# Patient Record
Sex: MALE | Race: WHITE | NOT HISPANIC OR LATINO
[De-identification: names, ages, dates, MRNs, and addresses within clinical notes are randomized per-mention and may not be internally consistent; named-entity substitution may affect disease eponyms.]

---

## 2020-01-13 ENCOUNTER — APPOINTMENT (OUTPATIENT)
Dept: SURGERY | Facility: HOSPITAL | Age: 85
End: 2020-01-13

## 2020-01-13 ENCOUNTER — OUTPATIENT (OUTPATIENT)
Dept: OUTPATIENT SERVICES | Facility: HOSPITAL | Age: 85
LOS: 1 days | Discharge: HOME | End: 2020-01-13
Payer: COMMERCIAL

## 2020-01-13 ENCOUNTER — APPOINTMENT (OUTPATIENT)
Dept: WOUND CARE | Facility: HOSPITAL | Age: 85
End: 2020-01-13
Payer: MEDICARE

## 2020-01-13 VITALS
HEART RATE: 84 BPM | BODY MASS INDEX: 23.75 KG/M2 | SYSTOLIC BLOOD PRESSURE: 147 MMHG | TEMPERATURE: 97.1 F | RESPIRATION RATE: 20 BRPM | HEIGHT: 76 IN | WEIGHT: 195 LBS | DIASTOLIC BLOOD PRESSURE: 72 MMHG

## 2020-01-13 DIAGNOSIS — L97.101 NON-PRESSURE CHRONIC ULCER OF UNSPECIFIED THIGH LIMITED TO BREAKDOWN OF SKIN: ICD-10-CM

## 2020-01-13 DIAGNOSIS — Z78.9 OTHER SPECIFIED HEALTH STATUS: ICD-10-CM

## 2020-01-13 PROCEDURE — G0463: CPT

## 2020-01-13 PROCEDURE — 99204 OFFICE O/P NEW MOD 45 MIN: CPT

## 2020-01-13 NOTE — PLAN
[FreeTextEntry1] : Medihoney dressings with Ace wrap and leg elevation.\par Non-invasive vascular testing\par Return one week.

## 2020-01-13 NOTE — REVIEW OF SYSTEMS
[Joint Stiffness] : joint stiffness [Joint Pain] : joint pain [Skin Wound] : skin wound [Easy Bruising] : a tendency for easy bruising [Easy Bleeding] : a tendency for easy bleeding [Negative] : Endocrine [Change In A Mole] : no change in a mole

## 2020-01-13 NOTE — PHYSICAL EXAM
[Normal Breath Sounds] : Normal breath sounds [Normal Heart Sounds] : normal heart sounds [Murmur] : murmur was appreciated  [1+] : left 1+ [Ankle Swelling Bilaterally] : bilaterally  [Varicose Veins Of Lower Extremities] : bilaterally [] : bilaterally [Ankle Swelling On The Left] : moderate [Skin Ulcer] : ulcer [Oriented to Person] : oriented to person [Alert] : alert [Oriented to Place] : oriented to place [Oriented to Time] : oriented to time [Calm] : calm [JVD] : no jugular venous distention  [Purpura] : no purpura  [Abdomen Tenderness] : ~T ~M No abdominal tenderness [Petechiae] : no petechiae [Skin Induration] : no induration [de-identified] : WDWN elderly WM in NAD [de-identified] : Expressionless face [de-identified] : Faint systolic murmur present [de-identified] : Supple [de-identified] : Hyperpigmentation of skin both lower legs, left>right. Ulcer lateral aspect left lower leg through fat layer. Fibrin present at base with some viable soft tissue present. No exposure of bone, tendon, muscle. No undermining and no tunneling. [de-identified] : Tremor of both hands [FreeTextEntry1] : Left Lateral Lower Leg [FreeTextEntry2] : 3.0 [FreeTextEntry3] : 1.9 [de-identified] : Small Serosanguineous [FreeTextEntry4] : 0.3 [de-identified] : Intact/ slightly macerated [de-identified] : % [de-identified] : Medihoney/ Dry Dressing & Kerlix [de-identified] : 1-25% [de-identified] : Cleansed with Normal saline\par  [TWNoteComboBox6] : Venous [de-identified] : CIRCULATION\par Dorsalis Pedis: R palpable  L palpable\par Posterior Tibialis: R Doppler L Doppler\par Extremity Color: Pigmented\par Extremity Temperature: Warm\par Capillary Refill: > 3 seconds bilaterally\par CINTHIA: Vascular studies ordered by Dr Jose De Jesus myles sheet submitted\par \par \par  [de-identified] : None [de-identified] : None [de-identified] : Ace wraps [de-identified] : Yes

## 2020-01-13 NOTE — HISTORY OF PRESENT ILLNESS
[FreeTextEntry1] : The wound is located on Left Lower Leg & pt states he has had wound x 1 year- pt states he went to [facility] x about 7-9 months & then pt was discharged from [facility] & states he underwent Vein Procedure (pt states "tying off of the vein") while under their care- wound has gotten larger so he went to PCP (Dr Beckett) who recommended [facility]

## 2020-01-13 NOTE — ASSESSMENT
[FreeTextEntry2] : Alteration in skin integrity- promote optimal skin integrity\par  [FreeTextEntry4] : Dr Syed/ Photo taken\par Vascular studies ordered by Dr Syed- shar sheet submitted\par F/U to [facility] in 1 week\par

## 2020-01-13 NOTE — VITALS
[de-identified] : Pt denies c/o any pains or discomforts at present [Pain related to present condition?] : The patient's  pain is not related to present condition.

## 2020-01-14 DIAGNOSIS — I83.228 VARICOSE VEINS OF LEFT LOWER EXTREMITY WITH BOTH ULCER OF OTHER PART OF LOWER EXTREMITY AND INFLAMMATION: ICD-10-CM

## 2020-01-14 DIAGNOSIS — I49.3 VENTRICULAR PREMATURE DEPOLARIZATION: ICD-10-CM

## 2020-01-14 DIAGNOSIS — I34.0 NONRHEUMATIC MITRAL (VALVE) INSUFFICIENCY: ICD-10-CM

## 2020-01-14 DIAGNOSIS — M19.90 UNSPECIFIED OSTEOARTHRITIS, UNSPECIFIED SITE: ICD-10-CM

## 2020-01-14 DIAGNOSIS — Z79.899 OTHER LONG TERM (CURRENT) DRUG THERAPY: ICD-10-CM

## 2020-01-14 DIAGNOSIS — Z87.891 PERSONAL HISTORY OF NICOTINE DEPENDENCE: ICD-10-CM

## 2020-01-14 DIAGNOSIS — R01.1 CARDIAC MURMUR, UNSPECIFIED: ICD-10-CM

## 2020-01-14 DIAGNOSIS — G20 PARKINSON'S DISEASE: ICD-10-CM

## 2020-01-14 DIAGNOSIS — Z91.018 ALLERGY TO OTHER FOODS: ICD-10-CM

## 2020-01-14 DIAGNOSIS — I83.893 VARICOSE VEINS OF BILATERAL LOWER EXTREMITIES WITH OTHER COMPLICATIONS: ICD-10-CM

## 2020-01-14 DIAGNOSIS — L97.822 NON-PRESSURE CHRONIC ULCER OF OTHER PART OF LEFT LOWER LEG WITH FAT LAYER EXPOSED: ICD-10-CM

## 2020-01-21 ENCOUNTER — OUTPATIENT (OUTPATIENT)
Dept: OUTPATIENT SERVICES | Facility: HOSPITAL | Age: 85
LOS: 1 days | Discharge: HOME | End: 2020-01-21
Payer: COMMERCIAL

## 2020-01-21 ENCOUNTER — APPOINTMENT (OUTPATIENT)
Dept: WOUND CARE | Facility: HOSPITAL | Age: 85
End: 2020-01-21
Payer: MEDICARE

## 2020-01-21 VITALS
SYSTOLIC BLOOD PRESSURE: 132 MMHG | HEIGHT: 76 IN | HEART RATE: 92 BPM | BODY MASS INDEX: 23.75 KG/M2 | RESPIRATION RATE: 20 BRPM | DIASTOLIC BLOOD PRESSURE: 74 MMHG | OXYGEN SATURATION: 100 % | TEMPERATURE: 97.2 F | WEIGHT: 195 LBS

## 2020-01-21 DIAGNOSIS — G20 PARKINSON'S DISEASE: ICD-10-CM

## 2020-01-21 DIAGNOSIS — L97.101 NON-PRESSURE CHRONIC ULCER OF UNSPECIFIED THIGH LIMITED TO BREAKDOWN OF SKIN: ICD-10-CM

## 2020-01-21 DIAGNOSIS — I83.228 VARICOSE VEINS OF LEFT LOWER EXTREMITY WITH BOTH ULCER OF OTHER PART OF LOWER EXTREMITY AND INFLAMMATION: ICD-10-CM

## 2020-01-21 DIAGNOSIS — M19.90 UNSPECIFIED OSTEOARTHRITIS, UNSPECIFIED SITE: ICD-10-CM

## 2020-01-21 DIAGNOSIS — Z91.018 ALLERGY TO OTHER FOODS: ICD-10-CM

## 2020-01-21 DIAGNOSIS — I34.0 NONRHEUMATIC MITRAL (VALVE) INSUFFICIENCY: ICD-10-CM

## 2020-01-21 DIAGNOSIS — I49.3 VENTRICULAR PREMATURE DEPOLARIZATION: ICD-10-CM

## 2020-01-21 DIAGNOSIS — R01.1 CARDIAC MURMUR, UNSPECIFIED: ICD-10-CM

## 2020-01-21 DIAGNOSIS — L97.822 NON-PRESSURE CHRONIC ULCER OF OTHER PART OF LEFT LOWER LEG WITH FAT LAYER EXPOSED: ICD-10-CM

## 2020-01-21 DIAGNOSIS — I83.893 VARICOSE VEINS OF BILATERAL LOWER EXTREMITIES WITH OTHER COMPLICATIONS: ICD-10-CM

## 2020-01-21 DIAGNOSIS — Z87.891 PERSONAL HISTORY OF NICOTINE DEPENDENCE: ICD-10-CM

## 2020-01-21 DIAGNOSIS — Z79.899 OTHER LONG TERM (CURRENT) DRUG THERAPY: ICD-10-CM

## 2020-01-21 PROCEDURE — 99213 OFFICE O/P EST LOW 20 MIN: CPT

## 2020-01-21 PROCEDURE — G0463: CPT

## 2020-01-21 NOTE — ASSESSMENT
[Written] : Written [Verbal] : Verbal [Patient] : Patient [Good - alert, interested, motivated] : Good - alert, interested, motivated [Skin Care] : skin care [Dressing changes] : dressing changes [Signs and symptoms of infection] : sign and symptoms of infection [Venous Disease] : venous disease [Arterial Disease] : arterial disease [Nutrition] : nutrition [How and When to Call] : how and when to call [Off-loading] : off-loading [Compression Therapy] : compression therapy [Patient responsibility to plan of care] : patient responsibility to plan of care [Stable] : stable [Home] : Home [Ambulatory] : Ambulatory [] : No [FreeTextEntry2] : Infection prevention\par Restoration of skin integrity \par Compression therapy/ elevation  [FreeTextEntry4] : No signs/symptoms of infection. \par Pt has not yet scheduled vascular studies \par Follow up to [facility] in one week

## 2020-01-21 NOTE — REVIEW OF SYSTEMS
[Joint Pain] : joint pain [Joint Stiffness] : joint stiffness [Skin Wound] : skin wound [Easy Bleeding] : a tendency for easy bleeding [Easy Bruising] : a tendency for easy bruising [Negative] : Endocrine [Change In A Mole] : no change in a mole

## 2020-01-21 NOTE — HISTORY OF PRESENT ILLNESS
[FreeTextEntry1] : The patient is an 84 year old male who presents for follow-up of a venous stasis ulcer of his left lower leg. He is using Medihoney, and the ulcer has improved. He is awaiting vascular testing.

## 2020-01-21 NOTE — PHYSICAL EXAM
[4 x 4] : 4 x 4  [Murmur] : murmur was appreciated  [1+] : left 1+ [Ankle Swelling Bilaterally] : bilaterally  [Varicose Veins Of Lower Extremities] : bilaterally [] : bilaterally [Ankle Swelling On The Left] : moderate [Skin Ulcer] : ulcer [Alert] : alert [Oriented to Person] : oriented to person [Oriented to Place] : oriented to place [Oriented to Time] : oriented to time [Calm] : calm [JVD] : no jugular venous distention  [Abdomen Tenderness] : ~T ~M No abdominal tenderness [Purpura] : no purpura  [Petechiae] : no petechiae [Skin Induration] : no induration [de-identified] : WDWN elderly WM in NAD [de-identified] : Expressionless face [de-identified] : Supple [de-identified] : Faint systolic murmur present [de-identified] : Ulcer left lower leg smaller with less slough at base and more viable soft tissue present. No sign of infection. [de-identified] : Tremor of both hands [FreeTextEntry1] : Lateral lower leg  [FreeTextEntry2] : 2.5 [FreeTextEntry3] : 1.5 [FreeTextEntry4] : 0.1 [de-identified] : Serosanguineous  [de-identified] : 1-5% [de-identified] : Circulatory and neuromuscular function WNL post ACE application. Patient verbalizes they feel 'comfortable' post ACE application to the lower extremities.  [de-identified] : NSC, Medihoney,DD  [TWNoteComboBox1] : Left [TWNoteComboBox4] : Small [TWNoteComboBox5] : No [de-identified] : No [de-identified] : Normal [de-identified] : None [de-identified] : None [de-identified] : >75% [de-identified] : Ace wraps [de-identified] : 3x Weekly [de-identified] : Yes [de-identified] : Secondary Dressing

## 2020-01-28 ENCOUNTER — OUTPATIENT (OUTPATIENT)
Dept: OUTPATIENT SERVICES | Facility: HOSPITAL | Age: 85
LOS: 1 days | End: 2020-01-28
Payer: COMMERCIAL

## 2020-01-28 DIAGNOSIS — I83.228 VARICOSE VEINS OF LEFT LOWER EXTREMITY WITH BOTH ULCER OF OTHER PART OF LOWER EXTREMITY AND INFLAMMATION: ICD-10-CM

## 2020-01-28 PROCEDURE — 93923 UPR/LXTR ART STDY 3+ LVLS: CPT

## 2020-01-28 PROCEDURE — 93922 UPR/L XTREMITY ART 2 LEVELS: CPT | Mod: 26

## 2020-01-29 ENCOUNTER — APPOINTMENT (OUTPATIENT)
Dept: WOUND CARE | Facility: HOSPITAL | Age: 85
End: 2020-01-29
Payer: MEDICARE

## 2020-01-29 ENCOUNTER — OUTPATIENT (OUTPATIENT)
Dept: OUTPATIENT SERVICES | Facility: HOSPITAL | Age: 85
LOS: 1 days | Discharge: HOME | End: 2020-01-29
Payer: COMMERCIAL

## 2020-01-29 VITALS
DIASTOLIC BLOOD PRESSURE: 73 MMHG | OXYGEN SATURATION: 98 % | RESPIRATION RATE: 20 BRPM | WEIGHT: 195 LBS | TEMPERATURE: 97.6 F | HEIGHT: 76 IN | SYSTOLIC BLOOD PRESSURE: 134 MMHG | BODY MASS INDEX: 23.75 KG/M2 | HEART RATE: 72 BPM

## 2020-01-29 DIAGNOSIS — L97.101 NON-PRESSURE CHRONIC ULCER OF UNSPECIFIED THIGH LIMITED TO BREAKDOWN OF SKIN: ICD-10-CM

## 2020-01-29 PROCEDURE — G0463: CPT

## 2020-01-29 PROCEDURE — 99213 OFFICE O/P EST LOW 20 MIN: CPT

## 2020-01-29 NOTE — PHYSICAL EXAM
[Murmur] : murmur was appreciated  [1+] : left 1+ [Ankle Swelling Bilaterally] : bilaterally  [Varicose Veins Of Lower Extremities] : bilaterally [] : bilaterally [Ankle Swelling On The Left] : moderate [Skin Ulcer] : ulcer [Alert] : alert [Oriented to Person] : oriented to person [Oriented to Place] : oriented to place [Oriented to Time] : oriented to time [Calm] : calm [JVD] : no jugular venous distention  [Abdomen Tenderness] : ~T ~M No abdominal tenderness [Purpura] : no purpura  [Skin Induration] : no induration [Petechiae] : no petechiae [de-identified] : WDWN elderly WM in NAD [de-identified] : Expressionless face [de-identified] : Supple [de-identified] : Ulcer now very clean with no slough at base. Tissue at base is viable and pink. No sign of infection. [de-identified] : Faint systolic murmur present [de-identified] : Tremor of both hands

## 2020-01-29 NOTE — PHYSICAL EXAM
[Murmur] : murmur was appreciated  [1+] : left 1+ [Ankle Swelling Bilaterally] : bilaterally  [Varicose Veins Of Lower Extremities] : bilaterally [] : bilaterally [Ankle Swelling On The Left] : moderate [Skin Ulcer] : ulcer [Oriented to Person] : oriented to person [Alert] : alert [Oriented to Place] : oriented to place [Oriented to Time] : oriented to time [Calm] : calm [JVD] : no jugular venous distention  [Abdomen Tenderness] : ~T ~M No abdominal tenderness [Purpura] : no purpura  [Skin Induration] : no induration [Petechiae] : no petechiae [de-identified] : WDWN elderly WM in NAD [de-identified] : Expressionless face [de-identified] : Supple [de-identified] : Faint systolic murmur present [de-identified] : Ulcer now very clean with no slough at base. Tissue at base is viable and pink. No sign of infection. [de-identified] : Tremor of both hands

## 2020-01-31 DIAGNOSIS — R01.1 CARDIAC MURMUR, UNSPECIFIED: ICD-10-CM

## 2020-01-31 DIAGNOSIS — M19.90 UNSPECIFIED OSTEOARTHRITIS, UNSPECIFIED SITE: ICD-10-CM

## 2020-01-31 DIAGNOSIS — G20 PARKINSON'S DISEASE: ICD-10-CM

## 2020-01-31 DIAGNOSIS — I49.3 VENTRICULAR PREMATURE DEPOLARIZATION: ICD-10-CM

## 2020-01-31 DIAGNOSIS — Z79.899 OTHER LONG TERM (CURRENT) DRUG THERAPY: ICD-10-CM

## 2020-01-31 DIAGNOSIS — Z91.018 ALLERGY TO OTHER FOODS: ICD-10-CM

## 2020-01-31 DIAGNOSIS — I34.0 NONRHEUMATIC MITRAL (VALVE) INSUFFICIENCY: ICD-10-CM

## 2020-01-31 DIAGNOSIS — I83.893 VARICOSE VEINS OF BILATERAL LOWER EXTREMITIES WITH OTHER COMPLICATIONS: ICD-10-CM

## 2020-01-31 DIAGNOSIS — I83.228 VARICOSE VEINS OF LEFT LOWER EXTREMITY WITH BOTH ULCER OF OTHER PART OF LOWER EXTREMITY AND INFLAMMATION: ICD-10-CM

## 2020-01-31 DIAGNOSIS — Z87.891 PERSONAL HISTORY OF NICOTINE DEPENDENCE: ICD-10-CM

## 2020-01-31 DIAGNOSIS — L97.822 NON-PRESSURE CHRONIC ULCER OF OTHER PART OF LEFT LOWER LEG WITH FAT LAYER EXPOSED: ICD-10-CM

## 2020-02-12 ENCOUNTER — OUTPATIENT (OUTPATIENT)
Dept: OUTPATIENT SERVICES | Facility: HOSPITAL | Age: 85
LOS: 1 days | Discharge: HOME | End: 2020-02-12
Payer: COMMERCIAL

## 2020-02-12 ENCOUNTER — APPOINTMENT (OUTPATIENT)
Dept: WOUND CARE | Facility: HOSPITAL | Age: 85
End: 2020-02-12
Payer: MEDICARE

## 2020-02-12 VITALS
HEART RATE: 82 BPM | HEIGHT: 76 IN | WEIGHT: 195 LBS | BODY MASS INDEX: 23.75 KG/M2 | TEMPERATURE: 97.1 F | RESPIRATION RATE: 18 BRPM | SYSTOLIC BLOOD PRESSURE: 117 MMHG | DIASTOLIC BLOOD PRESSURE: 64 MMHG | OXYGEN SATURATION: 99 %

## 2020-02-12 DIAGNOSIS — L97.101 NON-PRESSURE CHRONIC ULCER OF UNSPECIFIED THIGH LIMITED TO BREAKDOWN OF SKIN: ICD-10-CM

## 2020-02-12 PROCEDURE — 99213 OFFICE O/P EST LOW 20 MIN: CPT

## 2020-02-12 PROCEDURE — G0463: CPT

## 2020-02-12 NOTE — HISTORY OF PRESENT ILLNESS
[FreeTextEntry1] : The patient is an 84 year old male who presents for follow-up of a venous stasis ulcer of the left lower leg. It is unchanged in size

## 2020-02-12 NOTE — PHYSICAL EXAM
[4 x 4] : 4 x 4  [Murmur] : murmur was appreciated  [1+] : right 1+ [Ankle Swelling Bilaterally] : bilaterally  [Varicose Veins Of Lower Extremities] : bilaterally [] : bilaterally [Ankle Swelling On The Left] : moderate [Skin Ulcer] : ulcer [Alert] : alert [Oriented to Person] : oriented to person [Oriented to Place] : oriented to place [Oriented to Time] : oriented to time [Calm] : calm [Abdomen Tenderness] : ~T ~M No abdominal tenderness [JVD] : no jugular venous distention  [Purpura] : no purpura  [Petechiae] : no petechiae [Skin Induration] : no induration [de-identified] : WDWN elderly WM in NAD [de-identified] : Faint systolic murmur present [de-identified] : Supple [de-identified] : Expressionless face [de-identified] : Tremor of both hands [de-identified] : Ulcer left lower leg contains less granulation tissue and more fibrin at base. No new skin at margins. No sign of infection. [FreeTextEntry2] : 3.0 [FreeTextEntry1] : Lower Lateral Leg  [FreeTextEntry4] : 0.2 [de-identified] : 15% [FreeTextEntry3] : 1.7 [de-identified] : Medihoney  [de-identified] : Cleansed with Normal saline\par  [TWNoteComboBox1] : Left [TWNoteComboBox4] : Small [TWNoteComboBox6] : Venous [TWNoteComboBox5] : No [de-identified] : No [de-identified] : Normal [de-identified] : None [de-identified] : >75% [de-identified] : None [de-identified] : Yes [de-identified] : Ace wraps [de-identified] : 3x Weekly [de-identified] : Primary Dressing

## 2020-02-12 NOTE — ASSESSMENT
[Verbal] : Verbal [Patient] : Patient [Fair - mild discomfort, physical impairment, low acceptance] : Fair - mild discomfort, physical impairment, low acceptance [Verbalizes knowledge/Understanding] : Verbalizes knowledge/understanding [Dressing changes] : dressing changes [Signs and symptoms of infection] : sign and symptoms of infection [How and When to Call] : how and when to call [Compression Therapy] : compression therapy [Patient responsibility to plan of care] : patient responsibility to plan of care [Stable] : stable [Home] : Home [Ambulatory] : Ambulatory [Not Applicable - Long Term Care/Home Health Agency] : Long Term Care/Home Health Agency: Not Applicable [] : No [FreeTextEntry2] : Infection Prevention \par Enzymatic debridement \par Edema Control\par F/U 1 week [FreeTextEntry4] : F/U 1 week

## 2020-02-14 DIAGNOSIS — I83.228 VARICOSE VEINS OF LEFT LOWER EXTREMITY WITH BOTH ULCER OF OTHER PART OF LOWER EXTREMITY AND INFLAMMATION: ICD-10-CM

## 2020-02-14 DIAGNOSIS — I49.3 VENTRICULAR PREMATURE DEPOLARIZATION: ICD-10-CM

## 2020-02-14 DIAGNOSIS — G20 PARKINSON'S DISEASE: ICD-10-CM

## 2020-02-14 DIAGNOSIS — Z91.018 ALLERGY TO OTHER FOODS: ICD-10-CM

## 2020-02-14 DIAGNOSIS — Z79.899 OTHER LONG TERM (CURRENT) DRUG THERAPY: ICD-10-CM

## 2020-02-14 DIAGNOSIS — I34.0 NONRHEUMATIC MITRAL (VALVE) INSUFFICIENCY: ICD-10-CM

## 2020-02-14 DIAGNOSIS — R01.1 CARDIAC MURMUR, UNSPECIFIED: ICD-10-CM

## 2020-02-14 DIAGNOSIS — I83.893 VARICOSE VEINS OF BILATERAL LOWER EXTREMITIES WITH OTHER COMPLICATIONS: ICD-10-CM

## 2020-02-14 DIAGNOSIS — L97.822 NON-PRESSURE CHRONIC ULCER OF OTHER PART OF LEFT LOWER LEG WITH FAT LAYER EXPOSED: ICD-10-CM

## 2020-02-14 DIAGNOSIS — Z87.891 PERSONAL HISTORY OF NICOTINE DEPENDENCE: ICD-10-CM

## 2020-02-14 DIAGNOSIS — M19.90 UNSPECIFIED OSTEOARTHRITIS, UNSPECIFIED SITE: ICD-10-CM

## 2020-02-19 ENCOUNTER — OUTPATIENT (OUTPATIENT)
Dept: OUTPATIENT SERVICES | Facility: HOSPITAL | Age: 85
LOS: 1 days | Discharge: HOME | End: 2020-02-19
Payer: COMMERCIAL

## 2020-02-19 ENCOUNTER — APPOINTMENT (OUTPATIENT)
Dept: SURGERY | Facility: HOSPITAL | Age: 85
End: 2020-02-19

## 2020-02-19 ENCOUNTER — APPOINTMENT (OUTPATIENT)
Dept: WOUND CARE | Facility: HOSPITAL | Age: 85
End: 2020-02-19
Payer: MEDICARE

## 2020-02-19 VITALS
OXYGEN SATURATION: 99 % | DIASTOLIC BLOOD PRESSURE: 71 MMHG | RESPIRATION RATE: 20 BRPM | TEMPERATURE: 98.1 F | HEART RATE: 65 BPM | SYSTOLIC BLOOD PRESSURE: 136 MMHG

## 2020-02-19 DIAGNOSIS — I83.228 VARICOSE VEINS OF LEFT LOWER EXTREMITY WITH BOTH ULCER OF OTHER PART OF LOWER EXTREMITY AND INFLAMMATION: ICD-10-CM

## 2020-02-19 PROCEDURE — 99213 OFFICE O/P EST LOW 20 MIN: CPT

## 2020-02-19 PROCEDURE — G0463: CPT

## 2020-02-19 NOTE — HISTORY OF PRESENT ILLNESS
[FreeTextEntry1] : The patient is an 84 year old male who presents for follow-up of a venous stasis ulcer of his left lower leg. He is using Medihoney, and the ulcer is improved.

## 2020-02-19 NOTE — REVIEW OF SYSTEMS
[Joint Pain] : joint pain [Skin Wound] : skin wound [Joint Stiffness] : joint stiffness [Easy Bleeding] : a tendency for easy bleeding [Easy Bruising] : a tendency for easy bruising [Negative] : Endocrine [Change In A Mole] : no change in a mole

## 2020-02-19 NOTE — PHYSICAL EXAM
[Murmur] : murmur was appreciated  [Ankle Swelling Bilaterally] : bilaterally  [1+] : right 1+ [Varicose Veins Of Lower Extremities] : bilaterally [Ankle Swelling On The Left] : moderate [] : present [Alert] : alert [Skin Ulcer] : ulcer [Oriented to Time] : oriented to time [Oriented to Place] : oriented to place [Oriented to Person] : oriented to person [Calm] : calm [4 x 4] : 4 x 4  [JVD] : no jugular venous distention  [Abdomen Tenderness] : ~T ~M No abdominal tenderness [Purpura] : no purpura  [Petechiae] : no petechiae [de-identified] : WDWN elderly WM in NAD [de-identified] : Expressionless face [Skin Induration] : no induration [de-identified] : Supple [de-identified] : Faint systolic murmur present [de-identified] : Ulcer left lower leg  with less fibrin and more viable soft tissue present. Periwound clean. [de-identified] : Tremor of both hands [FreeTextEntry1] : Left lateral lower leg  [FreeTextEntry2] : 2.7 [de-identified] : No neurovascular deficits noted.\par  [FreeTextEntry4] : 0.2 [FreeTextEntry3] : 1.3 [de-identified] : Serous/sanguinous [de-identified] : 5% [de-identified] : Medihoney [de-identified] : Expressed comfort post ACE application. [de-identified] : Cleansed with NS\par Kerlix  [de-identified] : Normal [de-identified] : None [TWNoteComboBox4] : Small [de-identified] : >75% [de-identified] : None [de-identified] : Yes [de-identified] : Ace wraps [de-identified] : Primary Dressing [de-identified] : Every other day

## 2020-02-19 NOTE — ASSESSMENT
[Verbal] : Verbal [Good - alert, interested, motivated] : Good - alert, interested, motivated [Patient] : Patient [Verbalizes knowledge/Understanding] : Verbalizes knowledge/understanding [Dressing changes] : dressing changes [Signs and symptoms of infection] : sign and symptoms of infection [Skin Care] : skin care [How and When to Call] : how and when to call [Venous Disease] : venous disease [Patient responsibility to plan of care] : patient responsibility to plan of care [Compression Therapy] : compression therapy [Stable] : stable [Home] : Home [Ambulatory] : Ambulatory [Not Applicable - Long Term Care/Home Health Agency] : Long Term Care/Home Health Agency: Not Applicable [] : No [FreeTextEntry4] : Follow up in 1 week  [FreeTextEntry2] : Autolytic debridement \par Localized wound care \par Infection prevention \par Compression therapy

## 2020-02-21 DIAGNOSIS — I83.893 VARICOSE VEINS OF BILATERAL LOWER EXTREMITIES WITH OTHER COMPLICATIONS: ICD-10-CM

## 2020-02-21 DIAGNOSIS — I49.3 VENTRICULAR PREMATURE DEPOLARIZATION: ICD-10-CM

## 2020-02-21 DIAGNOSIS — L97.822 NON-PRESSURE CHRONIC ULCER OF OTHER PART OF LEFT LOWER LEG WITH FAT LAYER EXPOSED: ICD-10-CM

## 2020-02-21 DIAGNOSIS — I34.0 NONRHEUMATIC MITRAL (VALVE) INSUFFICIENCY: ICD-10-CM

## 2020-02-21 DIAGNOSIS — Z91.018 ALLERGY TO OTHER FOODS: ICD-10-CM

## 2020-02-21 DIAGNOSIS — Z79.899 OTHER LONG TERM (CURRENT) DRUG THERAPY: ICD-10-CM

## 2020-02-21 DIAGNOSIS — G20 PARKINSON'S DISEASE: ICD-10-CM

## 2020-02-21 DIAGNOSIS — M19.90 UNSPECIFIED OSTEOARTHRITIS, UNSPECIFIED SITE: ICD-10-CM

## 2020-02-21 DIAGNOSIS — I83.228 VARICOSE VEINS OF LEFT LOWER EXTREMITY WITH BOTH ULCER OF OTHER PART OF LOWER EXTREMITY AND INFLAMMATION: ICD-10-CM

## 2020-02-21 DIAGNOSIS — Z87.891 PERSONAL HISTORY OF NICOTINE DEPENDENCE: ICD-10-CM

## 2020-02-21 DIAGNOSIS — R01.1 CARDIAC MURMUR, UNSPECIFIED: ICD-10-CM

## 2020-02-26 ENCOUNTER — APPOINTMENT (OUTPATIENT)
Dept: WOUND CARE | Facility: HOSPITAL | Age: 85
End: 2020-02-26
Payer: MEDICARE

## 2020-02-26 ENCOUNTER — OUTPATIENT (OUTPATIENT)
Dept: OUTPATIENT SERVICES | Facility: HOSPITAL | Age: 85
LOS: 1 days | Discharge: HOME | End: 2020-02-26
Payer: COMMERCIAL

## 2020-02-26 VITALS
OXYGEN SATURATION: 97 % | SYSTOLIC BLOOD PRESSURE: 139 MMHG | RESPIRATION RATE: 20 BRPM | HEIGHT: 76 IN | TEMPERATURE: 97.3 F | BODY MASS INDEX: 23.75 KG/M2 | HEART RATE: 58 BPM | DIASTOLIC BLOOD PRESSURE: 75 MMHG | WEIGHT: 195 LBS

## 2020-02-26 DIAGNOSIS — I83.228 VARICOSE VEINS OF LEFT LOWER EXTREMITY WITH BOTH ULCER OF OTHER PART OF LOWER EXTREMITY AND INFLAMMATION: ICD-10-CM

## 2020-02-26 PROCEDURE — G0463: CPT

## 2020-02-26 PROCEDURE — 99213 OFFICE O/P EST LOW 20 MIN: CPT

## 2020-02-26 NOTE — ASSESSMENT
[Patient] : Patient [Verbal] : Verbal [Good - alert, interested, motivated] : Good - alert, interested, motivated [Dressing changes] : dressing changes [Verbalizes knowledge/Understanding] : Verbalizes knowledge/understanding [Skin Care] : skin care [Signs and symptoms of infection] : sign and symptoms of infection [Venous Disease] : venous disease [Compression Therapy] : compression therapy [How and When to Call] : how and when to call [Patient responsibility to plan of care] : patient responsibility to plan of care [] : Yes [Stable] : stable [Ambulatory] : Ambulatory [Home] : Home [Not Applicable - Long Term Care/Home Health Agency] : Long Term Care/Home Health Agency: Not Applicable [FreeTextEntry4] : No signs/symptoms of infection. \par Follow up in 1 week  [FreeTextEntry2] : Infection prevention \par Compression therapy / elevation\par Achieving pain tolerance levels within the Pts limits of 0/10.\par Focus on maintaining pain level within acceptable limits. \par

## 2020-02-26 NOTE — HISTORY OF PRESENT ILLNESS
[FreeTextEntry1] : The patient is an 84 year old male who presents for follow-up of a venous stasis ulcer of his left lower leg. It is improved with Medihoney.

## 2020-02-26 NOTE — REVIEW OF SYSTEMS
[Skin Wound] : skin wound [Joint Pain] : joint pain [Joint Stiffness] : joint stiffness [Easy Bleeding] : a tendency for easy bleeding [Easy Bruising] : a tendency for easy bruising [Negative] : Psychiatric [Change In A Mole] : no change in a mole

## 2020-02-26 NOTE — PHYSICAL EXAM
[Abdominal Pad] : Abdominal Pad [4 x 4] : 4 x 4  [Murmur] : murmur was appreciated  [1+] : left 1+ [Ankle Swelling Bilaterally] : bilaterally  [Varicose Veins Of Lower Extremities] : bilaterally [] : present [Ankle Swelling On The Left] : moderate [Skin Ulcer] : ulcer [Alert] : alert [Oriented to Person] : oriented to person [Oriented to Time] : oriented to time [Calm] : calm [Oriented to Place] : oriented to place [JVD] : no jugular venous distention  [Purpura] : no purpura  [Petechiae] : no petechiae [Abdomen Tenderness] : ~T ~M No abdominal tenderness [Skin Induration] : no induration [de-identified] : WDWN elderly WM in NAD [de-identified] : Expressionless face [de-identified] : Supple [de-identified] : Tremor of both hands [de-identified] : Ulcer left lower leg smaller with less slough at base. New skin at inferior margin. No sign of infection. [de-identified] : Faint systolic murmur present [de-identified] : \par  [FreeTextEntry1] : lateral lower leg  [FreeTextEntry2] : 2.5 [FreeTextEntry3] : 1.3 [de-identified] : Serous/sanguinous [FreeTextEntry4] : 0.2 [de-identified] : 5% [de-identified] : NSC,Medihoney,DD  [de-identified] : Circulatory and neuromuscular function WNL post ACE application. Patient verbalizes they feel 'comfortable' post ACE application to the lower extremities.  [de-identified] : None [de-identified] : Normal [TWNoteComboBox4] : Small [TWNoteComboBox1] : Left [de-identified] : Yes [de-identified] : >75% [de-identified] : None [de-identified] : Secondary Dressing [de-identified] : Ace wraps [de-identified] : 3x Weekly

## 2020-02-27 DIAGNOSIS — R01.1 CARDIAC MURMUR, UNSPECIFIED: ICD-10-CM

## 2020-02-27 DIAGNOSIS — Z91.018 ALLERGY TO OTHER FOODS: ICD-10-CM

## 2020-02-27 DIAGNOSIS — I34.0 NONRHEUMATIC MITRAL (VALVE) INSUFFICIENCY: ICD-10-CM

## 2020-02-27 DIAGNOSIS — M19.90 UNSPECIFIED OSTEOARTHRITIS, UNSPECIFIED SITE: ICD-10-CM

## 2020-02-27 DIAGNOSIS — L97.822 NON-PRESSURE CHRONIC ULCER OF OTHER PART OF LEFT LOWER LEG WITH FAT LAYER EXPOSED: ICD-10-CM

## 2020-02-27 DIAGNOSIS — Z87.891 PERSONAL HISTORY OF NICOTINE DEPENDENCE: ICD-10-CM

## 2020-02-27 DIAGNOSIS — I83.228 VARICOSE VEINS OF LEFT LOWER EXTREMITY WITH BOTH ULCER OF OTHER PART OF LOWER EXTREMITY AND INFLAMMATION: ICD-10-CM

## 2020-02-27 DIAGNOSIS — I49.3 VENTRICULAR PREMATURE DEPOLARIZATION: ICD-10-CM

## 2020-02-27 DIAGNOSIS — Z79.899 OTHER LONG TERM (CURRENT) DRUG THERAPY: ICD-10-CM

## 2020-02-27 DIAGNOSIS — G20 PARKINSON'S DISEASE: ICD-10-CM

## 2020-02-27 DIAGNOSIS — I83.893 VARICOSE VEINS OF BILATERAL LOWER EXTREMITIES WITH OTHER COMPLICATIONS: ICD-10-CM

## 2020-03-04 ENCOUNTER — APPOINTMENT (OUTPATIENT)
Dept: WOUND CARE | Facility: HOSPITAL | Age: 85
End: 2020-03-04
Payer: MEDICARE

## 2020-03-04 ENCOUNTER — OUTPATIENT (OUTPATIENT)
Dept: OUTPATIENT SERVICES | Facility: HOSPITAL | Age: 85
LOS: 1 days | Discharge: HOME | End: 2020-03-04
Payer: COMMERCIAL

## 2020-03-04 VITALS
BODY MASS INDEX: 23.75 KG/M2 | HEIGHT: 76 IN | OXYGEN SATURATION: 98 % | RESPIRATION RATE: 20 BRPM | TEMPERATURE: 98.4 F | SYSTOLIC BLOOD PRESSURE: 117 MMHG | HEART RATE: 69 BPM | WEIGHT: 195 LBS | DIASTOLIC BLOOD PRESSURE: 71 MMHG

## 2020-03-04 DIAGNOSIS — I83.228 VARICOSE VEINS OF LEFT LOWER EXTREMITY WITH BOTH ULCER OF OTHER PART OF LOWER EXTREMITY AND INFLAMMATION: ICD-10-CM

## 2020-03-04 PROCEDURE — 73610 X-RAY EXAM OF ANKLE: CPT | Mod: 26,LT

## 2020-03-04 PROCEDURE — G0463: CPT

## 2020-03-04 PROCEDURE — 73610 X-RAY EXAM OF ANKLE: CPT

## 2020-03-04 PROCEDURE — 73590 X-RAY EXAM OF LOWER LEG: CPT | Mod: 26,LT

## 2020-03-04 PROCEDURE — 73590 X-RAY EXAM OF LOWER LEG: CPT

## 2020-03-04 PROCEDURE — 99214 OFFICE O/P EST MOD 30 MIN: CPT

## 2020-03-06 NOTE — ASSESSMENT
[Verbal] : Verbal [Patient] : Patient [Verbalizes knowledge/Understanding] : Verbalizes knowledge/understanding [Good - alert, interested, motivated] : Good - alert, interested, motivated [Dressing changes] : dressing changes [Skin Care] : skin care [Signs and symptoms of infection] : sign and symptoms of infection [Venous Disease] : venous disease [How and When to Call] : how and when to call [Compression Therapy] : compression therapy [Patient responsibility to plan of care] : patient responsibility to plan of care [] : Yes [Stable] : stable [Ambulatory] : Ambulatory [Home] : Home [Not Applicable - Long Term Care/Home Health Agency] : Long Term Care/Home Health Agency: Not Applicable [FreeTextEntry2] : Infection prevention \par Compression therapy / elevation\par Achieving pain tolerance levels within the Pts limits of 0/10.\par Focus on maintaining pain level within acceptable limits. \par ON 03/06/20 [facility] MEDICAL DIRECTOR REQUEST PT OBTAIN MRI OF LEFT LEG AND ADVISED AUTHORIZATION WAS SUBMITTED\par  [FreeTextEntry4] : No signs/symptoms of infection. \par Follow up in 1 week

## 2020-03-06 NOTE — PLAN
[FreeTextEntry1] : Patient referred to DR. Christian for vascular surgical evaluation.\par Start Mupirocin dressings with Ace wrap. Hold Medihoney.\par Xray distal tibia/fibula and ankle\par Return one week.

## 2020-03-06 NOTE — HISTORY OF PRESENT ILLNESS
[FreeTextEntry1] : The patient is an 84 year old male who presents for follow-up of a venous stasis ulcer of the lower left leg. The ulcer tunnels below muscle as of today.

## 2020-03-06 NOTE — PHYSICAL EXAM
[4 x 4] : 4 x 4  [Abdominal Pad] : Abdominal Pad [Murmur] : murmur was appreciated  [1+] : left 1+ [Ankle Swelling Bilaterally] : bilaterally  [Varicose Veins Of Lower Extremities] : bilaterally [] : bilaterally [Ankle Swelling On The Left] : moderate [Skin Ulcer] : ulcer [Alert] : alert [Oriented to Person] : oriented to person [Oriented to Place] : oriented to place [Oriented to Time] : oriented to time [Calm] : calm [JVD] : no jugular venous distention  [Abdomen Tenderness] : ~T ~M No abdominal tenderness [Purpura] : no purpura  [Petechiae] : no petechiae [Skin Induration] : no induration [de-identified] : WDWN elderly WM in NAD [de-identified] : Expressionless face [de-identified] : Supple [de-identified] : Faint systolic murmur present [de-identified] : Ulcer left lower leg tunnels below necrotic muscle layer. Bone is palpable but not exposed at base. Some bleeding present from skin edges. No odor, no pus, no cellulitis. [de-identified] : Tremor of both hands [de-identified] : \par  [FreeTextEntry1] : lateral lower leg  [FreeTextEntry2] : 2.5 [FreeTextEntry3] : 1.3 [FreeTextEntry4] : 0.2- Probes to bone  [de-identified] : Serous/sanguinous [de-identified] : 1-5% [de-identified] : Circulatory and neuromuscular function WNL post ACE application. Patient verbalizes they feel 'comfortable' post ACE application to the lower extremities.  [de-identified] : PEDRO,bactroban,DD  [TWNoteComboBox1] : Left [TWNoteComboBox4] : Small [de-identified] : Normal [de-identified] : None [de-identified] : None [de-identified] : >75% [de-identified] : Yes [de-identified] : Ace wraps [de-identified] : 2x Daily [de-identified] : Secondary Dressing

## 2020-03-08 DIAGNOSIS — L97.825 NON-PRESSURE CHRONIC ULCER OF OTHER PART OF LEFT LOWER LEG WITH MUSCLE INVOLVEMENT WITHOUT EVIDENCE OF NECROSIS: ICD-10-CM

## 2020-03-08 DIAGNOSIS — I83.893 VARICOSE VEINS OF BILATERAL LOWER EXTREMITIES WITH OTHER COMPLICATIONS: ICD-10-CM

## 2020-03-08 DIAGNOSIS — Z79.899 OTHER LONG TERM (CURRENT) DRUG THERAPY: ICD-10-CM

## 2020-03-08 DIAGNOSIS — I34.0 NONRHEUMATIC MITRAL (VALVE) INSUFFICIENCY: ICD-10-CM

## 2020-03-08 DIAGNOSIS — I49.3 VENTRICULAR PREMATURE DEPOLARIZATION: ICD-10-CM

## 2020-03-08 DIAGNOSIS — M19.90 UNSPECIFIED OSTEOARTHRITIS, UNSPECIFIED SITE: ICD-10-CM

## 2020-03-08 DIAGNOSIS — I83.228 VARICOSE VEINS OF LEFT LOWER EXTREMITY WITH BOTH ULCER OF OTHER PART OF LOWER EXTREMITY AND INFLAMMATION: ICD-10-CM

## 2020-03-08 DIAGNOSIS — R01.1 CARDIAC MURMUR, UNSPECIFIED: ICD-10-CM

## 2020-03-08 DIAGNOSIS — Z87.891 PERSONAL HISTORY OF NICOTINE DEPENDENCE: ICD-10-CM

## 2020-03-08 DIAGNOSIS — G20 PARKINSON'S DISEASE: ICD-10-CM

## 2020-03-08 DIAGNOSIS — Z91.018 ALLERGY TO OTHER FOODS: ICD-10-CM

## 2020-03-11 ENCOUNTER — OUTPATIENT (OUTPATIENT)
Dept: OUTPATIENT SERVICES | Facility: HOSPITAL | Age: 85
LOS: 1 days | Discharge: HOME | End: 2020-03-11
Payer: COMMERCIAL

## 2020-03-11 ENCOUNTER — APPOINTMENT (OUTPATIENT)
Dept: WOUND CARE | Facility: HOSPITAL | Age: 85
End: 2020-03-11
Payer: MEDICARE

## 2020-03-11 VITALS
OXYGEN SATURATION: 97 % | SYSTOLIC BLOOD PRESSURE: 119 MMHG | DIASTOLIC BLOOD PRESSURE: 73 MMHG | WEIGHT: 195 LBS | RESPIRATION RATE: 20 BRPM | BODY MASS INDEX: 23.75 KG/M2 | HEIGHT: 76 IN | HEART RATE: 88 BPM | TEMPERATURE: 98.8 F

## 2020-03-11 DIAGNOSIS — R01.1 CARDIAC MURMUR, UNSPECIFIED: ICD-10-CM

## 2020-03-11 DIAGNOSIS — I83.228 VARICOSE VEINS OF LEFT LOWER EXTREMITY WITH BOTH ULCER OF OTHER PART OF LOWER EXTREMITY AND INFLAMMATION: ICD-10-CM

## 2020-03-11 DIAGNOSIS — Z87.891 PERSONAL HISTORY OF NICOTINE DEPENDENCE: ICD-10-CM

## 2020-03-11 DIAGNOSIS — Z79.899 OTHER LONG TERM (CURRENT) DRUG THERAPY: ICD-10-CM

## 2020-03-11 DIAGNOSIS — I83.893 VARICOSE VEINS OF BILATERAL LOWER EXTREMITIES WITH OTHER COMPLICATIONS: ICD-10-CM

## 2020-03-11 DIAGNOSIS — I34.0 NONRHEUMATIC MITRAL (VALVE) INSUFFICIENCY: ICD-10-CM

## 2020-03-11 DIAGNOSIS — L97.825 NON-PRESSURE CHRONIC ULCER OF OTHER PART OF LEFT LOWER LEG WITH MUSCLE INVOLVEMENT WITHOUT EVIDENCE OF NECROSIS: ICD-10-CM

## 2020-03-11 DIAGNOSIS — G20 PARKINSON'S DISEASE: ICD-10-CM

## 2020-03-11 DIAGNOSIS — M19.90 UNSPECIFIED OSTEOARTHRITIS, UNSPECIFIED SITE: ICD-10-CM

## 2020-03-11 DIAGNOSIS — Z91.018 ALLERGY TO OTHER FOODS: ICD-10-CM

## 2020-03-11 DIAGNOSIS — I49.3 VENTRICULAR PREMATURE DEPOLARIZATION: ICD-10-CM

## 2020-03-11 LAB
BUN SERPL-MCNC: 17 MG/DL — SIGNIFICANT CHANGE UP (ref 7–23)
CREAT SERPL-MCNC: 0.99 MG/DL — SIGNIFICANT CHANGE UP (ref 0.5–1.3)

## 2020-03-11 PROCEDURE — 99213 OFFICE O/P EST LOW 20 MIN: CPT

## 2020-03-11 PROCEDURE — G0463: CPT

## 2020-03-11 PROCEDURE — 84520 ASSAY OF UREA NITROGEN: CPT

## 2020-03-11 PROCEDURE — 82565 ASSAY OF CREATININE: CPT

## 2020-03-11 NOTE — PHYSICAL EXAM
[4 x 4] : 4 x 4  [Murmur] : murmur was appreciated  [1+] : left 1+ [Ankle Swelling Bilaterally] : bilaterally  [Varicose Veins Of Lower Extremities] : bilaterally [] : bilaterally [Ankle Swelling On The Left] : moderate [Skin Ulcer] : ulcer [Alert] : alert [Oriented to Person] : oriented to person [Oriented to Place] : oriented to place [Oriented to Time] : oriented to time [Calm] : calm [JVD] : no jugular venous distention  [Abdomen Tenderness] : ~T ~M No abdominal tenderness [Purpura] : no purpura  [Petechiae] : no petechiae [Skin Induration] : no induration [de-identified] : WDWN elderly WM in NAD [de-identified] : Expressionless face [de-identified] : Supple [de-identified] : Faint systolic murmur present [de-identified] : Ulcer left lower leg  with areas of viable soft tissue now present at base. No odor and no pus present. Bone is not exposed. No cellulitis present. [de-identified] : Tremor of both hands [FreeTextEntry1] : Lateral Lower Leg  [FreeTextEntry2] : 3.0 [FreeTextEntry3] : 1.9 [FreeTextEntry4] : 0.2 [de-identified] : 5% [de-identified] : Mupirocin  [de-identified] : Cleansed with Normal saline\par  [TWNoteComboBox1] : Left [TWNoteComboBox4] : Small [TWNoteComboBox5] : No [TWNoteComboBox6] : Other [de-identified] : No [de-identified] : Normal [de-identified] : None [de-identified] : None [de-identified] : >75% [de-identified] : Yes [de-identified] : Ace wraps [de-identified] : 2x Weekly [de-identified] : Primary Dressing

## 2020-03-11 NOTE — PLAN
[FreeTextEntry1] : As per medical director, patient is to have an MRI of the left lower leg\par Continue Mupirocin with Ace wrap\par Return one week.

## 2020-03-11 NOTE — ASSESSMENT
[Verbal] : Verbal [Patient] : Patient [Fair - mild discomfort, physical impairment, low acceptance] : Fair - mild discomfort, physical impairment, low acceptance [Verbalizes knowledge/Understanding] : Verbalizes knowledge/understanding [Dressing changes] : dressing changes [Signs and symptoms of infection] : sign and symptoms of infection [How and When to Call] : how and when to call [Labs and Tests] : labs and tests [Compression Therapy] : compression therapy [Patient responsibility to plan of care] : patient responsibility to plan of care [Stable] : stable [Home] : Home [Ambulatory] : Ambulatory [Not Applicable - Long Term Care/Home Health Agency] : Long Term Care/Home Health Agency: Not Applicable [] : No [FreeTextEntry2] : Infection Prevention\par Edema Control\par MRI of left lower leg \par Maintain acceptable pain limit of 0/10\par F/U 1 week  [FreeTextEntry4] : Patient provided with a prescription to complete an MRI of the left leg.Authorization for MRI of left leg submitted today. \par F/U 1 week

## 2020-03-11 NOTE — HISTORY OF PRESENT ILLNESS
[FreeTextEntry1] : The patient is an 84 year old male who presents for follow-up of an ulcer of his left lower leg. The ulcer was found to involve the muscle layer, and an xray of the area showed no sign of bone involvement. He has been using Mupirocin dressings, and the ulcer is improved.

## 2020-03-18 ENCOUNTER — OUTPATIENT (OUTPATIENT)
Dept: OUTPATIENT SERVICES | Facility: HOSPITAL | Age: 85
LOS: 1 days | Discharge: HOME | End: 2020-03-18
Payer: COMMERCIAL

## 2020-03-18 ENCOUNTER — APPOINTMENT (OUTPATIENT)
Dept: WOUND CARE | Facility: HOSPITAL | Age: 85
End: 2020-03-18
Payer: MEDICARE

## 2020-03-18 VITALS
TEMPERATURE: 97.6 F | HEART RATE: 99 BPM | BODY MASS INDEX: 23.75 KG/M2 | OXYGEN SATURATION: 95 % | RESPIRATION RATE: 20 BRPM | DIASTOLIC BLOOD PRESSURE: 77 MMHG | SYSTOLIC BLOOD PRESSURE: 129 MMHG | WEIGHT: 195 LBS | HEIGHT: 76 IN

## 2020-03-18 DIAGNOSIS — I83.228 VARICOSE VEINS OF LEFT LOWER EXTREMITY WITH BOTH ULCER OF OTHER PART OF LOWER EXTREMITY AND INFLAMMATION: ICD-10-CM

## 2020-03-18 PROCEDURE — 99213 OFFICE O/P EST LOW 20 MIN: CPT

## 2020-03-18 PROCEDURE — G0463: CPT

## 2020-03-18 NOTE — HISTORY OF PRESENT ILLNESS
[FreeTextEntry1] : The patient is an 84 year old male who presents for follow-up of an ulcer of his left lower leg. It is somewhat improved with the use of Mupirocin. An MRI showed no sign of osteomyelitis as per the interpreting radiologist. Patient's appointment with vascular surgeon has been delayed due to Coronavirus  problems.

## 2020-03-18 NOTE — ASSESSMENT
[Verbal] : Verbal [Written] : Written [Demo] : Demo [Good - alert, interested, motivated] : Good - alert, interested, motivated [Needs reinforcement] : needs reinforcement [Dressing changes] : dressing changes [Skin Care] : skin care [Signs and symptoms of infection] : sign and symptoms of infection [Venous Disease] : venous disease [How and When to Call] : how and when to call [Labs and Tests] : labs and tests [Pain Management] : pain management [Compression Therapy] : compression therapy [Patient responsibility to plan of care] : patient responsibility to plan of care [Stable] : stable [Home] : Home [Ambulatory] : Ambulatory [Not Applicable - Long Term Care/Home Health Agency] : Long Term Care/Home Health Agency: Not Applicable [] : No [FreeTextEntry2] : Infection prevention \par Localized wound care \par Compression therapy  [FreeTextEntry3] : Wound the same in status  [FreeTextEntry4] : MRI results reviewed by MD with patient. \par Patient stated he has an appointment with Dr. Christian some time in May 2020. \par Follow up in 1 week

## 2020-03-18 NOTE — PLAN
[FreeTextEntry1] : Continue Mupirocin with Ace wraps\par Ace wrap to right lower leg as well.\par Return one week.

## 2020-03-18 NOTE — PHYSICAL EXAM
[Murmur] : murmur was appreciated  [1+] : left 1+ [Ankle Swelling Bilaterally] : bilaterally  [Varicose Veins Of Lower Extremities] : bilaterally [] : bilaterally [Ankle Swelling On The Left] : moderate [Skin Ulcer] : ulcer [Alert] : alert [Oriented to Person] : oriented to person [Oriented to Place] : oriented to place [Oriented to Time] : oriented to time [Calm] : calm [4 x 4] : 4 x 4  [JVD] : no jugular venous distention  [Abdomen Tenderness] : ~T ~M No abdominal tenderness [Purpura] : no purpura  [Petechiae] : no petechiae [Skin Induration] : no induration [de-identified] : WDWN elderly WM in NAD [de-identified] : Expressionless face [de-identified] : Supple [de-identified] : Faint systolic murmur present [de-identified] : Left lower leg ulcer open with more viable soft tissue exposed at base. No odor, no pus, no cellulitis [de-identified] : Tremor of both hands [de-identified] : + 1 edema lower left leg  [FreeTextEntry1] : Left lateral lower leg  [FreeTextEntry2] : 3 [FreeTextEntry3] : 2 [FreeTextEntry4] : 0.2 [de-identified] : Serous/sanguinous [de-identified] : 15% [de-identified] : Expressed comfort post ACE application. [de-identified] : Bactroban  [de-identified] : Cleansed with NS\par Kerlix  [TWNoteComboBox4] : Small [de-identified] : Normal [de-identified] : None [de-identified] : None [de-identified] : >75% [de-identified] : Yes [de-identified] : Ace wraps [de-identified] : 2x Daily [de-identified] : Primary Dressing

## 2020-03-21 DIAGNOSIS — I34.0 NONRHEUMATIC MITRAL (VALVE) INSUFFICIENCY: ICD-10-CM

## 2020-03-21 DIAGNOSIS — Z87.891 PERSONAL HISTORY OF NICOTINE DEPENDENCE: ICD-10-CM

## 2020-03-21 DIAGNOSIS — Z79.899 OTHER LONG TERM (CURRENT) DRUG THERAPY: ICD-10-CM

## 2020-03-21 DIAGNOSIS — Z91.018 ALLERGY TO OTHER FOODS: ICD-10-CM

## 2020-03-21 DIAGNOSIS — M19.90 UNSPECIFIED OSTEOARTHRITIS, UNSPECIFIED SITE: ICD-10-CM

## 2020-03-21 DIAGNOSIS — L97.825 NON-PRESSURE CHRONIC ULCER OF OTHER PART OF LEFT LOWER LEG WITH MUSCLE INVOLVEMENT WITHOUT EVIDENCE OF NECROSIS: ICD-10-CM

## 2020-03-21 DIAGNOSIS — G20 PARKINSON'S DISEASE: ICD-10-CM

## 2020-03-21 DIAGNOSIS — I83.893 VARICOSE VEINS OF BILATERAL LOWER EXTREMITIES WITH OTHER COMPLICATIONS: ICD-10-CM

## 2020-03-21 DIAGNOSIS — I83.228 VARICOSE VEINS OF LEFT LOWER EXTREMITY WITH BOTH ULCER OF OTHER PART OF LOWER EXTREMITY AND INFLAMMATION: ICD-10-CM

## 2020-03-21 DIAGNOSIS — I49.3 VENTRICULAR PREMATURE DEPOLARIZATION: ICD-10-CM

## 2020-03-21 DIAGNOSIS — R01.1 CARDIAC MURMUR, UNSPECIFIED: ICD-10-CM

## 2020-03-25 ENCOUNTER — APPOINTMENT (OUTPATIENT)
Dept: OBGYN | Facility: HOSPITAL | Age: 85
End: 2020-03-25
Payer: MEDICARE

## 2020-03-25 ENCOUNTER — OUTPATIENT (OUTPATIENT)
Dept: OUTPATIENT SERVICES | Facility: HOSPITAL | Age: 85
LOS: 1 days | Discharge: HOME | End: 2020-03-25
Payer: COMMERCIAL

## 2020-03-25 ENCOUNTER — APPOINTMENT (OUTPATIENT)
Dept: WOUND CARE | Facility: HOSPITAL | Age: 85
End: 2020-03-25

## 2020-03-25 VITALS
HEIGHT: 76 IN | TEMPERATURE: 98.6 F | BODY MASS INDEX: 23.75 KG/M2 | RESPIRATION RATE: 20 BRPM | SYSTOLIC BLOOD PRESSURE: 118 MMHG | WEIGHT: 195 LBS | DIASTOLIC BLOOD PRESSURE: 66 MMHG | HEART RATE: 85 BPM | OXYGEN SATURATION: 98 %

## 2020-03-25 DIAGNOSIS — I83.228 VARICOSE VEINS OF LEFT LOWER EXTREMITY WITH BOTH ULCER OF OTHER PART OF LOWER EXTREMITY AND INFLAMMATION: ICD-10-CM

## 2020-03-25 PROCEDURE — G0463: CPT

## 2020-03-25 PROCEDURE — 99213 OFFICE O/P EST LOW 20 MIN: CPT

## 2020-03-25 NOTE — PHYSICAL EXAM
Patient: Ekta Brito Date: 2018   : 1951    67 year old female      HEPATOLOGY FOLLOW UP:    Chief Complaint   Patient presents with   • Hepatitis B     cirrhosis     ong  present for entire visit.    HPI: Ekta Brito is a 67 year old ong female with a history of HBV cirrhosis who presents to clinic today for follow up.     Patient was diagnosed with hepatitis B in  during that time she did not want treatment. She was admitted to the hospital on 2017 noted to have elevated LFTs and was started on entecavir 0.5 mg p.o. daily. More recently she underwent a fibroscan which noted cirrhosis Kpa 18. Her cirrhosis is complicated by volume overload and ascites not requiring paracentesis. No history of hepatic encephalopathy, GI bleed or jaundice. Past medical history is also significant for hypertension and type 2 diabetes, complicated by retinopathy and nephropathy (CKD III).    Patient was recently seen in the ED with complaints of abdominal bloating, high blood pressure and dizziness. Patient also noted bilateral blurred vision. She also reported lower extremity edema. Her hypertension was treated and her medications were adjusted. CT of abdomen and pelvis noted were some mild to moderate mesenteric edema and abdominal and pelvic ascites no other acute findings. Paracentesis was not done at the time.    The patient presents to clinic today accompanied by her daughter for post ED follow-up. Patient reports the abdominal bloating has improved. She continues to have lower extremity edema. She admits she is following 2 g low-sodium diet. She reports bruising easily and has multiple scattered bruises throughout her upper extremities and lower extremities. She denies any abdominal pain. No fever, chills, dyspnea, dysuria, nausea, vomiting or diarrhea. She reports she is still taking entecavir 1 tablet a day. However, at her last clinic visit this was increased to 1 mg. No GI bleeding. Patient  also denies F/C, jaundice, NV, abdominal pain, SOB, cough, CP, or urinary complaints.     PAST MEDICAL HISTORY:    Hypertension                                                  Diabetes (CMS/HCC)                                            Hypercholesteremia                              12/26/2015    Elevated liver enzymes                          12/26/2015    Proliferative diabetic retinopathy              10/21/2016    Vitreous hemorrhage, left eye (CMS/HCC)         09/20/2016    Glaucoma suspect of both eyes                   09/02/2016    Wears eyeglasses                                              Full dentures                                                 HPV in female                                   04/2017       Gastroesophageal reflux disease                               Liver disease                                                   Comment: Hep B    Anemia                                                        Social History:  Social History   Substance Use Topics   • Smoking status: Never Smoker   • Smokeless tobacco: Never Used   • Alcohol use No     MEDICATIONS:  Current Outpatient Prescriptions   Medication   • metoPROLOL tartrate (LOPRESSOR) 50 MG tablet   • Insulin Pen Needle (PEN NEEDLES) 32G X 5 MM Misc   • insulin aspart (NOVOLOG FLEXPEN) 100 UNIT/ML pen-injector   • insulin lispro (HUMALOG KWIKPEN) 100 UNIT/ML pen-injector   • insulin glargine (BASAGLAR KWIKPEN) 100 UNIT/ML pen-injector   • losartan (COZAAR) 100 MG tablet   • pantoprazole (PROTONIX) 40 MG tablet   • ferrous sulfate 325 (65 FE) MG tablet   • furosemide (LASIX) 20 MG tablet   • entecavir (BARACLUDE) 0.5 MG tablet   • amLODIPine (NORVASC) 5 MG tablet   • aspirin 81 MG tablet     No current facility-administered medications for this visit.      ALLERGIES:  Allergies as of 07/20/2018   • (No Known Allergies)       REVIEW OF SYSTEMS:    Negative except for HPI    PHYSICAL EXAMINATION:  Visit Vitals  /76 (BP Location: [facility]  Patient Position: Sitting, Cuff Size: Regular)   Pulse 68   Temp 97.6 °F (36.4 °C) (Oral)   Ht 4' 9\" (1.448 m)   Wt 49 kg   SpO2 99%   BMI 23.39 kg/m²     GENERAL: This is a 67 year old ong female in no apparent distress. Well-developed, well-nourished. A&O x 3.   HEENT: Normocephalic, atraumatic. Sclerae anicteric. PERRLA.   NECK: Supple with no cervical or supraclavicular lymphadenopathy. No thyroid enlargement.   SKIN: Warm and dry without rashes or jaundice.  HEART: S1, S2 normal with no murmurs  LUNGS: Good respiratory effort. CTAB without wheezes or rales.   ABDOMEN: Soft, nondistended, nontender, obese. No hepatosplenomegaly. No masses. Bowel sounds normal. Small amount of ascites.   MUSCULOSKELETAL: + 2 LE edema, cyanosis or clubbing.   NEUROLOGIC: Alert, awake and oriented x 3 without asterixis or focal neurological deficit.  PSYCHIATRIC: Mood and affect is appropriate.    Labs: Reviewed In EPIC  MELD-Na score: 13 at 7/20/2018 10:58 AM  MELD score: 13 at 7/20/2018 10:58 AM  Calculated from:  Serum Creatinine: 1.77 mg/dL at 7/20/2018 10:58 AM  Serum Sodium: 143 mmol/L (Rounded to 137) at 7/20/2018 10:58 AM  Total Bilirubin: 0.4 mg/dL (Rounded to 1) at 7/20/2018 10:58 AM  INR(ratio): 1.1 at 7/20/2018 10:58 AM  Age: 67 years    Radiology Findings:  5/21/18 US Liver W Doppler  IMPRESSION:    1.  Mild coarsening and surface nodularity of the liver on grayscale  imaging, compatible with cirrhosis.  2.  Based on Elastography- Liver Fibrosis Staging: Moderate-Severe Metavir  Score: F3  3.  Hepatic vasculature is patent with appropriate flow direction and  velocities.  4.  Unchanged 1.5 cm hepatic cyst.    7/13/18 CT Abdomen wo contrast  IMPRESSION:   1.  Anemia.  2.  Worsened mild to moderate mesenteric edema and abdominal and pelvic  ascites.  3.  Cirrhotic configuration of the liver.  4.  Distal colonic diverticulosis.  5.  Other findings as discussed.      ASSESSMENT AND PLAN:  This is a 67 year old ong  female with a history of HBV cirrhosis who presents for follow up.     1. HBV Cirrhosis. 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   -- Diagnosed via fibroscan 3/2018. Decompensated with h/o ascites and peripheral edema  -- Defer liver transplant evaluation due to low meld    2. Chronic hepatitis B   --Increase entecavir to 1 mg po daily ( changed at last clinic visit but pt wasn't taking 1 mg)   --HBV DNA 18 (2/26/18)   --Will check Be antigen and antibody with next labs   --LFTs improved.     3. Portal hypertension/Ascites. No history of paracentesis  --Small amount of ascites on exam, + 2 LE edema   --On lasix 20 mg po daily. Labs note ERICA Scr 1.7. Hold Lasix at this time and recheck next week.   --LVP PRN   -- Reinforced 2 gram sodium diet.     4. Portal hypertension/Varices.   -- Last EGD 10/2017 no varices.  -- Repeat EGD 10/2019.     5. Hepatic encephalopathy. Grade 0.   -- Stable, not acutely encephalopathic. No asterixis.     6. HCC screening.   -- US Liver 5/21 revealed no concerning liver lesions. 1.5 cm stable hepatic cyst  -- AFP 21 (8/2017). Pending today  -- Repeat imaging and AFP every 6 months.   -- Due for screening liver US in November. Unable to do 4 phase CT due to CKD III    7. Nutrition:   -- Encouraged to maintain adequate nutrition and increase protein intake.     8. CKD III with proteinuria. SCr 1.7. Hold Lasix. Repeat BMP next week.     9. DM type II. Following with PCP    Follow up appointment: as scheduled    HEBER Calero  [facility]  Phone # [phone]  Pager# [phone]     [4 x 4] : 4 x 4  [JVD] : no jugular venous distention  [Normal Thyroid] : the thyroid was normal [Normal Breath Sounds] : Normal breath sounds [Normal Heart Sounds] : normal heart sounds [Normal Rate and Rhythm] : normal rate and rhythm [] : of the left leg [Ankle Swelling On The Left] : moderate [Abdomen Masses] : No abdominal massess [Abdomen Tenderness] : ~T ~M No abdominal tenderness [Tender] : nontender [Enlarged] : not enlarged [Alert] : alert [Oriented to Person] : oriented to person [Oriented to Place] : oriented to place [Oriented to Time] : oriented to time [Calm] : calm [de-identified] : adult WM, NAD, WD,WN, alert, Ox 3. [FreeTextEntry1] : Lateral lower leg  [FreeTextEntry2] : 2.8 [FreeTextEntry3] : 1.8 [FreeTextEntry4] : 0.2 [de-identified] : Serous/sanguinous [de-identified] : 15% [de-identified] : Circulatory and neuromuscular function WNL post ACE application. Patient verbalizes they feel 'comfortable' post ACE application to the lower extremities.  [de-identified] : PEDRO,Bactroban,DD  [TWNoteComboBox1] : Left [TWNoteComboBox4] : Small [de-identified] : Normal [de-identified] : None [de-identified] : None [de-identified] : >75% [de-identified] : Yes [de-identified] : Ace wraps [de-identified] : 2x Daily [de-identified] : Primary Dressing

## 2020-03-25 NOTE — ASSESSMENT
[Verbal] : Verbal [Patient] : Patient [Good - alert, interested, motivated] : Good - alert, interested, motivated [Verbalizes knowledge/Understanding] : Verbalizes knowledge/understanding [Dressing changes] : dressing changes [Skin Care] : skin care [Signs and symptoms of infection] : sign and symptoms of infection [Venous Disease] : venous disease [How and When to Call] : how and when to call [Compression Therapy] : compression therapy [Patient responsibility to plan of care] : patient responsibility to plan of care [Stable] : stable [Home] : Home [Ambulatory] : Ambulatory [Not Applicable - Long Term Care/Home Health Agency] : Long Term Care/Home Health Agency: Not Applicable [Pressure relief] : pressure relief [Nutrition] : nutrition [Arterial Disease] : arterial disease [] : No [FreeTextEntry2] : Infection prevention \par Restoration of skin integrity \par Compression therapy / elevation\par Achieving pain tolerance levels within the Pts limits of 0/10.\par Develop Realistic expectations and measurable goals in nursing POC to reduce, eliminate or develop acceptable pain limit tolerance.\par Utilization of offloading, taking deep breaths and guided imagery to reduce discomfort PRN.  [FreeTextEntry4] : No signs/symptoms of infection. \par Follow up in 1 week

## 2020-03-25 NOTE — HISTORY OF PRESENT ILLNESS
[FreeTextEntry1] : 83 yo WM, here for f/u of a chronic lateral LLE VSU. Gradual improvement seen. Doing well.

## 2020-03-25 NOTE — REVIEW OF SYSTEMS
[Joint Pain] : joint pain [Joint Stiffness] : joint stiffness [Skin Wound] : skin wound [Change In A Mole] : no change in a mole [Easy Bleeding] : a tendency for easy bleeding [Easy Bruising] : a tendency for easy bruising [Negative] : Endocrine

## 2020-03-29 DIAGNOSIS — I34.0 NONRHEUMATIC MITRAL (VALVE) INSUFFICIENCY: ICD-10-CM

## 2020-03-29 DIAGNOSIS — I49.3 VENTRICULAR PREMATURE DEPOLARIZATION: ICD-10-CM

## 2020-03-29 DIAGNOSIS — G20 PARKINSON'S DISEASE: ICD-10-CM

## 2020-03-29 DIAGNOSIS — L97.822 NON-PRESSURE CHRONIC ULCER OF OTHER PART OF LEFT LOWER LEG WITH FAT LAYER EXPOSED: ICD-10-CM

## 2020-03-29 DIAGNOSIS — M19.90 UNSPECIFIED OSTEOARTHRITIS, UNSPECIFIED SITE: ICD-10-CM

## 2020-03-29 DIAGNOSIS — R01.1 CARDIAC MURMUR, UNSPECIFIED: ICD-10-CM

## 2020-03-29 DIAGNOSIS — Z79.899 OTHER LONG TERM (CURRENT) DRUG THERAPY: ICD-10-CM

## 2020-03-29 DIAGNOSIS — I83.893 VARICOSE VEINS OF BILATERAL LOWER EXTREMITIES WITH OTHER COMPLICATIONS: ICD-10-CM

## 2020-03-29 DIAGNOSIS — Z91.018 ALLERGY TO OTHER FOODS: ICD-10-CM

## 2020-03-29 DIAGNOSIS — I83.228 VARICOSE VEINS OF LEFT LOWER EXTREMITY WITH BOTH ULCER OF OTHER PART OF LOWER EXTREMITY AND INFLAMMATION: ICD-10-CM

## 2020-03-29 DIAGNOSIS — Z87.891 PERSONAL HISTORY OF NICOTINE DEPENDENCE: ICD-10-CM

## 2020-03-31 ENCOUNTER — APPOINTMENT (OUTPATIENT)
Dept: VASCULAR SURGERY | Facility: CLINIC | Age: 85
End: 2020-03-31
Payer: MEDICARE

## 2020-03-31 VITALS
DIASTOLIC BLOOD PRESSURE: 84 MMHG | BODY MASS INDEX: 23.75 KG/M2 | WEIGHT: 195 LBS | TEMPERATURE: 98.1 F | HEART RATE: 101 BPM | SYSTOLIC BLOOD PRESSURE: 154 MMHG | HEIGHT: 76 IN

## 2020-03-31 PROCEDURE — 99203 OFFICE O/P NEW LOW 30 MIN: CPT

## 2020-03-31 PROCEDURE — 93970 EXTREMITY STUDY: CPT

## 2020-03-31 NOTE — HISTORY OF PRESENT ILLNESS
[FreeTextEntry1] : 84-year-old gentleman presents to the office with a left lateral malleolus wound.  Patient is unsure how long the wound has been present.  Patient has a history of left small saphenous vein ligation.\par Has been undergoing compression dressings.  He is here for evaluation.

## 2020-03-31 NOTE — ASSESSMENT
[FreeTextEntry1] : Patient with previous arterial Dopplers which were within normal limits.  In the office today patient underwent a venous duplex study which shows ligation of the left small saphenous vein.  In addition there was reflux in the femoral vein and popliteal vein.  There was also reflux through the greater saphenous vein.  At this time would continue with compression and patient may eventually be a candidate for ablation upon resolution of the coronavirus epidemic.  He will follow-up with me in 6 to 8 weeks.

## 2020-03-31 NOTE — PHYSICAL EXAM
[JVD] : no jugular venous distention  [Normal Breath Sounds] : Normal breath sounds [Normal Rate and Rhythm] : normal rate and rhythm [2+] : left 2+ [Ankle Swelling (On Exam)] : present [Varicose Veins Of Lower Extremities] : not present [] : bilaterally [Ankle Swelling Bilaterally] : severe [Abdomen Tenderness] : ~T ~M No abdominal tenderness [No Rash or Lesion] : No rash or lesion [Skin Ulcer] : ulcer [Skin Induration] : induration [Alert] : alert [Calm] : calm [de-identified] : Appears well

## 2020-04-08 ENCOUNTER — APPOINTMENT (OUTPATIENT)
Dept: WOUND CARE | Facility: HOSPITAL | Age: 85
End: 2020-04-08
Payer: MEDICARE

## 2020-04-08 ENCOUNTER — OUTPATIENT (OUTPATIENT)
Dept: OUTPATIENT SERVICES | Facility: HOSPITAL | Age: 85
LOS: 1 days | Discharge: HOME | End: 2020-04-08
Payer: COMMERCIAL

## 2020-04-08 VITALS
WEIGHT: 195 LBS | TEMPERATURE: 97.1 F | RESPIRATION RATE: 20 BRPM | HEIGHT: 76 IN | SYSTOLIC BLOOD PRESSURE: 156 MMHG | OXYGEN SATURATION: 97 % | HEART RATE: 76 BPM | BODY MASS INDEX: 23.75 KG/M2 | DIASTOLIC BLOOD PRESSURE: 82 MMHG

## 2020-04-08 DIAGNOSIS — I83.228 VARICOSE VEINS OF LEFT LOWER EXTREMITY WITH BOTH ULCER OF OTHER PART OF LOWER EXTREMITY AND INFLAMMATION: ICD-10-CM

## 2020-04-08 PROCEDURE — 99213 OFFICE O/P EST LOW 20 MIN: CPT

## 2020-04-08 PROCEDURE — G0463: CPT

## 2020-04-08 NOTE — PLAN
[FreeTextEntry1] : Patient examined and evaluated at this time.\par Continue local wound care and offloading.\par Vascular procedure to be scheduled as per dr castellanos

## 2020-04-08 NOTE — REVIEW OF SYSTEMS
[Fever] : no fever [Eye Pain] : no eye pain [Earache] : no earache [Chest Pain] : no chest pain [Shortness Of Breath] : no shortness of breath [Abdominal Pain] : no abdominal pain [Skin Wound] : skin wound [FreeTextEntry9] : phill [de-identified] : left lateral lower leg venous stasis ulceration down to skin and subcutaneous tissue with stasis dermatitis

## 2020-04-08 NOTE — ASSESSMENT
[Verbal] : Verbal [Written] : Written [Demo] : Demo [Patient] : Patient [Verbalizes knowledge/Understanding] : Verbalizes knowledge/understanding [Dressing changes] : dressing changes [Skin Care] : skin care [Signs and symptoms of infection] : sign and symptoms of infection [Venous Disease] : venous disease [Nutrition] : nutrition [How and When to Call] : how and when to call [Pain Management] : pain management [Compression Therapy] : compression therapy [Patient responsibility to plan of care] : patient responsibility to plan of care [Stable] : stable [Home] : Home [Ambulatory] : Ambulatory [Not Applicable - Long Term Care/Home Health Agency] : Long Term Care/Home Health Agency: Not Applicable [] : No [FreeTextEntry2] : Infection prevention \par Localized wound care \par Acceptable pain tolerance levels deemed to be 0/10.\par Goal of remaining pain free regarding wounds.\par  [FreeTextEntry3] : Wound remains the same in status  [FreeTextEntry4] : Patient saw Dr. Christian who wants to do surgical intervention after Covid 19 pandemic. \par Follow up in 2 to 3 weeks

## 2020-04-08 NOTE — PHYSICAL EXAM
[4 x 4] : 4 x 4  [JVD] : no jugular venous distention  [2+] : left 2+ [Ankle Swelling (On Exam)] : present [Varicose Veins Of Lower Extremities] : present [Varicose Veins Of The Left Leg] : of the left leg [] : of the left leg [Ankle Swelling On The Left] : moderate [Skin Ulcer] : ulcer [de-identified] : calm [de-identified] : 4/5 strength in all quadrants [de-identified] : left lateral lower leg venous stasis ulceration down to skin and subcutaneous tissue with stasis dermatitis [FreeTextEntry1] : Left lateral lower leg [FreeTextEntry2] : 3 [FreeTextEntry3] : 1.8 [FreeTextEntry4] : 0.2 [de-identified] : Serous/sanguinous [de-identified] : 15% [de-identified] : Expressed comfort post ACE application. [de-identified] : Hydrofera blue  [de-identified] : Cleansed with NS\par Kerlix  [TWNoteComboBox4] : Small [de-identified] : Normal [de-identified] : None [de-identified] : None [de-identified] : >75% [de-identified] : Yes [de-identified] : Ace wraps [de-identified] : 3x Weekly [de-identified] : Primary Dressing

## 2020-04-08 NOTE — HISTORY OF PRESENT ILLNESS
[FreeTextEntry1] : pt seen for left lateral lower leg venous stasis ulceration down to skin and subcutaneous tissue with stasis dermatitis

## 2020-04-11 DIAGNOSIS — I83.228 VARICOSE VEINS OF LEFT LOWER EXTREMITY WITH BOTH ULCER OF OTHER PART OF LOWER EXTREMITY AND INFLAMMATION: ICD-10-CM

## 2020-04-11 DIAGNOSIS — I83.893 VARICOSE VEINS OF BILATERAL LOWER EXTREMITIES WITH OTHER COMPLICATIONS: ICD-10-CM

## 2020-04-11 DIAGNOSIS — I34.0 NONRHEUMATIC MITRAL (VALVE) INSUFFICIENCY: ICD-10-CM

## 2020-04-11 DIAGNOSIS — I49.3 VENTRICULAR PREMATURE DEPOLARIZATION: ICD-10-CM

## 2020-04-11 DIAGNOSIS — R01.1 CARDIAC MURMUR, UNSPECIFIED: ICD-10-CM

## 2020-04-11 DIAGNOSIS — Z91.018 ALLERGY TO OTHER FOODS: ICD-10-CM

## 2020-04-11 DIAGNOSIS — Z87.891 PERSONAL HISTORY OF NICOTINE DEPENDENCE: ICD-10-CM

## 2020-04-11 DIAGNOSIS — M19.90 UNSPECIFIED OSTEOARTHRITIS, UNSPECIFIED SITE: ICD-10-CM

## 2020-04-11 DIAGNOSIS — G20 PARKINSON'S DISEASE: ICD-10-CM

## 2020-04-11 DIAGNOSIS — Z79.899 OTHER LONG TERM (CURRENT) DRUG THERAPY: ICD-10-CM

## 2020-04-11 DIAGNOSIS — L97.822 NON-PRESSURE CHRONIC ULCER OF OTHER PART OF LEFT LOWER LEG WITH FAT LAYER EXPOSED: ICD-10-CM

## 2020-04-22 ENCOUNTER — APPOINTMENT (OUTPATIENT)
Dept: OBGYN | Facility: HOSPITAL | Age: 85
End: 2020-04-22
Payer: MEDICARE

## 2020-04-22 ENCOUNTER — APPOINTMENT (OUTPATIENT)
Dept: WOUND CARE | Facility: HOSPITAL | Age: 85
End: 2020-04-22

## 2020-04-22 ENCOUNTER — OUTPATIENT (OUTPATIENT)
Dept: OUTPATIENT SERVICES | Facility: HOSPITAL | Age: 85
LOS: 1 days | Discharge: HOME | End: 2020-04-22
Payer: COMMERCIAL

## 2020-04-22 VITALS
OXYGEN SATURATION: 97 % | DIASTOLIC BLOOD PRESSURE: 80 MMHG | HEIGHT: 76 IN | HEART RATE: 64 BPM | BODY MASS INDEX: 23.75 KG/M2 | TEMPERATURE: 97.5 F | RESPIRATION RATE: 16 BRPM | WEIGHT: 195 LBS | SYSTOLIC BLOOD PRESSURE: 159 MMHG

## 2020-04-22 DIAGNOSIS — I83.228 VARICOSE VEINS OF LEFT LOWER EXTREMITY WITH BOTH ULCER OF OTHER PART OF LOWER EXTREMITY AND INFLAMMATION: ICD-10-CM

## 2020-04-22 PROCEDURE — 99213 OFFICE O/P EST LOW 20 MIN: CPT

## 2020-04-22 PROCEDURE — G0463: CPT

## 2020-04-22 NOTE — ASSESSMENT
[Verbal] : Verbal [Patient] : Patient [Good - alert, interested, motivated] : Good - alert, interested, motivated [Verbalizes knowledge/Understanding] : Verbalizes knowledge/understanding [Dressing changes] : dressing changes [Skin Care] : skin care [Signs and symptoms of infection] : sign and symptoms of infection [Venous Disease] : venous disease [How and When to Call] : how and when to call [Compression Therapy] : compression therapy [Patient responsibility to plan of care] : patient responsibility to plan of care [Stable] : stable [Ambulatory] : Ambulatory [Home] : Home [Not Applicable - Long Term Care/Home Health Agency] : Long Term Care/Home Health Agency: Not Applicable [] : Yes [FreeTextEntry4] : Dr. Berkowitz\par Patient to f/u with Dr. Christian for surgical intervention after COVID-19 pandemic.\par f/u 2 weeks [FreeTextEntry2] : Promote optimal skin integrity, infection prevention, edema control

## 2020-04-22 NOTE — PHYSICAL EXAM
[4 x 4] : 4 x 4  [Normal Thyroid] : the thyroid was normal [Normal Breath Sounds] : Normal breath sounds [Normal Heart Sounds] : normal heart sounds [Normal Rate and Rhythm] : normal rate and rhythm [Ankle Swelling (On Exam)] : present [] : of the left leg [Ankle Swelling On The Left] : moderate [Alert] : alert [Oriented to Person] : oriented to person [Oriented to Place] : oriented to place [Oriented to Time] : oriented to time [Calm] : calm [Abdomen Masses] : No abdominal massess [JVD] : no jugular venous distention  [Abdomen Tenderness] : ~T ~M No abdominal tenderness [Tender] : nontender [Enlarged] : not enlarged [de-identified] : elderly WM, NAD, WD, WN, alert, Ox 3. [FreeTextEntry1] : Left lateral lower leg - small island of epithelium within wound [FreeTextEntry3] : 1.5 [FreeTextEntry2] : 2.9 [FreeTextEntry4] : 0.2 [de-identified] : serosanguineous [de-identified] : Intact [de-identified] : >75% [de-identified] : Hydrofera blue [de-identified] : 15% [de-identified] : NSC [TWNoteComboBox4] : Small [de-identified] : None [de-identified] : Yes [de-identified] : Ace wraps [de-identified] : 3x Weekly

## 2020-04-22 NOTE — REVIEW OF SYSTEMS
[Skin Wound] : skin wound [Fever] : no fever [Eye Pain] : no eye pain [Earache] : no earache [Chest Pain] : no chest pain [Shortness Of Breath] : no shortness of breath [Abdominal Pain] : no abdominal pain [FreeTextEntry9] : phill [de-identified] : left lateral lower leg venous stasis ulceration down to skin and subcutaneous tissue with stasis dermatitis

## 2020-04-22 NOTE — HISTORY OF PRESENT ILLNESS
[FreeTextEntry1] : 85 yo WM, here for f/u of chronic LLE VSU. Using hydrofera blue. Small area of new epithel island seen at 9:00.

## 2020-04-24 DIAGNOSIS — I83.228 VARICOSE VEINS OF LEFT LOWER EXTREMITY WITH BOTH ULCER OF OTHER PART OF LOWER EXTREMITY AND INFLAMMATION: ICD-10-CM

## 2020-04-24 DIAGNOSIS — I49.3 VENTRICULAR PREMATURE DEPOLARIZATION: ICD-10-CM

## 2020-04-24 DIAGNOSIS — Z91.018 ALLERGY TO OTHER FOODS: ICD-10-CM

## 2020-04-24 DIAGNOSIS — Z87.891 PERSONAL HISTORY OF NICOTINE DEPENDENCE: ICD-10-CM

## 2020-04-24 DIAGNOSIS — I34.0 NONRHEUMATIC MITRAL (VALVE) INSUFFICIENCY: ICD-10-CM

## 2020-04-24 DIAGNOSIS — I83.893 VARICOSE VEINS OF BILATERAL LOWER EXTREMITIES WITH OTHER COMPLICATIONS: ICD-10-CM

## 2020-04-24 DIAGNOSIS — Z79.899 OTHER LONG TERM (CURRENT) DRUG THERAPY: ICD-10-CM

## 2020-04-24 DIAGNOSIS — L97.822 NON-PRESSURE CHRONIC ULCER OF OTHER PART OF LEFT LOWER LEG WITH FAT LAYER EXPOSED: ICD-10-CM

## 2020-04-24 DIAGNOSIS — M19.90 UNSPECIFIED OSTEOARTHRITIS, UNSPECIFIED SITE: ICD-10-CM

## 2020-04-24 DIAGNOSIS — R01.1 CARDIAC MURMUR, UNSPECIFIED: ICD-10-CM

## 2020-04-24 DIAGNOSIS — G20 PARKINSON'S DISEASE: ICD-10-CM

## 2020-05-06 ENCOUNTER — OUTPATIENT (OUTPATIENT)
Dept: OUTPATIENT SERVICES | Facility: HOSPITAL | Age: 85
LOS: 1 days | Discharge: HOME | End: 2020-05-06
Payer: COMMERCIAL

## 2020-05-06 ENCOUNTER — APPOINTMENT (OUTPATIENT)
Dept: INTERNAL MEDICINE | Facility: CLINIC | Age: 85
End: 2020-05-06
Payer: MEDICARE

## 2020-05-06 ENCOUNTER — APPOINTMENT (OUTPATIENT)
Dept: OBGYN | Facility: HOSPITAL | Age: 85
End: 2020-05-06
Payer: MEDICARE

## 2020-05-06 VITALS
RESPIRATION RATE: 14 BRPM | DIASTOLIC BLOOD PRESSURE: 75 MMHG | WEIGHT: 196 LBS | HEART RATE: 67 BPM | SYSTOLIC BLOOD PRESSURE: 136 MMHG | TEMPERATURE: 99.2 F | OXYGEN SATURATION: 98 % | HEIGHT: 76 IN | BODY MASS INDEX: 23.87 KG/M2

## 2020-05-06 VITALS
HEART RATE: 87 BPM | RESPIRATION RATE: 18 BRPM | DIASTOLIC BLOOD PRESSURE: 71 MMHG | TEMPERATURE: 97.1 F | WEIGHT: 195 LBS | BODY MASS INDEX: 23.75 KG/M2 | OXYGEN SATURATION: 98 % | SYSTOLIC BLOOD PRESSURE: 128 MMHG | HEIGHT: 76 IN

## 2020-05-06 DIAGNOSIS — I83.228 VARICOSE VEINS OF LEFT LOWER EXTREMITY WITH BOTH ULCER OF OTHER PART OF LOWER EXTREMITY AND INFLAMMATION: ICD-10-CM

## 2020-05-06 DIAGNOSIS — Z80.0 FAMILY HISTORY OF MALIGNANT NEOPLASM OF DIGESTIVE ORGANS: ICD-10-CM

## 2020-05-06 PROCEDURE — 99203 OFFICE O/P NEW LOW 30 MIN: CPT

## 2020-05-06 PROCEDURE — 99213 OFFICE O/P EST LOW 20 MIN: CPT

## 2020-05-06 PROCEDURE — G0463: CPT

## 2020-05-06 NOTE — HISTORY OF PRESENT ILLNESS
[FreeTextEntry1] : 85 yo WM, here for f/u of a chronic left lateral ankle VSU. Using hydrofera blue. Recent ABIs were 1.4 bilaterally.

## 2020-05-06 NOTE — REVIEW OF SYSTEMS
[Skin Wound] : skin wound [Fever] : no fever [Eye Pain] : no eye pain [Earache] : no earache [Chest Pain] : no chest pain [Shortness Of Breath] : no shortness of breath [Abdominal Pain] : no abdominal pain [FreeTextEntry9] : phill [de-identified] : left lateral lower leg venous stasis ulceration down to skin and subcutaneous tissue with stasis dermatitis

## 2020-05-06 NOTE — ASSESSMENT
[Verbal] : Verbal [Patient] : Patient [Good - alert, interested, motivated] : Good - alert, interested, motivated [Verbalizes knowledge/Understanding] : Verbalizes knowledge/understanding [Dressing changes] : dressing changes [Skin Care] : skin care [Signs and symptoms of infection] : sign and symptoms of infection [How and When to Call] : how and when to call [Compression Therapy] : compression therapy [Patient responsibility to plan of care] : patient responsibility to plan of care [] : Yes [Stable] : stable [Home] : Home [Ambulatory] : Ambulatory [Not Applicable - Long Term Care/Home Health Agency] : Long Term Care/Home Health Agency: Not Applicable [FreeTextEntry2] : Restore Skin Integrity\par Infection Control\par Localized wound care\par Compression Therapy\par Develop Realistic expectations and measurable treatments nursing POC to reduce, eliminate or develop acceptable pain limit tolerance [FreeTextEntry4] : Photos Taken\par F/U to [facility] in 1 weeks\par

## 2020-05-06 NOTE — PHYSICAL EXAM
[4 x 4] : 4 x 4  [Normal Thyroid] : the thyroid was normal [Normal Breath Sounds] : Normal breath sounds [Normal Heart Sounds] : normal heart sounds [Normal Rate and Rhythm] : normal rate and rhythm [Ankle Swelling (On Exam)] : present [] : of the left leg [Ankle Swelling On The Left] : moderate [Alert] : alert [Oriented to Person] : oriented to person [Oriented to Place] : oriented to place [Oriented to Time] : oriented to time [Calm] : calm [JVD] : no jugular venous distention  [Abdomen Masses] : No abdominal massess [Abdomen Tenderness] : ~T ~M No abdominal tenderness [Tender] : nontender [Enlarged] : not enlarged [de-identified] : adult, elderly WM, NAD, WD, WN, alert, Ox3. [FreeTextEntry1] : Left Lateral Lower Leg [FreeTextEntry2] : 2.8 [FreeTextEntry3] : 1.1 [FreeTextEntry4] : 0.2 [de-identified] : Serosanguineous [de-identified] : Intact [de-identified] : 25% [de-identified] : Hydrofera Blue [de-identified] : Cleansed with Normal Saline\par  [TWNoteComboBox4] : Small [TWNoteComboBox5] : No [de-identified] : No [de-identified] : None [de-identified] : None [de-identified] : >75% [de-identified] : Yes [de-identified] : Ace wraps [de-identified] : 3x Weekly

## 2020-05-07 PROBLEM — Z80.0 FAMILY HISTORY OF PANCREATIC CANCER: Status: ACTIVE | Noted: 2020-05-07

## 2020-05-07 NOTE — HISTORY OF PRESENT ILLNESS
[FreeTextEntry8] : The patient is an 84 year old male PMH as below who presents for change in primary care physician.  He is taking both medications that he is prescribed daily and denies adverse reactions or side effects.  He has left lower extremity vascular disease that caused a wound on his left thigh.  He goes to the wound care clinic at [facility].  He uses HCTZ to help decrease the lower extremity edema.  His left lower extremity is wrapped with lymphedema pressure bandage.  He is taking sinemet for parkinson's disease.

## 2020-05-07 NOTE — REVIEW OF SYSTEMS
[Joint Pain] : joint pain [Joint Stiffness] : joint stiffness [Unsteady Walk] : ataxia [Negative] : Heme/Lymph [FreeTextEntry9] : bilateral knees [de-identified] : left lower extremity wound [de-identified] : parkinson's disease

## 2020-05-07 NOTE — PLAN
[FreeTextEntry1] : Neurology\par Parkinson's Disease-continue with sinemet, follow up with neurologist\par \par Vascular\par left lower extremity/ulcer-follow up with [facility], continue with HCTZ\par \par Orthopedics\par bilateral knee DJD-stable\par \par follow up for annual physical exam and fasting blood work up

## 2020-05-07 NOTE — PHYSICAL EXAM
[Normal Sclera/Conjunctiva] : normal sclera/conjunctiva [PERRL] : pupils equal round and reactive to light [EOMI] : extraocular movements intact [No Carotid Bruits] : no carotid bruits [No Palpable Aorta] : no palpable aorta [Normal] : no joint swelling and grossly normal strength and tone [de-identified] : 1-2 + bilateral pitting edema lower extremity [de-identified] : left lower extremity in wraps-did not want it to be removed [de-identified] : pill rolling tremor b/l hands, shuffling gait

## 2020-05-09 DIAGNOSIS — Z87.891 PERSONAL HISTORY OF NICOTINE DEPENDENCE: ICD-10-CM

## 2020-05-09 DIAGNOSIS — Z91.018 ALLERGY TO OTHER FOODS: ICD-10-CM

## 2020-05-09 DIAGNOSIS — I49.3 VENTRICULAR PREMATURE DEPOLARIZATION: ICD-10-CM

## 2020-05-09 DIAGNOSIS — I83.228 VARICOSE VEINS OF LEFT LOWER EXTREMITY WITH BOTH ULCER OF OTHER PART OF LOWER EXTREMITY AND INFLAMMATION: ICD-10-CM

## 2020-05-09 DIAGNOSIS — G20 PARKINSON'S DISEASE: ICD-10-CM

## 2020-05-09 DIAGNOSIS — I83.893 VARICOSE VEINS OF BILATERAL LOWER EXTREMITIES WITH OTHER COMPLICATIONS: ICD-10-CM

## 2020-05-09 DIAGNOSIS — R01.1 CARDIAC MURMUR, UNSPECIFIED: ICD-10-CM

## 2020-05-09 DIAGNOSIS — L97.822 NON-PRESSURE CHRONIC ULCER OF OTHER PART OF LEFT LOWER LEG WITH FAT LAYER EXPOSED: ICD-10-CM

## 2020-05-09 DIAGNOSIS — Z79.899 OTHER LONG TERM (CURRENT) DRUG THERAPY: ICD-10-CM

## 2020-05-09 DIAGNOSIS — M19.90 UNSPECIFIED OSTEOARTHRITIS, UNSPECIFIED SITE: ICD-10-CM

## 2020-05-09 DIAGNOSIS — I34.0 NONRHEUMATIC MITRAL (VALVE) INSUFFICIENCY: ICD-10-CM

## 2020-05-13 ENCOUNTER — APPOINTMENT (OUTPATIENT)
Dept: OBGYN | Facility: HOSPITAL | Age: 85
End: 2020-05-13
Payer: MEDICARE

## 2020-05-13 ENCOUNTER — OUTPATIENT (OUTPATIENT)
Dept: OUTPATIENT SERVICES | Facility: HOSPITAL | Age: 85
LOS: 1 days | Discharge: HOME | End: 2020-05-13
Payer: COMMERCIAL

## 2020-05-13 VITALS
HEIGHT: 76 IN | WEIGHT: 196 LBS | RESPIRATION RATE: 16 BRPM | HEART RATE: 79 BPM | OXYGEN SATURATION: 97 % | BODY MASS INDEX: 23.87 KG/M2 | DIASTOLIC BLOOD PRESSURE: 69 MMHG | TEMPERATURE: 98 F | SYSTOLIC BLOOD PRESSURE: 120 MMHG

## 2020-05-13 VITALS
RESPIRATION RATE: 20 BRPM | SYSTOLIC BLOOD PRESSURE: 120 MMHG | BODY MASS INDEX: 23.87 KG/M2 | HEIGHT: 76 IN | TEMPERATURE: 98 F | WEIGHT: 196 LBS | DIASTOLIC BLOOD PRESSURE: 69 MMHG | HEART RATE: 79 BPM

## 2020-05-13 DIAGNOSIS — I83.228 VARICOSE VEINS OF LEFT LOWER EXTREMITY WITH BOTH ULCER OF OTHER PART OF LOWER EXTREMITY AND INFLAMMATION: ICD-10-CM

## 2020-05-13 PROCEDURE — 99213 OFFICE O/P EST LOW 20 MIN: CPT

## 2020-05-13 PROCEDURE — 29581 APPL MULTLAYER CMPRN SYS LEG: CPT | Mod: LT

## 2020-05-20 ENCOUNTER — OUTPATIENT (OUTPATIENT)
Dept: OUTPATIENT SERVICES | Facility: HOSPITAL | Age: 85
LOS: 1 days | Discharge: HOME | End: 2020-05-20
Payer: COMMERCIAL

## 2020-05-20 ENCOUNTER — APPOINTMENT (OUTPATIENT)
Dept: OBGYN | Facility: HOSPITAL | Age: 85
End: 2020-05-20
Payer: MEDICARE

## 2020-05-20 VITALS
HEIGHT: 76 IN | BODY MASS INDEX: 23.87 KG/M2 | WEIGHT: 196 LBS | DIASTOLIC BLOOD PRESSURE: 81 MMHG | TEMPERATURE: 98.8 F | HEART RATE: 80 BPM | RESPIRATION RATE: 20 BRPM | OXYGEN SATURATION: 97 % | SYSTOLIC BLOOD PRESSURE: 137 MMHG

## 2020-05-20 DIAGNOSIS — I83.228 VARICOSE VEINS OF LEFT LOWER EXTREMITY WITH BOTH ULCER OF OTHER PART OF LOWER EXTREMITY AND INFLAMMATION: ICD-10-CM

## 2020-05-20 PROCEDURE — 99213 OFFICE O/P EST LOW 20 MIN: CPT

## 2020-05-20 PROCEDURE — 97602 WOUND(S) CARE NON-SELECTIVE: CPT

## 2020-05-20 RX ORDER — COLLAGENASE SANTYL 250 [ARB'U]/G
250 OINTMENT TOPICAL DAILY
Qty: 1 | Refills: 1 | Status: COMPLETED | COMMUNITY
Start: 2020-05-20 | End: 2020-07-19

## 2020-05-20 NOTE — REVIEW OF SYSTEMS
[Skin Wound] : skin wound [Fever] : no fever [Earache] : no earache [Eye Pain] : no eye pain [Shortness Of Breath] : no shortness of breath [Chest Pain] : no chest pain [Abdominal Pain] : no abdominal pain [FreeTextEntry9] : phill [de-identified] : left lateral lower leg venous stasis ulceration down to skin and subcutaneous tissue with stasis dermatitis

## 2020-05-20 NOTE — HISTORY OF PRESENT ILLNESS
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 Recent ABIs were 1.4. Pt seen by Dr. Henao recently who may be consideration vein ablation.

## 2020-05-20 NOTE — PHYSICAL EXAM
[Normal Thyroid] : the thyroid was normal [Normal Breath Sounds] : Normal breath sounds [Normal Heart Sounds] : normal heart sounds [Normal Rate and Rhythm] : normal rate and rhythm [Ankle Swelling (On Exam)] : present [Varicose Veins Of Lower Extremities] : bilaterally [] : of the left leg [Ankle Swelling On The Left] : moderate [Alert] : alert [Oriented to Person] : oriented to person [Oriented to Place] : oriented to place [Oriented to Time] : oriented to time [Calm] : calm [JVD] : no jugular venous distention  [Abdomen Masses] : No abdominal massess [Abdomen Tenderness] : ~T ~M No abdominal tenderness [Tender] : nontender [Enlarged] : not enlarged [de-identified] : elderly WM, NAD, WD, WN, alert, Ox3. [FreeTextEntry1] : Left Lateral Lower Leg [FreeTextEntry2] : 2.8 [FreeTextEntry3] : 1.4 [FreeTextEntry4] : 0.2 [de-identified] : Small Serosanguineous [de-identified] : Intact [de-identified] : % [de-identified] : 1-25% [de-identified] : Coban [de-identified] : Silver Alginate/ Dry Dressing & Kerlix [de-identified] : Cleansed with Normal saline\par  [de-identified] : None [de-identified] : None [de-identified] : Yes

## 2020-05-20 NOTE — ASSESSMENT
[Verbal] : Verbal [Demo] : Demo [Patient] : Patient [Good - alert, interested, motivated] : Good - alert, interested, motivated [Verbalizes knowledge/Understanding] : Verbalizes knowledge/understanding [Dressing changes] : dressing changes [Skin Care] : skin care [Pressure relief] : pressure relief [Signs and symptoms of infection] : sign and symptoms of infection [Venous Disease] : venous disease [How and When to Call] : how and when to call [Compression Therapy] : compression therapy [Off-loading] : off-loading [Patient responsibility to plan of care] : patient responsibility to plan of care [Stable] : stable [Home] : Home [Ambulatory] : Ambulatory [] : No [FreeTextEntry2] : Alteration in skin integrity- promote optimal skin integrity\par  [FreeTextEntry3] : unchanged [FreeTextEntry4] : Dr Berkowitz/ Photos taken\par Pt was seen by Dr Christian on 03/31/20 & is to schedule follow up (6-8 weeks)  & will be considered for possible ablation in future  by Dr Christian (see MD note)- Dr Berkowitz aware\par Apligraf ordered by Dr Berkowitz for application next visit- preauth sheet submitted\par F/U to [facility] in 1 week\par

## 2020-05-21 DIAGNOSIS — I83.228 VARICOSE VEINS OF LEFT LOWER EXTREMITY WITH BOTH ULCER OF OTHER PART OF LOWER EXTREMITY AND INFLAMMATION: ICD-10-CM

## 2020-05-21 DIAGNOSIS — I49.3 VENTRICULAR PREMATURE DEPOLARIZATION: ICD-10-CM

## 2020-05-21 DIAGNOSIS — Z79.899 OTHER LONG TERM (CURRENT) DRUG THERAPY: ICD-10-CM

## 2020-05-21 DIAGNOSIS — G20 PARKINSON'S DISEASE: ICD-10-CM

## 2020-05-21 DIAGNOSIS — Z91.018 ALLERGY TO OTHER FOODS: ICD-10-CM

## 2020-05-21 DIAGNOSIS — L97.822 NON-PRESSURE CHRONIC ULCER OF OTHER PART OF LEFT LOWER LEG WITH FAT LAYER EXPOSED: ICD-10-CM

## 2020-05-21 DIAGNOSIS — Z87.891 PERSONAL HISTORY OF NICOTINE DEPENDENCE: ICD-10-CM

## 2020-05-21 DIAGNOSIS — I83.893 VARICOSE VEINS OF BILATERAL LOWER EXTREMITIES WITH OTHER COMPLICATIONS: ICD-10-CM

## 2020-05-21 DIAGNOSIS — R01.1 CARDIAC MURMUR, UNSPECIFIED: ICD-10-CM

## 2020-05-21 DIAGNOSIS — I34.0 NONRHEUMATIC MITRAL (VALVE) INSUFFICIENCY: ICD-10-CM

## 2020-05-21 DIAGNOSIS — M19.90 UNSPECIFIED OSTEOARTHRITIS, UNSPECIFIED SITE: ICD-10-CM

## 2020-05-22 NOTE — PHYSICAL EXAM
[4 x 4] : 4 x 4  [Normal Thyroid] : the thyroid was normal [Normal Heart Sounds] : normal heart sounds [Normal Breath Sounds] : Normal breath sounds [Normal Rate and Rhythm] : normal rate and rhythm [] : present [Ankle Swelling On The Left] : moderate [Alert] : alert [Oriented to Person] : oriented to person [Oriented to Place] : oriented to place [Oriented to Time] : oriented to time [Calm] : calm [JVD] : no jugular venous distention  [Abdomen Tenderness] : ~T ~M No abdominal tenderness [Abdomen Masses] : No abdominal massess [Tender] : nontender [Enlarged] : not enlarged [de-identified] : elderly WM, NAD, WD, WN, alert, Ox3. [FreeTextEntry1] : Left Lateral Lower Leg [FreeTextEntry2] : 3.0 [FreeTextEntry3] : 1.6 [FreeTextEntry4] : 0.3 [de-identified] : Small Serosanguineous [de-identified] : Intact [de-identified] : 75% [de-identified] : 25% [de-identified] : None [de-identified] : Cleansed with Normal saline\par Kerlix  [de-identified] : Collagenase & Calcium Alginate  [de-identified] : >75% [de-identified] : None [de-identified] : Yes [de-identified] : Daily [de-identified] : Ace wraps

## 2020-05-22 NOTE — HISTORY OF PRESENT ILLNESS
[FreeTextEntry1] : 83 yo WM, here for f/u of a chronic left lateral VSU. Seen by Dr. Henao who may be planning vein ablation in near future. Used coban this past week. Was to have an apligraf placed, but a fair amt of yellow slough seen. Will use collagenase for a while first.

## 2020-05-22 NOTE — HISTORY OF PRESENT ILLNESS
[FreeTextEntry1] : 85 yo WM, here for f/u of a chronic left lateral VSU. Seen by Dr. Henao who may be planning vein ablation in near future. Used coban this past week. Was to have an apligraf placed, but a fair amt of yellow slough seen. Will use collagenase for a while first.

## 2020-05-22 NOTE — PLAN
[FreeTextEntry1] : collagenase, alginate, DD, ace\par  leg elevation\par hold on apligraf and coban for now.\par f/u 1 wk.

## 2020-05-22 NOTE — ASSESSMENT
[Verbal] : Verbal [Demo] : Demo [Patient] : Patient [Good - alert, interested, motivated] : Good - alert, interested, motivated [Verbalizes knowledge/Understanding] : Verbalizes knowledge/understanding [Dressing changes] : dressing changes [Skin Care] : skin care [Pressure relief] : pressure relief [Signs and symptoms of infection] : sign and symptoms of infection [Venous Disease] : venous disease [How and When to Call] : how and when to call [Compression Therapy] : compression therapy [Patient responsibility to plan of care] : patient responsibility to plan of care [Stable] : stable [Home] : Home [Ambulatory] : Ambulatory [FreeTextEntry2] : Restore Optimal Skin Integrity \par Enzymatic Debridement \par Compression Therapy \par  [] : No [FreeTextEntry3] : Wounds Larger [FreeTextEntry4] : Pt was Authorized for Apligraf Application. Procedure was held due to hardened slough in wound at today's visit to [facility]. \par MD escribed Collagenase to Pt's pharmacy.\par F/U to [facility] in 1 week\par 1 unit apligraf remains available in tissue holding for pts next  appt. Exp 05/27/20

## 2020-05-22 NOTE — REVIEW OF SYSTEMS
[Skin Wound] : skin wound [Fever] : no fever [Eye Pain] : no eye pain [Chest Pain] : no chest pain [Earache] : no earache [Shortness Of Breath] : no shortness of breath [Abdominal Pain] : no abdominal pain [FreeTextEntry9] : phill [de-identified] : left lateral lower leg venous stasis ulceration down to skin and subcutaneous tissue with stasis dermatitis

## 2020-05-22 NOTE — ASSESSMENT
[Verbal] : Verbal [Demo] : Demo [Patient] : Patient [Good - alert, interested, motivated] : Good - alert, interested, motivated [Verbalizes knowledge/Understanding] : Verbalizes knowledge/understanding [Dressing changes] : dressing changes [Skin Care] : skin care [Pressure relief] : pressure relief [Signs and symptoms of infection] : sign and symptoms of infection [Venous Disease] : venous disease [How and When to Call] : how and when to call [Patient responsibility to plan of care] : patient responsibility to plan of care [Compression Therapy] : compression therapy [Stable] : stable [Home] : Home [Ambulatory] : Ambulatory [] : No [FreeTextEntry2] : Restore Optimal Skin Integrity \par Enzymatic Debridement \par Compression Therapy \par  [FreeTextEntry4] : Pt was Authorized for Apligraf Application. Procedure was held due to hardened slough in wound at today's visit to [facility]. \par MD escribed Collagenase to Pt's pharmacy.\par F/U to [facility] in 1 week\par 1 unit apligraf remains available in tissue holding for pts next  appt. Exp 05/27/20 [FreeTextEntry3] : Wounds Larger

## 2020-05-22 NOTE — PHYSICAL EXAM
[4 x 4] : 4 x 4  [Normal Thyroid] : the thyroid was normal [Normal Heart Sounds] : normal heart sounds [Normal Breath Sounds] : Normal breath sounds [Normal Rate and Rhythm] : normal rate and rhythm [] : of the left leg [Ankle Swelling On The Left] : moderate [Alert] : alert [Oriented to Person] : oriented to person [Oriented to Place] : oriented to place [Oriented to Time] : oriented to time [Calm] : calm [JVD] : no jugular venous distention  [Abdomen Masses] : No abdominal massess [Abdomen Tenderness] : ~T ~M No abdominal tenderness [Tender] : nontender [Enlarged] : not enlarged [de-identified] : elderly WM, NAD, WD, WN, alert, Ox3. [FreeTextEntry1] : Left Lateral Lower Leg [FreeTextEntry2] : 3.0 [FreeTextEntry3] : 1.6 [de-identified] : Small Serosanguineous [FreeTextEntry4] : 0.3 [de-identified] : Intact [de-identified] : 75% [de-identified] : 25% [de-identified] : Collagenase & Calcium Alginate  [de-identified] : Cleansed with Normal saline\par Kerlix  [de-identified] : None [de-identified] : None [de-identified] : >75% [de-identified] : Yes [de-identified] : Ace wraps [de-identified] : Daily

## 2020-05-22 NOTE — REVIEW OF SYSTEMS
[Skin Wound] : skin wound [Fever] : no fever [Eye Pain] : no eye pain [Earache] : no earache [Chest Pain] : no chest pain [Shortness Of Breath] : no shortness of breath [Abdominal Pain] : no abdominal pain [FreeTextEntry9] : phill [de-identified] : left lateral lower leg venous stasis ulceration down to skin and subcutaneous tissue with stasis dermatitis

## 2020-05-22 NOTE — ASSESSMENT
[Verbal] : Verbal [Demo] : Demo [Patient] : Patient [Good - alert, interested, motivated] : Good - alert, interested, motivated [Verbalizes knowledge/Understanding] : Verbalizes knowledge/understanding [Dressing changes] : dressing changes [Skin Care] : skin care [Pressure relief] : pressure relief [Signs and symptoms of infection] : sign and symptoms of infection [Venous Disease] : venous disease [How and When to Call] : how and when to call [Compression Therapy] : compression therapy [Patient responsibility to plan of care] : patient responsibility to plan of care [Stable] : stable [Home] : Home [Ambulatory] : Ambulatory [] : No [FreeTextEntry2] : Restore Optimal Skin Integrity \par Enzymatic Debridement \par Compression Therapy \par  [FreeTextEntry3] : Wounds Larger [FreeTextEntry4] : Pt was Authorized for Apligraf Application. Procedure was held due to hardened slough in wound at today's visit to [facility]. \par MD escribed Collagenase to Pt's pharmacy.\par F/U to [facility] in 1 week\par 1 unit apligraf remains available in tissue holding for pts next  appt. Exp 05/27/20

## 2020-05-22 NOTE — PHYSICAL EXAM
[4 x 4] : 4 x 4  [Normal Thyroid] : the thyroid was normal [Normal Heart Sounds] : normal heart sounds [Normal Breath Sounds] : Normal breath sounds [Normal Rate and Rhythm] : normal rate and rhythm [] : present [Ankle Swelling On The Left] : moderate [Alert] : alert [Oriented to Person] : oriented to person [Oriented to Place] : oriented to place [Oriented to Time] : oriented to time [Calm] : calm [JVD] : no jugular venous distention  [Abdomen Tenderness] : ~T ~M No abdominal tenderness [Abdomen Masses] : No abdominal massess [Tender] : nontender [Enlarged] : not enlarged [de-identified] : elderly WM, NAD, WD, WN, alert, Ox3. [FreeTextEntry1] : Left Lateral Lower Leg [FreeTextEntry2] : 3.0 [FreeTextEntry3] : 1.6 [FreeTextEntry4] : 0.3 [de-identified] : Small Serosanguineous [de-identified] : 25% [de-identified] : 75% [de-identified] : Intact [de-identified] : Collagenase & Calcium Alginate  [de-identified] : Cleansed with Normal saline\par Kerlix  [de-identified] : None [de-identified] : None [de-identified] : >75% [de-identified] : Yes [de-identified] : Ace wraps [de-identified] : Daily

## 2020-05-22 NOTE — REVIEW OF SYSTEMS
[Skin Wound] : skin wound [Fever] : no fever [Eye Pain] : no eye pain [Chest Pain] : no chest pain [Earache] : no earache [Shortness Of Breath] : no shortness of breath [Abdominal Pain] : no abdominal pain [FreeTextEntry9] : phill [de-identified] : left lateral lower leg venous stasis ulceration down to skin and subcutaneous tissue with stasis dermatitis

## 2020-05-26 ENCOUNTER — APPOINTMENT (OUTPATIENT)
Dept: VASCULAR SURGERY | Facility: CLINIC | Age: 85
End: 2020-05-26
Payer: MEDICARE

## 2020-05-26 PROCEDURE — 99213 OFFICE O/P EST LOW 20 MIN: CPT

## 2020-05-26 NOTE — PHYSICAL EXAM
[JVD] : no jugular venous distention  [Normal Rate and Rhythm] : normal rate and rhythm [Normal Breath Sounds] : Normal breath sounds [2+] : left 2+ [Ankle Swelling (On Exam)] : present [] : bilaterally [Varicose Veins Of Lower Extremities] : not present [Ankle Swelling Bilaterally] : severe [Abdomen Tenderness] : ~T ~M No abdominal tenderness [Skin Ulcer] : ulcer [No Rash or Lesion] : No rash or lesion [Alert] : alert [Skin Induration] : induration [Calm] : calm [de-identified] : Appears well

## 2020-05-26 NOTE — ASSESSMENT
[FreeTextEntry1] : Patient with previous arterial Dopplers which were within normal limits.  In the office today patient underwent a venous duplex study which shows ligation of the left small saphenous vein.  In addition there was reflux in the femoral vein and popliteal vein.  There was also reflux through the greater saphenous vein.  At this time there has been minimal improvement in the left lateral wound.  Would recommend ablation of the left greater saphenous vein followed with ongoing compression.

## 2020-05-27 ENCOUNTER — APPOINTMENT (OUTPATIENT)
Dept: OBGYN | Facility: HOSPITAL | Age: 85
End: 2020-05-27
Payer: MEDICARE

## 2020-05-27 ENCOUNTER — OUTPATIENT (OUTPATIENT)
Dept: OUTPATIENT SERVICES | Facility: HOSPITAL | Age: 85
LOS: 1 days | Discharge: HOME | End: 2020-05-27
Payer: COMMERCIAL

## 2020-05-27 VITALS
OXYGEN SATURATION: 100 % | BODY MASS INDEX: 23.87 KG/M2 | SYSTOLIC BLOOD PRESSURE: 124 MMHG | DIASTOLIC BLOOD PRESSURE: 70 MMHG | HEART RATE: 72 BPM | TEMPERATURE: 97 F | WEIGHT: 196 LBS | RESPIRATION RATE: 20 BRPM | HEIGHT: 76 IN

## 2020-05-27 DIAGNOSIS — Z87.891 PERSONAL HISTORY OF NICOTINE DEPENDENCE: ICD-10-CM

## 2020-05-27 DIAGNOSIS — L97.822 NON-PRESSURE CHRONIC ULCER OF OTHER PART OF LEFT LOWER LEG WITH FAT LAYER EXPOSED: ICD-10-CM

## 2020-05-27 DIAGNOSIS — G20 PARKINSON'S DISEASE: ICD-10-CM

## 2020-05-27 DIAGNOSIS — I83.228 VARICOSE VEINS OF LEFT LOWER EXTREMITY WITH BOTH ULCER OF OTHER PART OF LOWER EXTREMITY AND INFLAMMATION: ICD-10-CM

## 2020-05-27 DIAGNOSIS — Z79.899 OTHER LONG TERM (CURRENT) DRUG THERAPY: ICD-10-CM

## 2020-05-27 DIAGNOSIS — Z91.018 ALLERGY TO OTHER FOODS: ICD-10-CM

## 2020-05-27 DIAGNOSIS — I49.3 VENTRICULAR PREMATURE DEPOLARIZATION: ICD-10-CM

## 2020-05-27 DIAGNOSIS — R01.1 CARDIAC MURMUR, UNSPECIFIED: ICD-10-CM

## 2020-05-27 DIAGNOSIS — M19.90 UNSPECIFIED OSTEOARTHRITIS, UNSPECIFIED SITE: ICD-10-CM

## 2020-05-27 DIAGNOSIS — I83.893 VARICOSE VEINS OF BILATERAL LOWER EXTREMITIES WITH OTHER COMPLICATIONS: ICD-10-CM

## 2020-05-27 PROCEDURE — 15275 SKIN SUB GRAFT FACE/NK/HF/G: CPT

## 2020-05-27 PROCEDURE — 15271 SKIN SUB GRAFT TRNK/ARM/LEG: CPT

## 2020-05-29 NOTE — PHYSICAL EXAM
[4 x 4] : 4 x 4  [Normal Thyroid] : the thyroid was normal [Normal Heart Sounds] : normal heart sounds [Normal Breath Sounds] : Normal breath sounds [Normal Rate and Rhythm] : normal rate and rhythm [Ankle Swelling (On Exam)] : present [] : present [Ankle Swelling On The Left] : moderate [Alert] : alert [Oriented to Place] : oriented to place [Oriented to Person] : oriented to person [Calm] : calm [Oriented to Time] : oriented to time [JVD] : no jugular venous distention  [Abdomen Tenderness] : ~T ~M No abdominal tenderness [Abdomen Masses] : No abdominal massess [Enlarged] : not enlarged [Tender] : nontender [de-identified] : elderly WM, NAD, WD, WN, alert, Ox 3. [FreeTextEntry1] : Lateral Lower Leg [FreeTextEntry2] : 2.8 [FreeTextEntry3] : 1.1 [FreeTextEntry4] : 0.3 [de-identified] : Intact [de-identified] : Small Serosanguineous [de-identified] : 75% [de-identified] : 25% [de-identified] : Apligraf, Dermabond, Wound veill & Calcium Alginate  [TWNoteComboBox1] : Left [de-identified] : COBAN Circulatory and neuromuscular function WNL post COBAN application. Patient verbalizes they feel 'comfortable' post COBAN application to the lower extremities.  [de-identified] : None [de-identified] : None [TWNoteComboBox7] : Jorge [de-identified] : >75% [de-identified] : Yes [de-identified] : Application of skin substitute [de-identified] : Other [de-identified] : Secondary Dressing [de-identified] : Weekly

## 2020-05-29 NOTE — ASSESSMENT
[Verbal] : Verbal [Demo] : Demo [Good - alert, interested, motivated] : Good - alert, interested, motivated [Patient] : Patient [Verbalizes knowledge/Understanding] : Verbalizes knowledge/understanding [Dressing changes] : dressing changes [Skin Care] : skin care [Signs and symptoms of infection] : sign and symptoms of infection [Pressure relief] : pressure relief [Venous Disease] : venous disease [How and When to Call] : how and when to call [Compression Therapy] : compression therapy [Patient responsibility to plan of care] : patient responsibility to plan of care [Home] : Home [Stable] : stable [Ambulatory] : Ambulatory [] : No [FreeTextEntry2] : Infection prevention\par Restore Optimal Skin Integrity \par Enzymatic Debridement \par Compression Therapy \par Achieving pain tolerance levels within the Pts limits of 0/10.\par Develop Realistic expectations and measurable goals in nursing POC to reduce, eliminate or develop acceptable pain limit tolerance.\par Pt educated on the utilization of offloading, taking deep breaths and guided imagery to reduce discomfort PRN. \par  [FreeTextEntry4] : 1 unit of Apligraf (44 sq cm), Lot #[id number] EXP 05/27/2020 [id number] , pH:  7.3 - 7.5, reconstituted with Normal saline 0.9% as per manufacturers guideline , Lot # [id number], Exp 08/01/20., applied to left lateral lower leg .  30 % implanted.   70% discarded.\par Auth submitted for apligraf #2 next visit \par pt saw vascular surgeon yesterday and is pending scheduling for vascular intervention\par Follow up to [facility] in one week \par 1 unit apligraf ordered on 05/29/20 for pts next  appt

## 2020-05-29 NOTE — REVIEW OF SYSTEMS
[Skin Wound] : skin wound [Fever] : no fever [Earache] : no earache [Eye Pain] : no eye pain [Chest Pain] : no chest pain [Abdominal Pain] : no abdominal pain [Shortness Of Breath] : no shortness of breath [FreeTextEntry9] : phill [de-identified] : left lateral lower leg venous stasis ulceration down to skin and subcutaneous tissue with stasis dermatitis

## 2020-05-29 NOTE — PROCEDURE
[Scalpel] : scalpel [Saline] : saline [Skin] : skin [Other: ___] : [unfilled] [Subcutaneous Tissue] : subcutaneous tissue [Hydrated with saline] : hydrated with saline [Fenestrated] : fenestrated [Apligraf] : apligraf [____ % was used] : and [unfilled] % was used [____ % was discarded] : and [unfilled] % was discarded [FreeTextEntry9] : 0782 [] : No [de-identified] : shallow LLE VSU [de-identified] : Saulo Berkowitz MD [de-identified] : none [de-identified] : none [FreeTextEntry6] : Chronic VSU of the LLE [FreeTextEntry7] : same [de-identified] : none [de-identified] : none [de-identified] : tayo

## 2020-06-03 ENCOUNTER — APPOINTMENT (OUTPATIENT)
Dept: OBGYN | Facility: HOSPITAL | Age: 85
End: 2020-06-03
Payer: MEDICARE

## 2020-06-03 ENCOUNTER — OUTPATIENT (OUTPATIENT)
Dept: OUTPATIENT SERVICES | Facility: HOSPITAL | Age: 85
LOS: 1 days | Discharge: HOME | End: 2020-06-03
Payer: COMMERCIAL

## 2020-06-03 VITALS
HEART RATE: 72 BPM | TEMPERATURE: 98.3 F | SYSTOLIC BLOOD PRESSURE: 134 MMHG | HEIGHT: 76 IN | RESPIRATION RATE: 20 BRPM | WEIGHT: 195 LBS | BODY MASS INDEX: 23.75 KG/M2 | DIASTOLIC BLOOD PRESSURE: 76 MMHG | OXYGEN SATURATION: 99 %

## 2020-06-03 VITALS
BODY MASS INDEX: 23.87 KG/M2 | HEART RATE: 72 BPM | TEMPERATURE: 98.3 F | WEIGHT: 195.99 LBS | HEIGHT: 76 IN | DIASTOLIC BLOOD PRESSURE: 76 MMHG | SYSTOLIC BLOOD PRESSURE: 134 MMHG | OXYGEN SATURATION: 99 % | RESPIRATION RATE: 20 BRPM

## 2020-06-03 DIAGNOSIS — I49.3 VENTRICULAR PREMATURE DEPOLARIZATION: ICD-10-CM

## 2020-06-03 DIAGNOSIS — Z79.899 OTHER LONG TERM (CURRENT) DRUG THERAPY: ICD-10-CM

## 2020-06-03 DIAGNOSIS — Z91.018 ALLERGY TO OTHER FOODS: ICD-10-CM

## 2020-06-03 DIAGNOSIS — I83.228 VARICOSE VEINS OF LEFT LOWER EXTREMITY WITH BOTH ULCER OF OTHER PART OF LOWER EXTREMITY AND INFLAMMATION: ICD-10-CM

## 2020-06-03 DIAGNOSIS — I83.893 VARICOSE VEINS OF BILATERAL LOWER EXTREMITIES WITH OTHER COMPLICATIONS: ICD-10-CM

## 2020-06-03 DIAGNOSIS — R01.1 CARDIAC MURMUR, UNSPECIFIED: ICD-10-CM

## 2020-06-03 DIAGNOSIS — M19.90 UNSPECIFIED OSTEOARTHRITIS, UNSPECIFIED SITE: ICD-10-CM

## 2020-06-03 DIAGNOSIS — Z87.891 PERSONAL HISTORY OF NICOTINE DEPENDENCE: ICD-10-CM

## 2020-06-03 DIAGNOSIS — G20 PARKINSON'S DISEASE: ICD-10-CM

## 2020-06-03 DIAGNOSIS — I34.0 NONRHEUMATIC MITRAL (VALVE) INSUFFICIENCY: ICD-10-CM

## 2020-06-03 DIAGNOSIS — L97.822 NON-PRESSURE CHRONIC ULCER OF OTHER PART OF LEFT LOWER LEG WITH FAT LAYER EXPOSED: ICD-10-CM

## 2020-06-03 PROCEDURE — 15271 SKIN SUB GRAFT TRNK/ARM/LEG: CPT

## 2020-06-05 NOTE — REASON FOR VISIT
[Other: _____] : [unfilled] [Apligraf] : apligraf [FreeTextEntry4] : chronic VSU of the LLE [FreeTextEntry3] : chronic VSU of the LLE [FreeTextEntry6] : SARI VSU [FreeTextEntry7] : SARI HURLEYU [FreeTextEntry9] : see RN note [FreeTextEntry8] : see RN note

## 2020-06-05 NOTE — ASSESSMENT
[Verbal] : Verbal [Patient] : Patient [Good - alert, interested, motivated] : Good - alert, interested, motivated [Verbalizes knowledge/Understanding] : Verbalizes knowledge/understanding [Skin Care] : skin care [Dressing changes] : dressing changes [Signs and symptoms of infection] : sign and symptoms of infection [How and When to Call] : how and when to call [Compression Therapy] : compression therapy [Patient responsibility to plan of care] : patient responsibility to plan of care [] : Yes [Stable] : stable [Home] : Home [Ambulatory] : Ambulatory [Not Applicable - Long Term Care/Home Health Agency] : Long Term Care/Home Health Agency: Not Applicable [FreeTextEntry2] : Restore Skin Integrity\par Infection Control\par Localized wound care\par Maintain acceptable pain levels at satisfactory relief.\par Demonstrates use of both nonpharmacological and pharmacological pain relief strategies [FreeTextEntry4] : Photos Taken\par F/U to [facility] in 1 week\par MD feels pt would benefit from Apligraf, Auth Submitted for Apligraf #3 \par 1 unit apligraf ordered on 06/05/20 for pts next assessment

## 2020-06-05 NOTE — PHYSICAL EXAM
[4 x 4] : 4 x 4  [Abdominal Pad] : Abdominal Pad [Normal Thyroid] : the thyroid was normal [Normal Heart Sounds] : normal heart sounds [Normal Rate and Rhythm] : normal rate and rhythm [Normal Breath Sounds] : Normal breath sounds [Ankle Swelling Bilaterally] : bilaterally  [] : present [Ankle Swelling On The Left] : moderate [Alert] : alert [Oriented to Person] : oriented to person [Oriented to Place] : oriented to place [Oriented to Time] : oriented to time [Calm] : calm [JVD] : no jugular venous distention  [Ankle Swelling (On Exam)] : not present [Abdomen Masses] : No abdominal massess [Abdomen Tenderness] : ~T ~M No abdominal tenderness [Tender] : nontender [Enlarged] : not enlarged [de-identified] : elderly adult WM, NAD, WD, WN, alert, Ox3. [de-identified] : Small amount of Blood Loss, No Pain During or Post Procedure, Pt tolerated Well.\par  [FreeTextEntry1] : Left Lateral Lower Leg [FreeTextEntry2] : 2.9 [FreeTextEntry3] : 1.2 [FreeTextEntry4] : 0.3 [de-identified] : Serosanguineous [de-identified] : Intact [de-identified] : 25% [de-identified] : Post Debridement Measurements 3.0 x 1.3 x 0.4 [de-identified] : Coban Compression [de-identified] : Apligraf, Wound Veil, Calcium Alginate [TWNoteComboBox4] : Small [de-identified] : Cleansed with Normal Saline\par \par 1 Unit of Apligraf 44 (SqCm) \par Lot #:  [id number]\par Item #: [id number]\par  Exp: 6/09/2020\par pH: 6.8-7.3\par Reconstituted with NS, \par Lot #:  [id number]\par Exp:  09/01/20200\par Applied To Left Lateral Lower Leg\par 25% implanted 75% Discarded\par \par  [TWNoteComboBox5] : No [de-identified] : No [de-identified] : None [de-identified] : None [de-identified] : >75% [TWNoteComboBox7] : Jorge [de-identified] : Yes [de-identified] : Debridement performed of all devitalized tissue to bleeding viable tissue [de-identified] : Multilayer other compression wrap [de-identified] : Weekly

## 2020-06-05 NOTE — PROCEDURE
[Saline] : saline [Scalpel] : scalpel [Sharp scissors] : sharp scissors [Subcutaneous Tissue] : subcutaneous tissue [Other: ___] : [unfilled] [Fenestrated] : fenestrated [Hydrated with saline] : hydrated with saline [Apligraf] : apligraf [____ % was used] : and [unfilled] % was used [____ % was discarded] : and [unfilled] % was discarded [FreeTextEntry1] : wound veil, alginate, SUNNY, abd pad, coban [] : No [FreeTextEntry9] : 9:55am [de-identified] : SARI HURLEYU [de-identified] : Saulo Berkowitz MD [de-identified] : none [de-identified] : none [FreeTextEntry6] : chronic VSU of LLE [FreeTextEntry7] : same  [de-identified] : none [de-identified] : none [de-identified] : tayo

## 2020-06-05 NOTE — REVIEW OF SYSTEMS
[Skin Wound] : skin wound [Fever] : no fever [Eye Pain] : no eye pain [Earache] : no earache [Chest Pain] : no chest pain [Shortness Of Breath] : no shortness of breath [Abdominal Pain] : no abdominal pain [FreeTextEntry9] : phill [de-identified] : left lateral lower leg venous stasis ulceration down to skin and subcutaneous tissue with stasis dermatitis

## 2020-06-10 ENCOUNTER — APPOINTMENT (OUTPATIENT)
Dept: PLASTIC SURGERY | Facility: HOSPITAL | Age: 85
End: 2020-06-10
Payer: MEDICARE

## 2020-06-10 ENCOUNTER — APPOINTMENT (OUTPATIENT)
Dept: OBGYN | Facility: HOSPITAL | Age: 85
End: 2020-06-10

## 2020-06-10 ENCOUNTER — OUTPATIENT (OUTPATIENT)
Dept: OUTPATIENT SERVICES | Facility: HOSPITAL | Age: 85
LOS: 1 days | Discharge: HOME | End: 2020-06-10
Payer: COMMERCIAL

## 2020-06-10 VITALS
DIASTOLIC BLOOD PRESSURE: 73 MMHG | OXYGEN SATURATION: 96 % | SYSTOLIC BLOOD PRESSURE: 119 MMHG | WEIGHT: 195 LBS | TEMPERATURE: 99.3 F | HEIGHT: 76 IN | BODY MASS INDEX: 23.75 KG/M2 | HEART RATE: 72 BPM | RESPIRATION RATE: 20 BRPM

## 2020-06-10 DIAGNOSIS — Z91.018 ALLERGY TO OTHER FOODS: ICD-10-CM

## 2020-06-10 DIAGNOSIS — I83.228 VARICOSE VEINS OF LEFT LOWER EXTREMITY WITH BOTH ULCER OF OTHER PART OF LOWER EXTREMITY AND INFLAMMATION: ICD-10-CM

## 2020-06-10 DIAGNOSIS — G20 PARKINSON'S DISEASE: ICD-10-CM

## 2020-06-10 DIAGNOSIS — I83.893 VARICOSE VEINS OF BILATERAL LOWER EXTREMITIES WITH OTHER COMPLICATIONS: ICD-10-CM

## 2020-06-10 DIAGNOSIS — I49.3 VENTRICULAR PREMATURE DEPOLARIZATION: ICD-10-CM

## 2020-06-10 DIAGNOSIS — Z79.899 OTHER LONG TERM (CURRENT) DRUG THERAPY: ICD-10-CM

## 2020-06-10 DIAGNOSIS — I34.0 NONRHEUMATIC MITRAL (VALVE) INSUFFICIENCY: ICD-10-CM

## 2020-06-10 DIAGNOSIS — L97.822 NON-PRESSURE CHRONIC ULCER OF OTHER PART OF LEFT LOWER LEG WITH FAT LAYER EXPOSED: ICD-10-CM

## 2020-06-10 DIAGNOSIS — R01.1 CARDIAC MURMUR, UNSPECIFIED: ICD-10-CM

## 2020-06-10 DIAGNOSIS — M19.90 UNSPECIFIED OSTEOARTHRITIS, UNSPECIFIED SITE: ICD-10-CM

## 2020-06-10 DIAGNOSIS — Z87.891 PERSONAL HISTORY OF NICOTINE DEPENDENCE: ICD-10-CM

## 2020-06-10 PROCEDURE — 15271 SKIN SUB GRAFT TRNK/ARM/LEG: CPT

## 2020-06-12 NOTE — PROCEDURE
[Saline] : saline [Scalpel] : scalpel [Sharp scissors] : sharp scissors [Subcutaneous Tissue] : subcutaneous tissue [Other: ___] : [unfilled] [Fenestrated] : fenestrated [Hydrated with saline] : hydrated with saline [Apligraf] : apligraf [____ % was used] : and [unfilled] % was used [____ % was discarded] : and [unfilled] % was discarded [FreeTextEntry1] : wound veil, alginate, SUNNY, abd pad, coban [] : No [FreeTextEntry9] : 1007 [de-identified] : VSU left lateral lower leg [de-identified] : Chacorta [place]ner [de-identified] : none [FreeTextEntry6] : Venous stasis ulcer left lower leg [FreeTextEntry7] : same [de-identified] : topical lidocaine [de-identified] : 1cc [de-identified] : yes not sent

## 2020-06-12 NOTE — REVIEW OF SYSTEMS
[Skin Wound] : skin wound [Fever] : no fever [Eye Pain] : no eye pain [Earache] : no earache [Chest Pain] : no chest pain [Shortness Of Breath] : no shortness of breath [Abdominal Pain] : no abdominal pain [FreeTextEntry9] : phill [de-identified] : left lateral lower leg venous stasis ulceration down to skin and subcutaneous tissue with stasis dermatitis

## 2020-06-12 NOTE — PHYSICAL EXAM
[4 x 4] : 4 x 4  [Normal Thyroid] : the thyroid was normal [Normal Breath Sounds] : Normal breath sounds [Normal Heart Sounds] : normal heart sounds [Normal Rate and Rhythm] : normal rate and rhythm [Ankle Swelling Bilaterally] : bilaterally  [] : of the left leg [Ankle Swelling On The Left] : moderate [Alert] : alert [Oriented to Person] : oriented to person [Oriented to Place] : oriented to place [Oriented to Time] : oriented to time [Calm] : calm [JVD] : no jugular venous distention  [Ankle Swelling (On Exam)] : not present [Abdomen Masses] : No abdominal massess [Abdomen Tenderness] : ~T ~M No abdominal tenderness [Tender] : nontender [Enlarged] : not enlarged [de-identified] : elderly adult WM, NAD, WD, WN, alert, Ox3. [FreeTextEntry1] : Lateral Lower Leg [FreeTextEntry2] : 2.4 [FreeTextEntry4] : 0.3 [FreeTextEntry3] : 1.5 [de-identified] : Small Serosanguineous [de-identified] : Intact [de-identified] : 75% [de-identified] : 1-15% [FreeTextEntry5] : Post debridment measuremtns 2.5X1.6X0.4 [de-identified] : COBAN Circulatory and neuromuscular function WNL post COBAN application. Patient verbalizes they feel 'comfortable' post COBAN application to the lower extremities.  [de-identified] : Apligraf, Dermabond, Wound veill & Calcium Alginate  [TWNoteComboBox1] : Left [de-identified] : None [de-identified] : None [de-identified] : >75% [de-identified] : Yes [de-identified] : 2.5% Lidocaine Topical [TWNoteComboBox7] : Jorge [de-identified] : Application of skin substitute [de-identified] : Other [de-identified] : Weekly [de-identified] : Secondary Dressing

## 2020-06-12 NOTE — ASSESSMENT
[Verbal] : Verbal [Demo] : Demo [Patient] : Patient [Good - alert, interested, motivated] : Good - alert, interested, motivated [Verbalizes knowledge/Understanding] : Verbalizes knowledge/understanding [Dressing changes] : dressing changes [Skin Care] : skin care [Pressure relief] : pressure relief [Signs and symptoms of infection] : sign and symptoms of infection [Venous Disease] : venous disease [How and When to Call] : how and when to call [Compression Therapy] : compression therapy [Patient responsibility to plan of care] : patient responsibility to plan of care [Stable] : stable [Home] : Home [Ambulatory] : Ambulatory [Not Applicable - Long Term Care/Home Health Agency] : Long Term Care/Home Health Agency: Not Applicable [] : No [FreeTextEntry2] : Infection prevention\par Restore Optimal Skin Integrity \par Enzymatic Debridement \par Compression Therapy \par Achieving pain tolerance levels within the Pts limits of 0/10.\par Develop Realistic expectations and measurable goals in nursing POC to reduce, eliminate or develop acceptable pain limit tolerance.\par Pt educated on the utilization of offloading, taking deep breaths and guided imagery to reduce discomfort PRN. \par  [FreeTextEntry4] : 1 unit of Apligraf (44 sq cm), Lot #[id number] EXP 06/18/2020 [id number] , pH:  7.3 - 7.5, reconstituted with Normal saline 0.9% as per manufacturers guideline , Lot # [id number], Exp 09/01/22., applied to left lateral lower leg .  30 % implanted.   70% discarded.\par Auth submitted for apligraf #4 next visit \par pt saw vascular surgeon yesterday and is still pending scheduling for vascular intervention\par Follow up to [facility] in one week \par \par 1 UNIT APLIGRAF ORDERED ON 06/12/20 FOR PTS NEXT APPT

## 2020-06-17 ENCOUNTER — NON-APPOINTMENT (OUTPATIENT)
Age: 85
End: 2020-06-17

## 2020-06-17 ENCOUNTER — APPOINTMENT (OUTPATIENT)
Dept: PLASTIC SURGERY | Facility: HOSPITAL | Age: 85
End: 2020-06-17
Payer: MEDICARE

## 2020-06-17 ENCOUNTER — OUTPATIENT (OUTPATIENT)
Dept: OUTPATIENT SERVICES | Facility: HOSPITAL | Age: 85
LOS: 1 days | Discharge: HOME | End: 2020-06-17
Payer: COMMERCIAL

## 2020-06-17 VITALS
WEIGHT: 195 LBS | RESPIRATION RATE: 20 BRPM | DIASTOLIC BLOOD PRESSURE: 64 MMHG | TEMPERATURE: 98.9 F | OXYGEN SATURATION: 20 % | BODY MASS INDEX: 23.75 KG/M2 | SYSTOLIC BLOOD PRESSURE: 116 MMHG | HEIGHT: 76 IN | HEART RATE: 87 BPM

## 2020-06-17 DIAGNOSIS — I83.228 VARICOSE VEINS OF LEFT LOWER EXTREMITY WITH BOTH ULCER OF OTHER PART OF LOWER EXTREMITY AND INFLAMMATION: ICD-10-CM

## 2020-06-17 DIAGNOSIS — M19.90 UNSPECIFIED OSTEOARTHRITIS, UNSPECIFIED SITE: ICD-10-CM

## 2020-06-17 DIAGNOSIS — I34.0 NONRHEUMATIC MITRAL (VALVE) INSUFFICIENCY: ICD-10-CM

## 2020-06-17 DIAGNOSIS — L97.822 NON-PRESSURE CHRONIC ULCER OF OTHER PART OF LEFT LOWER LEG WITH FAT LAYER EXPOSED: ICD-10-CM

## 2020-06-17 DIAGNOSIS — I49.3 VENTRICULAR PREMATURE DEPOLARIZATION: ICD-10-CM

## 2020-06-17 DIAGNOSIS — I83.893 VARICOSE VEINS OF BILATERAL LOWER EXTREMITIES WITH OTHER COMPLICATIONS: ICD-10-CM

## 2020-06-17 DIAGNOSIS — R01.1 CARDIAC MURMUR, UNSPECIFIED: ICD-10-CM

## 2020-06-17 DIAGNOSIS — Z87.891 PERSONAL HISTORY OF NICOTINE DEPENDENCE: ICD-10-CM

## 2020-06-17 DIAGNOSIS — Z79.899 OTHER LONG TERM (CURRENT) DRUG THERAPY: ICD-10-CM

## 2020-06-17 DIAGNOSIS — G20 PARKINSON'S DISEASE: ICD-10-CM

## 2020-06-17 DIAGNOSIS — Z91.018 ALLERGY TO OTHER FOODS: ICD-10-CM

## 2020-06-17 PROCEDURE — 15271 SKIN SUB GRAFT TRNK/ARM/LEG: CPT

## 2020-06-22 NOTE — PHYSICAL EXAM
[Normal Thyroid] : the thyroid was normal [Normal Breath Sounds] : Normal breath sounds [Normal Heart Sounds] : normal heart sounds [Normal Rate and Rhythm] : normal rate and rhythm [Ankle Swelling Bilaterally] : bilaterally  [Ankle Swelling On The Left] : moderate [] : of the left leg [Alert] : alert [Oriented to Person] : oriented to person [Oriented to Place] : oriented to place [Oriented to Time] : oriented to time [Calm] : calm [Please See PDF for Tissue Analytics] : Please See PDF for Tissue Analytics. [JVD] : no jugular venous distention  [Abdomen Masses] : No abdominal massess [Ankle Swelling (On Exam)] : not present [Abdomen Tenderness] : ~T ~M No abdominal tenderness [Tender] : nontender [Enlarged] : not enlarged [de-identified] : elderly adult WM, NAD, WD, WN, alert, Ox3.

## 2020-06-22 NOTE — ASSESSMENT
[Verbal] : Verbal [Written] : Written [Demo] : Demo [Patient] : Patient [Good - alert, interested, motivated] : Good - alert, interested, motivated [Verbalizes knowledge/Understanding] : Verbalizes knowledge/understanding [Dressing changes] : dressing changes [Skin Care] : skin care [Signs and symptoms of infection] : sign and symptoms of infection [Venous Disease] : venous disease [How and When to Call] : how and when to call [Pain Management] : pain management [Patient responsibility to plan of care] : patient responsibility to plan of care [Compression Therapy] : compression therapy [] : Yes [Stable] : stable [Home] : Home [Not Applicable - Long Term Care/Home Health Agency] : Long Term Care/Home Health Agency: Not Applicable [Ambulatory] : Ambulatory [FreeTextEntry2] : Apligraf skin substitute\par Infection prevention\par Localized wound care \par Compression therapy   [FreeTextEntry4] : 1 unit of 44sq/cm  Apligraf applied. (4th). \par ITM: [id number]    Lot: [id number] 02 1A     EXP: 6/18/20       70% used 30% discarded \par Irrigated with 0.9% Lot: 09 [id number] - 4B - 02     EXP:   9/1/22 \par Auth submitted for Apligraf #5\par Follow up in 1 week \par 1 UNIT APLIGRAF ORDERED ON 06/19/20 FOR PTS NEXT WC APPT

## 2020-06-22 NOTE — ASSESSMENT
[Verbal] : Verbal [Written] : Written [Demo] : Demo [Patient] : Patient [Good - alert, interested, motivated] : Good - alert, interested, motivated [Verbalizes knowledge/Understanding] : Verbalizes knowledge/understanding [Dressing changes] : dressing changes [Skin Care] : skin care [Signs and symptoms of infection] : sign and symptoms of infection [Venous Disease] : venous disease [How and When to Call] : how and when to call [Pain Management] : pain management [Patient responsibility to plan of care] : patient responsibility to plan of care [Compression Therapy] : compression therapy [] : Yes [Home] : Home [Stable] : stable [Not Applicable - Long Term Care/Home Health Agency] : Long Term Care/Home Health Agency: Not Applicable [Ambulatory] : Ambulatory [FreeTextEntry2] : Apligraf skin substitute\par Infection prevention\par Localized wound care \par Compression therapy   [FreeTextEntry4] : 1 unit of 44sq/cm  Apligraf applied. (4th). \par ITM: [id number]    Lot: [id number] 02 1A     EXP: 6/18/20       70% used 30% discarded \par Irrigated with 0.9% Lot: 09 - [id number] - 4B - 02     EXP:   9/1/22 \par Auth submitted for Apligraf #5\par Follow up in 1 week \par 1 UNIT APLIGRAF ORDERED ON 06/19/20 FOR PTS NEXT WC APPT

## 2020-06-22 NOTE — PHYSICAL EXAM
[Normal Thyroid] : the thyroid was normal [Normal Breath Sounds] : Normal breath sounds [Normal Heart Sounds] : normal heart sounds [Normal Rate and Rhythm] : normal rate and rhythm [Ankle Swelling Bilaterally] : bilaterally  [] : of the left leg [Ankle Swelling On The Left] : moderate [Alert] : alert [Oriented to Place] : oriented to place [Oriented to Person] : oriented to person [Oriented to Time] : oriented to time [Calm] : calm [Please See PDF for Tissue Analytics] : Please See PDF for Tissue Analytics. [JVD] : no jugular venous distention  [Ankle Swelling (On Exam)] : not present [Abdomen Masses] : No abdominal massess [Abdomen Tenderness] : ~T ~M No abdominal tenderness [Enlarged] : not enlarged [Tender] : nontender [de-identified] : elderly adult WM, NAD, WD, WN, alert, Ox3.

## 2020-06-22 NOTE — REASON FOR VISIT
[Follow-Up: _____] : a [unfilled] follow-up visit [Apligraf] : apligraf [FreeTextEntry5] : Left lateral lower leg wound

## 2020-06-22 NOTE — REVIEW OF SYSTEMS
[Skin Wound] : skin wound [Fever] : no fever [Earache] : no earache [Eye Pain] : no eye pain [Shortness Of Breath] : no shortness of breath [Chest Pain] : no chest pain [Abdominal Pain] : no abdominal pain [FreeTextEntry9] : phill [de-identified] : left lateral lower leg venous stasis ulceration down to skin and subcutaneous tissue with stasis dermatitis

## 2020-06-22 NOTE — PROCEDURE
[Other: ___] : [unfilled] [Sharp curette] : sharp curette [Subcutaneous Tissue] : subcutaneous tissue [Fenestrated] : fenestrated [Hydrated with saline] : hydrated with saline [Apligraf] : apligraf [____ % was discarded] : and [unfilled] % was discarded [____ % was used] : and [unfilled] % was used [FreeTextEntry1] : wound vail,Sunil, DD, Mony and coban [] : No [FreeTextEntry9] : 10:17 [de-identified] : jayme greer [de-identified] : VSU left lateral lower leg [de-identified] : none [de-identified] : none [FreeTextEntry6] : VSU left lower leg [FreeTextEntry7] : same [de-identified] : 1cc [de-identified] : topical lidocaine [de-identified] : yes, not sent

## 2020-06-22 NOTE — REVIEW OF SYSTEMS
[Skin Wound] : skin wound [Earache] : no earache [Eye Pain] : no eye pain [Fever] : no fever [Chest Pain] : no chest pain [Shortness Of Breath] : no shortness of breath [FreeTextEntry9] : phill [Abdominal Pain] : no abdominal pain [de-identified] : left lateral lower leg venous stasis ulceration down to skin and subcutaneous tissue with stasis dermatitis

## 2020-06-22 NOTE — PROCEDURE
[Other: ___] : [unfilled] [Sharp curette] : sharp curette [Subcutaneous Tissue] : subcutaneous tissue [Fenestrated] : fenestrated [Hydrated with saline] : hydrated with saline [Apligraf] : apligraf [____ % was discarded] : and [unfilled] % was discarded [____ % was used] : and [unfilled] % was used [FreeTextEntry1] : wound vail,Sunil, DD, Mony and coban [] : No [FreeTextEntry9] : 10:17 [de-identified] : jayme greer [de-identified] : VSU left lateral lower leg [de-identified] : none [de-identified] : none [FreeTextEntry6] : VSU left lower leg [FreeTextEntry7] : same [de-identified] : 1cc [de-identified] : topical lidocaine [de-identified] : yes, not sent

## 2020-06-24 ENCOUNTER — OUTPATIENT (OUTPATIENT)
Dept: OUTPATIENT SERVICES | Facility: HOSPITAL | Age: 85
LOS: 1 days | Discharge: HOME | End: 2020-06-24
Payer: COMMERCIAL

## 2020-06-24 ENCOUNTER — APPOINTMENT (OUTPATIENT)
Dept: PLASTIC SURGERY | Facility: HOSPITAL | Age: 85
End: 2020-06-24
Payer: MEDICARE

## 2020-06-24 VITALS
BODY MASS INDEX: 23.75 KG/M2 | HEART RATE: 67 BPM | HEIGHT: 76 IN | WEIGHT: 195 LBS | SYSTOLIC BLOOD PRESSURE: 121 MMHG | TEMPERATURE: 98.4 F | OXYGEN SATURATION: 98 % | DIASTOLIC BLOOD PRESSURE: 72 MMHG | RESPIRATION RATE: 20 BRPM

## 2020-06-24 DIAGNOSIS — I83.893 VARICOSE VEINS OF BILATERAL LOWER EXTREMITIES WITH OTHER COMPLICATIONS: ICD-10-CM

## 2020-06-24 DIAGNOSIS — I83.228 VARICOSE VEINS OF LEFT LOWER EXTREMITY WITH BOTH ULCER OF OTHER PART OF LOWER EXTREMITY AND INFLAMMATION: ICD-10-CM

## 2020-06-24 DIAGNOSIS — I49.3 VENTRICULAR PREMATURE DEPOLARIZATION: ICD-10-CM

## 2020-06-24 DIAGNOSIS — G20 PARKINSON'S DISEASE: ICD-10-CM

## 2020-06-24 DIAGNOSIS — L97.822 NON-PRESSURE CHRONIC ULCER OF OTHER PART OF LEFT LOWER LEG WITH FAT LAYER EXPOSED: ICD-10-CM

## 2020-06-24 DIAGNOSIS — R01.1 CARDIAC MURMUR, UNSPECIFIED: ICD-10-CM

## 2020-06-24 DIAGNOSIS — Z87.891 PERSONAL HISTORY OF NICOTINE DEPENDENCE: ICD-10-CM

## 2020-06-24 DIAGNOSIS — Z79.899 OTHER LONG TERM (CURRENT) DRUG THERAPY: ICD-10-CM

## 2020-06-24 DIAGNOSIS — I34.0 NONRHEUMATIC MITRAL (VALVE) INSUFFICIENCY: ICD-10-CM

## 2020-06-24 DIAGNOSIS — Z91.018 ALLERGY TO OTHER FOODS: ICD-10-CM

## 2020-06-24 DIAGNOSIS — M19.90 UNSPECIFIED OSTEOARTHRITIS, UNSPECIFIED SITE: ICD-10-CM

## 2020-06-24 PROCEDURE — 15271 SKIN SUB GRAFT TRNK/ARM/LEG: CPT

## 2020-06-26 ENCOUNTER — NON-APPOINTMENT (OUTPATIENT)
Age: 85
End: 2020-06-26

## 2020-06-26 NOTE — REVIEW OF SYSTEMS
[Skin Wound] : skin wound [Eye Pain] : no eye pain [Fever] : no fever [Chest Pain] : no chest pain [Earache] : no earache [Shortness Of Breath] : no shortness of breath [FreeTextEntry9] : phill [de-identified] : left lateral lower leg venous stasis ulceration down to skin and subcutaneous tissue with stasis dermatitis [Abdominal Pain] : no abdominal pain

## 2020-06-26 NOTE — PHYSICAL EXAM
[Normal Thyroid] : the thyroid was normal [Normal Breath Sounds] : Normal breath sounds [Normal Rate and Rhythm] : normal rate and rhythm [Normal Heart Sounds] : normal heart sounds [Ankle Swelling Bilaterally] : bilaterally  [Ankle Swelling On The Left] : moderate [] : of the left leg [Alert] : alert [Oriented to Place] : oriented to place [Oriented to Person] : oriented to person [Calm] : calm [Oriented to Time] : oriented to time [Please See PDF for Tissue Analytics] : Please See PDF for Tissue Analytics. [JVD] : no jugular venous distention  [Abdomen Masses] : No abdominal massess [Ankle Swelling (On Exam)] : not present [Abdomen Tenderness] : ~T ~M No abdominal tenderness [Tender] : nontender [Enlarged] : not enlarged [de-identified] : elderly adult WM, NAD, WD, WN, alert, Ox3.

## 2020-06-26 NOTE — ASSESSMENT
[Verbal] : Verbal [Written] : Written [Demo] : Demo [Patient] : Patient [Good - alert, interested, motivated] : Good - alert, interested, motivated [Verbalizes knowledge/Understanding] : Verbalizes knowledge/understanding [Dressing changes] : dressing changes [Skin Care] : skin care [Signs and symptoms of infection] : sign and symptoms of infection [How and When to Call] : how and when to call [Compression Therapy] : compression therapy [Pain Management] : pain management [Home Health] : home health [Patient responsibility to plan of care] : patient responsibility to plan of care [] : Yes [Stable] : stable [Not Applicable - Long Term Care/Home Health Agency] : Long Term Care/Home Health Agency: Not Applicable [Home] : Home [Ambulatory] : Ambulatory [FreeTextEntry2] : Infection prevention\par Localized wound care \par Compression therapy \par Goal of remaining pain free regarding wounds.\par Application of skin substitute  [FreeTextEntry4] : 1 unit of 44sq/cm applied.  75% uses    25% discarded\par EXP: 6/25/20    LOT: [id number]   ITM: [id number]\par Irrigated with 0.9% NS LOT: 14 - 601 - 4B - 01    EXP: 2/1/23 \par Follow up in 1 week

## 2020-06-26 NOTE — PROCEDURE
[Other: ___] : [unfilled] [Sharp curette] : sharp curette [Fenestrated] : fenestrated [Subcutaneous Tissue] : subcutaneous tissue [Apligraf] : apligraf [Hydrated with saline] : hydrated with saline [____ % was used] : and [unfilled] % was used [____ % was discarded] : and [unfilled] % was discarded [FreeTextEntry1] : wound veil,CaAlginate, DD, Coban [] : No [FreeTextEntry9] : 1100hr [de-identified] : none [de-identified] : Chacorta Sumner [de-identified] : VSU left lateral ankle [de-identified] : none [de-identified] : topical lidocaine [FreeTextEntry6] : venous stasis ulcer left lateral lower leg [FreeTextEntry7] : same [de-identified] : yes, not sent to pathology [de-identified] : 1cc

## 2020-07-01 ENCOUNTER — OUTPATIENT (OUTPATIENT)
Dept: OUTPATIENT SERVICES | Facility: HOSPITAL | Age: 85
LOS: 1 days | Discharge: HOME | End: 2020-07-01
Payer: COMMERCIAL

## 2020-07-01 ENCOUNTER — APPOINTMENT (OUTPATIENT)
Dept: SURGERY | Facility: HOSPITAL | Age: 85
End: 2020-07-01
Payer: MEDICARE

## 2020-07-01 VITALS
SYSTOLIC BLOOD PRESSURE: 119 MMHG | BODY MASS INDEX: 23.75 KG/M2 | RESPIRATION RATE: 20 BRPM | DIASTOLIC BLOOD PRESSURE: 71 MMHG | TEMPERATURE: 97.5 F | WEIGHT: 195 LBS | HEART RATE: 78 BPM | HEIGHT: 76 IN | OXYGEN SATURATION: 97 %

## 2020-07-01 DIAGNOSIS — I49.3 VENTRICULAR PREMATURE DEPOLARIZATION: ICD-10-CM

## 2020-07-01 DIAGNOSIS — I83.228 VARICOSE VEINS OF LEFT LOWER EXTREMITY WITH BOTH ULCER OF OTHER PART OF LOWER EXTREMITY AND INFLAMMATION: ICD-10-CM

## 2020-07-01 DIAGNOSIS — M19.90 UNSPECIFIED OSTEOARTHRITIS, UNSPECIFIED SITE: ICD-10-CM

## 2020-07-01 DIAGNOSIS — G20 PARKINSON'S DISEASE: ICD-10-CM

## 2020-07-01 DIAGNOSIS — Z91.018 ALLERGY TO OTHER FOODS: ICD-10-CM

## 2020-07-01 DIAGNOSIS — I83.893 VARICOSE VEINS OF BILATERAL LOWER EXTREMITIES WITH OTHER COMPLICATIONS: ICD-10-CM

## 2020-07-01 DIAGNOSIS — R01.1 CARDIAC MURMUR, UNSPECIFIED: ICD-10-CM

## 2020-07-01 DIAGNOSIS — I34.0 NONRHEUMATIC MITRAL (VALVE) INSUFFICIENCY: ICD-10-CM

## 2020-07-01 DIAGNOSIS — L97.822 NON-PRESSURE CHRONIC ULCER OF OTHER PART OF LEFT LOWER LEG WITH FAT LAYER EXPOSED: ICD-10-CM

## 2020-07-01 DIAGNOSIS — Z87.891 PERSONAL HISTORY OF NICOTINE DEPENDENCE: ICD-10-CM

## 2020-07-01 DIAGNOSIS — Z79.899 OTHER LONG TERM (CURRENT) DRUG THERAPY: ICD-10-CM

## 2020-07-01 PROCEDURE — 29581 APPL MULTLAYER CMPRN SYS LEG: CPT | Mod: LT

## 2020-07-01 PROCEDURE — 99213 OFFICE O/P EST LOW 20 MIN: CPT

## 2020-07-01 NOTE — ASSESSMENT
[Verbal] : Verbal [Patient] : Patient [Verbalizes knowledge/Understanding] : Verbalizes knowledge/understanding [Good - alert, interested, motivated] : Good - alert, interested, motivated [Skin Care] : skin care [Dressing changes] : dressing changes [How and When to Call] : how and when to call [Signs and symptoms of infection] : sign and symptoms of infection [Compression Therapy] : compression therapy [Patient responsibility to plan of care] : patient responsibility to plan of care [] : Yes [Stable] : stable [Home] : Home [Ambulatory] : Ambulatory [Not Applicable - Long Term Care/Home Health Agency] : Long Term Care/Home Health Agency: Not Applicable [FreeTextEntry2] : Restore Skin Integrity\par Infection Control\par Localized wound care\par Maintain acceptable pain levels at satisfactory relief.\par Demonstrates use of both nonpharmacological and pharmacological pain relief strategies [FreeTextEntry1] : Left lateral leg stasis ulcer is clean, moderate excoriated, no infection.\par  [FreeTextEntry4] : Photos Taken\par F/U to [facility] in 1 week

## 2020-07-01 NOTE — PHYSICAL EXAM
[4 x 4] : 4 x 4  [Abdominal Pad] : Abdominal Pad [Normal Breath Sounds] : Normal breath sounds [1+] : right 1+ [0] : left 0 [Ankle Swelling (On Exam)] : present [Varicose Veins Of Lower Extremities] : present [] : of the left leg [Ankle Swelling On The Left] : moderate [Alert] : alert [Ankle Swelling Bilaterally] : severe [Oriented to Person] : oriented to person [Calm] : calm [JVD] : no jugular venous distention  [de-identified] : JANISL [de-identified] : WD/WN in no acute distress. [de-identified] : WNL [de-identified] : WNL [de-identified] : S/P 5 Apligraft, ulcer is clean, moderate excoriation, more serous drainage, no infection, periwound skin is intact with no cellulitis. [FreeTextEntry1] : Left Lateral lower Leg [FreeTextEntry3] : 4.8 [FreeTextEntry2] : 7.6 [de-identified] : Serosanguineous [de-identified] : 1-25% [FreeTextEntry4] : 0.3 [de-identified] : Coban Compression [de-identified] : Silver Alginate [TWNoteComboBox4] : Moderate [de-identified] : Cleansed with Normal Saline\par  [de-identified] : No [TWNoteComboBox5] : No [de-identified] : Mild [de-identified] : >75% [de-identified] : None [de-identified] : Yes [de-identified] : Multilayer other compression wrap [de-identified] : Weekly

## 2020-07-01 NOTE — VITALS
[Pain related to present condition?] : The patient's  pain is related to present condition. [Shooting] : shooting [Occasional] : occasional [] : No [de-identified] : No Pain 0/10 at todays visit pt states occasioanlly does get pain in left leg but is tolerable and does not take any OTC at this time. [FreeTextEntry3] : Left Leg

## 2020-07-08 ENCOUNTER — OUTPATIENT (OUTPATIENT)
Dept: OUTPATIENT SERVICES | Facility: HOSPITAL | Age: 85
LOS: 1 days | Discharge: HOME | End: 2020-07-08
Payer: COMMERCIAL

## 2020-07-08 ENCOUNTER — APPOINTMENT (OUTPATIENT)
Dept: SURGERY | Facility: HOSPITAL | Age: 85
End: 2020-07-08
Payer: MEDICARE

## 2020-07-08 VITALS
TEMPERATURE: 97.7 F | WEIGHT: 195 LBS | HEIGHT: 76 IN | RESPIRATION RATE: 20 BRPM | DIASTOLIC BLOOD PRESSURE: 68 MMHG | BODY MASS INDEX: 23.75 KG/M2 | SYSTOLIC BLOOD PRESSURE: 110 MMHG | HEART RATE: 81 BPM | OXYGEN SATURATION: 97 %

## 2020-07-08 DIAGNOSIS — I83.228 VARICOSE VEINS OF LEFT LOWER EXTREMITY WITH BOTH ULCER OF OTHER PART OF LOWER EXTREMITY AND INFLAMMATION: ICD-10-CM

## 2020-07-08 DIAGNOSIS — L97.822 NON-PRESSURE CHRONIC ULCER OF OTHER PART OF LEFT LOWER LEG WITH FAT LAYER EXPOSED: ICD-10-CM

## 2020-07-08 DIAGNOSIS — I34.0 NONRHEUMATIC MITRAL (VALVE) INSUFFICIENCY: ICD-10-CM

## 2020-07-08 DIAGNOSIS — M19.90 UNSPECIFIED OSTEOARTHRITIS, UNSPECIFIED SITE: ICD-10-CM

## 2020-07-08 DIAGNOSIS — R01.1 CARDIAC MURMUR, UNSPECIFIED: ICD-10-CM

## 2020-07-08 DIAGNOSIS — I49.3 VENTRICULAR PREMATURE DEPOLARIZATION: ICD-10-CM

## 2020-07-08 DIAGNOSIS — Z87.891 PERSONAL HISTORY OF NICOTINE DEPENDENCE: ICD-10-CM

## 2020-07-08 DIAGNOSIS — Z79.899 OTHER LONG TERM (CURRENT) DRUG THERAPY: ICD-10-CM

## 2020-07-08 DIAGNOSIS — I83.893 VARICOSE VEINS OF BILATERAL LOWER EXTREMITIES WITH OTHER COMPLICATIONS: ICD-10-CM

## 2020-07-08 DIAGNOSIS — Z91.018 ALLERGY TO OTHER FOODS: ICD-10-CM

## 2020-07-08 DIAGNOSIS — G20 PARKINSON'S DISEASE: ICD-10-CM

## 2020-07-08 PROCEDURE — 29581 APPL MULTLAYER CMPRN SYS LEG: CPT | Mod: LT

## 2020-07-08 PROCEDURE — 99213 OFFICE O/P EST LOW 20 MIN: CPT

## 2020-07-08 NOTE — ASSESSMENT
[Patient] : Patient [Verbal] : Verbal [Dressing changes] : dressing changes [Good - alert, interested, motivated] : Good - alert, interested, motivated [Verbalizes knowledge/Understanding] : Verbalizes knowledge/understanding [Skin Care] : skin care [Signs and symptoms of infection] : sign and symptoms of infection [Venous Disease] : venous disease [Compression Therapy] : compression therapy [Patient responsibility to plan of care] : patient responsibility to plan of care [How and When to Call] : how and when to call [Home] : Home [Stable] : stable [Not Applicable - Long Term Care/Home Health Agency] : Long Term Care/Home Health Agency: Not Applicable [Ambulatory] : Ambulatory [] : No [FreeTextEntry2] : Promote optimal skin integrity, infection prevention, edema control\par  [FreeTextEntry1] : Left leg stasis ulcer is stable, no infection.\par  [FreeTextEntry4] : f/u 1 week

## 2020-07-08 NOTE — PHYSICAL EXAM
[4 x 4] : 4 x 4  [Abdominal Pad] : Abdominal Pad [Normal Breath Sounds] : Normal breath sounds [1+] : left 1+ [0] : left 0 [Ankle Swelling (On Exam)] : present [Varicose Veins Of Lower Extremities] : bilaterally [Ankle Swelling On The Left] : moderate [] : of the left leg [Ankle Swelling Bilaterally] : severe [Alert] : alert [Oriented to Person] : oriented to person [Calm] : calm [JVD] : no jugular venous distention  [de-identified] : WD/WN in no acute distress. [de-identified] : WNL [de-identified] : JANISL [de-identified] : WNL [de-identified] : Left leg ulcer is clean, base is red and viable with some slough, minimal drainage, no infection, periwound skin is intact with no cellulitis. [FreeTextEntry1] : Left Lateral lower Leg [FreeTextEntry2] : 3.8 [FreeTextEntry4] : 0.1 - 0.3 [FreeTextEntry3] : 2.8 [de-identified] : Serosanguineous [de-identified] : >90% [de-identified] : <10% [de-identified] : Silver Alginate [de-identified] : Coban Compression [de-identified] : NSC [TWNoteComboBox4] : Small [TWNoteComboBox5] : No [de-identified] : No [de-identified] : None [de-identified] : None [de-identified] : Yes [de-identified] : False [de-identified] : Multilayer other compression wrap [de-identified] : Weekly

## 2020-07-15 ENCOUNTER — APPOINTMENT (OUTPATIENT)
Dept: SURGERY | Facility: HOSPITAL | Age: 85
End: 2020-07-15
Payer: MEDICARE

## 2020-07-15 ENCOUNTER — OUTPATIENT (OUTPATIENT)
Dept: OUTPATIENT SERVICES | Facility: HOSPITAL | Age: 85
LOS: 1 days | Discharge: HOME | End: 2020-07-15
Payer: COMMERCIAL

## 2020-07-15 VITALS
DIASTOLIC BLOOD PRESSURE: 60 MMHG | RESPIRATION RATE: 20 BRPM | HEART RATE: 75 BPM | BODY MASS INDEX: 23.75 KG/M2 | WEIGHT: 195 LBS | TEMPERATURE: 98.6 F | OXYGEN SATURATION: 98 % | HEIGHT: 76 IN | SYSTOLIC BLOOD PRESSURE: 95 MMHG

## 2020-07-15 DIAGNOSIS — Z79.899 OTHER LONG TERM (CURRENT) DRUG THERAPY: ICD-10-CM

## 2020-07-15 DIAGNOSIS — I83.228 VARICOSE VEINS OF LEFT LOWER EXTREMITY WITH BOTH ULCER OF OTHER PART OF LOWER EXTREMITY AND INFLAMMATION: ICD-10-CM

## 2020-07-15 DIAGNOSIS — I34.0 NONRHEUMATIC MITRAL (VALVE) INSUFFICIENCY: ICD-10-CM

## 2020-07-15 DIAGNOSIS — I83.893 VARICOSE VEINS OF BILATERAL LOWER EXTREMITIES WITH OTHER COMPLICATIONS: ICD-10-CM

## 2020-07-15 DIAGNOSIS — L97.822 NON-PRESSURE CHRONIC ULCER OF OTHER PART OF LEFT LOWER LEG WITH FAT LAYER EXPOSED: ICD-10-CM

## 2020-07-15 DIAGNOSIS — I49.3 VENTRICULAR PREMATURE DEPOLARIZATION: ICD-10-CM

## 2020-07-15 DIAGNOSIS — Z87.891 PERSONAL HISTORY OF NICOTINE DEPENDENCE: ICD-10-CM

## 2020-07-15 DIAGNOSIS — Z91.018 ALLERGY TO OTHER FOODS: ICD-10-CM

## 2020-07-15 DIAGNOSIS — R01.1 CARDIAC MURMUR, UNSPECIFIED: ICD-10-CM

## 2020-07-15 DIAGNOSIS — M19.90 UNSPECIFIED OSTEOARTHRITIS, UNSPECIFIED SITE: ICD-10-CM

## 2020-07-15 DIAGNOSIS — G20 PARKINSON'S DISEASE: ICD-10-CM

## 2020-07-15 PROCEDURE — 99213 OFFICE O/P EST LOW 20 MIN: CPT

## 2020-07-15 PROCEDURE — 29581 APPL MULTLAYER CMPRN SYS LEG: CPT | Mod: LT

## 2020-07-15 NOTE — ASSESSMENT
[Patient] : Patient [Verbal] : Verbal [Good - alert, interested, motivated] : Good - alert, interested, motivated [Verbalizes knowledge/Understanding] : Verbalizes knowledge/understanding [Signs and symptoms of infection] : sign and symptoms of infection [Dressing changes] : dressing changes [Skin Care] : skin care [Patient responsibility to plan of care] : patient responsibility to plan of care [How and When to Call] : how and when to call [Compression Therapy] : compression therapy [] : Yes [Stable] : stable [Home] : Home [Ambulatory] : Ambulatory [Not Applicable - Long Term Care/Home Health Agency] : Long Term Care/Home Health Agency: Not Applicable [FreeTextEntry2] : Restore Skin Integrity\par Infection Control\par Localized wound care\par Maintain acceptable pain levels at satisfactory relief.\par Demonstrates use of both nonpharmacological and pharmacological pain relief strategies [FreeTextEntry4] : Photos Taken\par F/U to [facility] in 1 week [FreeTextEntry1] : Left leg stasis ulcer is clean, no infection.\par

## 2020-07-15 NOTE — PHYSICAL EXAM
[facility]    PICU Transfer Acceptance Note    Date of Service (when I saw the patient): 2017     Assessment & Plan   Qing is a 4 month male with Trisomy 21, tetralogy of fallot, and G-tube dependence who underwent valve sparing repair including dacron patch VSD closure, RVOT corematrix patch.   Post op JAVIER showed a 2 mm residual VSD. Consequently, patient went back on bypass and muscular VSD was closed. A PFO was left. CPB was 76mins and XC was 44+9 min. Patient had brief 3rd degree heart block when coming off bypass. Patient developed accelerated junctional rhythm POD #0 overnight, which has resolved.  Was placed on Roshan on 12/8 for hypoxia, sats did improved and this was transitioned to sildenafil.  Sildenafil was discontinued on 12/12. She was transferred to the floor for ongoing management and monitoring of respiratory status and feeding.     CVS:    POD#9  Hemodynamically stable.  - Telemetry.     Resp:   D/c'd sildenafail on 12/12, CT d/c'd 12/11.  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   - Lasix 6 mg PO Q12 hours   - Strict Is/Os  - daily wt  - Neocate full continuous volume feeds via GT, 25 mL/hr 24 kcal/oz  - speech consult     Heme:   - stable      ID:   Colonized with MRSA. Afebrile.  S/P Ancef dc'd 12/11   - On Bactroban (5 day duration) for MRSA, last day is 12/16      Endo:   #Hypothyroidism  Hypotension during immediate post-op course concerning for adrenal insufficiency (random cortisol level 12) received stress dose hydrocortisone completed on 12/12  - continue synthroid      CNS:  - tylenol PRN     ACCESS  PIV      Patient seen and discussed with Dr. Lechuga.   CAM Carcamo, MS  Pediatric Resident, PGY-1  Pager: [phone]         Interval History    Qing has been doing well per parents. No increased work of breathing. He is  interactive. No fever. Tolerating his feeds well.   Review of Systems  4 point review of systems was performed and is negative other than noted in interval history.    Physical Exam   Temp: 97.6  F (36.4  C) Temp src: Axillary BP: 108/65   Heart Rate: 141 Resp: (!) 15 SpO2: 90 % O2 Device: Nasal cannula Oxygen Delivery: 3/4 LPM  Vitals:    12/12/17 0500 12/13/17 0400 12/14/17 0400   Weight: 5.75 kg (12 lb 10.8 oz) 5.96 kg (13 lb 2.2 oz) 5.78 kg (12 lb 11.9 oz)     Vital Signs with Ranges  Temp:  [97.3  F (36.3  C)-98.2  F (36.8  C)] 97.6  F (36.4  C)  Heart Rate:  [100-147] 141  Resp:  [15-85] 15  BP: ()/(39-95) 108/65  FiO2 (%):  [24 %] 24 %  SpO2:  [81 %-100 %] 90 %  I/O last 3 completed shifts:  In: 622.6 [I.V.:3; NG/GT:19.6]  Out: 484 [Urine:414; Stool:70]     General: alert, age-appropriate, and in no distress, lying on bed   HEENT: Trisomy 21 facies, intermittent esotropia, external ears normal, nares normal, no drainage, MMM, normal anterior fontanelle   Heart: regular rate and rhythm, normal S1/S2, grade 2 MI murmur  Respiratory: normal respiratory effort, lungs clear bilaterally and no crackles, wheezes or rales  Abdomen: soft, non-tender, non-distended, no palpable masses, G-tube c/d/i  Musculoskeletal: moving all extremities  Neuro: hypotonia, no deformity   Skin: median sternotomy scar c/d/i    Medications        furosemide  1 mg/kg (Dosing Weight) Oral BID     levothyroxine  25 mcg Oral Daily     mupirocin   Topical BID     sodium chloride (PF)  3 mL Intracatheter Q8H     PRN MEDICATIONS: glycerin (laxative), simethicone, sodium chloride (PF), acetaminophen **OR** acetaminophen, naloxone, artificial tears    Data   No results found for this or any previous visit (from the past 24 hour(s)).     [4 x 4] : 4 x 4  [Abdominal Pad] : Abdominal Pad [JVD] : no jugular venous distention  [Normal Breath Sounds] : Normal breath sounds [1+] : left 1+ [0] : left 0 [Ankle Swelling (On Exam)] : present [Varicose Veins Of Lower Extremities] : bilaterally [Ankle Swelling On The Left] : moderate [] : of the left leg [Ankle Swelling Bilaterally] : severe [Alert] : alert [Calm] : calm [Oriented to Person] : oriented to person [de-identified] : WNL [de-identified] : WD/WN in no acute distress. [de-identified] : JANISL [de-identified] : WNL [de-identified] : Left leg ulcer is clean, base is red and viable with some slough, minimal drainage, no infection, periwound skin is intact with no cellulitis. [FreeTextEntry1] : Left Lateral Lower Leg [FreeTextEntry2] : 3.1 [FreeTextEntry3] : 2.1 [FreeTextEntry4] : 0.1 [de-identified] : Serosanguineous [de-identified] : Intact [de-identified] : 1-25% [de-identified] : Coban Compression [de-identified] : Silver Alginate [de-identified] : Cleansed with Normal Saline\par  [TWNoteComboBox4] : Small [TWNoteComboBox5] : No [de-identified] : No [de-identified] : None [de-identified] : None [de-identified] : >75% [de-identified] : Yes [de-identified] : Multilayer other compression wrap [de-identified] : 3x Weekly

## 2020-07-22 ENCOUNTER — APPOINTMENT (OUTPATIENT)
Dept: SURGERY | Facility: HOSPITAL | Age: 85
End: 2020-07-22
Payer: MEDICARE

## 2020-07-22 ENCOUNTER — OUTPATIENT (OUTPATIENT)
Dept: OUTPATIENT SERVICES | Facility: HOSPITAL | Age: 85
LOS: 1 days | Discharge: HOME | End: 2020-07-22
Payer: COMMERCIAL

## 2020-07-22 VITALS
OXYGEN SATURATION: 97 % | TEMPERATURE: 96.8 F | SYSTOLIC BLOOD PRESSURE: 127 MMHG | RESPIRATION RATE: 20 BRPM | HEART RATE: 98 BPM | WEIGHT: 195 LBS | HEIGHT: 76 IN | DIASTOLIC BLOOD PRESSURE: 74 MMHG | BODY MASS INDEX: 23.75 KG/M2

## 2020-07-22 DIAGNOSIS — R01.1 CARDIAC MURMUR, UNSPECIFIED: ICD-10-CM

## 2020-07-22 DIAGNOSIS — I83.228 VARICOSE VEINS OF LEFT LOWER EXTREMITY WITH BOTH ULCER OF OTHER PART OF LOWER EXTREMITY AND INFLAMMATION: ICD-10-CM

## 2020-07-22 DIAGNOSIS — M19.90 UNSPECIFIED OSTEOARTHRITIS, UNSPECIFIED SITE: ICD-10-CM

## 2020-07-22 DIAGNOSIS — L97.822 NON-PRESSURE CHRONIC ULCER OF OTHER PART OF LEFT LOWER LEG WITH FAT LAYER EXPOSED: ICD-10-CM

## 2020-07-22 DIAGNOSIS — G20 PARKINSON'S DISEASE: ICD-10-CM

## 2020-07-22 DIAGNOSIS — Z91.018 ALLERGY TO OTHER FOODS: ICD-10-CM

## 2020-07-22 DIAGNOSIS — I49.3 VENTRICULAR PREMATURE DEPOLARIZATION: ICD-10-CM

## 2020-07-22 DIAGNOSIS — I34.0 NONRHEUMATIC MITRAL (VALVE) INSUFFICIENCY: ICD-10-CM

## 2020-07-22 DIAGNOSIS — Z79.899 OTHER LONG TERM (CURRENT) DRUG THERAPY: ICD-10-CM

## 2020-07-22 DIAGNOSIS — I83.893 VARICOSE VEINS OF BILATERAL LOWER EXTREMITIES WITH OTHER COMPLICATIONS: ICD-10-CM

## 2020-07-22 DIAGNOSIS — Z87.891 PERSONAL HISTORY OF NICOTINE DEPENDENCE: ICD-10-CM

## 2020-07-22 PROCEDURE — 29581 APPL MULTLAYER CMPRN SYS LEG: CPT | Mod: LT

## 2020-07-22 PROCEDURE — 99213 OFFICE O/P EST LOW 20 MIN: CPT

## 2020-07-22 NOTE — ASSESSMENT
[Patient] : Patient [Verbal] : Verbal [Good - alert, interested, motivated] : Good - alert, interested, motivated [Verbalizes knowledge/Understanding] : Verbalizes knowledge/understanding [Skin Care] : skin care [Dressing changes] : dressing changes [Signs and symptoms of infection] : sign and symptoms of infection [How and When to Call] : how and when to call [Compression Therapy] : compression therapy [Patient responsibility to plan of care] : patient responsibility to plan of care [] : Yes [Stable] : stable [Home] : Home [Ambulatory] : Ambulatory [Not Applicable - Long Term Care/Home Health Agency] : Long Term Care/Home Health Agency: Not Applicable [FreeTextEntry2] : Restore Optimal Skin Integrity \par Infection Prevention \par Localized wound care\par Maintain acceptable pain levels at satisfactory relief.\par Compression Therapy \par Demonstrates use of both nonpharmacological and pharmacological pain relief strategies [FreeTextEntry1] : Left leg ulcer is healing well, no infection.\par  [FreeTextEntry4] : F/U to [facility] in 1 week

## 2020-07-22 NOTE — PHYSICAL EXAM
[4 x 4] : 4 x 4  [Normal Breath Sounds] : Normal breath sounds [JVD] : no jugular venous distention  [1+] : right 1+ [Ankle Swelling (On Exam)] : present [0] : left 0 [Varicose Veins Of Lower Extremities] : present [] : present [Ankle Swelling On The Left] : moderate [Ankle Swelling Bilaterally] : severe [Alert] : alert [Oriented to Person] : oriented to person [Calm] : calm [de-identified] : WD/WN in no acute distress. [de-identified] : WNL [de-identified] : JANISL [de-identified] : WNL [de-identified] : Left leg ulcer is clean, base is red and viable, minimal drainage, no infection, minimal slough, new skin at the margin,  periwound skin is intact with no cellulitis. [FreeTextEntry3] : 1.7 [FreeTextEntry2] : 1.7 [FreeTextEntry1] : Left Lateral Lower Leg [de-identified] : Serosanguineous [de-identified] : Intact [FreeTextEntry4] : 0.2 [de-identified] : Silver Alginate [de-identified] : Coban Compression [de-identified] : 1-15% [de-identified] : Cleansed with Normal Saline\par  [TWNoteComboBox4] : Small [TWNoteComboBox5] : No [de-identified] : No [de-identified] : >75% [de-identified] : None [de-identified] : None [de-identified] : Multilayer other compression wrap [de-identified] : 3x Weekly [de-identified] : Yes

## 2020-07-29 ENCOUNTER — OUTPATIENT (OUTPATIENT)
Dept: OUTPATIENT SERVICES | Facility: HOSPITAL | Age: 85
LOS: 1 days | Discharge: HOME | End: 2020-07-29
Payer: COMMERCIAL

## 2020-07-29 ENCOUNTER — APPOINTMENT (OUTPATIENT)
Dept: SURGERY | Facility: HOSPITAL | Age: 85
End: 2020-07-29
Payer: MEDICARE

## 2020-07-29 VITALS
WEIGHT: 195 LBS | HEART RATE: 79 BPM | HEIGHT: 76 IN | OXYGEN SATURATION: 96 % | SYSTOLIC BLOOD PRESSURE: 108 MMHG | DIASTOLIC BLOOD PRESSURE: 62 MMHG | RESPIRATION RATE: 20 BRPM | BODY MASS INDEX: 23.75 KG/M2 | TEMPERATURE: 99.4 F

## 2020-07-29 DIAGNOSIS — I83.228 VARICOSE VEINS OF LEFT LOWER EXTREMITY WITH BOTH ULCER OF OTHER PART OF LOWER EXTREMITY AND INFLAMMATION: ICD-10-CM

## 2020-07-29 DIAGNOSIS — Z87.891 PERSONAL HISTORY OF NICOTINE DEPENDENCE: ICD-10-CM

## 2020-07-29 DIAGNOSIS — Z79.899 OTHER LONG TERM (CURRENT) DRUG THERAPY: ICD-10-CM

## 2020-07-29 DIAGNOSIS — L97.822 NON-PRESSURE CHRONIC ULCER OF OTHER PART OF LEFT LOWER LEG WITH FAT LAYER EXPOSED: ICD-10-CM

## 2020-07-29 DIAGNOSIS — I49.3 VENTRICULAR PREMATURE DEPOLARIZATION: ICD-10-CM

## 2020-07-29 DIAGNOSIS — I83.892 VARICOSE VEINS OF LEFT LOWER EXTREMITY WITH OTHER COMPLICATIONS: ICD-10-CM

## 2020-07-29 DIAGNOSIS — R01.1 CARDIAC MURMUR, UNSPECIFIED: ICD-10-CM

## 2020-07-29 DIAGNOSIS — I34.0 NONRHEUMATIC MITRAL (VALVE) INSUFFICIENCY: ICD-10-CM

## 2020-07-29 DIAGNOSIS — M19.90 UNSPECIFIED OSTEOARTHRITIS, UNSPECIFIED SITE: ICD-10-CM

## 2020-07-29 DIAGNOSIS — Z91.018 ALLERGY TO OTHER FOODS: ICD-10-CM

## 2020-07-29 DIAGNOSIS — G20 PARKINSON'S DISEASE: ICD-10-CM

## 2020-07-29 PROCEDURE — 99213 OFFICE O/P EST LOW 20 MIN: CPT

## 2020-07-29 PROCEDURE — 29581 APPL MULTLAYER CMPRN SYS LEG: CPT | Mod: LT

## 2020-07-29 NOTE — PHYSICAL EXAM
[Normal Breath Sounds] : Normal breath sounds [1+] : left 1+ [0] : left 0 [Ankle Swelling (On Exam)] : present [Varicose Veins Of Lower Extremities] : present [] : present [Ankle Swelling On The Left] : moderate [Alert] : alert [Oriented to Person] : oriented to person [Ankle Swelling Bilaterally] : severe [Calm] : calm [4 x 4] : 4 x 4  [JVD] : no jugular venous distention  [de-identified] : WD/WN in no acute distress. [de-identified] : WNL [de-identified] : JANISL [de-identified] : WNL [de-identified] : Left leg ulcer is clean, base is red and viable, no infection, minimal slough, new skin at the margin,  periwound skin is intact with no cellulitis. [de-identified] : No neurovascular deficit noted [FreeTextEntry1] : Lateral Lower Leg [FreeTextEntry2] : 1.7 [FreeTextEntry3] : 1.2 [FreeTextEntry4] : 0.2 [de-identified] : Serosanguineous  [de-identified] : Intact [de-identified] : 1-15% [de-identified] : Silver Alginate [de-identified] : Patient denies any pain or discomfort post Coban application  [de-identified] : Cleansed with Normal Saline, Kerlix  [TWNoteComboBox1] : Left [TWNoteComboBox4] : Small [TWNoteComboBox5] : No [de-identified] : No [de-identified] : None [de-identified] : None [de-identified] : >75% [de-identified] : Yes [de-identified] : Multilayer other compression wrap [de-identified] : Weekly

## 2020-07-29 NOTE — ASSESSMENT
[Verbal] : Verbal [Written] : Written [Patient] : Patient [Verbalizes knowledge/Understanding] : Verbalizes knowledge/understanding [Good - alert, interested, motivated] : Good - alert, interested, motivated [Skin Care] : skin care [Dressing changes] : dressing changes [Foot Care] : foot care [Signs and symptoms of infection] : sign and symptoms of infection [Pressure relief] : pressure relief [Off-loading] : off-loading [How and When to Call] : how and when to call [Patient responsibility to plan of care] : patient responsibility to plan of care [Compression Therapy] : compression therapy [] : No [FreeTextEntry2] : Infection Prevention\par Promote optimal skin integrity \par Compression therapy  [FreeTextEntry1] : Left leg stasis ulcer is improving, no infection.\par  [FreeTextEntry4] : F/U in one week\par Final week with Coban pending findings from upcoming visit with physician

## 2020-08-05 ENCOUNTER — OUTPATIENT (OUTPATIENT)
Dept: OUTPATIENT SERVICES | Facility: HOSPITAL | Age: 85
LOS: 1 days | Discharge: HOME | End: 2020-08-05
Payer: COMMERCIAL

## 2020-08-05 ENCOUNTER — APPOINTMENT (OUTPATIENT)
Dept: SURGERY | Facility: HOSPITAL | Age: 85
End: 2020-08-05
Payer: MEDICARE

## 2020-08-05 VITALS
HEIGHT: 76 IN | WEIGHT: 195 LBS | BODY MASS INDEX: 23.75 KG/M2 | TEMPERATURE: 97.6 F | SYSTOLIC BLOOD PRESSURE: 108 MMHG | DIASTOLIC BLOOD PRESSURE: 70 MMHG | HEART RATE: 72 BPM | OXYGEN SATURATION: 98 % | RESPIRATION RATE: 20 BRPM

## 2020-08-05 DIAGNOSIS — I83.218 VARICOSE VEINS OF RIGHT LOWER EXTREMITY WITH BOTH ULCER OF OTHER PART OF LOWER EXTREMITY AND INFLAMMATION: ICD-10-CM

## 2020-08-05 PROCEDURE — 29581 APPL MULTLAYER CMPRN SYS LEG: CPT | Mod: LT

## 2020-08-05 PROCEDURE — 99213 OFFICE O/P EST LOW 20 MIN: CPT

## 2020-08-05 NOTE — ASSESSMENT
[Verbal] : Verbal [Written] : Written [Good - alert, interested, motivated] : Good - alert, interested, motivated [Patient] : Patient [Verbalizes knowledge/Understanding] : Verbalizes knowledge/understanding [Dressing changes] : dressing changes [Skin Care] : skin care [Pressure relief] : pressure relief [Foot Care] : foot care [Signs and symptoms of infection] : sign and symptoms of infection [Venous Disease] : venous disease [How and When to Call] : how and when to call [Compression Therapy] : compression therapy [Off-loading] : off-loading [Patient responsibility to plan of care] : patient responsibility to plan of care [] : Yes [Stable] : stable [Home] : Home [Ambulatory] : Ambulatory [Not Applicable - Long Term Care/Home Health Agency] : Long Term Care/Home Health Agency: Not Applicable [FreeTextEntry2] : Promote optimal skin integrity, infection prevention, edema control\par  [FreeTextEntry4] : f/u 1 week [FreeTextEntry1] : Left leg ulcer is healing, no infection.\par

## 2020-08-05 NOTE — VITALS
[] : No [de-identified] : Pain scale:  0/10 - Patient reports no c/o pains or discomforts at present.

## 2020-08-05 NOTE — PHYSICAL EXAM
[4 x 4] : 4 x 4  [1+] : left 1+ [Normal Breath Sounds] : Normal breath sounds [0] : right 0 [Ankle Swelling (On Exam)] : present [Ankle Swelling On The Left] : moderate [Varicose Veins Of Lower Extremities] : present [Ankle Swelling Bilaterally] : severe [] : of the left leg [Alert] : alert [Calm] : calm [Oriented to Person] : oriented to person [JVD] : no jugular venous distention  [de-identified] : JANISL [de-identified] : WNL [de-identified] : WD/WN in no acute distress. [de-identified] : WNL [de-identified] : Left leg ulcer is clean, base is red and viable, no infection, minimal slough, new skin at the margin,  periwound skin is intact with no cellulitis. [FreeTextEntry1] : Left lateral lower leg [FreeTextEntry2] : 1.3 [FreeTextEntry3] : 0.8 [FreeTextEntry4] : 0.1 [de-identified] : serosanguineous [de-identified] : intact [de-identified] : Coban [de-identified] : silver alginate [de-identified] : NSC [TWNoteComboBox4] : Small [de-identified] : None [de-identified] : 100% [de-identified] : Multilayer other compression wrap [de-identified] : Weekly

## 2020-08-06 DIAGNOSIS — Z79.899 OTHER LONG TERM (CURRENT) DRUG THERAPY: ICD-10-CM

## 2020-08-06 DIAGNOSIS — I34.0 NONRHEUMATIC MITRAL (VALVE) INSUFFICIENCY: ICD-10-CM

## 2020-08-06 DIAGNOSIS — Z87.891 PERSONAL HISTORY OF NICOTINE DEPENDENCE: ICD-10-CM

## 2020-08-06 DIAGNOSIS — I83.228 VARICOSE VEINS OF LEFT LOWER EXTREMITY WITH BOTH ULCER OF OTHER PART OF LOWER EXTREMITY AND INFLAMMATION: ICD-10-CM

## 2020-08-06 DIAGNOSIS — G20 PARKINSON'S DISEASE: ICD-10-CM

## 2020-08-06 DIAGNOSIS — I83.893 VARICOSE VEINS OF BILATERAL LOWER EXTREMITIES WITH OTHER COMPLICATIONS: ICD-10-CM

## 2020-08-06 DIAGNOSIS — L97.822 NON-PRESSURE CHRONIC ULCER OF OTHER PART OF LEFT LOWER LEG WITH FAT LAYER EXPOSED: ICD-10-CM

## 2020-08-06 DIAGNOSIS — Z91.018 ALLERGY TO OTHER FOODS: ICD-10-CM

## 2020-08-06 DIAGNOSIS — I49.3 VENTRICULAR PREMATURE DEPOLARIZATION: ICD-10-CM

## 2020-08-06 DIAGNOSIS — R01.1 CARDIAC MURMUR, UNSPECIFIED: ICD-10-CM

## 2020-08-06 DIAGNOSIS — M19.90 UNSPECIFIED OSTEOARTHRITIS, UNSPECIFIED SITE: ICD-10-CM

## 2020-08-12 ENCOUNTER — OUTPATIENT (OUTPATIENT)
Dept: OUTPATIENT SERVICES | Facility: HOSPITAL | Age: 85
LOS: 1 days | Discharge: HOME | End: 2020-08-12
Payer: COMMERCIAL

## 2020-08-12 ENCOUNTER — APPOINTMENT (OUTPATIENT)
Dept: PLASTIC SURGERY | Facility: HOSPITAL | Age: 85
End: 2020-08-12
Payer: MEDICARE

## 2020-08-12 VITALS
TEMPERATURE: 98.5 F | RESPIRATION RATE: 20 BRPM | HEIGHT: 76 IN | BODY MASS INDEX: 23.75 KG/M2 | OXYGEN SATURATION: 97 % | HEART RATE: 79 BPM | DIASTOLIC BLOOD PRESSURE: 69 MMHG | WEIGHT: 195 LBS | SYSTOLIC BLOOD PRESSURE: 122 MMHG

## 2020-08-12 DIAGNOSIS — I83.228 VARICOSE VEINS OF LEFT LOWER EXTREMITY WITH BOTH ULCER OF OTHER PART OF LOWER EXTREMITY AND INFLAMMATION: ICD-10-CM

## 2020-08-12 PROCEDURE — 99213 OFFICE O/P EST LOW 20 MIN: CPT

## 2020-08-12 PROCEDURE — 29581 APPL MULTLAYER CMPRN SYS LEG: CPT | Mod: LT

## 2020-08-12 NOTE — ASSESSMENT
[Verbal] : Verbal [Written] : Written [Good - alert, interested, motivated] : Good - alert, interested, motivated [Patient] : Patient [Dressing changes] : dressing changes [Verbalizes knowledge/Understanding] : Verbalizes knowledge/understanding [Foot Care] : foot care [Skin Care] : skin care [Signs and symptoms of infection] : sign and symptoms of infection [Pressure relief] : pressure relief [How and When to Call] : how and when to call [Venous Disease] : venous disease [Off-loading] : off-loading [Compression Therapy] : compression therapy [Patient responsibility to plan of care] : patient responsibility to plan of care [Home] : Home [Stable] : stable [Ambulatory] : Ambulatory [Not Applicable - Long Term Care/Home Health Agency] : Long Term Care/Home Health Agency: Not Applicable [] : No [FreeTextEntry2] : Restore Skin Integrity\par Infection Control\par Localized wound care\par Compression Therapy\par Edema Prevention [FreeTextEntry4] : F/U to [facility] in 1 week

## 2020-08-12 NOTE — PHYSICAL EXAM
[4 x 4] : 4 x 4  [Normal Breath Sounds] : Normal breath sounds [1+] : left 1+ [0] : left 0 [Ankle Swelling (On Exam)] : present [Varicose Veins Of Lower Extremities] : present [Ankle Swelling On The Left] : moderate [] : of the left leg [Ankle Swelling Bilaterally] : severe [Alert] : alert [Oriented to Person] : oriented to person [Calm] : calm [JVD] : no jugular venous distention  [de-identified] : WD/WN in no acute distress. [de-identified] : WNL [de-identified] : JANISL [de-identified] : Left leg ulcer is clean, base is red and viable, no infection, minimal slough, new skin at the margin,  periwound skin is intact with no cellulitis. [de-identified] : WNL [de-identified] : No neurovascular deficit noted. [FreeTextEntry1] : Left lateral lower leg [FreeTextEntry2] : 1.3 [FreeTextEntry3] : 0.6 [FreeTextEntry4] : 0.1 [de-identified] : serosanguineous [de-identified] : Intact [de-identified] : Coban [de-identified] : Silver alginate [de-identified] : NSC [TWNoteComboBox4] : Small [de-identified] : Normal [de-identified] : None [de-identified] : None [de-identified] : Weekly [de-identified] : Multilayer other compression wrap [de-identified] : 100%

## 2020-08-12 NOTE — HISTORY OF PRESENT ILLNESS
[FreeTextEntry1] : Left leg ulcer is clean, no C/O\par 8/12/20 left lower leg ulcer clean and smaller

## 2020-08-13 DIAGNOSIS — I49.3 VENTRICULAR PREMATURE DEPOLARIZATION: ICD-10-CM

## 2020-08-13 DIAGNOSIS — M19.90 UNSPECIFIED OSTEOARTHRITIS, UNSPECIFIED SITE: ICD-10-CM

## 2020-08-13 DIAGNOSIS — G20 PARKINSON'S DISEASE: ICD-10-CM

## 2020-08-13 DIAGNOSIS — I83.228 VARICOSE VEINS OF LEFT LOWER EXTREMITY WITH BOTH ULCER OF OTHER PART OF LOWER EXTREMITY AND INFLAMMATION: ICD-10-CM

## 2020-08-13 DIAGNOSIS — Z87.891 PERSONAL HISTORY OF NICOTINE DEPENDENCE: ICD-10-CM

## 2020-08-13 DIAGNOSIS — Z91.018 ALLERGY TO OTHER FOODS: ICD-10-CM

## 2020-08-13 DIAGNOSIS — L97.822 NON-PRESSURE CHRONIC ULCER OF OTHER PART OF LEFT LOWER LEG WITH FAT LAYER EXPOSED: ICD-10-CM

## 2020-08-13 DIAGNOSIS — Z79.899 OTHER LONG TERM (CURRENT) DRUG THERAPY: ICD-10-CM

## 2020-08-13 DIAGNOSIS — I34.0 NONRHEUMATIC MITRAL (VALVE) INSUFFICIENCY: ICD-10-CM

## 2020-08-13 DIAGNOSIS — I83.893 VARICOSE VEINS OF BILATERAL LOWER EXTREMITIES WITH OTHER COMPLICATIONS: ICD-10-CM

## 2020-08-13 DIAGNOSIS — R01.1 CARDIAC MURMUR, UNSPECIFIED: ICD-10-CM

## 2020-08-19 ENCOUNTER — APPOINTMENT (OUTPATIENT)
Dept: SURGERY | Facility: HOSPITAL | Age: 85
End: 2020-08-19
Payer: MEDICARE

## 2020-08-19 ENCOUNTER — OUTPATIENT (OUTPATIENT)
Dept: OUTPATIENT SERVICES | Facility: HOSPITAL | Age: 85
LOS: 1 days | Discharge: HOME | End: 2020-08-19
Payer: COMMERCIAL

## 2020-08-19 VITALS
RESPIRATION RATE: 18 BRPM | WEIGHT: 195 LBS | OXYGEN SATURATION: 98 % | SYSTOLIC BLOOD PRESSURE: 133 MMHG | BODY MASS INDEX: 23.75 KG/M2 | TEMPERATURE: 98.2 F | HEIGHT: 76 IN | DIASTOLIC BLOOD PRESSURE: 76 MMHG | HEART RATE: 73 BPM

## 2020-08-19 DIAGNOSIS — I83.228 VARICOSE VEINS OF LEFT LOWER EXTREMITY WITH BOTH ULCER OF OTHER PART OF LOWER EXTREMITY AND INFLAMMATION: ICD-10-CM

## 2020-08-19 PROCEDURE — 29581 APPL MULTLAYER CMPRN SYS LEG: CPT | Mod: LT

## 2020-08-19 PROCEDURE — 99213 OFFICE O/P EST LOW 20 MIN: CPT

## 2020-08-19 NOTE — ASSESSMENT
[Verbal] : Verbal [Written] : Written [Demo] : Demo [Verbalizes knowledge/Understanding] : Verbalizes knowledge/understanding [Patient] : Patient [Good - alert, interested, motivated] : Good - alert, interested, motivated [Signs and symptoms of infection] : sign and symptoms of infection [Dressing changes] : dressing changes [Venous Disease] : venous disease [How and When to Call] : how and when to call [Compression Therapy] : compression therapy [Pain Management] : pain management [Patient responsibility to plan of care] : patient responsibility to plan of care [] : Yes [Ambulatory] : Ambulatory [Stable] : stable [Home] : Home [Not Applicable - Long Term Care/Home Health Agency] : Long Term Care/Home Health Agency: Not Applicable [FreeTextEntry2] : Infection prevention\par Localized wound care \par Goal of remaining pain free regarding wounds.\par Compression therapy  [FreeTextEntry4] : Patient instructed to purchase over the counter compression stockings 15 - 20 mm/hg for his right leg.\par Follow up in 1 week  [FreeTextEntry1] : Left leg stasis ulcer is healing well, no infection.\par

## 2020-08-19 NOTE — PHYSICAL EXAM
[Normal Breath Sounds] : Normal breath sounds [1+] : right 1+ [0] : left 0 [Ankle Swelling (On Exam)] : present [Ankle Swelling On The Left] : moderate [Varicose Veins Of Lower Extremities] : present [Alert] : alert [] : of the left leg [Ankle Swelling Bilaterally] : severe [Oriented to Person] : oriented to person [Calm] : calm [4 x 4] : 4 x 4  [JVD] : no jugular venous distention  [de-identified] : WD/WN in no acute distress. [de-identified] : WNL [de-identified] : JANISL [de-identified] : WNL [de-identified] : Left leg ulcer is clean, base is red and viable, no infection, minimal slough, more skin at the margin,  periwound skin is intact with no cellulitis. [de-identified] : No neurovascular deficits noted.\par  [FreeTextEntry1] : Left lateral lower leg  [FreeTextEntry3] : 0.6 [FreeTextEntry2] : 1.1 [FreeTextEntry4] : 0.1 [de-identified] : Silver alginate [de-identified] : Coban. Expressed comfort post Coban application. [de-identified] : Serous/sanguinous [TWNoteComboBox4] : Small [de-identified] : Cleansed with NS\par Tegaderm  [de-identified] : Normal [de-identified] : None [de-identified] : None [de-identified] : >75% [de-identified] : Weekly [de-identified] : Multilayer other compression wrap [de-identified] : No [de-identified] : Primary Dressing

## 2020-08-20 DIAGNOSIS — Z91.018 ALLERGY TO OTHER FOODS: ICD-10-CM

## 2020-08-20 DIAGNOSIS — G20 PARKINSON'S DISEASE: ICD-10-CM

## 2020-08-20 DIAGNOSIS — I83.228 VARICOSE VEINS OF LEFT LOWER EXTREMITY WITH BOTH ULCER OF OTHER PART OF LOWER EXTREMITY AND INFLAMMATION: ICD-10-CM

## 2020-08-20 DIAGNOSIS — I83.893 VARICOSE VEINS OF BILATERAL LOWER EXTREMITIES WITH OTHER COMPLICATIONS: ICD-10-CM

## 2020-08-20 DIAGNOSIS — I49.3 VENTRICULAR PREMATURE DEPOLARIZATION: ICD-10-CM

## 2020-08-20 DIAGNOSIS — L97.822 NON-PRESSURE CHRONIC ULCER OF OTHER PART OF LEFT LOWER LEG WITH FAT LAYER EXPOSED: ICD-10-CM

## 2020-08-20 DIAGNOSIS — Z87.891 PERSONAL HISTORY OF NICOTINE DEPENDENCE: ICD-10-CM

## 2020-08-20 DIAGNOSIS — I34.0 NONRHEUMATIC MITRAL (VALVE) INSUFFICIENCY: ICD-10-CM

## 2020-08-20 DIAGNOSIS — Z79.899 OTHER LONG TERM (CURRENT) DRUG THERAPY: ICD-10-CM

## 2020-08-20 DIAGNOSIS — R01.1 CARDIAC MURMUR, UNSPECIFIED: ICD-10-CM

## 2020-08-20 DIAGNOSIS — M19.90 UNSPECIFIED OSTEOARTHRITIS, UNSPECIFIED SITE: ICD-10-CM

## 2020-08-26 ENCOUNTER — APPOINTMENT (OUTPATIENT)
Dept: SURGERY | Facility: HOSPITAL | Age: 85
End: 2020-08-26
Payer: MEDICARE

## 2020-08-26 ENCOUNTER — OUTPATIENT (OUTPATIENT)
Dept: OUTPATIENT SERVICES | Facility: HOSPITAL | Age: 85
LOS: 1 days | Discharge: HOME | End: 2020-08-26
Payer: COMMERCIAL

## 2020-08-26 VITALS
HEART RATE: 71 BPM | SYSTOLIC BLOOD PRESSURE: 130 MMHG | OXYGEN SATURATION: 98 % | DIASTOLIC BLOOD PRESSURE: 81 MMHG | WEIGHT: 195 LBS | TEMPERATURE: 98.9 F | RESPIRATION RATE: 20 BRPM | BODY MASS INDEX: 23.75 KG/M2 | HEIGHT: 76 IN

## 2020-08-26 DIAGNOSIS — Z79.899 OTHER LONG TERM (CURRENT) DRUG THERAPY: ICD-10-CM

## 2020-08-26 DIAGNOSIS — M19.90 UNSPECIFIED OSTEOARTHRITIS, UNSPECIFIED SITE: ICD-10-CM

## 2020-08-26 DIAGNOSIS — I83.228 VARICOSE VEINS OF LEFT LOWER EXTREMITY WITH BOTH ULCER OF OTHER PART OF LOWER EXTREMITY AND INFLAMMATION: ICD-10-CM

## 2020-08-26 DIAGNOSIS — L97.822 NON-PRESSURE CHRONIC ULCER OF OTHER PART OF LEFT LOWER LEG WITH FAT LAYER EXPOSED: ICD-10-CM

## 2020-08-26 DIAGNOSIS — Z87.891 PERSONAL HISTORY OF NICOTINE DEPENDENCE: ICD-10-CM

## 2020-08-26 DIAGNOSIS — R01.1 CARDIAC MURMUR, UNSPECIFIED: ICD-10-CM

## 2020-08-26 DIAGNOSIS — I34.0 NONRHEUMATIC MITRAL (VALVE) INSUFFICIENCY: ICD-10-CM

## 2020-08-26 DIAGNOSIS — G20 PARKINSON'S DISEASE: ICD-10-CM

## 2020-08-26 DIAGNOSIS — I83.893 VARICOSE VEINS OF BILATERAL LOWER EXTREMITIES WITH OTHER COMPLICATIONS: ICD-10-CM

## 2020-08-26 DIAGNOSIS — Z91.018 ALLERGY TO OTHER FOODS: ICD-10-CM

## 2020-08-26 DIAGNOSIS — I49.3 VENTRICULAR PREMATURE DEPOLARIZATION: ICD-10-CM

## 2020-08-26 PROCEDURE — 29581 APPL MULTLAYER CMPRN SYS LEG: CPT | Mod: LT

## 2020-08-26 PROCEDURE — 99213 OFFICE O/P EST LOW 20 MIN: CPT

## 2020-08-26 NOTE — PHYSICAL EXAM
[Normal Breath Sounds] : Normal breath sounds [1+] : left 1+ [Ankle Swelling (On Exam)] : present [0] : right 0 [Ankle Swelling On The Left] : moderate [Varicose Veins Of Lower Extremities] : bilaterally [Alert] : alert [] : of the left leg [Ankle Swelling Bilaterally] : severe [Oriented to Person] : oriented to person [Calm] : calm [2 x 2] : 2 x 2  [JVD] : no jugular venous distention  [de-identified] : WD/WN in no acute distress. [de-identified] : WNL [de-identified] : JANISL [de-identified] : Left leg ulcer is clean, base is red and viable, no infection, minimal slough, more skin at the margin,  periwound skin is intact with no cellulitis. [de-identified] : WNL [de-identified] : No neurovascular deficits noted.\par  [FreeTextEntry1] : Left lateral lower leg  [FreeTextEntry2] : 0.7 [FreeTextEntry4] : 0.1 [FreeTextEntry3] : 0.5 [de-identified] : Serous/sanguinous [de-identified] : Ani  [de-identified] : Coban. Expressed comfort post Coban application. [de-identified] : None [de-identified] : Cleansed with NS\par Tegaderm  [TWNoteComboBox4] : Moderate [de-identified] : Normal [de-identified] : None [de-identified] : 100% [de-identified] : Weekly [de-identified] : Multilayer other compression wrap [de-identified] : Primary Dressing

## 2020-08-26 NOTE — ASSESSMENT
[Verbal] : Verbal [Written] : Written [Patient] : Patient [Demo] : Demo [Good - alert, interested, motivated] : Good - alert, interested, motivated [Dressing changes] : dressing changes [Verbalizes knowledge/Understanding] : Verbalizes knowledge/understanding [Skin Care] : skin care [Signs and symptoms of infection] : sign and symptoms of infection [Venous Disease] : venous disease [Pain Management] : pain management [How and When to Call] : how and when to call [Compression Therapy] : compression therapy [Patient responsibility to plan of care] : patient responsibility to plan of care [Stable] : stable [Home] : Home [Ambulatory] : Ambulatory [Not Applicable - Long Term Care/Home Health Agency] : Long Term Care/Home Health Agency: Not Applicable [] : No [FreeTextEntry2] : Infection prevention\par Localized wound care \par Goal of remaining pain free regarding wounds.\par Compression therapy  [FreeTextEntry4] : Follow up in 1 week  [FreeTextEntry1] : Left leg stasis ulcer is healing well, no infection.\par

## 2020-09-02 ENCOUNTER — APPOINTMENT (OUTPATIENT)
Dept: SURGERY | Facility: HOSPITAL | Age: 85
End: 2020-09-02

## 2020-10-01 ENCOUNTER — APPOINTMENT (OUTPATIENT)
Dept: INTERNAL MEDICINE | Facility: CLINIC | Age: 85
End: 2020-10-01
Payer: MEDICARE

## 2020-10-01 ENCOUNTER — NON-APPOINTMENT (OUTPATIENT)
Age: 85
End: 2020-10-01

## 2020-10-01 VITALS
SYSTOLIC BLOOD PRESSURE: 140 MMHG | DIASTOLIC BLOOD PRESSURE: 80 MMHG | RESPIRATION RATE: 16 BRPM | BODY MASS INDEX: 23.17 KG/M2 | OXYGEN SATURATION: 98 % | TEMPERATURE: 98.4 F | HEIGHT: 75 IN | WEIGHT: 186.38 LBS | HEART RATE: 84 BPM

## 2020-10-01 DIAGNOSIS — Z00.00 ENCOUNTER FOR GENERAL ADULT MEDICAL EXAMINATION W/OUT ABNORMAL FINDINGS: ICD-10-CM

## 2020-10-01 DIAGNOSIS — Z23 ENCOUNTER FOR IMMUNIZATION: ICD-10-CM

## 2020-10-01 DIAGNOSIS — Z87.891 PERSONAL HISTORY OF NICOTINE DEPENDENCE: ICD-10-CM

## 2020-10-01 PROCEDURE — 93000 ELECTROCARDIOGRAM COMPLETE: CPT | Mod: 59

## 2020-10-01 PROCEDURE — G0442 ANNUAL ALCOHOL SCREEN 15 MIN: CPT

## 2020-10-01 PROCEDURE — G0008: CPT

## 2020-10-01 PROCEDURE — 90662 IIV NO PRSV INCREASED AG IM: CPT

## 2020-10-01 PROCEDURE — 99397 PER PM REEVAL EST PAT 65+ YR: CPT | Mod: 25

## 2020-10-01 NOTE — PHYSICAL EXAM
[No Acute Distress] : no acute distress [Well Nourished] : well nourished [Well Developed] : well developed [Well-Appearing] : well-appearing [Normal Sclera/Conjunctiva] : normal sclera/conjunctiva [PERRL] : pupils equal round and reactive to light [EOMI] : extraocular movements intact [Normal Outer Ear/Nose] : the outer ears and nose were normal in appearance [Normal Oropharynx] : the oropharynx was normal [No JVD] : no jugular venous distention [No Lymphadenopathy] : no lymphadenopathy [Supple] : supple [Thyroid Normal, No Nodules] : the thyroid was normal and there were no nodules present [No Respiratory Distress] : no respiratory distress  [No Accessory Muscle Use] : no accessory muscle use [Clear to Auscultation] : lungs were clear to auscultation bilaterally [Normal Rate] : normal rate  [Regular Rhythm] : with a regular rhythm [Normal S1, S2] : normal S1 and S2 [No Murmur] : no murmur heard [No Carotid Bruits] : no carotid bruits [No Abdominal Bruit] : a ~M bruit was not heard ~T in the abdomen [No Varicosities] : no varicosities [Pedal Pulses Present] : the pedal pulses are present [No Palpable Aorta] : no palpable aorta [No Extremity Clubbing/Cyanosis] : no extremity clubbing/cyanosis [Soft] : abdomen soft [Non Tender] : non-tender [Non-distended] : non-distended [No Masses] : no abdominal mass palpated [No HSM] : no HSM [Normal Bowel Sounds] : normal bowel sounds [Normal Posterior Cervical Nodes] : no posterior cervical lymphadenopathy [Normal Anterior Cervical Nodes] : no anterior cervical lymphadenopathy [No CVA Tenderness] : no CVA  tenderness [No Spinal Tenderness] : no spinal tenderness [No Joint Swelling] : no joint swelling [Grossly Normal Strength/Tone] : grossly normal strength/tone [No Rash] : no rash [Coordination Grossly Intact] : coordination grossly intact [No Focal Deficits] : no focal deficits [Normal Gait] : normal gait [Normal Affect] : the affect was normal [Normal Insight/Judgement] : insight and judgment were intact [de-identified] : 1-2+ pitting edema in lower extremities bilaterally; RLE has compression stockings  [de-identified] : LLE is wrapped with ACE bandages [de-identified] : pill rolling tremor bilaterally

## 2020-10-01 NOTE — HEALTH RISK ASSESSMENT
[Yes] : Yes [Monthly or less (1 pt)] : Monthly or less (1 point) [1 or 2 (0 pts)] : 1 or 2 (0 points) [Never (0 pts)] : Never (0 points) [No] : In the past 12 months have you used drugs other than those required for medical reasons? No [0] : 2) Feeling down, depressed, or hopeless: Not at all (0) [] : No [Audit-CScore] : 1 [[id number]] : 0

## 2020-10-01 NOTE — PLAN
[FreeTextEntry1] : Pulmonary\par former smoker - check EKG (results as above) - continue smoking cessation\par Vascular\par lower extremity edema - continue HCTZ 25mg p.o.q.d. - continue using compression stockings - continue keeping legs elevated - continue low sodium diet \par Neurology\par history of Parkinson's disease - continue Sinemet (Carbidopa-Levodopa) 25-100mg TID p.o.q.d. as directed - advised to follow up with neurologist, Dr. Corado to further discuss adjusting dosage given worsening symptoms \par Integumentary\par LLE stasis ulcer - advised to follow up with Dr. Locke at wound care facility \par Immunization\par flu vaccine - Fluzone Quadrivalent High-Dose 0.7mL x 1 administered intramuscularly to left deltoid \par \par check EKG (results as above)\par Rx given for blood work (male panel) and UA.

## 2020-10-01 NOTE — ASSESSMENT
[FreeTextEntry1] : Patient is a 84 year old male with a past medical history as above who presents for an annual wellness visit.

## 2020-10-01 NOTE — REVIEW OF SYSTEMS
[Lower Ext Edema] : lower extremity edema [Negative] : Heme/Lymph [de-identified] : LLE wound  [de-identified] : issues with balance

## 2020-10-01 NOTE — ADDENDUM
[FreeTextEntry1] : I, Merlin Garcias, acted solely as scribe for Dr. Jaren Mohr DO on this date 10/01/2020  8:40AM .\par \par All medical record entries made by the Scribe were at my, Dr. Jaren Mohr DO direction and personally dictated by me on 10/01/2020  8:40AM. I have reviewed the chart and agree that the record accurately reflects my personal performance of the history, physical exam, assessment and plan. I have also personally directed, reviewed and agreed with the chart.\par

## 2020-10-01 NOTE — HISTORY OF PRESENT ILLNESS
[FreeTextEntry1] : annual wellness visit  [de-identified] : Patient is a 84 year old male with a past medical history as below who presents for an annual wellness visit. Patient notes missing a few doses of HCTZ which had been prescribed for lower extremity edema. He denies lightheadedness or dizziness on the medication. Patient notes worsening issues with balance secondary to Parkinson's disease. He notes seeing neurologist, Dr. Corado in the past. Patient has been seeing Dr. Locke at a wound care facility regarding a LLE stasis ulcer. Patient inquires about receiving the flu vaccine today. He is not fasting today.  \par

## 2020-11-13 ENCOUNTER — APPOINTMENT (OUTPATIENT)
Dept: INTERNAL MEDICINE | Facility: CLINIC | Age: 85
End: 2020-11-13
Payer: MEDICARE

## 2020-11-13 VITALS
SYSTOLIC BLOOD PRESSURE: 114 MMHG | DIASTOLIC BLOOD PRESSURE: 72 MMHG | TEMPERATURE: 97.2 F | BODY MASS INDEX: 23.55 KG/M2 | OXYGEN SATURATION: 99 % | WEIGHT: 188.38 LBS | RESPIRATION RATE: 14 BRPM | HEART RATE: 70 BPM

## 2020-11-13 DIAGNOSIS — Z86.69 PERSONAL HISTORY OF OTHER DISEASES OF THE NERVOUS SYSTEM AND SENSE ORGANS: ICD-10-CM

## 2020-11-13 DIAGNOSIS — M17.0 BILATERAL PRIMARY OSTEOARTHRITIS OF KNEE: ICD-10-CM

## 2020-11-13 DIAGNOSIS — L97.825: ICD-10-CM

## 2020-11-13 PROCEDURE — 99214 OFFICE O/P EST MOD 30 MIN: CPT | Mod: 25

## 2020-11-13 PROCEDURE — 99072 ADDL SUPL MATRL&STAF TM PHE: CPT

## 2020-11-14 PROBLEM — Z86.69 H/O PARKINSON'S DISEASE: Status: RESOLVED | Noted: 2020-01-13 | Resolved: 2020-11-14

## 2020-11-14 PROBLEM — M17.0 OSTEOARTHRITIS OF KNEES, BILATERAL: Status: ACTIVE | Noted: 2020-05-07

## 2020-11-14 PROBLEM — L97.825: Status: ACTIVE | Noted: 2020-03-05

## 2020-11-14 NOTE — ASSESSMENT
[FreeTextEntry1] : Patient is a 84 year old male with a past medical history as above who presents for general follow-up.

## 2020-11-14 NOTE — PLAN
[FreeTextEntry1] : Constitutional\par fatigue - likely secondary to being on Sinemet (Carbidopa-Levodopa) \par Vascular\par bilateral lower extremity edema - continue HCTZ 25mg p.o.q.d. - continue using compression stockings - advised keeping legs elevated - advised low sodium diet \par LLE stasis ulcer - referred to vascular physician, Dr. Birch for further evaluation \par Neurology\par history of Parkinson's disease - continue Sinemet (Carbidopa-Levodopa) 25-100mg 1/2 tablet TID p.o.q.d. as directed - referred to neurologist, Dr. Faith \par Endocrinology\par vitamin D deficiency - continue vitamin D-3 5000 IU p.o.q.d. with meals \par Gastroenterology\par increased gas - advised against consuming gluten-rich foods given history of gluten sensitivity - continue Simethicone p.o. as directed \par \par Advised to RTO in 1 month.

## 2020-11-14 NOTE — PHYSICAL EXAM
[No Acute Distress] : no acute distress [Well Nourished] : well nourished [Well Developed] : well developed [Well-Appearing] : well-appearing [Normal Sclera/Conjunctiva] : normal sclera/conjunctiva [PERRL] : pupils equal round and reactive to light [EOMI] : extraocular movements intact [Normal Outer Ear/Nose] : the outer ears and nose were normal in appearance [Normal Oropharynx] : the oropharynx was normal [No JVD] : no jugular venous distention [No Lymphadenopathy] : no lymphadenopathy [Supple] : supple [Thyroid Normal, No Nodules] : the thyroid was normal and there were no nodules present [No Respiratory Distress] : no respiratory distress  [No Accessory Muscle Use] : no accessory muscle use [Clear to Auscultation] : lungs were clear to auscultation bilaterally [Normal Rate] : normal rate  [Regular Rhythm] : with a regular rhythm [Normal S1, S2] : normal S1 and S2 [No Murmur] : no murmur heard [No Carotid Bruits] : no carotid bruits [No Abdominal Bruit] : a ~M bruit was not heard ~T in the abdomen [No Varicosities] : no varicosities [Pedal Pulses Present] : the pedal pulses are present [No Palpable Aorta] : no palpable aorta [No Extremity Clubbing/Cyanosis] : no extremity clubbing/cyanosis [Soft] : abdomen soft [Non Tender] : non-tender [Non-distended] : non-distended [No Masses] : no abdominal mass palpated [No HSM] : no HSM [Normal Bowel Sounds] : normal bowel sounds [Normal Posterior Cervical Nodes] : no posterior cervical lymphadenopathy [Normal Anterior Cervical Nodes] : no anterior cervical lymphadenopathy [No CVA Tenderness] : no CVA  tenderness [No Spinal Tenderness] : no spinal tenderness [No Joint Swelling] : no joint swelling [Grossly Normal Strength/Tone] : grossly normal strength/tone [No Rash] : no rash [Coordination Grossly Intact] : coordination grossly intact [No Focal Deficits] : no focal deficits [Normal Gait] : normal gait [Normal Affect] : the affect was normal [Normal Insight/Judgement] : insight and judgment were intact [de-identified] : 2+ pitting edema in lower extremities bilaterally

## 2020-11-14 NOTE — HEALTH RISK ASSESSMENT
[Yes] : Yes [Monthly or less (1 pt)] : Monthly or less (1 point) [1 or 2 (0 pts)] : 1 or 2 (0 points) [Never (0 pts)] : Never (0 points) [No] : In the past 12 months have you used drugs other than those required for medical reasons? No [0] : 2) Feeling down, depressed, or hopeless: Not at all (0) [] : No [Audit-CScore] : 1 [[id number]] : 0

## 2020-11-14 NOTE — REVIEW OF SYSTEMS
[Joint Stiffness] : joint stiffness [Fatigue] : fatigue [Negative] : Psychiatric [FreeTextEntry5] : LLE stasis ulcer [FreeTextEntry7] : increased gas [FreeTextEntry9] : stiffness in knees  [de-identified] : shuffling gait  [de-identified] : s

## 2020-11-14 NOTE — ADDENDUM
[FreeTextEntry1] : I, Merlin Garcias, acted solely as scribe for Dr. Jaren Mohr DO on this date 11/13/2020  9:10AM .\par \par All medical record entries made by the Scribe were at my, Dr. Jaren Mohr DO direction and personally dictated by me on 11/13/2020  9:10AM. I have reviewed the chart and agree that the record accurately reflects my personal performance of the history, physical exam, assessment and plan. I have also personally directed, reviewed and agreed with the chart.\par

## 2020-11-14 NOTE — HISTORY OF PRESENT ILLNESS
[Spouse] : spouse [FreeTextEntry1] : general follow-up [de-identified] : Patient is a 84 year old male with a past medical history as below who presents for general follow-up. Blood work done in early October revealed total cholesterol (191), HDL (79), triglycerides (44), LDL (99), blood-glucose (85), normal CBC, potassium (3.9), sodium (142), HGB A1C (5.3), normal TSH, normal PSA, and vitamin D (26). UA was normal. Patient denies lightheadedness or dizziness on HCTZ. He occasionally forgets to take his 3rd dose of Sinemet (Carbidopa-Levodopa). He has been taking Vitamin D-3 5000 IU daily. Patient notes bilateral knee stiffness when seated for extended periods of time. The stiffness improves with movement. Patient notes tremors in both hands and shuffling gait likely secondary to Parkinson's disease. He had been seeing neurologist, Dr. Corado. Patient notes frequently feeling groggy/fatigued. Patient notes increased gas possibly secondary to consuming gluten-rich foods recently. He has been taking Simethicone. Patient has been unable to follow up with his wound care specialist regarding a LLE stasis ulcer secondary to the cost of visits.

## 2020-11-20 ENCOUNTER — NON-APPOINTMENT (OUTPATIENT)
Age: 85
End: 2020-11-20

## 2020-11-30 ENCOUNTER — APPOINTMENT (OUTPATIENT)
Dept: VASCULAR SURGERY | Facility: CLINIC | Age: 85
End: 2020-11-30
Payer: MEDICARE

## 2020-11-30 ENCOUNTER — OUTPATIENT (OUTPATIENT)
Dept: OUTPATIENT SERVICES | Facility: HOSPITAL | Age: 85
LOS: 1 days | Discharge: HOME | End: 2020-11-30
Payer: COMMERCIAL

## 2020-11-30 VITALS
BODY MASS INDEX: 23.38 KG/M2 | HEART RATE: 75 BPM | RESPIRATION RATE: 18 BRPM | HEIGHT: 75 IN | WEIGHT: 188 LBS | DIASTOLIC BLOOD PRESSURE: 88 MMHG | SYSTOLIC BLOOD PRESSURE: 153 MMHG | OXYGEN SATURATION: 94 % | TEMPERATURE: 97.7 F

## 2020-11-30 DIAGNOSIS — I83.893 VARICOSE VEINS OF BILATERAL LOWER EXTREMITIES WITH OTHER COMPLICATIONS: ICD-10-CM

## 2020-11-30 DIAGNOSIS — I83.228 VARICOSE VEINS OF LEFT LOWER EXTREMITY WITH BOTH ULCER OF OTHER PART OF LOWER EXTREMITY AND INFLAMMATION: ICD-10-CM

## 2020-11-30 DIAGNOSIS — Z80.0 FAMILY HISTORY OF MALIGNANT NEOPLASM OF DIGESTIVE ORGANS: ICD-10-CM

## 2020-11-30 DIAGNOSIS — L97.822 NON-PRESSURE CHRONIC ULCER OF OTHER PART OF LEFT LOWER LEG WITH FAT LAYER EXPOSED: ICD-10-CM

## 2020-11-30 DIAGNOSIS — G20 PARKINSON'S DISEASE: ICD-10-CM

## 2020-11-30 DIAGNOSIS — Z79.899 OTHER LONG TERM (CURRENT) DRUG THERAPY: ICD-10-CM

## 2020-11-30 DIAGNOSIS — M19.90 UNSPECIFIED OSTEOARTHRITIS, UNSPECIFIED SITE: ICD-10-CM

## 2020-11-30 DIAGNOSIS — E11.621 TYPE 2 DIABETES MELLITUS WITH FOOT ULCER: ICD-10-CM

## 2020-11-30 DIAGNOSIS — Z87.891 PERSONAL HISTORY OF NICOTINE DEPENDENCE: ICD-10-CM

## 2020-11-30 PROCEDURE — 29581 APPL MULTLAYER CMPRN SYS LEG: CPT | Mod: LT

## 2020-11-30 PROCEDURE — 99203 OFFICE O/P NEW LOW 30 MIN: CPT

## 2020-11-30 NOTE — PHYSICAL EXAM
[2+] : left 2+ [Ankle Swelling (On Exam)] : present [Varicose Veins Of Lower Extremities] : bilaterally [Ankle Swelling On The Left] : moderate [] : bilaterally [Ankle Swelling Bilaterally] : severe [Skin Ulcer] : ulcer [Skin Induration] : induration [Alert] : alert [Oriented to Person] : oriented to person [Oriented to Place] : oriented to place [Oriented to Time] : oriented to time [Calm] : calm [de-identified] : L lateral malleolar wound with subcutaneous tissue exposed, no drainage, edges are flat.

## 2020-11-30 NOTE — HISTORY OF PRESENT ILLNESS
[FreeTextEntry1] : 85 yo M with hx of LLE lateral malleolar venous stasis ulcer. Wound heals and reopened. Has been dealing with this for 3 years. Has worn compression in the past. No family hx but he used to work on his feet all day long. LLE is swollen. Has noticed darkening of skin for years.

## 2020-11-30 NOTE — ASSESSMENT
[FreeTextEntry1] : 85 yo M with advanced venous stasis disease. Old US does show GSV. He had a high risk fob. C 6 disease NO evidence of PVD.

## 2020-12-03 ENCOUNTER — OUTPATIENT (OUTPATIENT)
Dept: OUTPATIENT SERVICES | Facility: HOSPITAL | Age: 85
LOS: 1 days | End: 2020-12-03
Payer: COMMERCIAL

## 2020-12-03 ENCOUNTER — RESULT REVIEW (OUTPATIENT)
Age: 85
End: 2020-12-03

## 2020-12-03 DIAGNOSIS — I83.228 VARICOSE VEINS OF LEFT LOWER EXTREMITY WITH BOTH ULCER OF OTHER PART OF LOWER EXTREMITY AND INFLAMMATION: ICD-10-CM

## 2020-12-03 PROCEDURE — 93970 EXTREMITY STUDY: CPT

## 2020-12-03 PROCEDURE — 93970 EXTREMITY STUDY: CPT | Mod: 26

## 2020-12-07 ENCOUNTER — APPOINTMENT (OUTPATIENT)
Dept: WOUND CARE | Facility: HOSPITAL | Age: 85
End: 2020-12-07
Payer: MEDICARE

## 2020-12-07 ENCOUNTER — OUTPATIENT (OUTPATIENT)
Dept: OUTPATIENT SERVICES | Facility: HOSPITAL | Age: 85
LOS: 1 days | Discharge: HOME | End: 2020-12-07
Payer: COMMERCIAL

## 2020-12-07 VITALS
WEIGHT: 188 LBS | HEART RATE: 83 BPM | HEIGHT: 75 IN | TEMPERATURE: 97.87 F | BODY MASS INDEX: 23.38 KG/M2 | DIASTOLIC BLOOD PRESSURE: 69 MMHG | OXYGEN SATURATION: 98 % | RESPIRATION RATE: 21 BRPM | SYSTOLIC BLOOD PRESSURE: 120 MMHG

## 2020-12-07 DIAGNOSIS — I83.228 VARICOSE VEINS OF LEFT LOWER EXTREMITY WITH BOTH ULCER OF OTHER PART OF LOWER EXTREMITY AND INFLAMMATION: ICD-10-CM

## 2020-12-07 PROCEDURE — 15271 SKIN SUB GRAFT TRNK/ARM/LEG: CPT

## 2020-12-08 DIAGNOSIS — Z80.0 FAMILY HISTORY OF MALIGNANT NEOPLASM OF DIGESTIVE ORGANS: ICD-10-CM

## 2020-12-08 DIAGNOSIS — G20 PARKINSON'S DISEASE: ICD-10-CM

## 2020-12-08 DIAGNOSIS — Z87.891 PERSONAL HISTORY OF NICOTINE DEPENDENCE: ICD-10-CM

## 2020-12-08 DIAGNOSIS — Z79.899 OTHER LONG TERM (CURRENT) DRUG THERAPY: ICD-10-CM

## 2020-12-08 DIAGNOSIS — L97.822 NON-PRESSURE CHRONIC ULCER OF OTHER PART OF LEFT LOWER LEG WITH FAT LAYER EXPOSED: ICD-10-CM

## 2020-12-08 DIAGNOSIS — R01.1 CARDIAC MURMUR, UNSPECIFIED: ICD-10-CM

## 2020-12-08 DIAGNOSIS — I83.228 VARICOSE VEINS OF LEFT LOWER EXTREMITY WITH BOTH ULCER OF OTHER PART OF LOWER EXTREMITY AND INFLAMMATION: ICD-10-CM

## 2020-12-08 DIAGNOSIS — M19.90 UNSPECIFIED OSTEOARTHRITIS, UNSPECIFIED SITE: ICD-10-CM

## 2020-12-08 DIAGNOSIS — I49.3 VENTRICULAR PREMATURE DEPOLARIZATION: ICD-10-CM

## 2020-12-08 DIAGNOSIS — I34.0 NONRHEUMATIC MITRAL (VALVE) INSUFFICIENCY: ICD-10-CM

## 2020-12-08 DIAGNOSIS — I83.893 VARICOSE VEINS OF BILATERAL LOWER EXTREMITIES WITH OTHER COMPLICATIONS: ICD-10-CM

## 2020-12-14 ENCOUNTER — APPOINTMENT (OUTPATIENT)
Dept: VASCULAR SURGERY | Facility: CLINIC | Age: 85
End: 2020-12-14
Payer: MEDICARE

## 2020-12-14 ENCOUNTER — APPOINTMENT (OUTPATIENT)
Dept: SURGERY | Facility: HOSPITAL | Age: 85
End: 2020-12-14
Payer: MEDICARE

## 2020-12-14 ENCOUNTER — OUTPATIENT (OUTPATIENT)
Dept: OUTPATIENT SERVICES | Facility: HOSPITAL | Age: 85
LOS: 1 days | Discharge: HOME | End: 2020-12-14
Payer: COMMERCIAL

## 2020-12-14 VITALS
DIASTOLIC BLOOD PRESSURE: 75 MMHG | RESPIRATION RATE: 20 BRPM | SYSTOLIC BLOOD PRESSURE: 124 MMHG | WEIGHT: 188 LBS | TEMPERATURE: 97.8 F | HEIGHT: 75 IN | HEART RATE: 90 BPM | OXYGEN SATURATION: 94 % | BODY MASS INDEX: 23.38 KG/M2

## 2020-12-14 DIAGNOSIS — R01.1 CARDIAC MURMUR, UNSPECIFIED: ICD-10-CM

## 2020-12-14 DIAGNOSIS — L97.822 NON-PRESSURE CHRONIC ULCER OF OTHER PART OF LEFT LOWER LEG WITH FAT LAYER EXPOSED: ICD-10-CM

## 2020-12-14 DIAGNOSIS — I34.0 NONRHEUMATIC MITRAL (VALVE) INSUFFICIENCY: ICD-10-CM

## 2020-12-14 DIAGNOSIS — Z80.0 FAMILY HISTORY OF MALIGNANT NEOPLASM OF DIGESTIVE ORGANS: ICD-10-CM

## 2020-12-14 DIAGNOSIS — G20 PARKINSON'S DISEASE: ICD-10-CM

## 2020-12-14 DIAGNOSIS — L97.329 VARICOSE VEINS OF LEFT LOWER EXTREMITY WITH ULCER OF ANKLE: ICD-10-CM

## 2020-12-14 DIAGNOSIS — I83.228 VARICOSE VEINS OF LEFT LOWER EXTREMITY WITH BOTH ULCER OF OTHER PART OF LOWER EXTREMITY AND INFLAMMATION: ICD-10-CM

## 2020-12-14 DIAGNOSIS — I83.023 VARICOSE VEINS OF LEFT LOWER EXTREMITY WITH ULCER OF ANKLE: ICD-10-CM

## 2020-12-14 DIAGNOSIS — I83.893 VARICOSE VEINS OF BILATERAL LOWER EXTREMITIES WITH OTHER COMPLICATIONS: ICD-10-CM

## 2020-12-14 DIAGNOSIS — Z87.891 PERSONAL HISTORY OF NICOTINE DEPENDENCE: ICD-10-CM

## 2020-12-14 DIAGNOSIS — Z79.899 OTHER LONG TERM (CURRENT) DRUG THERAPY: ICD-10-CM

## 2020-12-14 DIAGNOSIS — M19.90 UNSPECIFIED OSTEOARTHRITIS, UNSPECIFIED SITE: ICD-10-CM

## 2020-12-14 PROCEDURE — 99072 ADDL SUPL MATRL&STAF TM PHE: CPT

## 2020-12-14 PROCEDURE — 15271 SKIN SUB GRAFT TRNK/ARM/LEG: CPT

## 2020-12-14 PROCEDURE — 99213 OFFICE O/P EST LOW 20 MIN: CPT

## 2020-12-14 NOTE — PHYSICAL EXAM
[Normal Breath Sounds] : Normal breath sounds [1+] : left 1+ [0] : left 0 [Ankle Swelling (On Exam)] : present [Varicose Veins Of Lower Extremities] : bilaterally [Ankle Swelling On The Left] : moderate [] : of the left leg [Ankle Swelling Bilaterally] : severe [Alert] : alert [Oriented to Person] : oriented to person [Calm] : calm [4 x 4] : 4 x 4  [Abdominal Pad] : Abdominal Pad [JVD] : no jugular venous distention  [de-identified] : WD/WN in no acute distress. [de-identified] : WNL [de-identified] : JAINSL [de-identified] : WNL [de-identified] : Left leg ulcer is clean, base is red and viable, no infection, minimal slough, more skin at the margin,  periwound skin is intact with no cellulitis. [de-identified] : Small amount of Blood Loss, No Pain During or Post Procedure, Pt tolerated Well.\par  [FreeTextEntry1] : Left Lower Leg [FreeTextEntry2] : 2.8 [FreeTextEntry3] : 1.8 [FreeTextEntry4] : 0.3 [de-identified] : Serosanguineous [de-identified] : Intact [de-identified] : 1-25% [de-identified] : Post Debridement Measurements 2.9 x 1.9 x 0.4 [de-identified] : Coban Compression [de-identified] : Epifix, Adaptic Touch, Calcium Alginate [de-identified] : Cleansed with Normal Saline\par \par Epifix 3.5 x 3.5CM\par Tissue ID: [id number]\par Item#: [id number]\par Exp: 10/01/2025\par Reconstituted with Normal Saline\par Lot:-[id number]\par Exp:03/01/2023\par Applied to Left Lower Leg\par 100% Used 0% Discarded\par \par  [TWNoteComboBox4] : Moderate [TWNoteComboBox5] : No [de-identified] : No [de-identified] : Mild [de-identified] : None [de-identified] : >75% [de-identified] : Yes [TWNoteComboBox7] : Jorge [de-identified] : Debridement performed of all devitalized tissue to bleeding viable tissue [de-identified] : Multilayer other compression wrap [de-identified] : Weekly

## 2020-12-14 NOTE — ASSESSMENT
[FreeTextEntry1] : 85 yo M with advanced venous stasis disease. Old US does show GSV. He had a high risk fob. C 6 disease NO evidence of PVD.  Patient has been compressed for months. Stasis wound recurred over the past 3 years.

## 2020-12-14 NOTE — REASON FOR VISIT
[Other: _____] : [unfilled] [Epifix] : epifix [FreeTextEntry4] : Left leg stasis ulcer is clean, no acute infection, base is granular\par \par  [FreeTextEntry3] : Stasis ulcer left leg [FreeTextEntry6] : Stasis ulcer left leg [FreeTextEntry7] : Stasis ulcer left leg

## 2020-12-14 NOTE — ASSESSMENT
[Verbal] : Verbal [Patient] : Patient [Good - alert, interested, motivated] : Good - alert, interested, motivated [Verbalizes knowledge/Understanding] : Verbalizes knowledge/understanding [Dressing changes] : dressing changes [Skin Care] : skin care [Signs and symptoms of infection] : sign and symptoms of infection [How and When to Call] : how and when to call [Compression Therapy] : compression therapy [Patient responsibility to plan of care] : patient responsibility to plan of care [Stable] : stable [Home] : Home [Ambulatory] : Ambulatory [Not Applicable - Long Term Care/Home Health Agency] : Long Term Care/Home Health Agency: Not Applicable [] : No [FreeTextEntry2] : Restore Skin Integrity\par Infection Control\par Localized wound care\par Maintain acceptable pain levels at satisfactory relief.\par Demonstrates use of both nonpharmacological and pharmacological pain relief strategies [FreeTextEntry4] : F/U to [facility] in 1 week\par MD feels Patient Would Benefit From Additional Epifix, Auth submitted #3\par Patient seen for Vascular Follow Up with Dr. Birch. See Chart Notes.

## 2020-12-14 NOTE — PROCEDURE
[Saline] : saline [Hydrated with saline] : hydrated with saline [Epifix] : epifix [____ % was used] : and [unfilled] % was used [____ % was discarded] : and [unfilled] % was discarded [FreeTextEntry1] : Adaptic Touch, Alginate, dry dressing, Coband.  [] : No [FreeTextEntry9] : 9:45 AM [de-identified] : Stasis ulcer left leg [de-identified] : GEOVANI Locke [de-identified] : None [FreeTextEntry6] : Stasis ulcer left leg [FreeTextEntry7] : Stasis ulcer left leg [de-identified] : None [de-identified] : 0cc [de-identified] : None

## 2020-12-14 NOTE — REASON FOR VISIT
[Follow-Up: _____] : a [unfilled] follow-up visit [Consultation] : a consultation visit [FreeTextEntry1] : LLE venous stais ulcer

## 2020-12-14 NOTE — HISTORY OF PRESENT ILLNESS
[FreeTextEntry1] : 83 yo M with hx of LLE lateral malleolar venous stasis ulcer. Wound heals and reopened. Has been dealing with this for 3 years. Has worn compression in the past and has been undergoing compression dressings at .  No family hx but he used to work on his feet all day long. LLE is swollen. Has noticed darkening of skin for years. He has a remote hx of L SSV ligation [de-identified] : Today to evaluate wound and discuss potential intervention to prevent recurrence of stasis ulcer.

## 2020-12-14 NOTE — PHYSICAL EXAM
[2+] : left 2+ [Ankle Swelling (On Exam)] : present [Varicose Veins Of Lower Extremities] : bilaterally [Ankle Swelling On The Left] : moderate [] : bilaterally [Ankle Swelling Bilaterally] : severe [Skin Ulcer] : ulcer [Skin Induration] : induration [Alert] : alert [Oriented to Person] : oriented to person [Oriented to Place] : oriented to place [Oriented to Time] : oriented to time [Calm] : calm [JVD] : no jugular venous distention  [de-identified] : L lateral malleolar wound with subcutaneous tissue exposed, no drainage, edges are flat.L shin and across knee 4-5 mm varicose veins, severe skin hyperpitmentation

## 2020-12-15 NOTE — ASSESSMENT
[Verbal] : Verbal [Demo] : Demo [Patient] : Patient [Good - alert, interested, motivated] : Good - alert, interested, motivated [Verbalizes knowledge/Understanding] : Verbalizes knowledge/understanding [Dressing changes] : dressing changes [Foot Care] : foot care [Skin Care] : skin care [Signs and symptoms of infection] : sign and symptoms of infection [Venous Disease] : venous disease [Nutrition] : nutrition [How and When to Call] : how and when to call [Off-loading] : off-loading [Compression Therapy] : compression therapy [Patient responsibility to plan of care] : patient responsibility to plan of care [] : Yes [Stable] : stable [Home] : Home [Ambulatory] : Ambulatory [Not Applicable - Long Term Care/Home Health Agency] : Long Term Care/Home Health Agency: Not Applicable [FreeTextEntry2] : Infection Prevention\par Promote Skin Integrity\par Offloading\par Elevation\par Compression Compliance\par Low Na+ Diet\par Maintain acceptable levels of pain\par Demonstrates use of both pharmacological and nonpharmacological pain management interventions\par  [FreeTextEntry3] : 1 st Epifix Skin Graft applied today [FreeTextEntry4] : F/U 1 week to [facility]\par Preauthorization for second Epifix Skin Graft submitted today\par Vascular Consult with Dr. Eaton requested as per DPM \par

## 2020-12-15 NOTE — REVIEW OF SYSTEMS
[Skin Wound] : skin wound [FreeTextEntry1] : 110 [Fever] : no fever [Eye Pain] : no eye pain [Earache] : no earache [Chest Pain] : no chest pain [Shortness Of Breath] : no shortness of breath [Abdominal Pain] : no abdominal pain [FreeTextEntry9] : phill [de-identified] : left lateral lower leg venous stasis ulceration down to skin and subcutaneous tissue and fat with stasis dermatitis

## 2020-12-15 NOTE — PROCEDURE
[Saline] : saline [Hydrated with saline] : hydrated with saline [Epifix] : epifix [____ % was used] : and [unfilled] % was used [____ % was discarded] : and [unfilled] % was discarded [FreeTextEntry1] : adaptic, calcium alginate, dry dressing, coban multilayer compression dressing [FreeTextEntry9] : 0675 [de-identified] : left lateral lower leg venous stasis ulceration down to skin and subcutaneous tissue and fat with stasis dermatitis [de-identified] : loretta [de-identified] : neff [FreeTextEntry6] : left lateral lower leg venous stasis ulceration down to skin and subcutaneous tissue and fat with stasis dermatitis [FreeTextEntry7] : left lateral lower leg venous stasis ulceration down to skin and subcutaneous tissue and fat with stasis dermatitis

## 2020-12-21 ENCOUNTER — APPOINTMENT (OUTPATIENT)
Dept: SURGERY | Facility: HOSPITAL | Age: 85
End: 2020-12-21
Payer: MEDICARE

## 2020-12-21 ENCOUNTER — OUTPATIENT (OUTPATIENT)
Dept: OUTPATIENT SERVICES | Facility: HOSPITAL | Age: 85
LOS: 1 days | Discharge: HOME | End: 2020-12-21
Payer: COMMERCIAL

## 2020-12-21 VITALS
WEIGHT: 188 LBS | OXYGEN SATURATION: 95 % | RESPIRATION RATE: 20 BRPM | HEART RATE: 69 BPM | DIASTOLIC BLOOD PRESSURE: 78 MMHG | TEMPERATURE: 97 F | SYSTOLIC BLOOD PRESSURE: 123 MMHG | HEIGHT: 75 IN | BODY MASS INDEX: 23.38 KG/M2

## 2020-12-21 DIAGNOSIS — M19.90 UNSPECIFIED OSTEOARTHRITIS, UNSPECIFIED SITE: ICD-10-CM

## 2020-12-21 DIAGNOSIS — I83.893 VARICOSE VEINS OF BILATERAL LOWER EXTREMITIES WITH OTHER COMPLICATIONS: ICD-10-CM

## 2020-12-21 DIAGNOSIS — Z80.0 FAMILY HISTORY OF MALIGNANT NEOPLASM OF DIGESTIVE ORGANS: ICD-10-CM

## 2020-12-21 DIAGNOSIS — I49.3 VENTRICULAR PREMATURE DEPOLARIZATION: ICD-10-CM

## 2020-12-21 DIAGNOSIS — I34.0 NONRHEUMATIC MITRAL (VALVE) INSUFFICIENCY: ICD-10-CM

## 2020-12-21 DIAGNOSIS — I83.228 VARICOSE VEINS OF LEFT LOWER EXTREMITY WITH BOTH ULCER OF OTHER PART OF LOWER EXTREMITY AND INFLAMMATION: ICD-10-CM

## 2020-12-21 DIAGNOSIS — L97.822 NON-PRESSURE CHRONIC ULCER OF OTHER PART OF LEFT LOWER LEG WITH FAT LAYER EXPOSED: ICD-10-CM

## 2020-12-21 DIAGNOSIS — Z87.891 PERSONAL HISTORY OF NICOTINE DEPENDENCE: ICD-10-CM

## 2020-12-21 DIAGNOSIS — G20 PARKINSON'S DISEASE: ICD-10-CM

## 2020-12-21 DIAGNOSIS — Z79.899 OTHER LONG TERM (CURRENT) DRUG THERAPY: ICD-10-CM

## 2020-12-21 PROCEDURE — 15271 SKIN SUB GRAFT TRNK/ARM/LEG: CPT

## 2020-12-21 NOTE — PROCEDURE
[Saline] : saline [Hydrated with saline] : hydrated with saline [Epifix] : epifix [____ % was used] : and [unfilled] % was used [____ % was discarded] : and [unfilled] % was discarded [FreeTextEntry1] : Adaptic touc, Alginate, dry dressing, Coband.  [] : No [FreeTextEntry9] : 0903hr [de-identified] : Left leg stasis ulcer. [de-identified] : GEOVANI Locke [de-identified] : None. [FreeTextEntry6] : Left leg stasis ulcer. [FreeTextEntry7] : Left leg stasis ulcer. [de-identified] : None [de-identified] : 0cc [de-identified] : None

## 2020-12-21 NOTE — REASON FOR VISIT
[Epifix] : epifix [FreeTextEntry5] : Left lower leg stasis ulcer. [FreeTextEntry4] : Left leg stasis ulcer is clean, base is red and viable, no acute infection, periwound skin is intact with no cellulitis.\par \par  [FreeTextEntry3] : Left leg stasis ulcer. [FreeTextEntry6] : Left leg stasis ulcer. [FreeTextEntry7] : Left leg stasis ulcer.

## 2020-12-21 NOTE — PHYSICAL EXAM
[4 x 4] : 4 x 4  [Normal Breath Sounds] : Normal breath sounds [1+] : left 1+ [0] : left 0 [Ankle Swelling (On Exam)] : present [Varicose Veins Of Lower Extremities] : bilaterally [Ankle Swelling On The Left] : moderate [] : of the left leg [Ankle Swelling Bilaterally] : severe [Alert] : alert [Oriented to Person] : oriented to person [Calm] : calm [JVD] : no jugular venous distention  [de-identified] : WD/WN in no acute distress. [de-identified] : WNL [de-identified] : JANISL [de-identified] : WNL [de-identified] : Left leg ulcer is clean, base is red and viable, no infection, S/P two Epifix.  [FreeTextEntry1] : Left lower leg  [FreeTextEntry2] : 3.3 [FreeTextEntry3] : 1.6 [FreeTextEntry4] : 0.3 [de-identified] : Serous/sanguinous [de-identified] : 5% [de-identified] : Coban. Expressed comfort post Coban application. [de-identified] : Epifix, Adaptic touch, Alginate  [de-identified] : Cleansed with NS\par Kerlix \par \par Post debridement measurements: 3.4/1.7/0.3\par \par \par 1 unit of 2x3 cm Epifix applied. \par 100% used     0% discarded. \par ITM: [id number]\par EXP: 8/1/25\par \par Irrigated with 0.9% Normal saline \par LOT: [id number]\par EXP: 2/1/23  [TWNoteComboBox4] : Moderate [de-identified] : Normal [de-identified] : Mild [de-identified] : None [de-identified] : >75% [de-identified] : Yes [TWNoteComboBox7] : Jorge [de-identified] : Multilayer other compression wrap [de-identified] : Weekly [de-identified] : Primary Dressing

## 2020-12-21 NOTE — ASSESSMENT
[Verbal] : Verbal [Written] : Written [Demo] : Demo [Patient] : Patient [Good - alert, interested, motivated] : Good - alert, interested, motivated [Verbalizes knowledge/Understanding] : Verbalizes knowledge/understanding [Dressing changes] : dressing changes [Skin Care] : skin care [Signs and symptoms of infection] : sign and symptoms of infection [Venous Disease] : venous disease [Nutrition] : nutrition [How and When to Call] : how and when to call [Pain Management] : pain management [Compression Therapy] : compression therapy [Patient responsibility to plan of care] : patient responsibility to plan of care [] : Yes [Stable] : stable [Home] : Home [Ambulatory] : Ambulatory [Not Applicable - Long Term Care/Home Health Agency] : Long Term Care/Home Health Agency: Not Applicable [FreeTextEntry2] : Infection prevention\par Localized wound care \par Skin substitute therapy \par Compression therapy  [FreeTextEntry4] : Auth submitted for Epifix #4\par Pending vascular intervention procedure by Dr. Ramos \par Follow up in 1 week

## 2020-12-28 ENCOUNTER — APPOINTMENT (OUTPATIENT)
Dept: SURGERY | Facility: HOSPITAL | Age: 85
End: 2020-12-28
Payer: MEDICARE

## 2020-12-28 ENCOUNTER — OUTPATIENT (OUTPATIENT)
Dept: OUTPATIENT SERVICES | Facility: HOSPITAL | Age: 85
LOS: 1 days | Discharge: HOME | End: 2020-12-28
Payer: COMMERCIAL

## 2020-12-28 VITALS
HEART RATE: 69 BPM | TEMPERATURE: 97.8 F | OXYGEN SATURATION: 100 % | DIASTOLIC BLOOD PRESSURE: 77 MMHG | SYSTOLIC BLOOD PRESSURE: 139 MMHG | RESPIRATION RATE: 20 BRPM

## 2020-12-28 DIAGNOSIS — I83.893 VARICOSE VEINS OF BILATERAL LOWER EXTREMITIES WITH OTHER COMPLICATIONS: ICD-10-CM

## 2020-12-28 DIAGNOSIS — I49.3 VENTRICULAR PREMATURE DEPOLARIZATION: ICD-10-CM

## 2020-12-28 DIAGNOSIS — R01.1 CARDIAC MURMUR, UNSPECIFIED: ICD-10-CM

## 2020-12-28 DIAGNOSIS — I83.228 VARICOSE VEINS OF LEFT LOWER EXTREMITY WITH BOTH ULCER OF OTHER PART OF LOWER EXTREMITY AND INFLAMMATION: ICD-10-CM

## 2020-12-28 DIAGNOSIS — Z79.899 OTHER LONG TERM (CURRENT) DRUG THERAPY: ICD-10-CM

## 2020-12-28 DIAGNOSIS — Z80.0 FAMILY HISTORY OF MALIGNANT NEOPLASM OF DIGESTIVE ORGANS: ICD-10-CM

## 2020-12-28 DIAGNOSIS — Z87.891 PERSONAL HISTORY OF NICOTINE DEPENDENCE: ICD-10-CM

## 2020-12-28 DIAGNOSIS — L97.822 NON-PRESSURE CHRONIC ULCER OF OTHER PART OF LEFT LOWER LEG WITH FAT LAYER EXPOSED: ICD-10-CM

## 2020-12-28 DIAGNOSIS — G20 PARKINSON'S DISEASE: ICD-10-CM

## 2020-12-28 DIAGNOSIS — I34.0 NONRHEUMATIC MITRAL (VALVE) INSUFFICIENCY: ICD-10-CM

## 2020-12-28 DIAGNOSIS — M19.90 UNSPECIFIED OSTEOARTHRITIS, UNSPECIFIED SITE: ICD-10-CM

## 2020-12-28 PROCEDURE — 15271 SKIN SUB GRAFT TRNK/ARM/LEG: CPT

## 2020-12-28 NOTE — PROCEDURE
[Saline] : saline [Sharp curette] : sharp curette [Subcutaneous Tissue] : subcutaneous tissue [Hydrated with saline] : hydrated with saline [Epifix] : epifix [____ % was used] : and [unfilled] % was used [____ % was discarded] : and [unfilled] % was discarded [] : No [FreeTextEntry9] : 9:05 AM [de-identified] : Stasis ulcer left leg. [de-identified] : GEOVANI Locke [de-identified] : None [FreeTextEntry6] : Stasis ulcer left leg. [FreeTextEntry7] : Stasis ulcer left leg. [de-identified] : None [de-identified] : 1cc [de-identified] : None

## 2020-12-28 NOTE — ASSESSMENT
[Verbal] : Verbal [Written] : Written [Demo] : Demo [Patient] : Patient [Good - alert, interested, motivated] : Good - alert, interested, motivated [Verbalizes knowledge/Understanding] : Verbalizes knowledge/understanding [Dressing changes] : dressing changes [Skin Care] : skin care [Signs and symptoms of infection] : sign and symptoms of infection [Venous Disease] : venous disease [How and When to Call] : how and when to call [Pain Management] : pain management [Compression Therapy] : compression therapy [Patient responsibility to plan of care] : patient responsibility to plan of care [Stable] : stable [Home] : Home [Ambulatory] : Ambulatory [Not Applicable - Long Term Care/Home Health Agency] : Long Term Care/Home Health Agency: Not Applicable [] : No [FreeTextEntry2] : Infection prevention\par Localized wound care \par Goal of remaining pain free regarding wounds.\par Compression therapy \par Skin substitute therapy  [FreeTextEntry4] : 5th Epifix Auth submitted\par Patient having vascular procedure with Dr. Eaton 1/19/20\par Follow up in 1 week

## 2020-12-28 NOTE — PHYSICAL EXAM
[Normal Breath Sounds] : Normal breath sounds [1+] : left 1+ [0] : left 0 [Ankle Swelling (On Exam)] : present [Varicose Veins Of Lower Extremities] : bilaterally [Ankle Swelling On The Left] : moderate [] : of the left leg [Ankle Swelling Bilaterally] : severe [Alert] : alert [Oriented to Person] : oriented to person [Calm] : calm [4 x 4] : 4 x 4  [JVD] : no jugular venous distention  [de-identified] : WD/WN in no acute distress. [de-identified] : WNL [de-identified] : JANISL [de-identified] : WNL [de-identified] : Left leg ulcer is clean, base is red and viable, no infection, S/P four Epifix.  [FreeTextEntry1] : Left lower leg  [FreeTextEntry2] : 3.3 [FreeTextEntry3] : 1.5 [FreeTextEntry4] : 0.3 [de-identified] : Serous/sanguinous [de-identified] : Dry and flaky  [de-identified] : Epifix  [de-identified] : Coban. Expressed comfort post Coban application. [de-identified] : Epifix, Adaptic touch, Alginate  [de-identified] : Cleansed with Chlorhexidine then  NS\par \par Post debridement measurements: 3.4/1.6/0.3\par \par 1 unit 2x3 cm Epifix applied\par 100% used 0% discarded \par ITM: [id number]\par EXP: 8/1/25\par \par Irrigated with 0.9% Normal saline \par Lot: [id number]\par EXP: 3/1/23 [TWNoteComboBox4] : Small [de-identified] : other [de-identified] : None [de-identified] : None [de-identified] : >75% [de-identified] : No [TWNoteComboBox7] : Jorge [de-identified] : Application of skin substitute [de-identified] : Multilayer Dynaflex Compression [de-identified] : Weekly [de-identified] : Primary Dressing

## 2020-12-28 NOTE — REASON FOR VISIT
[Epifix] : epifix [FreeTextEntry5] : Left lower leg  [FreeTextEntry4] : Left leg stasis ulcer is clean, base is red and viable, S/P 4 Epifix, no acute infection\par  [FreeTextEntry3] : Stasis ulcer left leg. [FreeTextEntry6] : Stasis ulcer left leg. [FreeTextEntry7] : Stasis ulcer left leg.

## 2021-01-04 ENCOUNTER — APPOINTMENT (OUTPATIENT)
Dept: NEUROLOGY | Facility: CLINIC | Age: 86
End: 2021-01-04
Payer: MEDICARE

## 2021-01-04 ENCOUNTER — OUTPATIENT (OUTPATIENT)
Dept: OUTPATIENT SERVICES | Facility: HOSPITAL | Age: 86
LOS: 1 days | Discharge: HOME | End: 2021-01-04
Payer: COMMERCIAL

## 2021-01-04 ENCOUNTER — APPOINTMENT (OUTPATIENT)
Dept: SURGERY | Facility: HOSPITAL | Age: 86
End: 2021-01-04
Payer: MEDICARE

## 2021-01-04 VITALS
BODY MASS INDEX: 23.38 KG/M2 | HEIGHT: 75 IN | HEART RATE: 70 BPM | TEMPERATURE: 97.8 F | RESPIRATION RATE: 20 BRPM | SYSTOLIC BLOOD PRESSURE: 130 MMHG | DIASTOLIC BLOOD PRESSURE: 72 MMHG | WEIGHT: 188 LBS

## 2021-01-04 VITALS
SYSTOLIC BLOOD PRESSURE: 158 MMHG | HEIGHT: 75 IN | HEART RATE: 82 BPM | TEMPERATURE: 98.4 F | DIASTOLIC BLOOD PRESSURE: 80 MMHG | WEIGHT: 194 LBS | BODY MASS INDEX: 24.12 KG/M2

## 2021-01-04 DIAGNOSIS — I83.218 VARICOSE VEINS OF RIGHT LOWER EXTREMITY WITH BOTH ULCER OF OTHER PART OF LOWER EXTREMITY AND INFLAMMATION: ICD-10-CM

## 2021-01-04 PROCEDURE — 15271 SKIN SUB GRAFT TRNK/ARM/LEG: CPT

## 2021-01-04 PROCEDURE — 99204 OFFICE O/P NEW MOD 45 MIN: CPT

## 2021-01-04 PROCEDURE — 99072 ADDL SUPL MATRL&STAF TM PHE: CPT

## 2021-01-04 NOTE — HISTORY OF PRESENT ILLNESS
[FreeTextEntry1] : 85-year-old right-handed male with PMHx of HTN, Knee arthritis and venous insufficiency; diagnosed with Parkinson's disease few years ago, has come in accompanied by his wife for evaluation of mildly memory loss.\par \par Patient reports that since past one year he has noted that he is forgetful, he leaves things in one place and forgets them, the other day he left out ICE-cream  and forgot about it till it melted, his wife reports he mixes his grand-kids names, at times has has difficulty with word retrieval. He is otherwise fully functional, he pays the bills, he drives, as per his wife he has not had any issues.\par \par Upon obtaining further history regarding PD, patient reports he developed tremors on the right hand about 3-4 years ago, it has been persistent, he has occasional tremors in the left hand as well, he also admits that at times he has postural / balance issues, he walks with short steps but has not had any falls. He was started on carbidopa levodopa 25/100 mg, takes half a pill 3 times a day. Patient denies sleep disturbance/vivid dreams but reports he naps during daytime which is new to him, he denies constipation or visual hallucinations.

## 2021-01-04 NOTE — PHYSICAL EXAM
[4 x 4] : 4 x 4  [Normal Breath Sounds] : Normal breath sounds [1+] : left 1+ [0] : left 0 [Ankle Swelling (On Exam)] : present [Varicose Veins Of Lower Extremities] : bilaterally [Ankle Swelling On The Left] : moderate [] : of the left leg [Ankle Swelling Bilaterally] : severe [Alert] : alert [Oriented to Person] : oriented to person [Calm] : calm [JVD] : no jugular venous distention  [de-identified] : WD/WN in no acute distress. [de-identified] : WNL [de-identified] : JANISL [de-identified] : WNL [de-identified] : Left leg ulcer is clean, base is red and viable, no infection, S/P four Epifix.  [FreeTextEntry1] : Left lower leg  [FreeTextEntry2] : 2.8 [FreeTextEntry3] : 1.4 [FreeTextEntry4] : 0.3 [de-identified] : serosanguineous [de-identified] : Dry and flaky  [de-identified] : none [de-identified] : >80% [de-identified] : Epifix  [de-identified] : Coban.  [de-identified] : Epifix, Adaptic touch, Alginate  [de-identified] : NSC\par \par Post debridement measurements: 2.9 x 1.5 x 0.4 cm\par \par 1 unit 3.5 x 3.5 cm Epifix applied\par 100% used 0% discarded \par ITM: [id number]\par Ref:  [id number]\par Ser  005\par EXP: 10/1/25\par \par Reconstituted with  0.9% Normal saline \par Lot: [id number]\par EXP: 3/1/23 [TWNoteComboBox4] : Small [de-identified] : other [de-identified] : False [de-identified] : False [de-identified] : False [de-identified] : No [TWNoteComboBox7] : Jorge [de-identified] : Application of skin substitute [de-identified] : Multilayer other compression wrap [de-identified] : Weekly [de-identified] : False

## 2021-01-04 NOTE — REASON FOR VISIT
[Epifix] : epifix [FreeTextEntry5] : left lower leg [FreeTextEntry4] : Left leg ulcer is clean, oz is red and viable, no drainage, no acute infection\par  [FreeTextEntry3] : Left leg stasis ulcer. [FreeTextEntry6] : Left leg stasis ulcer. [FreeTextEntry7] : Left leg stasis ulcer.

## 2021-01-04 NOTE — VITALS
[] : No [de-identified] : Pain scale:  0/10 - Patient reports no c/o pains or discomforts at present.

## 2021-01-04 NOTE — ASSESSMENT
[Verbal] : Verbal [Written] : Written [Demo] : Demo [Patient] : Patient [Good - alert, interested, motivated] : Good - alert, interested, motivated [Verbalizes knowledge/Understanding] : Verbalizes knowledge/understanding [Dressing changes] : dressing changes [Skin Care] : skin care [Signs and symptoms of infection] : sign and symptoms of infection [Venous Disease] : venous disease [How and When to Call] : how and when to call [Compression Therapy] : compression therapy [Patient responsibility to plan of care] : patient responsibility to plan of care [Stable] : stable [Home] : Home [Ambulatory] : Ambulatory [Not Applicable - Long Term Care/Home Health Agency] : Long Term Care/Home Health Agency: Not Applicable [Other: ____] : [unfilled] [] : No [FreeTextEntry2] : Promote optimal skin integrity, infection prevention, edema control\par  [FreeTextEntry4] : 6th Epifix Auth submitted\par Patient is scheduled to have vascular procedure with Dr. Eaton 1/19/20\par f/u 1 week

## 2021-01-04 NOTE — PROCEDURE
[Saline] : saline [Hydrated with saline] : hydrated with saline [Epifix] : epifix [____ % was used] : and [unfilled] % was used [____ % was discarded] : and [unfilled] % was discarded [FreeTextEntry1] : Adaptic touch, Alginate, dry dressing, Coband. [] : No [FreeTextEntry9] : 4510 [de-identified] : Left leg stasis ulcer. [de-identified] : GEOVANI Locke [de-identified] : None [FreeTextEntry6] : Left leg stasis ulcer. [FreeTextEntry7] : Left leg stasis ulcer. [de-identified] : None [de-identified] : 0cc [de-identified] : None

## 2021-01-04 NOTE — DISCUSSION/SUMMARY
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

## 2021-01-04 NOTE — PHYSICAL EXAM
[General Appearance - Alert] : alert [General Appearance - In No Acute Distress] : in no acute distress [General Appearance - Well-Appearing] : healthy appearing [Mood] : the mood was normal [Person] : oriented to person [Place] : oriented to place [Time] : oriented to time [Registration Intact] : recent registration memory intact [Concentration Intact] : normal concentrating ability [Naming Objects] : no difficulty naming common objects [Repeating Phrases] : no difficulty repeating a phrase [Fluency] : fluency intact [Comprehension] : comprehension intact [Past History] : adequate knowledge of personal past history [___ / 30] : the patient achieved a total score of [unfilled] /30 [___ / 5] : Visuospatial / Executive: [unfilled] / 5 [___ / 3] : Attention (Serial 7 subtraction): [unfilled] / 3 [___ / 1] : Fluency: [unfilled] / 1 [___ / 2] : Abstraction: [unfilled] / 2 [___ / 5] : Delayed Recall: [unfilled] / 5 [___ / 6] : Orientation: [unfilled] / 6 [Cranial Nerves Optic (II)] : visual acuity intact bilaterally,  visual fields full to confrontation, pupils equal round and reactive to light [Cranial Nerves Oculomotor (III)] : extraocular motion intact [Cranial Nerves Trigeminal (V)] : facial sensation intact symmetrically [Cranial Nerves Facial (VII)] : face symmetrical [Cranial Nerves Vestibulocochlear (VIII)] : hearing was intact bilaterally [Cranial Nerves Glossopharyngeal (IX)] : tongue and palate midline [Cranial Nerves Accessory (XI - Cranial And Spinal)] : head turning and shoulder shrug symmetric [Cranial Nerves Hypoglossal (XII)] : there was no tongue deviation with protrusion [No Muscle Atrophy] : normal bulk in all four extremities [2+] : Brachioradialis left 2+ [Sensation Tactile Decrease] : light touch was intact [Past-pointing] : there was no past-pointing [Tremor] : no tremor present [Coordination - Dysmetria Impaired Finger-to-Nose Bilateral] : not present [1+] : Patella left 1+ [0] : Ankle jerk left 0 [Plantar Reflex Right Only] : normal on the right [Plantar Reflex Left Only] : normal on the left [PERRL With Normal Accommodation] : pupils were equal in size, round, reactive to light, with normal accommodation [Extraocular Movements] : extraocular movements were intact [Full Visual Field] : full visual field [Hearing Threshold Finger Rub Not [place]] : hearing was normal [Neck Cervical Mass (___cm)] : no neck mass was observed [Auscultation Breath Sounds / Voice Sounds] : lungs were clear to auscultation bilaterally [Heart Sounds] : normal S1 and S2 [FreeTextEntry1] : Chronic LLE edema [] : no rash

## 2021-01-04 NOTE — REVIEW OF SYSTEMS
[As Noted in HPI] : as noted in HPI [Memory Lapses or Loss] : memory loss [Decr. Concentrating Ability] : decreased concentrating ability [Poor Coordination] : poor coordination [Diarrhea] : diarrhea [Negative] : Heme/Lymph [Palpitations] : no palpitations [Lower Ext Edema] : lower extremity edema [FreeTextEntry9] : Arthritis knees

## 2021-01-05 DIAGNOSIS — I83.893 VARICOSE VEINS OF BILATERAL LOWER EXTREMITIES WITH OTHER COMPLICATIONS: ICD-10-CM

## 2021-01-05 DIAGNOSIS — I83.228 VARICOSE VEINS OF LEFT LOWER EXTREMITY WITH BOTH ULCER OF OTHER PART OF LOWER EXTREMITY AND INFLAMMATION: ICD-10-CM

## 2021-01-05 DIAGNOSIS — G20 PARKINSON'S DISEASE: ICD-10-CM

## 2021-01-05 DIAGNOSIS — Z87.891 PERSONAL HISTORY OF NICOTINE DEPENDENCE: ICD-10-CM

## 2021-01-05 DIAGNOSIS — Z79.899 OTHER LONG TERM (CURRENT) DRUG THERAPY: ICD-10-CM

## 2021-01-05 DIAGNOSIS — Z80.0 FAMILY HISTORY OF MALIGNANT NEOPLASM OF DIGESTIVE ORGANS: ICD-10-CM

## 2021-01-05 DIAGNOSIS — L97.822 NON-PRESSURE CHRONIC ULCER OF OTHER PART OF LEFT LOWER LEG WITH FAT LAYER EXPOSED: ICD-10-CM

## 2021-01-05 DIAGNOSIS — M19.90 UNSPECIFIED OSTEOARTHRITIS, UNSPECIFIED SITE: ICD-10-CM

## 2021-01-05 DIAGNOSIS — I34.0 NONRHEUMATIC MITRAL (VALVE) INSUFFICIENCY: ICD-10-CM

## 2021-01-05 DIAGNOSIS — I49.3 VENTRICULAR PREMATURE DEPOLARIZATION: ICD-10-CM

## 2021-01-05 DIAGNOSIS — R01.1 CARDIAC MURMUR, UNSPECIFIED: ICD-10-CM

## 2021-01-11 ENCOUNTER — APPOINTMENT (OUTPATIENT)
Dept: SURGERY | Facility: HOSPITAL | Age: 86
End: 2021-01-11
Payer: MEDICARE

## 2021-01-11 ENCOUNTER — OUTPATIENT (OUTPATIENT)
Dept: OUTPATIENT SERVICES | Facility: HOSPITAL | Age: 86
LOS: 1 days | Discharge: HOME | End: 2021-01-11
Payer: COMMERCIAL

## 2021-01-11 VITALS
WEIGHT: 194 LBS | SYSTOLIC BLOOD PRESSURE: 125 MMHG | HEIGHT: 75 IN | TEMPERATURE: 97.8 F | BODY MASS INDEX: 24.12 KG/M2 | DIASTOLIC BLOOD PRESSURE: 72 MMHG

## 2021-01-11 DIAGNOSIS — I83.228 VARICOSE VEINS OF LEFT LOWER EXTREMITY WITH BOTH ULCER OF OTHER PART OF LOWER EXTREMITY AND INFLAMMATION: ICD-10-CM

## 2021-01-11 PROCEDURE — 99213 OFFICE O/P EST LOW 20 MIN: CPT

## 2021-01-11 PROCEDURE — 29581 APPL MULTLAYER CMPRN SYS LEG: CPT | Mod: LT

## 2021-01-11 NOTE — PLAN
[FreeTextEntry1] : Silver Alginate, dry dressing, Coband wrap, return to office in one week.\par 25 minutes spent for patient care and medical decision making.\par \par

## 2021-01-11 NOTE — ASSESSMENT
[Verbal] : Verbal [Patient] : Patient [Good - alert, interested, motivated] : Good - alert, interested, motivated [Verbalizes knowledge/Understanding] : Verbalizes knowledge/understanding [Dressing changes] : dressing changes [Skin Care] : skin care [Signs and symptoms of infection] : sign and symptoms of infection [How and When to Call] : how and when to call [Compression Therapy] : compression therapy [Patient responsibility to plan of care] : patient responsibility to plan of care [] : Yes [Stable] : stable [Home] : Home [Ambulatory] : Ambulatory [Not Applicable - Long Term Care/Home Health Agency] : Long Term Care/Home Health Agency: Not Applicable [FreeTextEntry2] : Restore Skin Integrity\par Infection Control\par Localized wound care\par Maintain acceptable pain levels at satisfactory relief.\par Demonstrates use of both nonpharmacological and pharmacological pain relief strategies [FreeTextEntry4] : Photos Taken\par Epiflix Held Today, To be Applied Next Visit per MD\par F/U to [facility] in 1 week [FreeTextEntry1] : Left leg ulcer is improving, no acute infection\par

## 2021-01-11 NOTE — PHYSICAL EXAM
[4 x 4] : 4 x 4  [Normal Breath Sounds] : Normal breath sounds [1+] : left 1+ [0] : left 0 [Ankle Swelling (On Exam)] : present [Varicose Veins Of Lower Extremities] : bilaterally [Ankle Swelling On The Left] : moderate [] : of the left leg [Ankle Swelling Bilaterally] : severe [Alert] : alert [Oriented to Person] : oriented to person [Calm] : calm [JVD] : no jugular venous distention  [de-identified] : WD/WN in no acute distress. [de-identified] : WNL [de-identified] : JANISL [de-identified] : Left leg ulcer is clean, base is red and viable, no infection, periwound skin is intact with no cellulitis. [de-identified] : WNL [FreeTextEntry1] : Left Lateral Lower Leg [FreeTextEntry2] : 2.3 [FreeTextEntry3] : 1.0 [FreeTextEntry4] : 0.3 [de-identified] : Serosanguineous [de-identified] : Intact [de-identified] : 1-25% [de-identified] : Coban Compression [de-identified] : Silver Alginate [de-identified] : Cleansed with Normal Saline\par Kerlix [TWNoteComboBox4] : Moderate [TWNoteComboBox5] : No [de-identified] : No [de-identified] : Mild [de-identified] : None [de-identified] : >75% [de-identified] : Yes [de-identified] : Multilayer other compression wrap [de-identified] : Weekly

## 2021-01-12 DIAGNOSIS — I34.0 NONRHEUMATIC MITRAL (VALVE) INSUFFICIENCY: ICD-10-CM

## 2021-01-12 DIAGNOSIS — L97.822 NON-PRESSURE CHRONIC ULCER OF OTHER PART OF LEFT LOWER LEG WITH FAT LAYER EXPOSED: ICD-10-CM

## 2021-01-12 DIAGNOSIS — I49.3 VENTRICULAR PREMATURE DEPOLARIZATION: ICD-10-CM

## 2021-01-12 DIAGNOSIS — I83.893 VARICOSE VEINS OF BILATERAL LOWER EXTREMITIES WITH OTHER COMPLICATIONS: ICD-10-CM

## 2021-01-12 DIAGNOSIS — Z80.0 FAMILY HISTORY OF MALIGNANT NEOPLASM OF DIGESTIVE ORGANS: ICD-10-CM

## 2021-01-12 DIAGNOSIS — G20 PARKINSON'S DISEASE: ICD-10-CM

## 2021-01-12 DIAGNOSIS — Z79.899 OTHER LONG TERM (CURRENT) DRUG THERAPY: ICD-10-CM

## 2021-01-12 DIAGNOSIS — I83.228 VARICOSE VEINS OF LEFT LOWER EXTREMITY WITH BOTH ULCER OF OTHER PART OF LOWER EXTREMITY AND INFLAMMATION: ICD-10-CM

## 2021-01-12 DIAGNOSIS — M19.90 UNSPECIFIED OSTEOARTHRITIS, UNSPECIFIED SITE: ICD-10-CM

## 2021-01-12 DIAGNOSIS — Z87.891 PERSONAL HISTORY OF NICOTINE DEPENDENCE: ICD-10-CM

## 2021-01-12 DIAGNOSIS — R01.1 CARDIAC MURMUR, UNSPECIFIED: ICD-10-CM

## 2021-01-17 ENCOUNTER — OUTPATIENT (OUTPATIENT)
Dept: OUTPATIENT SERVICES | Facility: HOSPITAL | Age: 86
LOS: 1 days | End: 2021-01-17
Payer: COMMERCIAL

## 2021-01-17 DIAGNOSIS — Z20.828 CONTACT WITH AND (SUSPECTED) EXPOSURE TO OTHER VIRAL COMMUNICABLE DISEASES: ICD-10-CM

## 2021-01-17 LAB — SARS-COV-2 RNA SPEC QL NAA+PROBE: SIGNIFICANT CHANGE UP

## 2021-01-17 PROCEDURE — U0005: CPT

## 2021-01-17 PROCEDURE — U0003: CPT

## 2021-01-18 ENCOUNTER — OUTPATIENT (OUTPATIENT)
Dept: OUTPATIENT SERVICES | Facility: HOSPITAL | Age: 86
LOS: 1 days | Discharge: HOME | End: 2021-01-18
Payer: COMMERCIAL

## 2021-01-18 ENCOUNTER — RESULT REVIEW (OUTPATIENT)
Age: 86
End: 2021-01-18

## 2021-01-18 ENCOUNTER — TRANSCRIPTION ENCOUNTER (OUTPATIENT)
Age: 86
End: 2021-01-18

## 2021-01-18 ENCOUNTER — APPOINTMENT (OUTPATIENT)
Dept: OBGYN | Facility: HOSPITAL | Age: 86
End: 2021-01-18
Payer: MEDICARE

## 2021-01-18 VITALS
OXYGEN SATURATION: 99 % | HEIGHT: 75 IN | BODY MASS INDEX: 24.12 KG/M2 | TEMPERATURE: 97.6 F | RESPIRATION RATE: 20 BRPM | DIASTOLIC BLOOD PRESSURE: 52 MMHG | SYSTOLIC BLOOD PRESSURE: 124 MMHG | HEART RATE: 79 BPM | WEIGHT: 194 LBS

## 2021-01-18 DIAGNOSIS — I83.228 VARICOSE VEINS OF LEFT LOWER EXTREMITY WITH BOTH ULCER OF OTHER PART OF LOWER EXTREMITY AND INFLAMMATION: ICD-10-CM

## 2021-01-18 PROCEDURE — 88304 TISSUE EXAM BY PATHOLOGIST: CPT

## 2021-01-18 PROCEDURE — 11042 DBRDMT SUBQ TIS 1ST 20SQCM/<: CPT

## 2021-01-18 PROCEDURE — 88304 TISSUE EXAM BY PATHOLOGIST: CPT | Mod: 26

## 2021-01-19 ENCOUNTER — RESULT REVIEW (OUTPATIENT)
Age: 86
End: 2021-01-19

## 2021-01-19 ENCOUNTER — OUTPATIENT (OUTPATIENT)
Dept: OUTPATIENT SERVICES | Facility: HOSPITAL | Age: 86
LOS: 1 days | End: 2021-01-19
Payer: COMMERCIAL

## 2021-01-19 DIAGNOSIS — M19.90 UNSPECIFIED OSTEOARTHRITIS, UNSPECIFIED SITE: ICD-10-CM

## 2021-01-19 DIAGNOSIS — I87.2 VENOUS INSUFFICIENCY (CHRONIC) (PERIPHERAL): ICD-10-CM

## 2021-01-19 DIAGNOSIS — I83.023 VARICOSE VEINS OF LEFT LOWER EXTREMITY WITH ULCER OF ANKLE: ICD-10-CM

## 2021-01-19 DIAGNOSIS — I83.893 VARICOSE VEINS OF BILATERAL LOWER EXTREMITIES WITH OTHER COMPLICATIONS: ICD-10-CM

## 2021-01-19 DIAGNOSIS — L97.322 NON-PRESSURE CHRONIC ULCER OF LEFT ANKLE WITH FAT LAYER EXPOSED: ICD-10-CM

## 2021-01-19 DIAGNOSIS — R01.1 CARDIAC MURMUR, UNSPECIFIED: ICD-10-CM

## 2021-01-19 DIAGNOSIS — Z79.899 OTHER LONG TERM (CURRENT) DRUG THERAPY: ICD-10-CM

## 2021-01-19 DIAGNOSIS — Z87.891 PERSONAL HISTORY OF NICOTINE DEPENDENCE: ICD-10-CM

## 2021-01-19 DIAGNOSIS — I49.3 VENTRICULAR PREMATURE DEPOLARIZATION: ICD-10-CM

## 2021-01-19 DIAGNOSIS — Z80.0 FAMILY HISTORY OF MALIGNANT NEOPLASM OF DIGESTIVE ORGANS: ICD-10-CM

## 2021-01-19 DIAGNOSIS — I83.223 VARICOSE VEINS OF LEFT LOWER EXTREMITY WITH BOTH ULCER OF ANKLE AND INFLAMMATION: ICD-10-CM

## 2021-01-19 DIAGNOSIS — G20 PARKINSON'S DISEASE: ICD-10-CM

## 2021-01-19 DIAGNOSIS — I34.0 NONRHEUMATIC MITRAL (VALVE) INSUFFICIENCY: ICD-10-CM

## 2021-01-19 PROCEDURE — 36471 NJX SCLRSNT MLT INCMPTNT VN: CPT | Mod: LT

## 2021-01-19 PROCEDURE — 36476 ENDOVENOUS RF VEIN ADD-ON: CPT

## 2021-01-19 PROCEDURE — 36475 ENDOVENOUS RF 1ST VEIN: CPT | Mod: LT

## 2021-01-20 LAB — SURGICAL PATHOLOGY STUDY: SIGNIFICANT CHANGE UP

## 2021-01-21 ENCOUNTER — APPOINTMENT (OUTPATIENT)
Dept: PLASTIC SURGERY | Facility: HOSPITAL | Age: 86
End: 2021-01-21
Payer: MEDICARE

## 2021-01-21 ENCOUNTER — OUTPATIENT (OUTPATIENT)
Dept: OUTPATIENT SERVICES | Facility: HOSPITAL | Age: 86
LOS: 1 days | Discharge: HOME | End: 2021-01-21
Payer: COMMERCIAL

## 2021-01-21 VITALS
WEIGHT: 194 LBS | TEMPERATURE: 97.9 F | BODY MASS INDEX: 24.12 KG/M2 | HEIGHT: 75 IN | HEART RATE: 76 BPM | RESPIRATION RATE: 20 BRPM | SYSTOLIC BLOOD PRESSURE: 125 MMHG | DIASTOLIC BLOOD PRESSURE: 73 MMHG | OXYGEN SATURATION: 100 %

## 2021-01-21 PROCEDURE — 99213 OFFICE O/P EST LOW 20 MIN: CPT

## 2021-01-21 PROCEDURE — 29581 APPL MULTLAYER CMPRN SYS LEG: CPT | Mod: LT

## 2021-01-21 NOTE — ASSESSMENT
[Verbal] : Verbal [Written] : Written [Demo] : Demo [Patient] : Patient [Good - alert, interested, motivated] : Good - alert, interested, motivated [Verbalizes knowledge/Understanding] : Verbalizes knowledge/understanding [Dressing changes] : dressing changes [Skin Care] : skin care [Signs and symptoms of infection] : sign and symptoms of infection [Venous Disease] : venous disease [How and When to Call] : how and when to call [Compression Therapy] : compression therapy [Other: ____] : [unfilled] [Patient responsibility to plan of care] : patient responsibility to plan of care [Stable] : stable [Home] : Home [Ambulatory] : Ambulatory [Not Applicable - Long Term Care/Home Health Agency] : Long Term Care/Home Health Agency: Not Applicable [Surgery] : surgery [] : No [FreeTextEntry2] : Promote optimal skin integrity, infection prevention, edema control\par  [FreeTextEntry4] : Wound improved.  Epifix not applied as per MD.  No additional Epifix ordered.\par Patient is scheduled to have vascular procedure with Dr. Eaton 1/19/20\par f/u 1 week

## 2021-01-21 NOTE — HISTORY OF PRESENT ILLNESS
[FreeTextEntry1] : Left leg ulcer is clean, smaller, no C/O\par 1/21/21 Patient had ablation by history had an ablation left side with Dr Ramos. Was to have a doppler but has not at this time. Dr Jacobo requested placement of a coban dressing as per nursing. wound is clean

## 2021-01-21 NOTE — PROCEDURE
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 ankle [FreeTextEntry7] : same [de-identified] : none [de-identified] : <1cc. [de-identified] : full thickness necrotic tissue.

## 2021-01-21 NOTE — VITALS
[Pain related to present condition?] : The patient's  pain is not related to present condition. [] : No [de-identified] : 0/10

## 2021-01-21 NOTE — PHYSICAL EXAM
[4 x 4] : 4 x 4  [Normal Thyroid] : the thyroid was normal [Normal Breath Sounds] : Normal breath sounds [Normal Heart Sounds] : normal heart sounds [Ankle Swelling (On Exam)] : present [Normal Rate and Rhythm] : normal rate and rhythm [Ankle Swelling On The Left] : of the left ankle [Ankle Swelling On The Right] : mild [] : present [Alert] : alert [Oriented to Place] : oriented to place [Oriented to Person] : oriented to person [Oriented to Time] : oriented to time [Calm] : calm [JVD] : no jugular venous distention  [Abdomen Masses] : No abdominal massess [Abdomen Tenderness] : ~T ~M No abdominal tenderness [Tender] : nontender [Enlarged] : not enlarged [de-identified] : elderly WM, NAD, alert, Ox3. [de-identified] : post debridement measurement:  1.5 x 0.9 x 0.2 cm.  Patient tolerated procedure well. [FreeTextEntry1] : Left lower leg  [FreeTextEntry2] : 1.4 [FreeTextEntry3] : 0.8 [FreeTextEntry4] : 0.1 [de-identified] : serosanguineous [de-identified] : dry eschar [de-identified] : none [de-identified] : >80% [de-identified] : none [de-identified] : Coban.  [de-identified] : Ani [de-identified] : NSC [TWNoteComboBox4] : Small [de-identified] : Normal [de-identified] : No [TWNoteComboBox7] : Jorge [de-identified] : Application of skin substitute [de-identified] : Multilayer other compression wrap [de-identified] : Weekly

## 2021-01-21 NOTE — REASON FOR VISIT
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

## 2021-01-21 NOTE — ASSESSMENT
[Verbal] : Verbal [Written] : Written [Demo] : Demo [Patient] : Patient [Good - alert, interested, motivated] : Good - alert, interested, motivated [Verbalizes knowledge/Understanding] : Verbalizes knowledge/understanding [Skin Care] : skin care [Dressing changes] : dressing changes [Signs and symptoms of infection] : sign and symptoms of infection [Surgery] : surgery [Venous Disease] : venous disease [How and When to Call] : how and when to call [Patient responsibility to plan of care] : patient responsibility to plan of care [Compression Therapy] : compression therapy [Other: ____] : [unfilled] [Stable] : stable [Home] : Home [Ambulatory] : Ambulatory [Not Applicable - Long Term Care/Home Health Agency] : Long Term Care/Home Health Agency: Not Applicable [] : No [FreeTextEntry2] : Promote optimal skin integrity\par Infection prevention\par Edema control\par F/U 1 week \par  [FreeTextEntry4] : Vein ablation performed on 1/19/21 with Dr. Ramos\neo F/U 1 week

## 2021-01-21 NOTE — VITALS
[] : No [de-identified] : Pain scale:  0/10 - Patient reports no c/o pains or discomforts at present.

## 2021-01-22 DIAGNOSIS — I83.223 VARICOSE VEINS OF LEFT LOWER EXTREMITY WITH BOTH ULCER OF ANKLE AND INFLAMMATION: ICD-10-CM

## 2021-01-23 DIAGNOSIS — G20 PARKINSON'S DISEASE: ICD-10-CM

## 2021-01-23 DIAGNOSIS — Z80.0 FAMILY HISTORY OF MALIGNANT NEOPLASM OF DIGESTIVE ORGANS: ICD-10-CM

## 2021-01-23 DIAGNOSIS — R01.1 CARDIAC MURMUR, UNSPECIFIED: ICD-10-CM

## 2021-01-23 DIAGNOSIS — L97.322 NON-PRESSURE CHRONIC ULCER OF LEFT ANKLE WITH FAT LAYER EXPOSED: ICD-10-CM

## 2021-01-23 DIAGNOSIS — Z79.899 OTHER LONG TERM (CURRENT) DRUG THERAPY: ICD-10-CM

## 2021-01-23 DIAGNOSIS — Z87.891 PERSONAL HISTORY OF NICOTINE DEPENDENCE: ICD-10-CM

## 2021-01-23 DIAGNOSIS — M19.90 UNSPECIFIED OSTEOARTHRITIS, UNSPECIFIED SITE: ICD-10-CM

## 2021-01-23 DIAGNOSIS — I34.0 NONRHEUMATIC MITRAL (VALVE) INSUFFICIENCY: ICD-10-CM

## 2021-01-23 DIAGNOSIS — I83.223 VARICOSE VEINS OF LEFT LOWER EXTREMITY WITH BOTH ULCER OF ANKLE AND INFLAMMATION: ICD-10-CM

## 2021-01-23 DIAGNOSIS — I49.3 VENTRICULAR PREMATURE DEPOLARIZATION: ICD-10-CM

## 2021-01-23 DIAGNOSIS — I83.893 VARICOSE VEINS OF BILATERAL LOWER EXTREMITIES WITH OTHER COMPLICATIONS: ICD-10-CM

## 2021-01-25 ENCOUNTER — APPOINTMENT (OUTPATIENT)
Dept: SURGERY | Facility: HOSPITAL | Age: 86
End: 2021-01-25

## 2021-01-25 ENCOUNTER — RESULT REVIEW (OUTPATIENT)
Age: 86
End: 2021-01-25

## 2021-01-25 ENCOUNTER — OUTPATIENT (OUTPATIENT)
Dept: OUTPATIENT SERVICES | Facility: HOSPITAL | Age: 86
LOS: 1 days | End: 2021-01-25
Payer: COMMERCIAL

## 2021-01-25 DIAGNOSIS — I83.223 VARICOSE VEINS OF LEFT LOWER EXTREMITY WITH BOTH ULCER OF ANKLE AND INFLAMMATION: ICD-10-CM

## 2021-01-25 PROCEDURE — 93971 EXTREMITY STUDY: CPT

## 2021-01-25 PROCEDURE — 93971 EXTREMITY STUDY: CPT | Mod: 26

## 2021-01-28 ENCOUNTER — OUTPATIENT (OUTPATIENT)
Dept: OUTPATIENT SERVICES | Facility: HOSPITAL | Age: 86
LOS: 1 days | Discharge: HOME | End: 2021-01-28
Payer: COMMERCIAL

## 2021-01-28 ENCOUNTER — APPOINTMENT (OUTPATIENT)
Dept: PLASTIC SURGERY | Facility: HOSPITAL | Age: 86
End: 2021-01-28
Payer: MEDICARE

## 2021-01-28 VITALS
RESPIRATION RATE: 20 BRPM | HEIGHT: 75 IN | DIASTOLIC BLOOD PRESSURE: 81 MMHG | TEMPERATURE: 97.6 F | WEIGHT: 194 LBS | HEART RATE: 76 BPM | BODY MASS INDEX: 24.12 KG/M2 | OXYGEN SATURATION: 98 % | SYSTOLIC BLOOD PRESSURE: 137 MMHG

## 2021-01-28 DIAGNOSIS — I83.223 VARICOSE VEINS OF LEFT LOWER EXTREMITY WITH BOTH ULCER OF ANKLE AND INFLAMMATION: ICD-10-CM

## 2021-01-28 PROCEDURE — 99213 OFFICE O/P EST LOW 20 MIN: CPT

## 2021-01-28 PROCEDURE — 29581 APPL MULTLAYER CMPRN SYS LEG: CPT | Mod: LT

## 2021-01-28 NOTE — HISTORY OF PRESENT ILLNESS
[FreeTextEntry1] : Left leg ulcer is clean, smaller, no C/O\par 1/21/21 Patient had ablation by history had an ablation left side with Dr Ramos. Was to have a doppler but has not at this time. Dr Jacobo requested placement of a coban dressing as per nursing. wound is clean\par 1/28/21 dressing changed and ulcer stable without any signs of infection. Medial left calf shows mild erythema in area of ablation but no increased warmth. patient to notify if increased discomfort

## 2021-01-28 NOTE — ASSESSMENT
[Verbal] : Verbal [Written] : Written [Demo] : Demo [Patient] : Patient [Good - alert, interested, motivated] : Good - alert, interested, motivated [Verbalizes knowledge/Understanding] : Verbalizes knowledge/understanding [Dressing changes] : dressing changes [Skin Care] : skin care [Signs and symptoms of infection] : sign and symptoms of infection [Surgery] : surgery [Venous Disease] : venous disease [How and When to Call] : how and when to call [Compression Therapy] : compression therapy [Patient responsibility to plan of care] : patient responsibility to plan of care [Other: ____] : [unfilled] [] : Yes [Stable] : stable [Home] : Home [Ambulatory] : Ambulatory [Not Applicable - Long Term Care/Home Health Agency] : Long Term Care/Home Health Agency: Not Applicable [FreeTextEntry2] : Promote optimal skin integrity\par Infection prevention\par Edema control\par F/U 1 week \par  [FreeTextEntry4] : Photos Taken\par F/U 1 week

## 2021-01-28 NOTE — PHYSICAL EXAM
[4 x 4] : 4 x 4  [JVD] : no jugular venous distention  [Normal Breath Sounds] : Normal breath sounds [1+] : left 1+ [0] : left 0 [Ankle Swelling (On Exam)] : present [Varicose Veins Of Lower Extremities] : bilaterally [Ankle Swelling On The Left] : moderate [] : of the left leg [Ankle Swelling Bilaterally] : severe [Alert] : alert [Oriented to Person] : oriented to person [Calm] : calm [de-identified] : WD/WN in no acute distress. [de-identified] : JANISL [de-identified] : WNL [de-identified] : Left leg ulcer is clean, base is red and viable, no infection, periwound skin is intact with no cellulitis. [FreeTextEntry1] :  Lower Leg  [FreeTextEntry2] : 2.5 [FreeTextEntry3] : 1.4 [FreeTextEntry4] : 0.1-0.3 [de-identified] : serosanguineous [de-identified] : moderately  [de-identified] : none [de-identified] : >80% [de-identified] : 20% [de-identified] : none [de-identified] : Coban.  [de-identified] : Cleansed with Normal saline\par  [de-identified] : Silver Alginate [FreeTextEntry7] : Medial Leg - steri strip in place after venous procedure [FreeTextEntry8] : 1.1 [FreeTextEntry9] : 4.9 [de-identified] : 0.1 [de-identified] : Coban [de-identified] : No treatment required [de-identified] : Cleansed with Normal saline\par  [TWNoteComboBox1] : Left [TWNoteComboBox4] : Small [de-identified] : Macerated [de-identified] : Yes [TWNoteComboBox7] : False [de-identified] : Multilayer other compression wrap [de-identified] : Weekly [TWNoteComboBox9] : Left [de-identified] : Primary Dressing [de-identified] : None [de-identified] : Multilayer other compression wrap [de-identified] : Compression

## 2021-01-29 DIAGNOSIS — L97.322 NON-PRESSURE CHRONIC ULCER OF LEFT ANKLE WITH FAT LAYER EXPOSED: ICD-10-CM

## 2021-01-29 DIAGNOSIS — I34.0 NONRHEUMATIC MITRAL (VALVE) INSUFFICIENCY: ICD-10-CM

## 2021-01-29 DIAGNOSIS — G20 PARKINSON'S DISEASE: ICD-10-CM

## 2021-01-29 DIAGNOSIS — M19.90 UNSPECIFIED OSTEOARTHRITIS, UNSPECIFIED SITE: ICD-10-CM

## 2021-01-29 DIAGNOSIS — Z79.899 OTHER LONG TERM (CURRENT) DRUG THERAPY: ICD-10-CM

## 2021-01-29 DIAGNOSIS — I49.3 VENTRICULAR PREMATURE DEPOLARIZATION: ICD-10-CM

## 2021-01-29 DIAGNOSIS — I83.893 VARICOSE VEINS OF BILATERAL LOWER EXTREMITIES WITH OTHER COMPLICATIONS: ICD-10-CM

## 2021-01-29 DIAGNOSIS — I83.223 VARICOSE VEINS OF LEFT LOWER EXTREMITY WITH BOTH ULCER OF ANKLE AND INFLAMMATION: ICD-10-CM

## 2021-01-29 DIAGNOSIS — R01.1 CARDIAC MURMUR, UNSPECIFIED: ICD-10-CM

## 2021-01-29 DIAGNOSIS — Z87.891 PERSONAL HISTORY OF NICOTINE DEPENDENCE: ICD-10-CM

## 2021-01-29 DIAGNOSIS — Z80.0 FAMILY HISTORY OF MALIGNANT NEOPLASM OF DIGESTIVE ORGANS: ICD-10-CM

## 2021-02-04 ENCOUNTER — APPOINTMENT (OUTPATIENT)
Dept: PLASTIC SURGERY | Facility: HOSPITAL | Age: 86
End: 2021-02-04
Payer: MEDICARE

## 2021-02-04 ENCOUNTER — OUTPATIENT (OUTPATIENT)
Dept: OUTPATIENT SERVICES | Facility: HOSPITAL | Age: 86
LOS: 1 days | Discharge: HOME | End: 2021-02-04
Payer: COMMERCIAL

## 2021-02-04 ENCOUNTER — OUTPATIENT (OUTPATIENT)
Dept: OUTPATIENT SERVICES | Facility: HOSPITAL | Age: 86
LOS: 1 days | End: 2021-02-04
Payer: COMMERCIAL

## 2021-02-04 ENCOUNTER — APPOINTMENT (OUTPATIENT)
Dept: MRI IMAGING | Facility: CLINIC | Age: 86
End: 2021-02-04

## 2021-02-04 VITALS
SYSTOLIC BLOOD PRESSURE: 105 MMHG | HEIGHT: 75 IN | RESPIRATION RATE: 20 BRPM | TEMPERATURE: 97.6 F | WEIGHT: 194 LBS | OXYGEN SATURATION: 99 % | BODY MASS INDEX: 24.12 KG/M2 | HEART RATE: 67 BPM | DIASTOLIC BLOOD PRESSURE: 61 MMHG

## 2021-02-04 DIAGNOSIS — I83.223 VARICOSE VEINS OF LEFT LOWER EXTREMITY WITH BOTH ULCER OF ANKLE AND INFLAMMATION: ICD-10-CM

## 2021-02-04 DIAGNOSIS — G20 PARKINSON'S DISEASE: ICD-10-CM

## 2021-02-04 PROCEDURE — 70551 MRI BRAIN STEM W/O DYE: CPT | Mod: 26

## 2021-02-04 PROCEDURE — 70551 MRI BRAIN STEM W/O DYE: CPT

## 2021-02-04 PROCEDURE — 29581 APPL MULTLAYER CMPRN SYS LEG: CPT | Mod: LT

## 2021-02-04 PROCEDURE — 99213 OFFICE O/P EST LOW 20 MIN: CPT

## 2021-02-04 NOTE — PLAN
[FreeTextEntry1] : Silver Alginate, wound veil, dry dressing, Coband wrap, return to office in one week.\par 25 minutes spent for patient care and medical decision making.\par \par

## 2021-02-04 NOTE — HISTORY OF PRESENT ILLNESS
[FreeTextEntry1] : Left leg ulcer is clean, smaller, no C/O\par 1/21/21 Patient had ablation by history had an ablation left side with Dr Ramos. Was to have a doppler but has not at this time. Dr Jacobo requested placement of a coban dressing as per nursing. wound is clean\par 1/28/21 dressing changed and ulcer stable without any signs of infection. Medial left calf shows mild erythema in area of ablation but no increased warmth. patient to notify if increased discomfort\par 2/4/21 less maceration, wound stable and clean.

## 2021-02-04 NOTE — ASSESSMENT
[Verbal] : Verbal [Written] : Written [Demo] : Demo [Patient] : Patient [Good - alert, interested, motivated] : Good - alert, interested, motivated [Verbalizes knowledge/Understanding] : Verbalizes knowledge/understanding [Dressing changes] : dressing changes [Skin Care] : skin care [Signs and symptoms of infection] : sign and symptoms of infection [Venous Disease] : venous disease [Surgery] : surgery [How and When to Call] : how and when to call [Compression Therapy] : compression therapy [Patient responsibility to plan of care] : patient responsibility to plan of care [Other: ____] : [unfilled] [Stable] : stable [Home] : Home [Ambulatory] : Ambulatory [Not Applicable - Long Term Care/Home Health Agency] : Long Term Care/Home Health Agency: Not Applicable [] : No [FreeTextEntry2] : Promote optimal skin integrity\par Infection prevention\par Edema control\par F/U 1 week \par  [FreeTextEntry4] : F/U 1 week

## 2021-02-04 NOTE — VITALS
[Pain related to present condition?] : The patient's  pain is not related to present condition. [] : No [de-identified] : 0/10

## 2021-02-04 NOTE — PHYSICAL EXAM
[4 x 4] : 4 x 4  [Normal Breath Sounds] : Normal breath sounds [1+] : left 1+ [0] : left 0 [Ankle Swelling (On Exam)] : present [Varicose Veins Of Lower Extremities] : bilaterally [Ankle Swelling On The Left] : moderate [] : of the left leg [Ankle Swelling Bilaterally] : severe [Alert] : alert [Oriented to Person] : oriented to person [Calm] : calm [JVD] : no jugular venous distention  [de-identified] : WD/WN in no acute distress. [de-identified] : WNL [de-identified] : JANISL [de-identified] : WNL [de-identified] : Left leg ulcer is clean, base is red and viable, no infection, periwound skin is intact with no cellulitis. [FreeTextEntry1] :  Lower Leg  [FreeTextEntry2] : 2.5 [FreeTextEntry3] : 1.5 [FreeTextEntry4] : 0.1-0.3 [de-identified] : serosanguineous [de-identified] : mildly   [de-identified] : none [de-identified] : >80% [de-identified] : 20% [de-identified] : none [de-identified] : Coban.  [de-identified] : Woundveil, Silver Alginate [de-identified] : Cleansed with Normal saline\par  [FreeTextEntry7] : Medial Leg -CLOSED [de-identified] : Coban [de-identified] : No treatment required [de-identified] : Cleansed with Normal saline\par  [TWNoteComboBox1] : Left [TWNoteComboBox4] : Small [de-identified] : Macerated [de-identified] : Yes [de-identified] : Multilayer other compression wrap [de-identified] : Weekly [de-identified] : Primary Dressing [TWNoteComboBox9] : Left [de-identified] : None [de-identified] : Multilayer other compression wrap [de-identified] : Compression

## 2021-02-05 ENCOUNTER — NON-APPOINTMENT (OUTPATIENT)
Age: 86
End: 2021-02-05

## 2021-02-05 DIAGNOSIS — I83.893 VARICOSE VEINS OF BILATERAL LOWER EXTREMITIES WITH OTHER COMPLICATIONS: ICD-10-CM

## 2021-02-05 DIAGNOSIS — I83.223 VARICOSE VEINS OF LEFT LOWER EXTREMITY WITH BOTH ULCER OF ANKLE AND INFLAMMATION: ICD-10-CM

## 2021-02-05 DIAGNOSIS — Z80.0 FAMILY HISTORY OF MALIGNANT NEOPLASM OF DIGESTIVE ORGANS: ICD-10-CM

## 2021-02-05 DIAGNOSIS — Z87.891 PERSONAL HISTORY OF NICOTINE DEPENDENCE: ICD-10-CM

## 2021-02-05 DIAGNOSIS — M19.90 UNSPECIFIED OSTEOARTHRITIS, UNSPECIFIED SITE: ICD-10-CM

## 2021-02-05 DIAGNOSIS — L97.322 NON-PRESSURE CHRONIC ULCER OF LEFT ANKLE WITH FAT LAYER EXPOSED: ICD-10-CM

## 2021-02-05 DIAGNOSIS — Z79.899 OTHER LONG TERM (CURRENT) DRUG THERAPY: ICD-10-CM

## 2021-02-05 DIAGNOSIS — G20 PARKINSON'S DISEASE: ICD-10-CM

## 2021-02-05 DIAGNOSIS — R01.1 CARDIAC MURMUR, UNSPECIFIED: ICD-10-CM

## 2021-02-05 DIAGNOSIS — I34.0 NONRHEUMATIC MITRAL (VALVE) INSUFFICIENCY: ICD-10-CM

## 2021-02-05 DIAGNOSIS — I49.3 VENTRICULAR PREMATURE DEPOLARIZATION: ICD-10-CM

## 2021-02-11 ENCOUNTER — APPOINTMENT (OUTPATIENT)
Dept: PLASTIC SURGERY | Facility: HOSPITAL | Age: 86
End: 2021-02-11
Payer: MEDICARE

## 2021-02-11 ENCOUNTER — OUTPATIENT (OUTPATIENT)
Dept: OUTPATIENT SERVICES | Facility: HOSPITAL | Age: 86
LOS: 1 days | Discharge: HOME | End: 2021-02-11
Payer: COMMERCIAL

## 2021-02-11 VITALS
OXYGEN SATURATION: 99 % | DIASTOLIC BLOOD PRESSURE: 70 MMHG | BODY MASS INDEX: 24.12 KG/M2 | SYSTOLIC BLOOD PRESSURE: 122 MMHG | RESPIRATION RATE: 20 BRPM | HEART RATE: 67 BPM | WEIGHT: 194 LBS | TEMPERATURE: 97.6 F | HEIGHT: 75 IN

## 2021-02-11 DIAGNOSIS — I83.223 VARICOSE VEINS OF LEFT LOWER EXTREMITY WITH BOTH ULCER OF ANKLE AND INFLAMMATION: ICD-10-CM

## 2021-02-11 PROCEDURE — 29581 APPL MULTLAYER CMPRN SYS LEG: CPT | Mod: LT

## 2021-02-11 PROCEDURE — 99213 OFFICE O/P EST LOW 20 MIN: CPT

## 2021-02-11 NOTE — ASSESSMENT
[Verbal] : Verbal [Written] : Written [Demo] : Demo [Patient] : Patient [Good - alert, interested, motivated] : Good - alert, interested, motivated [Verbalizes knowledge/Understanding] : Verbalizes knowledge/understanding [Dressing changes] : dressing changes [Skin Care] : skin care [Signs and symptoms of infection] : sign and symptoms of infection [Surgery] : surgery [Venous Disease] : venous disease [How and When to Call] : how and when to call [Compression Therapy] : compression therapy [Patient responsibility to plan of care] : patient responsibility to plan of care [Other: ____] : [unfilled] [] : Yes [Stable] : stable [Home] : Home [Ambulatory] : Ambulatory [Not Applicable - Long Term Care/Home Health Agency] : Long Term Care/Home Health Agency: Not Applicable [FreeTextEntry2] : Restore Skin Integrity\par Infection Control\par Localized wound care\par Compression Therapy\par Edema Prevention [FreeTextEntry4] : Photos Taken\par F/U 1 week

## 2021-02-11 NOTE — HISTORY OF PRESENT ILLNESS
[FreeTextEntry1] : Left leg ulcer is clean, smaller, no C/O\par 1/21/21 Patient had ablation by history had an ablation left side with Dr Ramos. Was to have a doppler but has not at this time. Dr Jacobo requested placement of a coban dressing as per nursing. wound is clean\par 1/28/21 dressing changed and ulcer stable without any signs of infection. Medial left calf shows mild erythema in area of ablation but no increased warmth. patient to notify if increased discomfort\par 2/4/21 less maceration, wound stable and clean.\par 2/11/21 left lower leg ulcer smaller

## 2021-02-11 NOTE — PHYSICAL EXAM
[4 x 4] : 4 x 4  [Normal Breath Sounds] : Normal breath sounds [1+] : left 1+ [0] : left 0 [Ankle Swelling (On Exam)] : present [Varicose Veins Of Lower Extremities] : bilaterally [Ankle Swelling On The Left] : moderate [] : of the left leg [Ankle Swelling Bilaterally] : severe [Alert] : alert [Oriented to Person] : oriented to person [Calm] : calm [JVD] : no jugular venous distention  [de-identified] : WD/WN in no acute distress. [de-identified] : WNL [de-identified] : JANISL [de-identified] : WNL [de-identified] : Left leg ulcer is clean, base is red and viable, no infection, periwound skin is intact with no cellulitis. [FreeTextEntry1] :  Lower Leg  [FreeTextEntry2] : 1.9 [FreeTextEntry3] : 0.6 [FreeTextEntry4] : 0.1-0.3 [de-identified] : serosanguineous [de-identified] : none [de-identified] : >80% [de-identified] : 20% [de-identified] : none [de-identified] : Coban.  [de-identified] : Woundveil, Silver Alginate [de-identified] : Cleansed with Normal saline\par  [FreeTextEntry7] : Medial Leg -CLOSED [de-identified] : Coban [de-identified] : No treatment required [TWNoteComboBox1] : Left [de-identified] : Cleansed with Normal saline\par  [TWNoteComboBox4] : Small [de-identified] : Normal [de-identified] : Yes [de-identified] : Multilayer other compression wrap [de-identified] : Weekly [de-identified] : Primary Dressing [TWNoteComboBox9] : Left [de-identified] : None [de-identified] : Multilayer other compression wrap [de-identified] : Compression

## 2021-02-12 DIAGNOSIS — I83.893 VARICOSE VEINS OF BILATERAL LOWER EXTREMITIES WITH OTHER COMPLICATIONS: ICD-10-CM

## 2021-02-12 DIAGNOSIS — Z87.891 PERSONAL HISTORY OF NICOTINE DEPENDENCE: ICD-10-CM

## 2021-02-12 DIAGNOSIS — I83.223 VARICOSE VEINS OF LEFT LOWER EXTREMITY WITH BOTH ULCER OF ANKLE AND INFLAMMATION: ICD-10-CM

## 2021-02-12 DIAGNOSIS — I34.0 NONRHEUMATIC MITRAL (VALVE) INSUFFICIENCY: ICD-10-CM

## 2021-02-12 DIAGNOSIS — M19.90 UNSPECIFIED OSTEOARTHRITIS, UNSPECIFIED SITE: ICD-10-CM

## 2021-02-12 DIAGNOSIS — L97.322 NON-PRESSURE CHRONIC ULCER OF LEFT ANKLE WITH FAT LAYER EXPOSED: ICD-10-CM

## 2021-02-12 DIAGNOSIS — Z80.0 FAMILY HISTORY OF MALIGNANT NEOPLASM OF DIGESTIVE ORGANS: ICD-10-CM

## 2021-02-12 DIAGNOSIS — I49.3 VENTRICULAR PREMATURE DEPOLARIZATION: ICD-10-CM

## 2021-02-12 DIAGNOSIS — R01.1 CARDIAC MURMUR, UNSPECIFIED: ICD-10-CM

## 2021-02-12 DIAGNOSIS — Z79.899 OTHER LONG TERM (CURRENT) DRUG THERAPY: ICD-10-CM

## 2021-02-12 DIAGNOSIS — G20 PARKINSON'S DISEASE: ICD-10-CM

## 2021-02-18 ENCOUNTER — APPOINTMENT (OUTPATIENT)
Dept: OBGYN | Facility: HOSPITAL | Age: 86
End: 2021-02-18
Payer: MEDICARE

## 2021-02-18 ENCOUNTER — OUTPATIENT (OUTPATIENT)
Dept: OUTPATIENT SERVICES | Facility: HOSPITAL | Age: 86
LOS: 1 days | Discharge: HOME | End: 2021-02-18
Payer: COMMERCIAL

## 2021-02-18 VITALS
WEIGHT: 194 LBS | RESPIRATION RATE: 20 BRPM | HEART RATE: 78 BPM | TEMPERATURE: 97.6 F | DIASTOLIC BLOOD PRESSURE: 66 MMHG | BODY MASS INDEX: 24.12 KG/M2 | SYSTOLIC BLOOD PRESSURE: 106 MMHG | OXYGEN SATURATION: 98 % | HEIGHT: 75 IN

## 2021-02-18 DIAGNOSIS — Z80.0 FAMILY HISTORY OF MALIGNANT NEOPLASM OF DIGESTIVE ORGANS: ICD-10-CM

## 2021-02-18 DIAGNOSIS — G20 PARKINSON'S DISEASE: ICD-10-CM

## 2021-02-18 DIAGNOSIS — Z87.891 PERSONAL HISTORY OF NICOTINE DEPENDENCE: ICD-10-CM

## 2021-02-18 DIAGNOSIS — I83.893 VARICOSE VEINS OF BILATERAL LOWER EXTREMITIES WITH OTHER COMPLICATIONS: ICD-10-CM

## 2021-02-18 DIAGNOSIS — I83.223 VARICOSE VEINS OF LEFT LOWER EXTREMITY WITH BOTH ULCER OF ANKLE AND INFLAMMATION: ICD-10-CM

## 2021-02-18 DIAGNOSIS — M19.90 UNSPECIFIED OSTEOARTHRITIS, UNSPECIFIED SITE: ICD-10-CM

## 2021-02-18 DIAGNOSIS — L97.322 NON-PRESSURE CHRONIC ULCER OF LEFT ANKLE WITH FAT LAYER EXPOSED: ICD-10-CM

## 2021-02-18 DIAGNOSIS — I49.3 VENTRICULAR PREMATURE DEPOLARIZATION: ICD-10-CM

## 2021-02-18 DIAGNOSIS — I34.0 NONRHEUMATIC MITRAL (VALVE) INSUFFICIENCY: ICD-10-CM

## 2021-02-18 DIAGNOSIS — R01.1 CARDIAC MURMUR, UNSPECIFIED: ICD-10-CM

## 2021-02-18 DIAGNOSIS — Z79.899 OTHER LONG TERM (CURRENT) DRUG THERAPY: ICD-10-CM

## 2021-02-18 PROCEDURE — 99213 OFFICE O/P EST LOW 20 MIN: CPT

## 2021-02-18 PROCEDURE — 29581 APPL MULTLAYER CMPRN SYS LEG: CPT | Mod: LT

## 2021-02-18 NOTE — ASSESSMENT
[Verbal] : Verbal [Written] : Written [Demo] : Demo [Patient] : Patient [Good - alert, interested, motivated] : Good - alert, interested, motivated [Verbalizes knowledge/Understanding] : Verbalizes knowledge/understanding [Dressing changes] : dressing changes [Skin Care] : skin care [Signs and symptoms of infection] : sign and symptoms of infection [Surgery] : surgery [Venous Disease] : venous disease [How and When to Call] : how and when to call [Compression Therapy] : compression therapy [Patient responsibility to plan of care] : patient responsibility to plan of care [Other: ____] : [unfilled] [Stable] : stable [Home] : Home [Ambulatory] : Ambulatory [Not Applicable - Long Term Care/Home Health Agency] : Long Term Care/Home Health Agency: Not Applicable [] : No [FreeTextEntry2] : Restore Skin Integrity\par Infection Control\par Localized wound care\par Compression Therapy\par Edema Prevention [FreeTextEntry4] : F/U 1 week

## 2021-02-18 NOTE — PHYSICAL EXAM
[4 x 4] : 4 x 4  [JVD] : no jugular venous distention  [Normal Thyroid] : the thyroid was normal [Normal Breath Sounds] : Normal breath sounds [Normal Heart Sounds] : normal heart sounds [Normal Rate and Rhythm] : normal rate and rhythm [Ankle Swelling (On Exam)] : present [Ankle Swelling On The Left] : moderate [] : of the left leg [Ankle Swelling On The Right] : mild [Abdomen Masses] : No abdominal massess [Abdomen Tenderness] : ~T ~M No abdominal tenderness [Tender] : nontender [Enlarged] : not enlarged [Alert] : alert [Oriented to Person] : oriented to person [Oriented to Place] : oriented to place [Oriented to Time] : oriented to time [Calm] : calm [de-identified] : adult WM, alert , NAD, Ox3 , VSS [FreeTextEntry1] :  Lower Leg  [FreeTextEntry2] : 1.5 [FreeTextEntry4] : 0.1-0.3 [FreeTextEntry3] : 0.5 [de-identified] : serosanguineous [de-identified] : none [de-identified] : >80% [de-identified] : 20% [de-identified] : none [de-identified] : Coban [de-identified] : Woundveil, Silver Alginate [de-identified] : Cleansed with Normal saline\par  [FreeTextEntry7] : Medial Leg -CLOSED [de-identified] : Coban [de-identified] : No treatment required [de-identified] : Cleansed with Normal saline\par  [TWNoteComboBox1] : Left [TWNoteComboBox4] : Small [de-identified] : Normal [de-identified] : Yes [de-identified] : Multilayer other compression wrap [de-identified] : Weekly [de-identified] : Primary Dressing [TWNoteComboBox9] : Left [de-identified] : None [de-identified] : Multilayer other compression wrap [de-identified] : Compression

## 2021-02-18 NOTE — HISTORY OF PRESENT ILLNESS
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 Using coban. S/p vein ablation by Dr. Eaton in 1/21. Improvement seen.

## 2021-02-18 NOTE — PLAN
[FreeTextEntry1] : wound veil, ag alginate, DD, coban\par leg elevation\par f/u 1 wk\par \par time spent 20 mins.

## 2021-02-25 ENCOUNTER — APPOINTMENT (OUTPATIENT)
Dept: PLASTIC SURGERY | Facility: HOSPITAL | Age: 86
End: 2021-02-25

## 2021-02-25 ENCOUNTER — OUTPATIENT (OUTPATIENT)
Dept: OUTPATIENT SERVICES | Facility: HOSPITAL | Age: 86
LOS: 1 days | Discharge: HOME | End: 2021-02-25
Payer: COMMERCIAL

## 2021-02-25 ENCOUNTER — APPOINTMENT (OUTPATIENT)
Dept: OBGYN | Facility: HOSPITAL | Age: 86
End: 2021-02-25
Payer: MEDICARE

## 2021-02-25 VITALS
TEMPERATURE: 97.6 F | HEART RATE: 82 BPM | HEIGHT: 75 IN | WEIGHT: 194 LBS | BODY MASS INDEX: 24.12 KG/M2 | OXYGEN SATURATION: 98 % | SYSTOLIC BLOOD PRESSURE: 112 MMHG | RESPIRATION RATE: 20 BRPM | DIASTOLIC BLOOD PRESSURE: 65 MMHG

## 2021-02-25 DIAGNOSIS — Z80.0 FAMILY HISTORY OF MALIGNANT NEOPLASM OF DIGESTIVE ORGANS: ICD-10-CM

## 2021-02-25 DIAGNOSIS — I34.0 NONRHEUMATIC MITRAL (VALVE) INSUFFICIENCY: ICD-10-CM

## 2021-02-25 DIAGNOSIS — R01.1 CARDIAC MURMUR, UNSPECIFIED: ICD-10-CM

## 2021-02-25 DIAGNOSIS — I83.223 VARICOSE VEINS OF LEFT LOWER EXTREMITY WITH BOTH ULCER OF ANKLE AND INFLAMMATION: ICD-10-CM

## 2021-02-25 DIAGNOSIS — I83.893 VARICOSE VEINS OF BILATERAL LOWER EXTREMITIES WITH OTHER COMPLICATIONS: ICD-10-CM

## 2021-02-25 DIAGNOSIS — M19.90 UNSPECIFIED OSTEOARTHRITIS, UNSPECIFIED SITE: ICD-10-CM

## 2021-02-25 DIAGNOSIS — I83.228 VARICOSE VEINS OF LEFT LOWER EXTREMITY WITH BOTH ULCER OF OTHER PART OF LOWER EXTREMITY AND INFLAMMATION: ICD-10-CM

## 2021-02-25 DIAGNOSIS — I49.3 VENTRICULAR PREMATURE DEPOLARIZATION: ICD-10-CM

## 2021-02-25 DIAGNOSIS — L97.822 NON-PRESSURE CHRONIC ULCER OF OTHER PART OF LEFT LOWER LEG WITH FAT LAYER EXPOSED: ICD-10-CM

## 2021-02-25 DIAGNOSIS — G20 PARKINSON'S DISEASE: ICD-10-CM

## 2021-02-25 DIAGNOSIS — Z79.899 OTHER LONG TERM (CURRENT) DRUG THERAPY: ICD-10-CM

## 2021-02-25 DIAGNOSIS — Z87.891 PERSONAL HISTORY OF NICOTINE DEPENDENCE: ICD-10-CM

## 2021-02-25 PROCEDURE — 29581 APPL MULTLAYER CMPRN SYS LEG: CPT | Mod: LT

## 2021-02-25 PROCEDURE — 99213 OFFICE O/P EST LOW 20 MIN: CPT

## 2021-02-25 NOTE — PLAN
[FreeTextEntry1] : change to yasemin, DD, coban\par leg elevation\par f/u 1 wk.\par \par \par time spent 20 mins.

## 2021-02-25 NOTE — PHYSICAL EXAM
[4 x 4] : 4 x 4  [JVD] : no jugular venous distention  [Normal Thyroid] : the thyroid was normal [Normal Breath Sounds] : Normal breath sounds [Normal Heart Sounds] : normal heart sounds [Normal Rate and Rhythm] : normal rate and rhythm [Ankle Swelling (On Exam)] : present [Ankle Swelling On The Left] : moderate [] : of the left leg [Ankle Swelling On The Right] : mild [Abdomen Masses] : No abdominal massess [Abdomen Tenderness] : ~T ~M No abdominal tenderness [Tender] : nontender [Enlarged] : not enlarged [Alert] : alert [Oriented to Person] : oriented to person [Oriented to Place] : oriented to place [Oriented to Time] : oriented to time [Calm] : calm [de-identified] : elderly adult, WM, NAD, alert, Ox3. [FreeTextEntry1] :  Lower Leg  [FreeTextEntry2] : 1.5 [FreeTextEntry3] : 0.5 [FreeTextEntry4] : 0.1-0.3 [de-identified] : serosanguineous [de-identified] : none [de-identified] : >80% [de-identified] : 20% [de-identified] : none [de-identified] : Coban [de-identified] : Ani [de-identified] : Cleansed with Normal saline\par  [FreeTextEntry7] : Medial Leg -CLOSED [de-identified] : Coban [de-identified] : No treatment required [de-identified] : Cleansed with Normal saline\par  [TWNoteComboBox1] : Left [TWNoteComboBox4] : Small [de-identified] : Normal [de-identified] : Yes [de-identified] : Multilayer other compression wrap [de-identified] : Weekly [de-identified] : Primary Dressing [TWNoteComboBox9] : Left [de-identified] : None [de-identified] : Multilayer other compression wrap [de-identified] : Compression

## 2021-02-25 NOTE — HISTORY OF PRESENT ILLNESS
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 Using coban. S/p vein ablation by Dr. Eaton in 1/21. No c/o.\par

## 2021-03-03 ENCOUNTER — APPOINTMENT (OUTPATIENT)
Dept: NEUROLOGY | Facility: CLINIC | Age: 86
End: 2021-03-03
Payer: MEDICARE

## 2021-03-03 VITALS
WEIGHT: 194 LBS | HEART RATE: 68 BPM | DIASTOLIC BLOOD PRESSURE: 72 MMHG | BODY MASS INDEX: 24.12 KG/M2 | SYSTOLIC BLOOD PRESSURE: 140 MMHG | HEIGHT: 75 IN | TEMPERATURE: 97.5 F

## 2021-03-03 PROCEDURE — 99214 OFFICE O/P EST MOD 30 MIN: CPT

## 2021-03-03 PROCEDURE — 99072 ADDL SUPL MATRL&STAF TM PHE: CPT

## 2021-03-03 NOTE — HISTORY OF PRESENT ILLNESS
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

## 2021-03-03 NOTE — DATA REVIEWED
[No studies available for review at this time.] : No studies available for review at this time. [de-identified] : 2/4/21: MRI brain - no acute findings

## 2021-03-03 NOTE — DISCUSSION/SUMMARY
[FreeTextEntry1] : 85-year-old RH male with PMHx of HTN, Knee arthritis, venous insufficiency; diagnosed with Parkinson's disease few years ago, is on carbidopa/levodopa 25/100 mg, half a pill 3 times a day; presents for evaluation of mildly memory loss.\par \par # Mild cognitive impairment; amnestic type; initial MoCA score 26/30; pt has noted worsening \par \par # Parkinsons ds\par \par - Had a discussion with the patient and his wife, he is agreeable and wants to try donepezil for cognitive decline, adverse effects discussed with the patient, he was started donepezil 5 mg daily\par - Labs: B12, and TSH level\par - Continue carbidopa levodopa 25/100 mg, increase dose to 3 times a day.\par - Start physical therapy for balance and gait training\par - F/U in 4 months

## 2021-03-03 NOTE — PHYSICAL EXAM
[General Appearance - Alert] : alert [General Appearance - In No Acute Distress] : in no acute distress [General Appearance - Well-Appearing] : healthy appearing [Mood] : the mood was normal [Person] : oriented to person [Place] : oriented to place [Time] : oriented to time [Registration Intact] : recent registration memory intact [Concentration Intact] : normal concentrating ability [Naming Objects] : no difficulty naming common objects [Repeating Phrases] : no difficulty repeating a phrase [Fluency] : fluency intact [Comprehension] : comprehension intact [Past History] : adequate knowledge of personal past history [___ / 30] : the patient achieved a total score of [unfilled] /30 [___ / 5] : Visuospatial / Executive: [unfilled] / 5 [___ / 3] : Attention (Serial 7 subtraction): [unfilled] / 3 [___ / 1] : Fluency: [unfilled] / 1 [___ / 2] : Abstraction: [unfilled] / 2 [___ / 5] : Delayed Recall: [unfilled] / 5 [___ / 6] : Orientation: [unfilled] / 6 [Cranial Nerves Optic (II)] : visual acuity intact bilaterally,  visual fields full to confrontation, pupils equal round and reactive to light [Cranial Nerves Oculomotor (III)] : extraocular motion intact [Cranial Nerves Trigeminal (V)] : facial sensation intact symmetrically [Cranial Nerves Facial (VII)] : face symmetrical [Cranial Nerves Vestibulocochlear (VIII)] : hearing was intact bilaterally [Cranial Nerves Glossopharyngeal (IX)] : tongue and palate midline [Cranial Nerves Accessory (XI - Cranial And Spinal)] : head turning and shoulder shrug symmetric [Cranial Nerves Hypoglossal (XII)] : there was no tongue deviation with protrusion [No Muscle Atrophy] : normal bulk in all four extremities [Sensation Tactile Decrease] : light touch was intact [Past-pointing] : there was no past-pointing [Tremor] : no tremor present [Coordination - Dysmetria Impaired Finger-to-Nose Bilateral] : not present [2+] : Brachioradialis left 2+ [1+] : Patella left 1+ [0] : Ankle jerk left 0 [Plantar Reflex Right Only] : normal on the right [Plantar Reflex Left Only] : normal on the left [PERRL With Normal Accommodation] : pupils were equal in size, round, reactive to light, with normal accommodation [Extraocular Movements] : extraocular movements were intact [Full Visual Field] : full visual field [Hearing Threshold Finger Rub Not [place]] : hearing was normal [Neck Cervical Mass (___cm)] : no neck mass was observed [Auscultation Breath Sounds / Voice Sounds] : lungs were clear to auscultation bilaterally [Heart Sounds] : normal S1 and S2 [FreeTextEntry1] : Chronic LLE edema [] : no rash

## 2021-03-03 NOTE — REVIEW OF SYSTEMS
[As Noted in HPI] : as noted in HPI [Memory Lapses or Loss] : memory loss [Decr. Concentrating Ability] : decreased concentrating ability [Poor Coordination] : poor coordination [Palpitations] : no palpitations [Lower Ext Edema] : lower extremity edema [Diarrhea] : diarrhea [Negative] : Heme/Lymph [FreeTextEntry9] : Arthritis knees

## 2021-03-03 NOTE — REVIEW OF SYSTEMS
[As Noted in HPI] : as noted in HPI [Memory Lapses or Loss] : memory loss [Decr. Concentrating Ability] : decreased concentrating ability [Poor Coordination] : poor coordination [Lower Ext Edema] : lower extremity edema [Diarrhea] : diarrhea [Negative] : Heme/Lymph [Palpitations] : no palpitations [FreeTextEntry9] : Arthritis knees

## 2021-03-03 NOTE — DATA REVIEWED
[No studies available for review at this time.] : No studies available for review at this time. [de-identified] : 2/4/21: No acute hmg/stroke

## 2021-03-03 NOTE — PHYSICAL EXAM
[General Appearance - Alert] : alert [General Appearance - In No Acute Distress] : in no acute distress [General Appearance - Well-Appearing] : healthy appearing [Mood] : the mood was normal [Person] : oriented to person [Place] : oriented to place [Time] : oriented to time [Registration Intact] : recent registration memory intact [Concentration Intact] : normal concentrating ability [Naming Objects] : no difficulty naming common objects [Repeating Phrases] : no difficulty repeating a phrase [Fluency] : fluency intact [Comprehension] : comprehension intact [Past History] : adequate knowledge of personal past history [___ / 30] : the patient achieved a total score of [unfilled] /30 [___ / 5] : Visuospatial / Executive: [unfilled] / 5 [___ / 3] : Attention (Serial 7 subtraction): [unfilled] / 3 [___ / 1] : Fluency: [unfilled] / 1 [___ / 2] : Abstraction: [unfilled] / 2 [___ / 5] : Delayed Recall: [unfilled] / 5 [___ / 6] : Orientation: [unfilled] / 6 [Cranial Nerves Optic (II)] : visual acuity intact bilaterally,  visual fields full to confrontation, pupils equal round and reactive to light [Cranial Nerves Oculomotor (III)] : extraocular motion intact [Cranial Nerves Trigeminal (V)] : facial sensation intact symmetrically [Cranial Nerves Facial (VII)] : face symmetrical [Cranial Nerves Vestibulocochlear (VIII)] : hearing was intact bilaterally [Cranial Nerves Glossopharyngeal (IX)] : tongue and palate midline [Cranial Nerves Accessory (XI - Cranial And Spinal)] : head turning and shoulder shrug symmetric [Cranial Nerves Hypoglossal (XII)] : there was no tongue deviation with protrusion [No Muscle Atrophy] : normal bulk in all four extremities [Sensation Tactile Decrease] : light touch was intact [2+] : Brachioradialis left 2+ [1+] : Patella left 1+ [0] : Ankle jerk left 0 [PERRL With Normal Accommodation] : pupils were equal in size, round, reactive to light, with normal accommodation [Extraocular Movements] : extraocular movements were intact [Full Visual Field] : full visual field [Hearing Threshold Finger Rub Not [place]] : hearing was normal [Neck Cervical Mass (___cm)] : no neck mass was observed [Past-pointing] : there was no past-pointing [Tremor] : no tremor present [Coordination - Dysmetria Impaired Finger-to-Nose Bilateral] : not present [Plantar Reflex Right Only] : normal on the right [Plantar Reflex Left Only] : normal on the left [FreeTextEntry1] : Chronic LLE edema

## 2021-03-03 NOTE — HISTORY OF PRESENT ILLNESS
[FreeTextEntry1] : Pt is here for a follow up visit today, last seen on 1/4/21. Patient is accompanied by his wife, she reports he has progressive short-term memory issues, recently he forgot to pay his phone bill, this resulted in lot of confusion and payment of other utilities. He reports his short-term memory is poor  but has good recollection of past memory.\par \par He continues to have intermittent tremors of hands, gait is mildly unstable but he manages to walk without assistance. Reports he is exercising at home, has not had any falls,  he takes carbidopa levodopa 25/100 mg T.i.d., it provides him some relief of tremor.\par \par he had MRI of the brain done  that was unremarkable

## 2021-03-04 ENCOUNTER — APPOINTMENT (OUTPATIENT)
Dept: PLASTIC SURGERY | Facility: HOSPITAL | Age: 86
End: 2021-03-04
Payer: MEDICARE

## 2021-03-04 ENCOUNTER — OUTPATIENT (OUTPATIENT)
Dept: OUTPATIENT SERVICES | Facility: HOSPITAL | Age: 86
LOS: 1 days | Discharge: HOME | End: 2021-03-04
Payer: COMMERCIAL

## 2021-03-04 ENCOUNTER — APPOINTMENT (OUTPATIENT)
Dept: PODIATRY | Facility: HOSPITAL | Age: 86
End: 2021-03-04

## 2021-03-04 VITALS
HEIGHT: 75 IN | TEMPERATURE: 97.6 F | DIASTOLIC BLOOD PRESSURE: 62 MMHG | SYSTOLIC BLOOD PRESSURE: 104 MMHG | HEART RATE: 65 BPM | OXYGEN SATURATION: 98 % | WEIGHT: 194 LBS | RESPIRATION RATE: 20 BRPM | BODY MASS INDEX: 24.12 KG/M2

## 2021-03-04 DIAGNOSIS — I83.223 VARICOSE VEINS OF LEFT LOWER EXTREMITY WITH BOTH ULCER OF ANKLE AND INFLAMMATION: ICD-10-CM

## 2021-03-04 PROCEDURE — 99213 OFFICE O/P EST LOW 20 MIN: CPT

## 2021-03-04 PROCEDURE — 29581 APPL MULTLAYER CMPRN SYS LEG: CPT | Mod: LT

## 2021-03-04 NOTE — ASSESSMENT
[Verbal] : Verbal [Written] : Written [Demo] : Demo [Patient] : Patient [Good - alert, interested, motivated] : Good - alert, interested, motivated [Verbalizes knowledge/Understanding] : Verbalizes knowledge/understanding [Dressing changes] : dressing changes [Skin Care] : skin care [Signs and symptoms of infection] : sign and symptoms of infection [Surgery] : surgery [Venous Disease] : venous disease [How and When to Call] : how and when to call [Compression Therapy] : compression therapy [Patient responsibility to plan of care] : patient responsibility to plan of care [Other: ____] : [unfilled] [Stable] : stable [Home] : Home [Ambulatory] : Ambulatory [Not Applicable - Long Term Care/Home Health Agency] : Long Term Care/Home Health Agency: Not Applicable [] : No [FreeTextEntry2] : Restore Skin Integrity\par Collagen Matrix Therapy \par Infection Control\par Localized wound care\par Compression Therapy\par Edema Prevention [FreeTextEntry4] : Photos Taken\par F/U 1 week

## 2021-03-04 NOTE — PLAN
[FreeTextEntry1] : SUNNY hazel, coban Q Week\par leg elevation\par f/u 1 wk.\par \par \par time spent 20 mins.

## 2021-03-04 NOTE — PHYSICAL EXAM
[4 x 4] : 4 x 4  [Normal Thyroid] : the thyroid was normal [Normal Breath Sounds] : Normal breath sounds [Normal Heart Sounds] : normal heart sounds [Normal Rate and Rhythm] : normal rate and rhythm [Ankle Swelling (On Exam)] : present [Ankle Swelling On The Left] : moderate [] : of the left leg [Ankle Swelling On The Right] : mild [Alert] : alert [Oriented to Person] : oriented to person [Oriented to Place] : oriented to place [Oriented to Time] : oriented to time [Calm] : calm [JVD] : no jugular venous distention  [Abdomen Masses] : No abdominal massess [Abdomen Tenderness] : ~T ~M No abdominal tenderness [Tender] : nontender [Enlarged] : not enlarged [de-identified] : elderly adult, WM, NAD, alert, Ox3. [FreeTextEntry1] :  Lower Leg  [FreeTextEntry2] : 1.2 [FreeTextEntry3] : 0.5 [FreeTextEntry4] : 0.1-0.3 [de-identified] : serosanguineous [de-identified] : none [de-identified] : >80% [de-identified] : 20% [de-identified] : none [de-identified] : Coban [de-identified] : Ani [de-identified] : Cleansed with Normal saline\par  [FreeTextEntry7] : Medial Leg -CLOSED [de-identified] : Coban [de-identified] : No treatment required [de-identified] : Cleansed with Normal saline\par  [TWNoteComboBox1] : Left [TWNoteComboBox4] : Small [de-identified] : Normal [de-identified] : Yes [de-identified] : Multilayer other compression wrap [de-identified] : Weekly [de-identified] : Primary Dressing [TWNoteComboBox9] : Left [de-identified] : None [de-identified] : Multilayer other compression wrap [de-identified] : Compression

## 2021-03-04 NOTE — HISTORY OF PRESENT ILLNESS
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 Using coban. S/p vein ablation by Dr. Eaton in 1/21. No c/o.\par 3/4/21 wound clean and smaller with less edema\par

## 2021-03-05 DIAGNOSIS — I83.228 VARICOSE VEINS OF LEFT LOWER EXTREMITY WITH BOTH ULCER OF OTHER PART OF LOWER EXTREMITY AND INFLAMMATION: ICD-10-CM

## 2021-03-05 DIAGNOSIS — Z79.899 OTHER LONG TERM (CURRENT) DRUG THERAPY: ICD-10-CM

## 2021-03-05 DIAGNOSIS — R01.1 CARDIAC MURMUR, UNSPECIFIED: ICD-10-CM

## 2021-03-05 DIAGNOSIS — Z80.0 FAMILY HISTORY OF MALIGNANT NEOPLASM OF DIGESTIVE ORGANS: ICD-10-CM

## 2021-03-05 DIAGNOSIS — M19.90 UNSPECIFIED OSTEOARTHRITIS, UNSPECIFIED SITE: ICD-10-CM

## 2021-03-05 DIAGNOSIS — I49.3 VENTRICULAR PREMATURE DEPOLARIZATION: ICD-10-CM

## 2021-03-05 DIAGNOSIS — I83.893 VARICOSE VEINS OF BILATERAL LOWER EXTREMITIES WITH OTHER COMPLICATIONS: ICD-10-CM

## 2021-03-05 DIAGNOSIS — G20 PARKINSON'S DISEASE: ICD-10-CM

## 2021-03-05 DIAGNOSIS — I34.0 NONRHEUMATIC MITRAL (VALVE) INSUFFICIENCY: ICD-10-CM

## 2021-03-05 DIAGNOSIS — L97.822 NON-PRESSURE CHRONIC ULCER OF OTHER PART OF LEFT LOWER LEG WITH FAT LAYER EXPOSED: ICD-10-CM

## 2021-03-05 DIAGNOSIS — Z87.891 PERSONAL HISTORY OF NICOTINE DEPENDENCE: ICD-10-CM

## 2021-03-07 ENCOUNTER — RX RENEWAL (OUTPATIENT)
Age: 86
End: 2021-03-07

## 2021-03-11 ENCOUNTER — APPOINTMENT (OUTPATIENT)
Dept: PODIATRY | Facility: HOSPITAL | Age: 86
End: 2021-03-11

## 2021-03-11 ENCOUNTER — OUTPATIENT (OUTPATIENT)
Dept: OUTPATIENT SERVICES | Facility: HOSPITAL | Age: 86
LOS: 1 days | Discharge: HOME | End: 2021-03-11
Payer: COMMERCIAL

## 2021-03-11 ENCOUNTER — APPOINTMENT (OUTPATIENT)
Dept: PLASTIC SURGERY | Facility: HOSPITAL | Age: 86
End: 2021-03-11
Payer: MEDICARE

## 2021-03-11 VITALS
RESPIRATION RATE: 20 BRPM | DIASTOLIC BLOOD PRESSURE: 70 MMHG | HEART RATE: 69 BPM | OXYGEN SATURATION: 100 % | SYSTOLIC BLOOD PRESSURE: 122 MMHG | TEMPERATURE: 97.6 F | BODY MASS INDEX: 24.12 KG/M2 | WEIGHT: 194 LBS | HEIGHT: 75 IN

## 2021-03-11 DIAGNOSIS — I83.223 VARICOSE VEINS OF LEFT LOWER EXTREMITY WITH BOTH ULCER OF ANKLE AND INFLAMMATION: ICD-10-CM

## 2021-03-11 PROCEDURE — 29581 APPL MULTLAYER CMPRN SYS LEG: CPT | Mod: LT

## 2021-03-11 PROCEDURE — 99213 OFFICE O/P EST LOW 20 MIN: CPT

## 2021-03-11 NOTE — HISTORY OF PRESENT ILLNESS
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 Using coban. S/p vein ablation by Dr. Eaton in 1/21. No c/o.\par 3/4/21 wound clean and smaller with less edema\par 3/11/21 left leg ulcer much smaller and clean\par

## 2021-03-11 NOTE — ASSESSMENT
[FreeTextEntry2] : Restore Skin Integrity\par Collagen Matrix Therapy \par Infection Control\par Localized wound care\par Compression Therapy\par Edema Prevention [FreeTextEntry4] : Photos Taken\par Pt to F/U to [facility] in 1 Week

## 2021-03-11 NOTE — PHYSICAL EXAM
[JVD] : no jugular venous distention  [Abdomen Masses] : No abdominal massess [Abdomen Tenderness] : ~T ~M No abdominal tenderness [Tender] : nontender [Enlarged] : not enlarged [de-identified] : elderly adult, WM, NAD, alert, Ox3. [FreeTextEntry1] :  Lower Leg  [FreeTextEntry2] : 1.1 [FreeTextEntry3] : 0.4 [FreeTextEntry4] : 0.1-0.2 [de-identified] : serosanguineous [de-identified] : none [de-identified] : none [de-identified] : Coban [de-identified] : Ani [de-identified] : Cleansed with Normal saline\par  [FreeTextEntry7] : Medial Leg -CLOSED [de-identified] : Coban [de-identified] : No treatment required [de-identified] : Cleansed with Normal saline\par  [TWNoteComboBox1] : Left [TWNoteComboBox4] : Small [de-identified] : Normal [de-identified] : 100% [de-identified] : No [de-identified] : Multilayer other compression wrap [de-identified] : Weekly [de-identified] : Primary Dressing [TWNoteComboBox9] : Left [de-identified] : None [de-identified] : Multilayer other compression wrap [de-identified] : Compression

## 2021-03-12 DIAGNOSIS — I34.0 NONRHEUMATIC MITRAL (VALVE) INSUFFICIENCY: ICD-10-CM

## 2021-03-12 DIAGNOSIS — L97.822 NON-PRESSURE CHRONIC ULCER OF OTHER PART OF LEFT LOWER LEG WITH FAT LAYER EXPOSED: ICD-10-CM

## 2021-03-12 DIAGNOSIS — Z87.891 PERSONAL HISTORY OF NICOTINE DEPENDENCE: ICD-10-CM

## 2021-03-12 DIAGNOSIS — I49.3 VENTRICULAR PREMATURE DEPOLARIZATION: ICD-10-CM

## 2021-03-12 DIAGNOSIS — Z79.899 OTHER LONG TERM (CURRENT) DRUG THERAPY: ICD-10-CM

## 2021-03-12 DIAGNOSIS — M19.90 UNSPECIFIED OSTEOARTHRITIS, UNSPECIFIED SITE: ICD-10-CM

## 2021-03-12 DIAGNOSIS — I83.228 VARICOSE VEINS OF LEFT LOWER EXTREMITY WITH BOTH ULCER OF OTHER PART OF LOWER EXTREMITY AND INFLAMMATION: ICD-10-CM

## 2021-03-12 DIAGNOSIS — G20 PARKINSON'S DISEASE: ICD-10-CM

## 2021-03-12 DIAGNOSIS — R01.1 CARDIAC MURMUR, UNSPECIFIED: ICD-10-CM

## 2021-03-12 DIAGNOSIS — I83.893 VARICOSE VEINS OF BILATERAL LOWER EXTREMITIES WITH OTHER COMPLICATIONS: ICD-10-CM

## 2021-03-12 DIAGNOSIS — Z80.0 FAMILY HISTORY OF MALIGNANT NEOPLASM OF DIGESTIVE ORGANS: ICD-10-CM

## 2021-03-15 ENCOUNTER — NON-APPOINTMENT (OUTPATIENT)
Age: 86
End: 2021-03-15

## 2021-03-18 ENCOUNTER — APPOINTMENT (OUTPATIENT)
Dept: OBGYN | Facility: HOSPITAL | Age: 86
End: 2021-03-18
Payer: MEDICARE

## 2021-03-18 ENCOUNTER — OUTPATIENT (OUTPATIENT)
Dept: OUTPATIENT SERVICES | Facility: HOSPITAL | Age: 86
LOS: 1 days | Discharge: HOME | End: 2021-03-18
Payer: COMMERCIAL

## 2021-03-18 VITALS
RESPIRATION RATE: 20 BRPM | BODY MASS INDEX: 24.12 KG/M2 | HEIGHT: 75 IN | TEMPERATURE: 97.6 F | HEART RATE: 92 BPM | OXYGEN SATURATION: 98 % | DIASTOLIC BLOOD PRESSURE: 66 MMHG | SYSTOLIC BLOOD PRESSURE: 104 MMHG | WEIGHT: 194 LBS

## 2021-03-18 DIAGNOSIS — I83.223 VARICOSE VEINS OF LEFT LOWER EXTREMITY WITH BOTH ULCER OF ANKLE AND INFLAMMATION: ICD-10-CM

## 2021-03-18 PROCEDURE — 99213 OFFICE O/P EST LOW 20 MIN: CPT

## 2021-03-18 PROCEDURE — 29581 APPL MULTLAYER CMPRN SYS LEG: CPT | Mod: LT

## 2021-03-18 NOTE — ASSESSMENT
[Verbal] : Verbal [Written] : Written [Demo] : Demo [Patient] : Patient [Good - alert, interested, motivated] : Good - alert, interested, motivated [Verbalizes knowledge/Understanding] : Verbalizes knowledge/understanding [Dressing changes] : dressing changes [Skin Care] : skin care [Signs and symptoms of infection] : sign and symptoms of infection [Surgery] : surgery [Venous Disease] : venous disease [How and When to Call] : how and when to call [Compression Therapy] : compression therapy [Patient responsibility to plan of care] : patient responsibility to plan of care [Other: ____] : [unfilled] [] : Yes [Stable] : stable [Home] : Home [Ambulatory] : Ambulatory [Not Applicable - Long Term Care/Home Health Agency] : Long Term Care/Home Health Agency: Not Applicable [FreeTextEntry2] : Restore Skin Integrity\par Collagen Matrix Therapy \par Infection Control\par Localized wound care\par Compression Therapy\par Edema Prevention [FreeTextEntry4] : Pt to F/U to [facility] in 1 Week

## 2021-03-18 NOTE — PLAN
[FreeTextEntry1] : ag alginate,, DD, coban\par leg elevation\par f/u 1 wk\par \par \par time spent 20 mins.

## 2021-03-18 NOTE — HISTORY OF PRESENT ILLNESS
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 Using coban. S/p vein ablation by Dr. Eaton in 1/21. No c/o.\par \par

## 2021-03-18 NOTE — PHYSICAL EXAM
[4 x 4] : 4 x 4  [Normal Thyroid] : the thyroid was normal [Normal Breath Sounds] : Normal breath sounds [Normal Heart Sounds] : normal heart sounds [Normal Rate and Rhythm] : normal rate and rhythm [] : of the left leg [Ankle Swelling On The Left] : moderate [Alert] : alert [Oriented to Place] : oriented to place [Oriented to Time] : oriented to time [Calm] : calm [JVD] : no jugular venous distention  [Abdomen Masses] : No abdominal massess [Abdomen Tenderness] : ~T ~M No abdominal tenderness [Tender] : nontender [Enlarged] : not enlarged [de-identified] : elderly WM, NAD, alert, Ox 3. [FreeTextEntry1] :  Lower Leg  [FreeTextEntry2] : 1.1 [FreeTextEntry3] : 0.4 [FreeTextEntry4] : 0.1-0.2 [de-identified] : serosanguineous [de-identified] : none [de-identified] : none [de-identified] : Coban [de-identified] : Ani [de-identified] : Cleansed with Normal saline\par  [FreeTextEntry7] : Medial Leg -CLOSED [de-identified] : Coban [de-identified] : No treatment required [de-identified] : Cleansed with Normal saline\par  [TWNoteComboBox1] : Left [TWNoteComboBox4] : Small [de-identified] : Normal [de-identified] : 100% [de-identified] : No [de-identified] : Multilayer other compression wrap [de-identified] : Weekly [de-identified] : Primary Dressing [TWNoteComboBox9] : Left [de-identified] : None [de-identified] : Multilayer other compression wrap [de-identified] : Compression

## 2021-03-19 DIAGNOSIS — I83.228 VARICOSE VEINS OF LEFT LOWER EXTREMITY WITH BOTH ULCER OF OTHER PART OF LOWER EXTREMITY AND INFLAMMATION: ICD-10-CM

## 2021-03-19 DIAGNOSIS — G20 PARKINSON'S DISEASE: ICD-10-CM

## 2021-03-19 DIAGNOSIS — I34.0 NONRHEUMATIC MITRAL (VALVE) INSUFFICIENCY: ICD-10-CM

## 2021-03-19 DIAGNOSIS — I83.893 VARICOSE VEINS OF BILATERAL LOWER EXTREMITIES WITH OTHER COMPLICATIONS: ICD-10-CM

## 2021-03-19 DIAGNOSIS — Z79.899 OTHER LONG TERM (CURRENT) DRUG THERAPY: ICD-10-CM

## 2021-03-19 DIAGNOSIS — Z80.0 FAMILY HISTORY OF MALIGNANT NEOPLASM OF DIGESTIVE ORGANS: ICD-10-CM

## 2021-03-19 DIAGNOSIS — Z87.891 PERSONAL HISTORY OF NICOTINE DEPENDENCE: ICD-10-CM

## 2021-03-19 DIAGNOSIS — M19.90 UNSPECIFIED OSTEOARTHRITIS, UNSPECIFIED SITE: ICD-10-CM

## 2021-03-19 DIAGNOSIS — L97.822 NON-PRESSURE CHRONIC ULCER OF OTHER PART OF LEFT LOWER LEG WITH FAT LAYER EXPOSED: ICD-10-CM

## 2021-03-19 DIAGNOSIS — I49.3 VENTRICULAR PREMATURE DEPOLARIZATION: ICD-10-CM

## 2021-03-19 DIAGNOSIS — R01.1 CARDIAC MURMUR, UNSPECIFIED: ICD-10-CM

## 2021-03-25 ENCOUNTER — APPOINTMENT (OUTPATIENT)
Dept: OBGYN | Facility: HOSPITAL | Age: 86
End: 2021-03-25
Payer: MEDICARE

## 2021-03-25 ENCOUNTER — OUTPATIENT (OUTPATIENT)
Dept: OUTPATIENT SERVICES | Facility: HOSPITAL | Age: 86
LOS: 1 days | Discharge: HOME | End: 2021-03-25
Payer: COMMERCIAL

## 2021-03-25 VITALS
RESPIRATION RATE: 20 BRPM | OXYGEN SATURATION: 100 % | TEMPERATURE: 97.6 F | DIASTOLIC BLOOD PRESSURE: 62 MMHG | BODY MASS INDEX: 24.12 KG/M2 | SYSTOLIC BLOOD PRESSURE: 96 MMHG | HEART RATE: 76 BPM | HEIGHT: 75 IN | WEIGHT: 194 LBS

## 2021-03-25 DIAGNOSIS — I83.228 VARICOSE VEINS OF LEFT LOWER EXTREMITY WITH BOTH ULCER OF OTHER PART OF LOWER EXTREMITY AND INFLAMMATION: ICD-10-CM

## 2021-03-25 DIAGNOSIS — I49.3 VENTRICULAR PREMATURE DEPOLARIZATION: ICD-10-CM

## 2021-03-25 DIAGNOSIS — I83.223 VARICOSE VEINS OF LEFT LOWER EXTREMITY WITH BOTH ULCER OF ANKLE AND INFLAMMATION: ICD-10-CM

## 2021-03-25 DIAGNOSIS — Z87.891 PERSONAL HISTORY OF NICOTINE DEPENDENCE: ICD-10-CM

## 2021-03-25 DIAGNOSIS — M19.90 UNSPECIFIED OSTEOARTHRITIS, UNSPECIFIED SITE: ICD-10-CM

## 2021-03-25 DIAGNOSIS — R01.1 CARDIAC MURMUR, UNSPECIFIED: ICD-10-CM

## 2021-03-25 DIAGNOSIS — I34.0 NONRHEUMATIC MITRAL (VALVE) INSUFFICIENCY: ICD-10-CM

## 2021-03-25 DIAGNOSIS — G20 PARKINSON'S DISEASE: ICD-10-CM

## 2021-03-25 DIAGNOSIS — Z80.0 FAMILY HISTORY OF MALIGNANT NEOPLASM OF DIGESTIVE ORGANS: ICD-10-CM

## 2021-03-25 DIAGNOSIS — Z79.899 OTHER LONG TERM (CURRENT) DRUG THERAPY: ICD-10-CM

## 2021-03-25 DIAGNOSIS — I83.893 VARICOSE VEINS OF BILATERAL LOWER EXTREMITIES WITH OTHER COMPLICATIONS: ICD-10-CM

## 2021-03-25 DIAGNOSIS — L97.822 NON-PRESSURE CHRONIC ULCER OF OTHER PART OF LEFT LOWER LEG WITH FAT LAYER EXPOSED: ICD-10-CM

## 2021-03-25 PROCEDURE — 99213 OFFICE O/P EST LOW 20 MIN: CPT

## 2021-03-25 PROCEDURE — 29581 APPL MULTLAYER CMPRN SYS LEG: CPT | Mod: LT

## 2021-03-25 NOTE — PHYSICAL EXAM
[4 x 4] : 4 x 4  [Normal Thyroid] : the thyroid was normal [Normal Breath Sounds] : Normal breath sounds [Normal Heart Sounds] : normal heart sounds [Normal Rate and Rhythm] : normal rate and rhythm [Alert] : alert [Oriented to Person] : oriented to person [Oriented to Place] : oriented to place [Oriented to Time] : oriented to time [Calm] : calm [JVD] : no jugular venous distention  [Abdomen Masses] : No abdominal massess [Abdomen Tenderness] : ~T ~M No abdominal tenderness [Tender] : nontender [Enlarged] : not enlarged [de-identified] : elderly WM, NAD, alert, Ox3 [FreeTextEntry1] :  Lower Leg  [FreeTextEntry2] : 1.1 [FreeTextEntry3] : 0.4 [FreeTextEntry4] : 0.1-0.2 [de-identified] : serosanguineous [de-identified] : none [de-identified] : none [de-identified] : Coban [de-identified] : Ani [de-identified] : Cleansed with Normal saline\par  [FreeTextEntry7] : Medial Leg -CLOSED [de-identified] : Coban [de-identified] : No treatment required [de-identified] : Cleansed with Normal saline\par  [TWNoteComboBox1] : Left [TWNoteComboBox4] : Small [de-identified] : Normal [de-identified] : 100% [de-identified] : No [de-identified] : Multilayer other compression wrap [de-identified] : Weekly [de-identified] : Primary Dressing [TWNoteComboBox9] : Left [de-identified] : None [de-identified] : Multilayer other compression wrap [de-identified] : Compression

## 2021-03-25 NOTE — VITALS
[Pain related to present condition?] : The patient's  pain is not related to present condition. [] : No [de-identified] : 0

## 2021-03-25 NOTE — ASSESSMENT
[Verbal] : Verbal [Written] : Written [Demo] : Demo [Patient] : Patient [Good - alert, interested, motivated] : Good - alert, interested, motivated [Verbalizes knowledge/Understanding] : Verbalizes knowledge/understanding [Dressing changes] : dressing changes [Skin Care] : skin care [Signs and symptoms of infection] : sign and symptoms of infection [Surgery] : surgery [Venous Disease] : venous disease [How and When to Call] : how and when to call [Compression Therapy] : compression therapy [Patient responsibility to plan of care] : patient responsibility to plan of care [Other: ____] : [unfilled] [Stable] : stable [Home] : Home [Ambulatory] : Ambulatory [Not Applicable - Long Term Care/Home Health Agency] : Long Term Care/Home Health Agency: Not Applicable [] : No [FreeTextEntry2] : Restore Skin Integrity\par Collagen Matrix Therapy \par Infection Control\par Localized wound care\par Compression Therapy\par Edema Prevention\par F/U 1 week  [FreeTextEntry4] :  F/U 1 Week

## 2021-03-28 ENCOUNTER — RX RENEWAL (OUTPATIENT)
Age: 86
End: 2021-03-28

## 2021-04-01 ENCOUNTER — APPOINTMENT (OUTPATIENT)
Dept: SURGERY | Facility: HOSPITAL | Age: 86
End: 2021-04-01
Payer: MEDICARE

## 2021-04-01 ENCOUNTER — OUTPATIENT (OUTPATIENT)
Dept: OUTPATIENT SERVICES | Facility: HOSPITAL | Age: 86
LOS: 1 days | Discharge: HOME | End: 2021-04-01
Payer: COMMERCIAL

## 2021-04-01 VITALS
WEIGHT: 194 LBS | RESPIRATION RATE: 20 BRPM | SYSTOLIC BLOOD PRESSURE: 121 MMHG | BODY MASS INDEX: 24.12 KG/M2 | HEIGHT: 75 IN | OXYGEN SATURATION: 99 % | DIASTOLIC BLOOD PRESSURE: 77 MMHG | TEMPERATURE: 97.6 F | HEART RATE: 68 BPM

## 2021-04-01 DIAGNOSIS — Z79.899 OTHER LONG TERM (CURRENT) DRUG THERAPY: ICD-10-CM

## 2021-04-01 DIAGNOSIS — I83.223 VARICOSE VEINS OF LEFT LOWER EXTREMITY WITH BOTH ULCER OF ANKLE AND INFLAMMATION: ICD-10-CM

## 2021-04-01 DIAGNOSIS — I83.893 VARICOSE VEINS OF BILATERAL LOWER EXTREMITIES WITH OTHER COMPLICATIONS: ICD-10-CM

## 2021-04-01 DIAGNOSIS — I49.3 VENTRICULAR PREMATURE DEPOLARIZATION: ICD-10-CM

## 2021-04-01 DIAGNOSIS — Z80.0 FAMILY HISTORY OF MALIGNANT NEOPLASM OF DIGESTIVE ORGANS: ICD-10-CM

## 2021-04-01 DIAGNOSIS — Z87.891 PERSONAL HISTORY OF NICOTINE DEPENDENCE: ICD-10-CM

## 2021-04-01 DIAGNOSIS — M19.90 UNSPECIFIED OSTEOARTHRITIS, UNSPECIFIED SITE: ICD-10-CM

## 2021-04-01 DIAGNOSIS — R01.1 CARDIAC MURMUR, UNSPECIFIED: ICD-10-CM

## 2021-04-01 DIAGNOSIS — I83.228 VARICOSE VEINS OF LEFT LOWER EXTREMITY WITH BOTH ULCER OF OTHER PART OF LOWER EXTREMITY AND INFLAMMATION: ICD-10-CM

## 2021-04-01 DIAGNOSIS — G20 PARKINSON'S DISEASE: ICD-10-CM

## 2021-04-01 DIAGNOSIS — L97.822 NON-PRESSURE CHRONIC ULCER OF OTHER PART OF LEFT LOWER LEG WITH FAT LAYER EXPOSED: ICD-10-CM

## 2021-04-01 DIAGNOSIS — I34.0 NONRHEUMATIC MITRAL (VALVE) INSUFFICIENCY: ICD-10-CM

## 2021-04-01 PROCEDURE — 29581 APPL MULTLAYER CMPRN SYS LEG: CPT | Mod: LT

## 2021-04-01 PROCEDURE — 99213 OFFICE O/P EST LOW 20 MIN: CPT

## 2021-04-01 NOTE — PHYSICAL EXAM
[4 x 4] : 4 x 4  [Normal Breath Sounds] : Normal breath sounds [Normal Heart Sounds] : normal heart sounds [Ankle Swelling On The Left] : of the left ankle [Varicose Veins Of The Left Leg] : of the left leg [] : of the left leg [Ankle Swelling Bilaterally] : severe [Skin Ulcer] : ulcer [JVD] : no jugular venous distention  [de-identified] : well developed, well nourished, in no acute distress. [de-identified] : WNL [de-identified] : No neurovascular deficits noted.\par The wound has granulation tissue.\par no acute infection,\par \par \par  [FreeTextEntry1] : Left lower leg  [de-identified] : Dry and flaky  [de-identified] : Coban. Expressed comfort post Coban application. [de-identified] : Ani  [de-identified] : Cleansed with chlorhexidine then NS\par Kerlix \par  [de-identified] : Wound care  yasemin, dry dressing, multilayer wrap with coban [TWNoteComboBox4] : Moderate [de-identified] : other [de-identified] : None [de-identified] : None [de-identified] : 100% [de-identified] : No [de-identified] : Multilayer other compression wrap [de-identified] : Weekly [de-identified] : Primary Dressing

## 2021-04-01 NOTE — PLAN
[FreeTextEntry1] : Wound care  yasemin, dry dressing multilayer compression with coban weekly\par return to office in 1 week.\par time spent with patient 25 minutes

## 2021-04-01 NOTE — HISTORY OF PRESENT ILLNESS
[FreeTextEntry1] : The patient is an 85 year old male with a venous\par ulcer of the left lower extremity

## 2021-04-01 NOTE — ASSESSMENT
[Verbal] : Verbal [Written] : Written [Demo] : Demo [Patient] : Patient [Good - alert, interested, motivated] : Good - alert, interested, motivated [Verbalizes knowledge/Understanding] : Verbalizes knowledge/understanding [Dressing changes] : dressing changes [Skin Care] : skin care [Signs and symptoms of infection] : sign and symptoms of infection [Venous Disease] : venous disease [Nutrition] : nutrition [How and When to Call] : how and when to call [Pain Management] : pain management [Compression Therapy] : compression therapy [Patient responsibility to plan of care] : patient responsibility to plan of care [] : Yes [Stable] : stable [Home] : Home [Ambulatory] : Ambulatory [Not Applicable - Long Term Care/Home Health Agency] : Long Term Care/Home Health Agency: Not Applicable [FreeTextEntry2] : Infection prevention\par Localized wound care \par Goal of remaining pain free regarding wounds.\par  [FreeTextEntry4] : Follow up in 1 week

## 2021-04-08 ENCOUNTER — OUTPATIENT (OUTPATIENT)
Dept: OUTPATIENT SERVICES | Facility: HOSPITAL | Age: 86
LOS: 1 days | Discharge: HOME | End: 2021-04-08
Payer: COMMERCIAL

## 2021-04-08 ENCOUNTER — APPOINTMENT (OUTPATIENT)
Dept: INTERNAL MEDICINE | Facility: CLINIC | Age: 86
End: 2021-04-08
Payer: MEDICARE

## 2021-04-08 ENCOUNTER — APPOINTMENT (OUTPATIENT)
Dept: SURGERY | Facility: HOSPITAL | Age: 86
End: 2021-04-08
Payer: MEDICARE

## 2021-04-08 VITALS
RESPIRATION RATE: 20 BRPM | DIASTOLIC BLOOD PRESSURE: 67 MMHG | SYSTOLIC BLOOD PRESSURE: 127 MMHG | WEIGHT: 194 LBS | HEIGHT: 75 IN | TEMPERATURE: 97.6 F | BODY MASS INDEX: 24.12 KG/M2 | HEART RATE: 64 BPM | OXYGEN SATURATION: 98 %

## 2021-04-08 VITALS
SYSTOLIC BLOOD PRESSURE: 120 MMHG | WEIGHT: 195 LBS | BODY MASS INDEX: 24.25 KG/M2 | HEIGHT: 75 IN | HEART RATE: 92 BPM | DIASTOLIC BLOOD PRESSURE: 70 MMHG | TEMPERATURE: 97.5 F | RESPIRATION RATE: 14 BRPM | OXYGEN SATURATION: 95 %

## 2021-04-08 DIAGNOSIS — I83.223 VARICOSE VEINS OF LEFT LOWER EXTREMITY WITH BOTH ULCER OF ANKLE AND INFLAMMATION: ICD-10-CM

## 2021-04-08 DIAGNOSIS — R41.3 OTHER AMNESIA: ICD-10-CM

## 2021-04-08 PROCEDURE — 99214 OFFICE O/P EST MOD 30 MIN: CPT | Mod: 25

## 2021-04-08 PROCEDURE — 29581 APPL MULTLAYER CMPRN SYS LEG: CPT | Mod: LT

## 2021-04-08 PROCEDURE — 99072 ADDL SUPL MATRL&STAF TM PHE: CPT

## 2021-04-08 PROCEDURE — 99213 OFFICE O/P EST LOW 20 MIN: CPT

## 2021-04-08 PROCEDURE — 36415 COLL VENOUS BLD VENIPUNCTURE: CPT

## 2021-04-08 NOTE — PHYSICAL EXAM
[No Acute Distress] : no acute distress [Well Nourished] : well nourished [Well Developed] : well developed [Well-Appearing] : well-appearing [Normal Sclera/Conjunctiva] : normal sclera/conjunctiva [PERRL] : pupils equal round and reactive to light [EOMI] : extraocular movements intact [Normal Outer Ear/Nose] : the outer ears and nose were normal in appearance [Normal Oropharynx] : the oropharynx was normal [No JVD] : no jugular venous distention [No Lymphadenopathy] : no lymphadenopathy [Supple] : supple [Thyroid Normal, No Nodules] : the thyroid was normal and there were no nodules present [No Respiratory Distress] : no respiratory distress  [No Accessory Muscle Use] : no accessory muscle use [Clear to Auscultation] : lungs were clear to auscultation bilaterally [Normal Rate] : normal rate  [Regular Rhythm] : with a regular rhythm [Normal S1, S2] : normal S1 and S2 [No Murmur] : no murmur heard [No Carotid Bruits] : no carotid bruits [No Abdominal Bruit] : a ~M bruit was not heard ~T in the abdomen [No Varicosities] : no varicosities [Pedal Pulses Present] : the pedal pulses are present [No Palpable Aorta] : no palpable aorta [No Extremity Clubbing/Cyanosis] : no extremity clubbing/cyanosis [Soft] : abdomen soft [Non Tender] : non-tender [Non-distended] : non-distended [No Masses] : no abdominal mass palpated [No HSM] : no HSM [Normal Posterior Cervical Nodes] : no posterior cervical lymphadenopathy [Normal Anterior Cervical Nodes] : no anterior cervical lymphadenopathy [No CVA Tenderness] : no CVA  tenderness [No Spinal Tenderness] : no spinal tenderness [No Joint Swelling] : no joint swelling [Grossly Normal Strength/Tone] : grossly normal strength/tone [No Rash] : no rash [Coordination Grossly Intact] : coordination grossly intact [No Focal Deficits] : no focal deficits [Normal Gait] : normal gait [Deep Tendon Reflexes (DTR)] : deep tendon reflexes were 2+ and symmetric [Normal Affect] : the affect was normal [Normal Insight/Judgement] : insight and judgment were intact [de-identified] : trace edema in lower extremities bilaterally  [de-identified] : very active bowel sounds [de-identified] : pill rolling tremor of both hands

## 2021-04-08 NOTE — HEALTH RISK ASSESSMENT
[Yes] : Yes [Monthly or less (1 pt)] : Monthly or less (1 point) [1 or 2 (0 pts)] : 1 or 2 (0 points) [Never (0 pts)] : Never (0 points) [No] : In the past 12 months have you used drugs other than those required for medical reasons? No [0] : 2) Feeling down, depressed, or hopeless: Not at all (0) [] : No [Audit-CScore] : 1 [[id number]] : 0

## 2021-04-08 NOTE — ADDENDUM
[FreeTextEntry1] : I, Merlin Garcias, acted solely as scribe for Dr. Jaren Mohr DO on this date 04/08/2021 11:10AM .\par \par All medical record entries made by the Scribe were at my, Dr. Jaren Mohr DO direction and personally dictated by me on 04/08/2021 11:10AM. I have reviewed the chart and agree that the record accurately reflects my personal performance of the history, physical exam, assessment and plan. I have also personally directed, reviewed and agreed with the chart.\par

## 2021-04-08 NOTE — ASSESSMENT
[FreeTextEntry1] : Patient is a 85 year old male with a past medical history as above who presents for non-fasting blood work and general follow-up.\par

## 2021-04-08 NOTE — REVIEW OF SYSTEMS
[Negative] : Heme/Lymph [Diarrhea] : diarrhea [Memory Loss] : memory loss [FreeTextEntry5] : LLE ulcer [FreeTextEntry7] : increased gas; hearing bowel sounds; stomach rumbling

## 2021-04-08 NOTE — HISTORY OF PRESENT ILLNESS
[FreeTextEntry1] : non-fasting blood work and general follow-up\par  [de-identified] : Patient is a 85 year old male with a past medical history as below who presents for non-fasting blood work and general follow-up. Patient states he is taking all medications as prescribed. He denies lightheadedness or dizziness on HCTZ. LLE ulcer is being monitored by Dr. Max Rivera. Patient continues to follow up with neurologist, Dr. Carter given history of Parkinson's Disease. He notes issues with memory. Patient has intermittently noted diarrhea over the course of the past several months. He notes increased gas. He notes often hearing the sounds of intestinal motility as well as a rumbling sensation in his stomach.

## 2021-04-08 NOTE — PLAN
C/w home meds keppra 1000 mg BID  F/up on levels ALP elevated about 220, AST/ ALT normal , bilirubin is 1.6  U/s hepatic and pancreas shows hepatic fibrofatty changes, no dilatation, right sided pleural effusion  F/up on hepatic panel, direct and indirect bl  Monitor the LFts  HepB core antibody positive,  f/u HBe antigen [FreeTextEntry1] : Vascular\par bilateral lower extremity edema - secondary to venous insufficiency - continue HCTZ 25mg p.o.q.d. as directed - check BMP - continue using compression stockings - continue keeping legs elevated - continue low sodium diet \par LLE stasis ulcer - continue to follow up with Dr. Rivera (wound care)\par Neurology\par history of Parkinson's disease - continue Sinemet (Carbidopa-Levodopa) 25-100mg p.o.q.d. as directed \par cognitive disorder/memory loss - continue Donepezil HCl 5mg p.o.q.d. as directed\par Advised to follow up with neurologist, Dr. Carter to further discuss adjusting current regimen given complaint of diarrhea, possibly a side effect of Donepezil \par Endocrinology\par continue Vitamin D-3 5000 IU p.o.q.d. with meals as directed\par \par check BMP\par Advised to follow up every 3-4 months to having electrolyte levels checked.

## 2021-04-09 DIAGNOSIS — Z80.0 FAMILY HISTORY OF MALIGNANT NEOPLASM OF DIGESTIVE ORGANS: ICD-10-CM

## 2021-04-09 DIAGNOSIS — M19.90 UNSPECIFIED OSTEOARTHRITIS, UNSPECIFIED SITE: ICD-10-CM

## 2021-04-09 DIAGNOSIS — G20 PARKINSON'S DISEASE: ICD-10-CM

## 2021-04-09 DIAGNOSIS — I83.893 VARICOSE VEINS OF BILATERAL LOWER EXTREMITIES WITH OTHER COMPLICATIONS: ICD-10-CM

## 2021-04-09 DIAGNOSIS — I83.228 VARICOSE VEINS OF LEFT LOWER EXTREMITY WITH BOTH ULCER OF OTHER PART OF LOWER EXTREMITY AND INFLAMMATION: ICD-10-CM

## 2021-04-09 DIAGNOSIS — I34.0 NONRHEUMATIC MITRAL (VALVE) INSUFFICIENCY: ICD-10-CM

## 2021-04-09 DIAGNOSIS — Z87.891 PERSONAL HISTORY OF NICOTINE DEPENDENCE: ICD-10-CM

## 2021-04-09 DIAGNOSIS — R01.1 CARDIAC MURMUR, UNSPECIFIED: ICD-10-CM

## 2021-04-09 DIAGNOSIS — I49.3 VENTRICULAR PREMATURE DEPOLARIZATION: ICD-10-CM

## 2021-04-09 DIAGNOSIS — L97.822 NON-PRESSURE CHRONIC ULCER OF OTHER PART OF LEFT LOWER LEG WITH FAT LAYER EXPOSED: ICD-10-CM

## 2021-04-09 DIAGNOSIS — Z79.899 OTHER LONG TERM (CURRENT) DRUG THERAPY: ICD-10-CM

## 2021-04-11 LAB
ANION GAP SERPL CALC-SCNC: 9 MMOL/L
BUN SERPL-MCNC: 18 MG/DL
CALCIUM SERPL-MCNC: 9.5 MG/DL
CHLORIDE SERPL-SCNC: 103 MMOL/L
CO2 SERPL-SCNC: 30 MMOL/L
CREAT SERPL-MCNC: 1.11 MG/DL
GLUCOSE SERPL-MCNC: 137 MG/DL
POTASSIUM SERPL-SCNC: 4.1 MMOL/L
SODIUM SERPL-SCNC: 142 MMOL/L

## 2021-04-12 ENCOUNTER — NON-APPOINTMENT (OUTPATIENT)
Age: 86
End: 2021-04-12

## 2021-04-15 ENCOUNTER — OUTPATIENT (OUTPATIENT)
Dept: OUTPATIENT SERVICES | Facility: HOSPITAL | Age: 86
LOS: 1 days | Discharge: HOME | End: 2021-04-15
Payer: COMMERCIAL

## 2021-04-15 ENCOUNTER — APPOINTMENT (OUTPATIENT)
Dept: SURGERY | Facility: HOSPITAL | Age: 86
End: 2021-04-15
Payer: MEDICARE

## 2021-04-15 VITALS
DIASTOLIC BLOOD PRESSURE: 65 MMHG | BODY MASS INDEX: 24.25 KG/M2 | OXYGEN SATURATION: 100 % | HEART RATE: 76 BPM | WEIGHT: 195 LBS | TEMPERATURE: 97.6 F | RESPIRATION RATE: 20 BRPM | SYSTOLIC BLOOD PRESSURE: 103 MMHG | HEIGHT: 75 IN

## 2021-04-15 DIAGNOSIS — I83.228 VARICOSE VEINS OF LEFT LOWER EXTREMITY WITH BOTH ULCER OF OTHER PART OF LOWER EXTREMITY AND INFLAMMATION: ICD-10-CM

## 2021-04-15 DIAGNOSIS — M19.90 UNSPECIFIED OSTEOARTHRITIS, UNSPECIFIED SITE: ICD-10-CM

## 2021-04-15 DIAGNOSIS — G20 PARKINSON'S DISEASE: ICD-10-CM

## 2021-04-15 DIAGNOSIS — I49.3 VENTRICULAR PREMATURE DEPOLARIZATION: ICD-10-CM

## 2021-04-15 DIAGNOSIS — R01.1 CARDIAC MURMUR, UNSPECIFIED: ICD-10-CM

## 2021-04-15 DIAGNOSIS — I83.223 VARICOSE VEINS OF LEFT LOWER EXTREMITY WITH BOTH ULCER OF ANKLE AND INFLAMMATION: ICD-10-CM

## 2021-04-15 DIAGNOSIS — Z87.891 PERSONAL HISTORY OF NICOTINE DEPENDENCE: ICD-10-CM

## 2021-04-15 DIAGNOSIS — Z80.0 FAMILY HISTORY OF MALIGNANT NEOPLASM OF DIGESTIVE ORGANS: ICD-10-CM

## 2021-04-15 DIAGNOSIS — I83.893 VARICOSE VEINS OF BILATERAL LOWER EXTREMITIES WITH OTHER COMPLICATIONS: ICD-10-CM

## 2021-04-15 DIAGNOSIS — Z79.899 OTHER LONG TERM (CURRENT) DRUG THERAPY: ICD-10-CM

## 2021-04-15 DIAGNOSIS — L97.822 NON-PRESSURE CHRONIC ULCER OF OTHER PART OF LEFT LOWER LEG WITH FAT LAYER EXPOSED: ICD-10-CM

## 2021-04-15 DIAGNOSIS — I34.0 NONRHEUMATIC MITRAL (VALVE) INSUFFICIENCY: ICD-10-CM

## 2021-04-15 PROCEDURE — 99213 OFFICE O/P EST LOW 20 MIN: CPT

## 2021-04-15 PROCEDURE — 29581 APPL MULTLAYER CMPRN SYS LEG: CPT | Mod: LT

## 2021-04-15 NOTE — HISTORY OF PRESENT ILLNESS
[FreeTextEntry1] : DENG SIERRA is a 85 year M is here for follow up.\par He is treated for a venous ulcer of the right leg.\par

## 2021-04-15 NOTE — PLAN
[FreeTextEntry1] : Wound care:  Ani, dry dressing, multi layer compression with coban. weekly.\par To return in 1 week.

## 2021-04-15 NOTE — PHYSICAL EXAM
[4 x 4] : 4 x 4  [Normal Breath Sounds] : Normal breath sounds [Normal Heart Sounds] : normal heart sounds [Ankle Swelling (On Exam)] : present [Ankle Swelling On The Right] : of the right ankle [Varicose Veins Of Lower Extremities] : present [Varicose Veins Of The Right Leg] : of the right leg [] : of the right leg [Ankle Swelling On The Left] : moderate [Skin Ulcer] : ulcer [JVD] : no jugular venous distention  [de-identified] : well developed, well nourished, in no acute distress. [de-identified] : WNL [de-identified] : No masses [FreeTextEntry1] : Left lower leg  [FreeTextEntry2] : 0.5 [FreeTextEntry3] : 0.2 [FreeTextEntry4] : 0.1 [de-identified] : Scant Serosanguineous  [de-identified] : Dry and flaky  [de-identified] : 25% [de-identified] : Coban. Expressed comfort post Coban application. [de-identified] : Ani  [de-identified] : Cleansed with chlorhexidine then NS\par Kerlix \par  [de-identified] : Wound care  yasemin, dry dressing, multilayer wrap with coban.\par Right lower leg venous ulcer clean with good granulation. Reduced in size  by half. No evidence of infection. [TWNoteComboBox4] : Moderate [de-identified] : other [de-identified] : None [de-identified] : None [de-identified] : >75% [de-identified] : Yes [de-identified] : Multilayer other compression wrap [de-identified] : Weekly [de-identified] : Primary Dressing

## 2021-04-15 NOTE — ASSESSMENT
[Verbal] : Verbal [Written] : Written [Demo] : Demo [Good - alert, interested, motivated] : Good - alert, interested, motivated [Patient] : Patient [Verbalizes knowledge/Understanding] : Verbalizes knowledge/understanding [Dressing changes] : dressing changes [Skin Care] : skin care [Signs and symptoms of infection] : sign and symptoms of infection [Venous Disease] : venous disease [Nutrition] : nutrition [How and When to Call] : how and when to call [Pain Management] : pain management [Compression Therapy] : compression therapy [Patient responsibility to plan of care] : patient responsibility to plan of care [] : Yes [Stable] : stable [Home] : Home [Ambulatory] : Ambulatory [Not Applicable - Long Term Care/Home Health Agency] : Long Term Care/Home Health Agency: Not Applicable [FreeTextEntry2] : Infection prevention\par Localized wound care \par Goal of remaining pain free regarding wounds.\par Compression Therapy \par  [FreeTextEntry4] : Follow up in 1 week

## 2021-04-15 NOTE — REVIEW OF SYSTEMS
[Skin Wound] : skin wound [Negative] : Heme/Lymph [Abdominal Pain] : no abdominal pain [de-identified] : Right lateral calf venous ulcer [de-identified] : Parkinsons

## 2021-04-15 NOTE — ASSESSMENT
[Verbal] : Verbal [Written] : Written [Demo] : Demo [Patient] : Patient [Good - alert, interested, motivated] : Good - alert, interested, motivated [Verbalizes knowledge/Understanding] : Verbalizes knowledge/understanding [Skin Care] : skin care [Dressing changes] : dressing changes [Signs and symptoms of infection] : sign and symptoms of infection [Venous Disease] : venous disease [Nutrition] : nutrition [How and When to Call] : how and when to call [Pain Management] : pain management [Compression Therapy] : compression therapy [Patient responsibility to plan of care] : patient responsibility to plan of care [Stable] : stable [Home] : Home [Ambulatory] : Ambulatory [Not Applicable - Long Term Care/Home Health Agency] : Long Term Care/Home Health Agency: Not Applicable [] : No 0.18 [FreeTextEntry2] : Infection prevention\par Localized wound care \par Goal of remaining pain free regarding wounds.\par Compression Therapy \par  [FreeTextEntry4] : Follow up in 1 week

## 2021-04-15 NOTE — PHYSICAL EXAM
[4 x 4] : 4 x 4  [FreeTextEntry1] : Left lower leg  [FreeTextEntry2] : 0.8 [FreeTextEntry3] : 0.2 [FreeTextEntry4] : 0.1 [de-identified] : Scant Serosanguineous  [de-identified] : Dry and flaky  [de-identified] : 25% [de-identified] : Coban. Expressed comfort post Coban application. [de-identified] : Ani  [de-identified] : Cleansed with chlorhexidine then NS\par Kerlix \par  [de-identified] : Wound care  yasemin, dry dressing, multilayer wrap with coban [TWNoteComboBox4] : Moderate [de-identified] : other [de-identified] : None [de-identified] : None [de-identified] : >75% [de-identified] : Yes [de-identified] : Multilayer other compression wrap [de-identified] : Weekly [de-identified] : Primary Dressing

## 2021-04-22 ENCOUNTER — APPOINTMENT (OUTPATIENT)
Dept: SURGERY | Facility: HOSPITAL | Age: 86
End: 2021-04-22
Payer: MEDICARE

## 2021-04-22 ENCOUNTER — OUTPATIENT (OUTPATIENT)
Dept: OUTPATIENT SERVICES | Facility: HOSPITAL | Age: 86
LOS: 1 days | Discharge: HOME | End: 2021-04-22
Payer: COMMERCIAL

## 2021-04-22 VITALS
BODY MASS INDEX: 24.25 KG/M2 | WEIGHT: 195 LBS | RESPIRATION RATE: 20 BRPM | DIASTOLIC BLOOD PRESSURE: 70 MMHG | OXYGEN SATURATION: 98 % | TEMPERATURE: 97.6 F | SYSTOLIC BLOOD PRESSURE: 116 MMHG | HEIGHT: 75 IN | HEART RATE: 80 BPM

## 2021-04-22 DIAGNOSIS — Z80.0 FAMILY HISTORY OF MALIGNANT NEOPLASM OF DIGESTIVE ORGANS: ICD-10-CM

## 2021-04-22 DIAGNOSIS — L97.822 NON-PRESSURE CHRONIC ULCER OF OTHER PART OF LEFT LOWER LEG WITH FAT LAYER EXPOSED: ICD-10-CM

## 2021-04-22 DIAGNOSIS — I83.893 VARICOSE VEINS OF BILATERAL LOWER EXTREMITIES WITH OTHER COMPLICATIONS: ICD-10-CM

## 2021-04-22 DIAGNOSIS — I83.223 VARICOSE VEINS OF LEFT LOWER EXTREMITY WITH BOTH ULCER OF ANKLE AND INFLAMMATION: ICD-10-CM

## 2021-04-22 DIAGNOSIS — I83.228 VARICOSE VEINS OF LEFT LOWER EXTREMITY WITH BOTH ULCER OF OTHER PART OF LOWER EXTREMITY AND INFLAMMATION: ICD-10-CM

## 2021-04-22 DIAGNOSIS — Z79.899 OTHER LONG TERM (CURRENT) DRUG THERAPY: ICD-10-CM

## 2021-04-22 DIAGNOSIS — Z87.891 PERSONAL HISTORY OF NICOTINE DEPENDENCE: ICD-10-CM

## 2021-04-22 DIAGNOSIS — M19.90 UNSPECIFIED OSTEOARTHRITIS, UNSPECIFIED SITE: ICD-10-CM

## 2021-04-22 DIAGNOSIS — I34.0 NONRHEUMATIC MITRAL (VALVE) INSUFFICIENCY: ICD-10-CM

## 2021-04-22 DIAGNOSIS — R01.1 CARDIAC MURMUR, UNSPECIFIED: ICD-10-CM

## 2021-04-22 DIAGNOSIS — G20 PARKINSON'S DISEASE: ICD-10-CM

## 2021-04-22 DIAGNOSIS — I49.3 VENTRICULAR PREMATURE DEPOLARIZATION: ICD-10-CM

## 2021-04-22 PROCEDURE — 99213 OFFICE O/P EST LOW 20 MIN: CPT

## 2021-04-22 PROCEDURE — 29581 APPL MULTLAYER CMPRN SYS LEG: CPT | Mod: LT

## 2021-04-23 NOTE — REVIEW OF SYSTEMS
[Skin Wound] : skin wound [Negative] : Musculoskeletal [de-identified] : Venous stasis ulcer left leg..

## 2021-04-23 NOTE — PLAN
[FreeTextEntry1] : Wound care: Wound veil, yasemin, dry dressing,multilayered compression with coban.\par Return in 1 week

## 2021-04-23 NOTE — HISTORY OF PRESENT ILLNESS
[FreeTextEntry1] : DENG SIERRA is a 85 year M is here for follow up\par Being treated for a venous stasis ulcer left lateral leg.\par

## 2021-04-23 NOTE — ASSESSMENT
[Verbal] : Verbal [Written] : Written [Demo] : Demo [Patient] : Patient [Good - alert, interested, motivated] : Good - alert, interested, motivated [Verbalizes knowledge/Understanding] : Verbalizes knowledge/understanding [Dressing changes] : dressing changes [Skin Care] : skin care [Signs and symptoms of infection] : sign and symptoms of infection [Venous Disease] : venous disease [Nutrition] : nutrition [How and When to Call] : how and when to call [Pain Management] : pain management [Compression Therapy] : compression therapy [Stable] : stable [Home] : Home [Ambulatory] : Ambulatory [Not Applicable - Long Term Care/Home Health Agency] : Long Term Care/Home Health Agency: Not Applicable [] : No [FreeTextEntry2] : Infection prevention\par Localized wound care \par Goal of remaining pain free regarding wounds.\par Compression therapy  [FreeTextEntry3] : Wound the same in size  [FreeTextEntry4] : Follow up in 1 week

## 2021-04-23 NOTE — PHYSICAL EXAM
[4 x 4] : 4 x 4  [Normal Breath Sounds] : Normal breath sounds [Normal Heart Sounds] : normal heart sounds [Ankle Swelling (On Exam)] : present [Varicose Veins Of Lower Extremities] : present [Varicose Veins Of The Left Leg] : of the left leg [] : of the left leg [Ankle Swelling On The Left] : moderate [Skin Ulcer] : ulcer [Alert] : alert [Oriented to Person] : oriented to person [Oriented to Place] : oriented to place [Oriented to Time] : oriented to time [Calm] : calm [JVD] : no jugular venous distention  [de-identified] : well developed, well nourished, in no acute distress. [de-identified] : no masses [de-identified] : WNL [de-identified] : Small venous stasis ulcer left lateral leg  Appears smaller. Minimal drainage. Stasis dermatitis midcalf to ankle. No evidence of infection [FreeTextEntry1] : Left lateral lower leg  [FreeTextEntry2] : 0.5 [FreeTextEntry3] : 0.2 [FreeTextEntry4] : 0.1 [de-identified] : Serous/sanguinous [de-identified] : coban. Expressed comfort post Coban application. [de-identified] : Ani, woundveil  [de-identified] : Cleansed with chlorhexidine then NS\par Kerlix  [de-identified] : Responding well to treatment. [TWNoteComboBox4] : Small [de-identified] : Normal [de-identified] : None [de-identified] : None [de-identified] : 100% [de-identified] : No [de-identified] : Multilayer other compression wrap [de-identified] : Weekly [de-identified] : Primary Dressing

## 2021-04-29 ENCOUNTER — OUTPATIENT (OUTPATIENT)
Dept: OUTPATIENT SERVICES | Facility: HOSPITAL | Age: 86
LOS: 1 days | Discharge: HOME | End: 2021-04-29
Payer: COMMERCIAL

## 2021-04-29 ENCOUNTER — APPOINTMENT (OUTPATIENT)
Dept: SURGERY | Facility: HOSPITAL | Age: 86
End: 2021-04-29
Payer: MEDICARE

## 2021-04-29 VITALS
WEIGHT: 195 LBS | TEMPERATURE: 97.6 F | DIASTOLIC BLOOD PRESSURE: 70 MMHG | HEART RATE: 88 BPM | RESPIRATION RATE: 20 BRPM | OXYGEN SATURATION: 100 % | SYSTOLIC BLOOD PRESSURE: 118 MMHG | BODY MASS INDEX: 24.25 KG/M2 | HEIGHT: 75 IN

## 2021-04-29 DIAGNOSIS — I83.223 VARICOSE VEINS OF LEFT LOWER EXTREMITY WITH BOTH ULCER OF ANKLE AND INFLAMMATION: ICD-10-CM

## 2021-04-29 PROCEDURE — 29581 APPL MULTLAYER CMPRN SYS LEG: CPT | Mod: LT

## 2021-04-29 PROCEDURE — 99213 OFFICE O/P EST LOW 20 MIN: CPT

## 2021-04-29 NOTE — PLAN
[FreeTextEntry1] : Wound care change to wound veil Ag Alginate,dry dressing, multi layer compression with coban. Weekly\par Return in one week.\par Time spent evaluating and decision making 25 minutes.

## 2021-04-29 NOTE — ASSESSMENT
[Verbal] : Verbal [Written] : Written [Demo] : Demo [Patient] : Patient [Good - alert, interested, motivated] : Good - alert, interested, motivated [Verbalizes knowledge/Understanding] : Verbalizes knowledge/understanding [Dressing changes] : dressing changes [Skin Care] : skin care [Signs and symptoms of infection] : sign and symptoms of infection [Venous Disease] : venous disease [Nutrition] : nutrition [Pain Management] : pain management [How and When to Call] : how and when to call [Compression Therapy] : compression therapy [Stable] : stable [Home] : Home [Ambulatory] : Ambulatory [Not Applicable - Long Term Care/Home Health Agency] : Long Term Care/Home Health Agency: Not Applicable [] : No [FreeTextEntry2] : Infection prevention\par Localized wound care \par Goal of remaining pain free regarding wounds.\par Compression therapy \par F/U 1 week  [FreeTextEntry3] : Wound the same in size  [FreeTextEntry4] : F/U 1 week

## 2021-04-29 NOTE — HISTORY OF PRESENT ILLNESS
[FreeTextEntry1] : DENG SIERRA is a 85 year M is here for follow up.\par Treated for a venous stasis ulcer left anterolateral leg.\par

## 2021-04-29 NOTE — PHYSICAL EXAM
[4 x 4] : 4 x 4  [JVD] : no jugular venous distention  [Normal Breath Sounds] : Normal breath sounds [Normal Heart Sounds] : normal heart sounds [Ankle Swelling (On Exam)] : present [Varicose Veins Of Lower Extremities] : present [] : present [Ankle Swelling On The Left] : moderate [Skin Ulcer] : ulcer [Alert] : alert [Oriented to Person] : oriented to person [Oriented to Place] : oriented to place [Oriented to Time] : oriented to time [Calm] : calm [de-identified] : well developed, well nourished, in no acute distress. [de-identified] : WNL [de-identified] : no masses [de-identified] : small ulcer remains, clean, granulating, minimal drainage. No evidence of infection. [FreeTextEntry1] :  Lateral Lower Leg  [FreeTextEntry2] : 0.5 [FreeTextEntry3] : 0.2 [FreeTextEntry4] : 0.2 [de-identified] : Serous/sanguinous [de-identified] : coban. Expressed comfort post Coban application. [de-identified] :  Wound veil, Silver Alginate [de-identified] : Cleansed with chlorhexidine then Normal saline\par  [TWNoteComboBox1] : Left [TWNoteComboBox4] : Small [de-identified] : Normal [de-identified] : None [de-identified] : None [de-identified] : 100% [de-identified] : No [de-identified] : Multilayer other compression wrap [de-identified] : Weekly [de-identified] : Primary Dressing

## 2021-04-29 NOTE — REVIEW OF SYSTEMS
[Skin Wound] : skin wound [Negative] : Gastrointestinal [de-identified] : Ulcer is small, clean, granulating. No evidence of infection. leg swelling minimal.

## 2021-04-29 NOTE — VITALS
[Pain related to present condition?] : The patient's  pain is not related to present condition. [] : No [de-identified] : 0

## 2021-04-30 DIAGNOSIS — G20 PARKINSON'S DISEASE: ICD-10-CM

## 2021-04-30 DIAGNOSIS — R01.1 CARDIAC MURMUR, UNSPECIFIED: ICD-10-CM

## 2021-04-30 DIAGNOSIS — Z80.0 FAMILY HISTORY OF MALIGNANT NEOPLASM OF DIGESTIVE ORGANS: ICD-10-CM

## 2021-04-30 DIAGNOSIS — M19.90 UNSPECIFIED OSTEOARTHRITIS, UNSPECIFIED SITE: ICD-10-CM

## 2021-04-30 DIAGNOSIS — I83.228 VARICOSE VEINS OF LEFT LOWER EXTREMITY WITH BOTH ULCER OF OTHER PART OF LOWER EXTREMITY AND INFLAMMATION: ICD-10-CM

## 2021-04-30 DIAGNOSIS — I34.0 NONRHEUMATIC MITRAL (VALVE) INSUFFICIENCY: ICD-10-CM

## 2021-04-30 DIAGNOSIS — Z79.899 OTHER LONG TERM (CURRENT) DRUG THERAPY: ICD-10-CM

## 2021-04-30 DIAGNOSIS — I83.893 VARICOSE VEINS OF BILATERAL LOWER EXTREMITIES WITH OTHER COMPLICATIONS: ICD-10-CM

## 2021-04-30 DIAGNOSIS — L97.822 NON-PRESSURE CHRONIC ULCER OF OTHER PART OF LEFT LOWER LEG WITH FAT LAYER EXPOSED: ICD-10-CM

## 2021-04-30 DIAGNOSIS — I49.3 VENTRICULAR PREMATURE DEPOLARIZATION: ICD-10-CM

## 2021-04-30 DIAGNOSIS — Z87.891 PERSONAL HISTORY OF NICOTINE DEPENDENCE: ICD-10-CM

## 2021-05-06 ENCOUNTER — APPOINTMENT (OUTPATIENT)
Dept: SURGERY | Facility: HOSPITAL | Age: 86
End: 2021-05-06
Payer: MEDICARE

## 2021-05-06 ENCOUNTER — OUTPATIENT (OUTPATIENT)
Dept: OUTPATIENT SERVICES | Facility: HOSPITAL | Age: 86
LOS: 1 days | Discharge: HOME | End: 2021-05-06
Payer: COMMERCIAL

## 2021-05-06 VITALS
DIASTOLIC BLOOD PRESSURE: 70 MMHG | HEART RATE: 76 BPM | TEMPERATURE: 97.6 F | BODY MASS INDEX: 24.25 KG/M2 | RESPIRATION RATE: 20 BRPM | WEIGHT: 195 LBS | OXYGEN SATURATION: 100 % | SYSTOLIC BLOOD PRESSURE: 128 MMHG | HEIGHT: 75 IN

## 2021-05-06 DIAGNOSIS — I83.223 VARICOSE VEINS OF LEFT LOWER EXTREMITY WITH BOTH ULCER OF ANKLE AND INFLAMMATION: ICD-10-CM

## 2021-05-06 PROCEDURE — 99213 OFFICE O/P EST LOW 20 MIN: CPT

## 2021-05-06 PROCEDURE — 29581 APPL MULTLAYER CMPRN SYS LEG: CPT | Mod: LT

## 2021-05-06 NOTE — ASSESSMENT
[Verbal] : Verbal [Written] : Written [Demo] : Demo [Patient] : Patient [Good - alert, interested, motivated] : Good - alert, interested, motivated [Verbalizes knowledge/Understanding] : Verbalizes knowledge/understanding [Dressing changes] : dressing changes [Skin Care] : skin care [Signs and symptoms of infection] : sign and symptoms of infection [Venous Disease] : venous disease [Nutrition] : nutrition [How and When to Call] : how and when to call [Pain Management] : pain management [Compression Therapy] : compression therapy [] : Yes [Stable] : stable [Home] : Home [Ambulatory] : Ambulatory [Not Applicable - Long Term Care/Home Health Agency] : Long Term Care/Home Health Agency: Not Applicable [FreeTextEntry2] : Infection prevention\par Localized wound care \par Compression therapy \par Goal of remaining pain free regarding wounds.\par  [FreeTextEntry4] : Pt will now use his own stocking on left leg\par follow up in 2 weeks  [FreeTextEntry1] : Left leg stasis ulcer is healing well, no acute infection\par

## 2021-05-06 NOTE — PLAN
[FreeTextEntry1] : Silver Alginate, dry dressing, ACE wrap, return to office in two weeks.\par 25 minutes spent for patient care and medical decision making.\par \par

## 2021-05-06 NOTE — PHYSICAL EXAM
[Normal Breath Sounds] : Normal breath sounds [1+] : left 1+ [0] : left 0 [Ankle Swelling (On Exam)] : present [Varicose Veins Of Lower Extremities] : bilaterally [Ankle Swelling On The Left] : moderate [] : of the left leg [Ankle Swelling Bilaterally] : severe [Alert] : alert [Oriented to Person] : oriented to person [Calm] : calm [JVD] : no jugular venous distention  [de-identified] : WD/WN in no acute distress. [de-identified] : WNL [de-identified] : JANISL [de-identified] : WNL [de-identified] : Left leg ulcer is clean, very small, no drainage, no odor, no acute infection, periwound skin is intact with no cellulitis. [FreeTextEntry1] : Left lateral lower leg  [FreeTextEntry2] : 0.4 [FreeTextEntry3] : 0.2 [FreeTextEntry4] : 0.1 [de-identified] : Scant Serous/sanguinous [de-identified] : Expressed comfort post ACE application. [de-identified] : Silver alginate [de-identified] : Cleansed with Chlorhexidine then NS\par Allevyn border  [de-identified] : Normal [de-identified] : None [de-identified] : None [de-identified] : 100% [de-identified] : No [de-identified] : Ace wraps [de-identified] : 3x Weekly [de-identified] : Secondary Dressing

## 2021-05-08 DIAGNOSIS — L97.822 NON-PRESSURE CHRONIC ULCER OF OTHER PART OF LEFT LOWER LEG WITH FAT LAYER EXPOSED: ICD-10-CM

## 2021-05-08 DIAGNOSIS — M19.90 UNSPECIFIED OSTEOARTHRITIS, UNSPECIFIED SITE: ICD-10-CM

## 2021-05-08 DIAGNOSIS — R01.1 CARDIAC MURMUR, UNSPECIFIED: ICD-10-CM

## 2021-05-08 DIAGNOSIS — I83.893 VARICOSE VEINS OF BILATERAL LOWER EXTREMITIES WITH OTHER COMPLICATIONS: ICD-10-CM

## 2021-05-08 DIAGNOSIS — G20 PARKINSON'S DISEASE: ICD-10-CM

## 2021-05-08 DIAGNOSIS — I83.228 VARICOSE VEINS OF LEFT LOWER EXTREMITY WITH BOTH ULCER OF OTHER PART OF LOWER EXTREMITY AND INFLAMMATION: ICD-10-CM

## 2021-05-08 DIAGNOSIS — Z79.899 OTHER LONG TERM (CURRENT) DRUG THERAPY: ICD-10-CM

## 2021-05-08 DIAGNOSIS — Z87.891 PERSONAL HISTORY OF NICOTINE DEPENDENCE: ICD-10-CM

## 2021-05-08 DIAGNOSIS — I49.3 VENTRICULAR PREMATURE DEPOLARIZATION: ICD-10-CM

## 2021-05-08 DIAGNOSIS — Z80.0 FAMILY HISTORY OF MALIGNANT NEOPLASM OF DIGESTIVE ORGANS: ICD-10-CM

## 2021-05-08 DIAGNOSIS — I34.0 NONRHEUMATIC MITRAL (VALVE) INSUFFICIENCY: ICD-10-CM

## 2021-05-20 ENCOUNTER — APPOINTMENT (OUTPATIENT)
Dept: OBGYN | Facility: HOSPITAL | Age: 86
End: 2021-05-20
Payer: MEDICARE

## 2021-05-20 ENCOUNTER — OUTPATIENT (OUTPATIENT)
Dept: OUTPATIENT SERVICES | Facility: HOSPITAL | Age: 86
LOS: 1 days | Discharge: HOME | End: 2021-05-20
Payer: COMMERCIAL

## 2021-05-20 ENCOUNTER — RX RENEWAL (OUTPATIENT)
Age: 86
End: 2021-05-20

## 2021-05-20 VITALS
OXYGEN SATURATION: 100 % | HEART RATE: 61 BPM | WEIGHT: 195 LBS | BODY MASS INDEX: 24.25 KG/M2 | HEIGHT: 75 IN | DIASTOLIC BLOOD PRESSURE: 72 MMHG | RESPIRATION RATE: 20 BRPM | SYSTOLIC BLOOD PRESSURE: 111 MMHG | TEMPERATURE: 97.8 F

## 2021-05-20 DIAGNOSIS — I83.228 VARICOSE VEINS OF LEFT LOWER EXTREMITY WITH BOTH ULCER OF OTHER PART OF LOWER EXTREMITY AND INFLAMMATION: ICD-10-CM

## 2021-05-20 DIAGNOSIS — Z79.899 OTHER LONG TERM (CURRENT) DRUG THERAPY: ICD-10-CM

## 2021-05-20 DIAGNOSIS — M19.90 UNSPECIFIED OSTEOARTHRITIS, UNSPECIFIED SITE: ICD-10-CM

## 2021-05-20 DIAGNOSIS — R01.1 CARDIAC MURMUR, UNSPECIFIED: ICD-10-CM

## 2021-05-20 DIAGNOSIS — I83.893 VARICOSE VEINS OF BILATERAL LOWER EXTREMITIES WITH OTHER COMPLICATIONS: ICD-10-CM

## 2021-05-20 DIAGNOSIS — Z80.0 FAMILY HISTORY OF MALIGNANT NEOPLASM OF DIGESTIVE ORGANS: ICD-10-CM

## 2021-05-20 DIAGNOSIS — G20 PARKINSON'S DISEASE: ICD-10-CM

## 2021-05-20 DIAGNOSIS — I49.3 VENTRICULAR PREMATURE DEPOLARIZATION: ICD-10-CM

## 2021-05-20 DIAGNOSIS — L97.822 NON-PRESSURE CHRONIC ULCER OF OTHER PART OF LEFT LOWER LEG WITH FAT LAYER EXPOSED: ICD-10-CM

## 2021-05-20 DIAGNOSIS — I34.0 NONRHEUMATIC MITRAL (VALVE) INSUFFICIENCY: ICD-10-CM

## 2021-05-20 DIAGNOSIS — I83.223 VARICOSE VEINS OF LEFT LOWER EXTREMITY WITH BOTH ULCER OF ANKLE AND INFLAMMATION: ICD-10-CM

## 2021-05-20 DIAGNOSIS — Z87.891 PERSONAL HISTORY OF NICOTINE DEPENDENCE: ICD-10-CM

## 2021-05-20 PROCEDURE — G0463: CPT

## 2021-05-20 PROCEDURE — 99213 OFFICE O/P EST LOW 20 MIN: CPT

## 2021-05-20 NOTE — HISTORY OF PRESENT ILLNESS
[FreeTextEntry1] : 86 yo WM, here for f/u of a chronic VSU involving his lateral LLE. No change since last visit. Uses own compression stockings.

## 2021-05-20 NOTE — VITALS
[] : No [de-identified] : Pain scale:  0/10 - Patient reports no c/o pains or discomforts at present.

## 2021-05-20 NOTE — ASSESSMENT
[Verbal] : Verbal [Patient] : Patient [Good - alert, interested, motivated] : Good - alert, interested, motivated [Verbalizes knowledge/Understanding] : Verbalizes knowledge/understanding [Dressing changes] : dressing changes [Skin Care] : skin care [Signs and symptoms of infection] : sign and symptoms of infection [Venous Disease] : venous disease [How and When to Call] : how and when to call [Compression Therapy] : compression therapy [Patient responsibility to plan of care] : patient responsibility to plan of care [Stable] : stable [Home] : Home [Ambulatory] : Ambulatory [Not Applicable - Long Term Care/Home Health Agency] : Long Term Care/Home Health Agency: Not Applicable [] : No [FreeTextEntry2] : Promote optimal skin integrity, infection prevention, edema control\par  [FreeTextEntry3] : No change in wound status.  MD changed from silver alginate to Ani [FreeTextEntry4] : f/u 2 weeks

## 2021-05-20 NOTE — PLAN
[FreeTextEntry1] : yasemin, DD, compression stockings.\par leg elevation\par f/u 2 wks.\par \par time spent 20 mins.

## 2021-05-20 NOTE — PHYSICAL EXAM
[2 x 2] : 2 x 2  [Normal Thyroid] : the thyroid was normal [Normal Breath Sounds] : Normal breath sounds [Normal Rate and Rhythm] : normal rate and rhythm [Normal Heart Sounds] : normal heart sounds [Ankle Swelling (On Exam)] : present [Ankle Swelling On The Left] : moderate [] : present [Ankle Swelling On The Right] : mild [Alert] : alert [Oriented to Person] : oriented to person [Oriented to Place] : oriented to place [Oriented to Time] : oriented to time [Calm] : calm [JVD] : no jugular venous distention  [Abdomen Masses] : No abdominal massess [Abdomen Tenderness] : ~T ~M No abdominal tenderness [Enlarged] : not enlarged [de-identified] : elderly WM, NAD, alert, Ox 3. [FreeTextEntry1] : Left lateral lower leg [FreeTextEntry2] : 1.0 [FreeTextEntry3] : 0.3 [FreeTextEntry4] : 0.1 [de-identified] : scant serosanguineous [de-identified] : intact [de-identified] : 100% [de-identified] : Patient to use his compression stocking at home [de-identified] : Ani, Allevyn border [de-identified] : NSC [de-identified] : False [de-identified] : 3x Weekly

## 2021-06-03 ENCOUNTER — APPOINTMENT (OUTPATIENT)
Dept: PLASTIC SURGERY | Facility: HOSPITAL | Age: 86
End: 2021-06-03
Payer: MEDICARE

## 2021-06-03 ENCOUNTER — OUTPATIENT (OUTPATIENT)
Dept: OUTPATIENT SERVICES | Facility: HOSPITAL | Age: 86
LOS: 1 days | Discharge: HOME | End: 2021-06-03
Payer: COMMERCIAL

## 2021-06-03 VITALS
OXYGEN SATURATION: 98 % | BODY MASS INDEX: 24.25 KG/M2 | HEART RATE: 71 BPM | RESPIRATION RATE: 18 BRPM | DIASTOLIC BLOOD PRESSURE: 78 MMHG | SYSTOLIC BLOOD PRESSURE: 138 MMHG | HEIGHT: 75 IN | WEIGHT: 195 LBS | TEMPERATURE: 97.2 F
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
## 2021-06-03 DIAGNOSIS — I83.893 VARICOSE VEINS OF BILATERAL LOWER EXTREMITIES WITH OTHER COMPLICATIONS: ICD-10-CM

## 2021-06-03 DIAGNOSIS — S50.811A ABRASION OF RIGHT FOREARM, INITIAL ENCOUNTER: ICD-10-CM

## 2021-06-03 DIAGNOSIS — I83.228 VARICOSE VEINS OF LEFT LOWER EXTREMITY WITH BOTH ULCER OF OTHER PART OF LOWER EXTREMITY AND INFLAMMATION: ICD-10-CM
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
## 2021-06-03 DIAGNOSIS — Z87.891 PERSONAL HISTORY OF NICOTINE DEPENDENCE: ICD-10-CM
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
## 2021-06-03 DIAGNOSIS — R01.1 CARDIAC MURMUR, UNSPECIFIED: ICD-10-CM
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
## 2021-06-03 DIAGNOSIS — M19.90 UNSPECIFIED OSTEOARTHRITIS, UNSPECIFIED SITE: ICD-10-CM

## 2021-06-03 DIAGNOSIS — I83.222 VARICOSE VEINS OF LEFT LOWER EXTREMITY WITH BOTH ULCER OF CALF AND INFLAMMATION: ICD-10-CM

## 2021-06-03 DIAGNOSIS — L97.822 NON-PRESSURE CHRONIC ULCER OF OTHER PART OF LEFT LOWER LEG WITH FAT LAYER EXPOSED: ICD-10-CM

## 2021-06-03 DIAGNOSIS — I49.3 VENTRICULAR PREMATURE DEPOLARIZATION: ICD-10-CM

## 2021-06-03 DIAGNOSIS — Z79.899 OTHER LONG TERM (CURRENT) DRUG THERAPY: ICD-10-CM

## 2021-06-03 DIAGNOSIS — G20 PARKINSON'S DISEASE: ICD-10-CM

## 2021-06-03 DIAGNOSIS — I34.0 NONRHEUMATIC MITRAL (VALVE) INSUFFICIENCY: ICD-10-CM

## 2021-06-03 DIAGNOSIS — Z80.0 FAMILY HISTORY OF MALIGNANT NEOPLASM OF DIGESTIVE ORGANS: ICD-10-CM

## 2021-06-03 PROCEDURE — G0463: CPT

## 2021-06-03 PROCEDURE — 99214 OFFICE O/P EST MOD 30 MIN: CPT

## 2021-06-03 NOTE — ASSESSMENT
[Verbal] : Verbal [Patient] : Patient [Good - alert, interested, motivated] : Good - alert, interested, motivated [Verbalizes knowledge/Understanding] : Verbalizes knowledge/understanding [Dressing changes] : dressing changes [Skin Care] : skin care [Signs and symptoms of infection] : sign and symptoms of infection [Venous Disease] : venous disease [How and When to Call] : how and when to call [Compression Therapy] : compression therapy [Patient responsibility to plan of care] : patient responsibility to plan of care [] : Yes [Stable] : stable [Home] : Home [Ambulatory] : Ambulatory [Not Applicable - Long Term Care/Home Health Agency] : Long Term Care/Home Health Agency: Not Applicable [FreeTextEntry2] : Restore Skin Integrity\par Infection Control\par Localized wound care\par Compression Therapy\par Edema Prevention [FreeTextEntry4] : f/u 2 weeks

## 2021-06-03 NOTE — PHYSICAL EXAM
[2 x 2] : 2 x 2  [4 x 4] : 4 x 4  [JVD] : no jugular venous distention  [Normal Thyroid] : the thyroid was normal [Normal Breath Sounds] : Normal breath sounds [Normal Heart Sounds] : normal heart sounds [Normal Rate and Rhythm] : normal rate and rhythm [Ankle Swelling (On Exam)] : present [Ankle Swelling On The Left] : moderate [] : present [Ankle Swelling On The Right] : mild [Abdomen Masses] : No abdominal massess [Abdomen Tenderness] : ~T ~M No abdominal tenderness [Enlarged] : not enlarged [Alert] : alert [Oriented to Person] : oriented to person [Oriented to Place] : oriented to place [Oriented to Time] : oriented to time [Calm] : calm [de-identified] : elderly WM, NAD, alert, Ox 3. [FreeTextEntry1] : Left lateral lower leg - 2 open areas within measurement [FreeTextEntry2] : 1.5 [FreeTextEntry3] : 1.0 [FreeTextEntry4] : 0.1 [de-identified] : scant serosanguineous [de-identified] : intact [de-identified] : 100% [de-identified] : Patient to use his compression stocking at home [de-identified] : Ani, Allevyn border [de-identified] : Cleansed with Normal Saline\par  [FreeTextEntry7] : Lateral Upper Arm - NEW [FreeTextEntry8] : 3.0 [FreeTextEntry9] : 2.1 [de-identified] : 0.1 [de-identified] : sanguinous [de-identified] : Xeroform [de-identified] : Cleansed with Normal Saline\par Kerlix [de-identified] : 3x Weekly [TWNoteComboBox9] : Right [de-identified] : Small [de-identified] : Traumatic [de-identified] : Normal [de-identified] : None [de-identified] : 100% [de-identified] : No [de-identified] : 3x Weekly

## 2021-06-03 NOTE — HISTORY OF PRESENT ILLNESS
[FreeTextEntry1] : 84 yo WM, here for f/u of a chronic VSU involving his lateral LLE. No change since last visit. Uses own compression stockings.\par 6/3/21 ankle wound doing well. New wound abrasion right forearm

## 2021-06-03 NOTE — PLAN
[FreeTextEntry1] : yasemin, DD, compression stockings.3x/week to left lateral ankle\par new abrasion right forearm xeroform,Dd 3x/week\par leg elevation\par f/u 2 wks.\par \par time spent 30 mins.

## 2021-06-04 ENCOUNTER — APPOINTMENT (OUTPATIENT)
Dept: NEUROLOGY | Facility: CLINIC | Age: 86
End: 2021-06-04
Payer: MEDICARE

## 2021-06-04 VITALS
HEIGHT: 75 IN | DIASTOLIC BLOOD PRESSURE: 68 MMHG | BODY MASS INDEX: 24.12 KG/M2 | WEIGHT: 194 LBS | HEART RATE: 69 BPM | TEMPERATURE: 97.2 F | SYSTOLIC BLOOD PRESSURE: 116 MMHG

## 2021-06-04 PROCEDURE — 99214 OFFICE O/P EST MOD 30 MIN: CPT

## 2021-06-04 NOTE — DISCUSSION/SUMMARY
[FreeTextEntry1] : 85-year-old RH male with PMHx of HTN, Knee arthritis, venous insufficiency; diagnosed with Parkinson's disease few years ago, is on carbidopa/levodopa 25/100 mg, half a pill 3 times a day; presents for evaluation of mildly memory loss.\par \par # Mild cognitive impairment; amnestic type; initial MoCA score 26/30; pt has noted worsening of memory\par \par # Parkinsons ds, unstable gait\par \par - Increase donepezil 10 mg daily\par - Cognitive exercises\par - Continue carbidopa levodopa 25/100 mg 3 times a day.\par - physical therapy for balance and gait training\par - F/U in 3 months

## 2021-06-04 NOTE — PHYSICAL EXAM
[General Appearance - Alert] : alert [General Appearance - In No Acute Distress] : in no acute distress [General Appearance - Well-Appearing] : healthy appearing [Mood] : the mood was normal [Person] : oriented to person [Place] : oriented to place [Time] : oriented to time [Registration Intact] : recent registration memory intact [Concentration Intact] : normal concentrating ability [Naming Objects] : no difficulty naming common objects [Repeating Phrases] : no difficulty repeating a phrase [Fluency] : fluency intact [Comprehension] : comprehension intact [Past History] : adequate knowledge of personal past history [Cranial Nerves Optic (II)] : visual acuity intact bilaterally,  visual fields full to confrontation, pupils equal round and reactive to light [Cranial Nerves Oculomotor (III)] : extraocular motion intact [Cranial Nerves Trigeminal (V)] : facial sensation intact symmetrically [Cranial Nerves Facial (VII)] : face symmetrical [Cranial Nerves Vestibulocochlear (VIII)] : hearing was intact bilaterally [Cranial Nerves Glossopharyngeal (IX)] : tongue and palate midline [Cranial Nerves Accessory (XI - Cranial And Spinal)] : head turning and shoulder shrug symmetric [Cranial Nerves Hypoglossal (XII)] : there was no tongue deviation with protrusion [No Muscle Atrophy] : normal bulk in all four extremities [Sensation Tactile Decrease] : light touch was intact [Past-pointing] : there was no past-pointing [Tremor] : no tremor present [Coordination - Dysmetria Impaired Finger-to-Nose Bilateral] : not present [2+] : Brachioradialis left 2+ [1+] : Patella left 1+ [0] : Ankle jerk left 0 [Plantar Reflex Right Only] : normal on the right [Plantar Reflex Left Only] : normal on the left [FreeTextEntry1] : Hypomimia with mild hypophonia and mild micrographia.\par No dysarthria\par Constant pill-rolling tremors right >left hand, rigidity and bradykinesia and decreased finger tapping on the right. Raul posture with small shuffling steps decreased right arm swing\par  [Sclera] : the sclera and conjunctiva were normal [PERRL With Normal Accommodation] : pupils were equal in size, round, reactive to light, with normal accommodation [Extraocular Movements] : extraocular movements were intact [Hearing Threshold Finger Rub Not [place]] : hearing was normal [Oropharynx] : the oropharynx was normal

## 2021-06-04 NOTE — REVIEW OF SYSTEMS
[As Noted in HPI] : as noted in HPI [Memory Lapses or Loss] : memory loss [Decr. Concentrating Ability] : decreased concentrating ability [Poor Coordination] : poor coordination [Palpitations] : no palpitations [Lower Ext Edema] : lower extremity edema [Diarrhea] : no diarrhea [Negative] : Heme/Lymph [FreeTextEntry9] : Arthritis knees

## 2021-06-04 NOTE — DATA REVIEWED
[No studies available for review at this time.] : No studies available for review at this time. [de-identified] : 2/4/21: MRI brain - no acute findings [de-identified] : 3/15/21- Labs: B12, TSH level normal

## 2021-06-04 NOTE — HISTORY OF PRESENT ILLNESS
[FreeTextEntry1] : Pt is here for a followup visit today, last seen on 3/3/21. Patient reports he started donepezil 5 mg daily, tolerated it well, has not had any adverse effects, he however feels that his memory is worsening, he admits he is not doing any cognitive exercises.\par \par Patient also reports that he continues to have tremors of hands, his fine motor movements are slowed, he continues to have unstable gait and is walking slower, he is however more concerned as he has severe pain in both knees, this makes his walking more difficult. Patient is taking carbidopa levodopa 25/100 mg 3 times a day\par \par After the last visit, B12 and TSH levels checked were normal

## 2021-06-04 NOTE — REASON FOR VISIT
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

## 2021-06-17 ENCOUNTER — OUTPATIENT (OUTPATIENT)
Dept: OUTPATIENT SERVICES | Facility: HOSPITAL | Age: 86
LOS: 1 days | Discharge: HOME | End: 2021-06-17
Payer: COMMERCIAL

## 2021-06-17 ENCOUNTER — APPOINTMENT (OUTPATIENT)
Dept: OBGYN | Facility: HOSPITAL | Age: 86
End: 2021-06-17
Payer: MEDICARE

## 2021-06-17 VITALS
OXYGEN SATURATION: 99 % | WEIGHT: 194 LBS | RESPIRATION RATE: 20 BRPM | HEIGHT: 75 IN | SYSTOLIC BLOOD PRESSURE: 114 MMHG | HEART RATE: 97 BPM | DIASTOLIC BLOOD PRESSURE: 72 MMHG | TEMPERATURE: 97.1 F | BODY MASS INDEX: 24.12 KG/M2

## 2021-06-17 VITALS
SYSTOLIC BLOOD PRESSURE: 114 MMHG | HEIGHT: 78 IN | OXYGEN SATURATION: 99 % | TEMPERATURE: 97.1 F | WEIGHT: 194 LBS | HEART RATE: 97 BPM | DIASTOLIC BLOOD PRESSURE: 72 MMHG | RESPIRATION RATE: 20 BRPM | BODY MASS INDEX: 22.45 KG/M2

## 2021-06-17 DIAGNOSIS — I83.223 VARICOSE VEINS OF LEFT LOWER EXTREMITY WITH BOTH ULCER OF ANKLE AND INFLAMMATION: ICD-10-CM

## 2021-06-17 PROCEDURE — G0463: CPT

## 2021-06-17 PROCEDURE — 99213 OFFICE O/P EST LOW 20 MIN: CPT

## 2021-06-17 NOTE — PHYSICAL EXAM
[2 x 2] : 2 x 2  [Normal Thyroid] : the thyroid was normal [Normal Breath Sounds] : Normal breath sounds [Normal Heart Sounds] : normal heart sounds [Normal Rate and Rhythm] : normal rate and rhythm [Ankle Swelling (On Exam)] : present [Ankle Swelling On The Left] : moderate [] : of the left leg [Ankle Swelling On The Right] : mild [Alert] : alert [Oriented to Person] : oriented to person [Oriented to Place] : oriented to place [Oriented to Time] : oriented to time [Calm] : calm [JVD] : no jugular venous distention  [Abdomen Masses] : No abdominal massess [Abdomen Tenderness] : ~T ~M No abdominal tenderness [Tender] : nontender [Enlarged] : not enlarged [de-identified] : elderly WM, NAD, alert, Ox3. [FreeTextEntry1] : Left lateral lower leg [FreeTextEntry2] : 2.0 [FreeTextEntry3] : 2.0 [FreeTextEntry4] : 0.1 [de-identified] : scant serosanguineous [de-identified] : intact [de-identified] : 100% [de-identified] : Pt's compression stocking [de-identified] : Ani, Alginate [de-identified] : Cleansed with Normal Saline\par  [FreeTextEntry7] : Lateral Upper Arm - Fragile new epithelium  [de-identified] : 3x Weekly [de-identified] : Cleansed with Normal Saline\par  [TWNoteComboBox9] : Right [de-identified] : None [de-identified] : Traumatic [de-identified] : Normal [de-identified] : None [de-identified] : 100% [de-identified] : 3x Weekly [de-identified] : No

## 2021-06-17 NOTE — PLAN
[FreeTextEntry1] : Ani, alginate, DD, comp stockings\par leg elevation \par f/u 1 wk.\par \par \par time spent 25 mins.

## 2021-06-17 NOTE — HISTORY OF PRESENT ILLNESS
[FreeTextEntry1] : 84 yo WM, here for f/u of chronic LLE VSU. Using yasemin and somewhat more moist this week and slight increase in size. Advised importance of leg elevation.

## 2021-06-18 DIAGNOSIS — Z80.0 FAMILY HISTORY OF MALIGNANT NEOPLASM OF DIGESTIVE ORGANS: ICD-10-CM

## 2021-06-18 DIAGNOSIS — Z87.891 PERSONAL HISTORY OF NICOTINE DEPENDENCE: ICD-10-CM

## 2021-06-18 DIAGNOSIS — M19.90 UNSPECIFIED OSTEOARTHRITIS, UNSPECIFIED SITE: ICD-10-CM
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
## 2021-06-18 DIAGNOSIS — R01.1 CARDIAC MURMUR, UNSPECIFIED: ICD-10-CM
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
## 2021-06-18 DIAGNOSIS — I83.893 VARICOSE VEINS OF BILATERAL LOWER EXTREMITIES WITH OTHER COMPLICATIONS: ICD-10-CM

## 2021-06-18 DIAGNOSIS — I83.228 VARICOSE VEINS OF LEFT LOWER EXTREMITY WITH BOTH ULCER OF OTHER PART OF LOWER EXTREMITY AND INFLAMMATION: ICD-10-CM
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
## 2021-06-18 DIAGNOSIS — I34.0 NONRHEUMATIC MITRAL (VALVE) INSUFFICIENCY: ICD-10-CM

## 2021-06-18 DIAGNOSIS — S50.811D ABRASION OF RIGHT FOREARM, SUBSEQUENT ENCOUNTER: ICD-10-CM

## 2021-06-18 DIAGNOSIS — I49.3 VENTRICULAR PREMATURE DEPOLARIZATION: ICD-10-CM

## 2021-06-18 DIAGNOSIS — L97.822 NON-PRESSURE CHRONIC ULCER OF OTHER PART OF LEFT LOWER LEG WITH FAT LAYER EXPOSED: ICD-10-CM
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
## 2021-06-18 DIAGNOSIS — Z79.899 OTHER LONG TERM (CURRENT) DRUG THERAPY: ICD-10-CM

## 2021-06-18 DIAGNOSIS — G20 PARKINSON'S DISEASE: ICD-10-CM

## 2021-07-01 ENCOUNTER — OUTPATIENT (OUTPATIENT)
Dept: OUTPATIENT SERVICES | Facility: HOSPITAL | Age: 86
LOS: 1 days | Discharge: HOME | End: 2021-07-01
Payer: COMMERCIAL

## 2021-07-01 ENCOUNTER — APPOINTMENT (OUTPATIENT)
Dept: WOUND CARE | Facility: HOSPITAL | Age: 86
End: 2021-07-01
Payer: MEDICARE

## 2021-07-01 VITALS
RESPIRATION RATE: 18 BRPM | WEIGHT: 194 LBS | HEIGHT: 78 IN | HEART RATE: 82 BPM | TEMPERATURE: 98.2 F | OXYGEN SATURATION: 97 % | BODY MASS INDEX: 22.45 KG/M2 | DIASTOLIC BLOOD PRESSURE: 82 MMHG | SYSTOLIC BLOOD PRESSURE: 144 MMHG

## 2021-07-01 DIAGNOSIS — I83.223 VARICOSE VEINS OF LEFT LOWER EXTREMITY WITH BOTH ULCER OF ANKLE AND INFLAMMATION: ICD-10-CM

## 2021-07-01 DIAGNOSIS — S40.812A ABRASION OF LEFT UPPER ARM, INITIAL ENCOUNTER: ICD-10-CM

## 2021-07-01 DIAGNOSIS — S50.811A ABRASION OF RIGHT FOREARM, INITIAL ENCOUNTER: ICD-10-CM

## 2021-07-01 PROCEDURE — G0463: CPT

## 2021-07-01 PROCEDURE — 99214 OFFICE O/P EST MOD 30 MIN: CPT

## 2021-07-02 DIAGNOSIS — I83.228 VARICOSE VEINS OF LEFT LOWER EXTREMITY WITH BOTH ULCER OF OTHER PART OF LOWER EXTREMITY AND INFLAMMATION: ICD-10-CM
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
## 2021-07-02 DIAGNOSIS — S50.811D ABRASION OF RIGHT FOREARM, SUBSEQUENT ENCOUNTER: ICD-10-CM

## 2021-07-02 DIAGNOSIS — R01.1 CARDIAC MURMUR, UNSPECIFIED: ICD-10-CM

## 2021-07-02 DIAGNOSIS — I49.3 VENTRICULAR PREMATURE DEPOLARIZATION: ICD-10-CM
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
## 2021-07-02 DIAGNOSIS — M19.90 UNSPECIFIED OSTEOARTHRITIS, UNSPECIFIED SITE: ICD-10-CM

## 2021-07-02 DIAGNOSIS — G20 PARKINSON'S DISEASE: ICD-10-CM
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
## 2021-07-02 DIAGNOSIS — Z79.899 OTHER LONG TERM (CURRENT) DRUG THERAPY: ICD-10-CM

## 2021-07-02 DIAGNOSIS — S40.812A ABRASION OF LEFT UPPER ARM, INITIAL ENCOUNTER: ICD-10-CM

## 2021-07-02 DIAGNOSIS — I34.0 NONRHEUMATIC MITRAL (VALVE) INSUFFICIENCY: ICD-10-CM

## 2021-07-02 DIAGNOSIS — Z87.891 PERSONAL HISTORY OF NICOTINE DEPENDENCE: ICD-10-CM

## 2021-07-02 DIAGNOSIS — L97.822 NON-PRESSURE CHRONIC ULCER OF OTHER PART OF LEFT LOWER LEG WITH FAT LAYER EXPOSED: ICD-10-CM

## 2021-07-02 DIAGNOSIS — Z80.0 FAMILY HISTORY OF MALIGNANT NEOPLASM OF DIGESTIVE ORGANS: ICD-10-CM

## 2021-07-02 DIAGNOSIS — I83.893 VARICOSE VEINS OF BILATERAL LOWER EXTREMITIES WITH OTHER COMPLICATIONS: ICD-10-CM

## 2021-07-03 NOTE — PHYSICAL EXAM
[4 x 4] : 4 x 4  [Normal Thyroid] : the thyroid was normal [Normal Breath Sounds] : Normal breath sounds [Normal Heart Sounds] : normal heart sounds [Normal Rate and Rhythm] : normal rate and rhythm [Ankle Swelling (On Exam)] : present [Ankle Swelling On The Left] : moderate [] : of the left leg [Ankle Swelling On The Right] : mild [Alert] : alert [Oriented to Person] : oriented to person [Oriented to Place] : oriented to place [Oriented to Time] : oriented to time [Calm] : calm [Abdominal Pad] : Abdominal Pad [2 x 2] : 2 x 2  [JVD] : no jugular venous distention  [Abdomen Masses] : No abdominal massess [Abdomen Tenderness] : ~T ~M No abdominal tenderness [Tender] : nontender [Enlarged] : not enlarged [de-identified] : elderly WM, NAD, alert, Ox3. [de-identified] : Circ neurovascular function WNL post ace compression, pt expressed comfort.\par  [de-identified] : intact/mild maceration [de-identified] : 90-95% [de-identified] : 5-10% [FreeTextEntry7] : Lateral Upper Arm - Fragile new epithelium  [de-identified] : NONE [de-identified] : Cleansed with Normal Saline\par  [de-identified] : Right Posterior upper arm- Skin Avulsion( skin flap intact) [de-identified] : 0.5 [de-identified] : 2.7 [de-identified] : 0.1 [de-identified] : small serosanguineous [de-identified] : Xeroform [de-identified] : Cleansed with NS\par Allevyn Border [FreeTextEntry1] : Left Forearm [FreeTextEntry2] : 1.0 [FreeTextEntry3] : 0.9 [FreeTextEntry4] : 0.1 [de-identified] : scant serosanguineous [de-identified] : Xeroform [de-identified] : Cleansed with NS\par  [de-identified] : No [de-identified] : None [de-identified] : >75% [de-identified] : Ace wraps [de-identified] : Every other day [de-identified] : Primary Dressing [TWNoteComboBox9] : Right [de-identified] : None [de-identified] : Normal [de-identified] : None [de-identified] : No [de-identified] : No [de-identified] : Traumatic [de-identified] : No [de-identified] : Normal [de-identified] : None [de-identified] : None [de-identified] : 100% [de-identified] : No [de-identified] : Every other day [TWNoteComboBox5] : No [de-identified] : Primary Dressing [TWNoteComboBox6] : Traumatic [de-identified] : No [de-identified] : Normal [de-identified] : None [de-identified] : None [de-identified] : 100% [de-identified] : No [de-identified] : Every other day [de-identified] : Primary Dressing

## 2021-07-03 NOTE — PLAN
[FreeTextEntry1] : Ag alginate, DD, comp stockings. QOD to left lateral lower leg\par xeroform and DD to left and right arm wounds\par leg elevation \par f/u 1 wk.\par \par \par time spent 35 mins.

## 2021-07-03 NOTE — ASSESSMENT
[Verbal] : Verbal [Patient] : Patient [Good - alert, interested, motivated] : Good - alert, interested, motivated [Verbalizes knowledge/Understanding] : Verbalizes knowledge/understanding [Dressing changes] : dressing changes [Skin Care] : skin care [Signs and symptoms of infection] : sign and symptoms of infection [Venous Disease] : venous disease [How and When to Call] : how and when to call [Compression Therapy] : compression therapy [Patient responsibility to plan of care] : patient responsibility to plan of care [Stable] : stable [Home] : Home [Ambulatory] : Ambulatory [Not Applicable - Long Term Care/Home Health Agency] : Long Term Care/Home Health Agency: Not Applicable [Nutrition] : nutrition [Pain Management] : pain management [Off-loading] : off-loading [] : No [FreeTextEntry2] : Infection Prevention\par Promote Skin Integrity\par Offloading\par Elevation\par Compression Compliance\par Low Na+ Diet\par Maintain acceptable levels of pain\par Demonstrates use of both pharmacological and nonpharmacological pain management interventions\par  [FreeTextEntry3] : New Wounds [FreeTextEntry4] : f/u 1 week for assessment

## 2021-07-03 NOTE — HISTORY OF PRESENT ILLNESS
[FreeTextEntry1] : 84 yo WM, here for f/u of chronic LLE VSU. Using yasemin and somewhat more moist this week and slight increase in size. Advised importance of leg elevation.\par 7/1/21 new wounds of left forearm and right upper arm after fall and cat scratch. Wounds healing well. Left lateral lower leg VSU slow to heal

## 2021-07-08 ENCOUNTER — APPOINTMENT (OUTPATIENT)
Dept: WOUND CARE | Facility: HOSPITAL | Age: 86
End: 2021-07-08
Payer: MEDICARE

## 2021-07-08 ENCOUNTER — OUTPATIENT (OUTPATIENT)
Dept: OUTPATIENT SERVICES | Facility: HOSPITAL | Age: 86
LOS: 1 days | Discharge: HOME | End: 2021-07-08
Payer: COMMERCIAL

## 2021-07-08 VITALS
SYSTOLIC BLOOD PRESSURE: 123 MMHG | HEART RATE: 74 BPM | OXYGEN SATURATION: 98 % | DIASTOLIC BLOOD PRESSURE: 75 MMHG | RESPIRATION RATE: 18 BRPM | TEMPERATURE: 98.1 F

## 2021-07-08 DIAGNOSIS — I83.223 VARICOSE VEINS OF LEFT LOWER EXTREMITY WITH BOTH ULCER OF ANKLE AND INFLAMMATION: ICD-10-CM

## 2021-07-08 DIAGNOSIS — S50.811D: ICD-10-CM

## 2021-07-08 DIAGNOSIS — S40.812D: ICD-10-CM

## 2021-07-08 PROCEDURE — G0463: CPT

## 2021-07-08 PROCEDURE — 99213 OFFICE O/P EST LOW 20 MIN: CPT

## 2021-07-08 NOTE — HISTORY OF PRESENT ILLNESS
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 Healing well. Using comp. stockings. Discussed importance of compression stockings/leg elevation.\par    Pt also with adherent eschar on both forearm.

## 2021-07-08 NOTE — PHYSICAL EXAM
[Normal Thyroid] : the thyroid was normal [Normal Breath Sounds] : Normal breath sounds [Normal Heart Sounds] : normal heart sounds [Normal Rate and Rhythm] : normal rate and rhythm [Ankle Swelling (On Exam)] : present [Ankle Swelling On The Left] : moderate [] : of the left leg [Ankle Swelling On The Right] : mild [Alert] : alert [Oriented to Person] : oriented to person [Oriented to Place] : oriented to place [Oriented to Time] : oriented to time [Calm] : calm [4 x 4] : 4 x 4  [JVD] : no jugular venous distention  [Abdomen Masses] : No abdominal massess [Abdomen Tenderness] : ~T ~M No abdominal tenderness [Tender] : nontender [Enlarged] : not enlarged [de-identified] : elderly adult WM, NAD, alert, Ox3. [FreeTextEntry1] : Left lateral lower leg  [FreeTextEntry2] : 4.2 [FreeTextEntry3] : 3.4  [FreeTextEntry4] : 0.1  [de-identified] : Own compression  [de-identified] : Silver alginate [de-identified] : Cleansed with NS\par Kerlix  [FreeTextEntry7] : Right lateral upper arm - Fragile new epithelium - closed  [de-identified] : Cleansed with NS\par No other treatment  [de-identified] : Right posterior upper arm - Skin avulsion (skin flap intact) - Closed  [de-identified] : Cleansed with NS\par No other treatment  [TWNoteComboBox4] : Small [de-identified] : Normal [de-identified] : None [de-identified] : None [de-identified] : >75% [de-identified] : No [de-identified] : Every other day [de-identified] : Primary Dressing

## 2021-07-08 NOTE — PLAN
[FreeTextEntry1] : leg elevation\par ag alginate, DD, ace; put on comp stockings once home.\par f/u 1 wk\par \par \par time spent 25 mins.
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
## 2021-07-09 DIAGNOSIS — I83.893 VARICOSE VEINS OF BILATERAL LOWER EXTREMITIES WITH OTHER COMPLICATIONS: ICD-10-CM

## 2021-07-09 DIAGNOSIS — S50.811D ABRASION OF RIGHT FOREARM, SUBSEQUENT ENCOUNTER: ICD-10-CM

## 2021-07-09 DIAGNOSIS — Z87.891 PERSONAL HISTORY OF NICOTINE DEPENDENCE: ICD-10-CM

## 2021-07-09 DIAGNOSIS — I83.228 VARICOSE VEINS OF LEFT LOWER EXTREMITY WITH BOTH ULCER OF OTHER PART OF LOWER EXTREMITY AND INFLAMMATION: ICD-10-CM

## 2021-07-09 DIAGNOSIS — I49.3 VENTRICULAR PREMATURE DEPOLARIZATION: ICD-10-CM

## 2021-07-09 DIAGNOSIS — Z79.899 OTHER LONG TERM (CURRENT) DRUG THERAPY: ICD-10-CM
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
## 2021-07-09 DIAGNOSIS — S40.812D ABRASION OF LEFT UPPER ARM, SUBSEQUENT ENCOUNTER: ICD-10-CM

## 2021-07-09 DIAGNOSIS — G20 PARKINSON'S DISEASE: ICD-10-CM

## 2021-07-09 DIAGNOSIS — Z80.0 FAMILY HISTORY OF MALIGNANT NEOPLASM OF DIGESTIVE ORGANS: ICD-10-CM
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
## 2021-07-09 DIAGNOSIS — M19.90 UNSPECIFIED OSTEOARTHRITIS, UNSPECIFIED SITE: ICD-10-CM
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
## 2021-07-09 DIAGNOSIS — L97.822 NON-PRESSURE CHRONIC ULCER OF OTHER PART OF LEFT LOWER LEG WITH FAT LAYER EXPOSED: ICD-10-CM

## 2021-07-09 DIAGNOSIS — I34.0 NONRHEUMATIC MITRAL (VALVE) INSUFFICIENCY: ICD-10-CM

## 2021-07-09 DIAGNOSIS — R01.1 CARDIAC MURMUR, UNSPECIFIED: ICD-10-CM

## 2021-07-15 ENCOUNTER — APPOINTMENT (OUTPATIENT)
Dept: WOUND CARE | Facility: HOSPITAL | Age: 86
End: 2021-07-15
Payer: MEDICARE

## 2021-07-15 ENCOUNTER — OUTPATIENT (OUTPATIENT)
Dept: OUTPATIENT SERVICES | Facility: HOSPITAL | Age: 86
LOS: 1 days | Discharge: HOME | End: 2021-07-15
Payer: COMMERCIAL

## 2021-07-15 VITALS
TEMPERATURE: 98.6 F | DIASTOLIC BLOOD PRESSURE: 69 MMHG | HEART RATE: 83 BPM | WEIGHT: 194 LBS | RESPIRATION RATE: 18 BRPM | HEIGHT: 78 IN | SYSTOLIC BLOOD PRESSURE: 123 MMHG | OXYGEN SATURATION: 98 % | BODY MASS INDEX: 22.45 KG/M2

## 2021-07-15 DIAGNOSIS — I83.223 VARICOSE VEINS OF LEFT LOWER EXTREMITY WITH BOTH ULCER OF ANKLE AND INFLAMMATION: ICD-10-CM

## 2021-07-15 PROCEDURE — 99213 OFFICE O/P EST LOW 20 MIN: CPT

## 2021-07-15 PROCEDURE — G0463: CPT

## 2021-07-15 NOTE — ASSESSMENT
[Verbal] : Verbal [Demo] : Demo [Patient] : Patient [Good - alert, interested, motivated] : Good - alert, interested, motivated [Verbalizes knowledge/Understanding] : Verbalizes knowledge/understanding [Dressing changes] : dressing changes [Skin Care] : skin care [Pressure relief] : pressure relief [Signs and symptoms of infection] : sign and symptoms of infection [Venous Disease] : venous disease [How and When to Call] : how and when to call [Off-loading] : off-loading [Compression Therapy] : compression therapy [Patient responsibility to plan of care] : patient responsibility to plan of care [] : Yes

## 2021-07-15 NOTE — HISTORY OF PRESENT ILLNESS
[FreeTextEntry1] : 84 yo WM, here for f/u of chronic VSU involving his left lateral ankle. Healing very well with islands of epithel forming. Using own comp stockings. Pt will be away in [place] for next 2 wks. Instructed pt not to swim in pools/beaches/jacuzzi.

## 2021-07-15 NOTE — PLAN
[FreeTextEntry1] : ag alginate, DD, comp stockings.\par leg elevation\par f/u 2 wk.\par \par time spent 20mins.

## 2021-07-15 NOTE — PHYSICAL EXAM
[Normal Thyroid] : the thyroid was normal [Normal Breath Sounds] : Normal breath sounds [Normal Heart Sounds] : normal heart sounds [Normal Rate and Rhythm] : normal rate and rhythm [Ankle Swelling (On Exam)] : present [Ankle Swelling On The Left] : moderate [] : of the left leg [Ankle Swelling On The Right] : mild [Alert] : alert [Oriented to Person] : oriented to person [Oriented to Place] : oriented to place [Oriented to Time] : oriented to time [Calm] : calm [JVD] : no jugular venous distention  [Abdomen Masses] : No abdominal massess [Abdomen Tenderness] : ~T ~M No abdominal tenderness [Tender] : nontender [Enlarged] : not enlarged [de-identified] : elderly WM, NAD, alert, Ox3 [FreeTextEntry1] : Left Lateral Lower Leg [FreeTextEntry2] : 4.5 [FreeTextEntry3] : 3.0 [FreeTextEntry4] : 0.1 [de-identified] : Small Serosanguineous [de-identified] : Intact [de-identified] : Pt's own compression stockings [de-identified] : Silver Alginate/ Dry Dressing & Kerlix [de-identified] : Cleansed with Normal saline\par  [de-identified] : None [de-identified] : None [de-identified] : 100% [de-identified] : No

## 2021-07-16 DIAGNOSIS — G20 PARKINSON'S DISEASE: ICD-10-CM

## 2021-07-16 DIAGNOSIS — I83.228 VARICOSE VEINS OF LEFT LOWER EXTREMITY WITH BOTH ULCER OF OTHER PART OF LOWER EXTREMITY AND INFLAMMATION: ICD-10-CM

## 2021-07-16 DIAGNOSIS — R01.1 CARDIAC MURMUR, UNSPECIFIED: ICD-10-CM

## 2021-07-16 DIAGNOSIS — I49.3 VENTRICULAR PREMATURE DEPOLARIZATION: ICD-10-CM

## 2021-07-16 DIAGNOSIS — Z87.891 PERSONAL HISTORY OF NICOTINE DEPENDENCE: ICD-10-CM

## 2021-07-16 DIAGNOSIS — M19.90 UNSPECIFIED OSTEOARTHRITIS, UNSPECIFIED SITE: ICD-10-CM

## 2021-07-16 DIAGNOSIS — I34.0 NONRHEUMATIC MITRAL (VALVE) INSUFFICIENCY: ICD-10-CM

## 2021-07-16 DIAGNOSIS — Z79.899 OTHER LONG TERM (CURRENT) DRUG THERAPY: ICD-10-CM

## 2021-07-16 DIAGNOSIS — Z80.0 FAMILY HISTORY OF MALIGNANT NEOPLASM OF DIGESTIVE ORGANS: ICD-10-CM

## 2021-07-16 DIAGNOSIS — I83.893 VARICOSE VEINS OF BILATERAL LOWER EXTREMITIES WITH OTHER COMPLICATIONS: ICD-10-CM

## 2021-08-06 ENCOUNTER — APPOINTMENT (OUTPATIENT)
Dept: WOUND CARE | Facility: HOSPITAL | Age: 86
End: 2021-08-06
Payer: MEDICARE

## 2021-08-06 ENCOUNTER — OUTPATIENT (OUTPATIENT)
Dept: OUTPATIENT SERVICES | Facility: HOSPITAL | Age: 86
LOS: 1 days | Discharge: HOME | End: 2021-08-06
Payer: COMMERCIAL

## 2021-08-06 VITALS
BODY MASS INDEX: 22.45 KG/M2 | DIASTOLIC BLOOD PRESSURE: 69 MMHG | TEMPERATURE: 98.5 F | RESPIRATION RATE: 18 BRPM | SYSTOLIC BLOOD PRESSURE: 113 MMHG | HEIGHT: 78 IN | OXYGEN SATURATION: 97 % | WEIGHT: 194 LBS | HEART RATE: 68 BPM

## 2021-08-06 DIAGNOSIS — G20 PARKINSON'S DISEASE: ICD-10-CM

## 2021-08-06 DIAGNOSIS — Z79.899 OTHER LONG TERM (CURRENT) DRUG THERAPY: ICD-10-CM

## 2021-08-06 DIAGNOSIS — I49.3 VENTRICULAR PREMATURE DEPOLARIZATION: ICD-10-CM

## 2021-08-06 DIAGNOSIS — I83.228 VARICOSE VEINS OF LEFT LOWER EXTREMITY WITH BOTH ULCER OF OTHER PART OF LOWER EXTREMITY AND INFLAMMATION: ICD-10-CM

## 2021-08-06 DIAGNOSIS — Z80.0 FAMILY HISTORY OF MALIGNANT NEOPLASM OF DIGESTIVE ORGANS: ICD-10-CM

## 2021-08-06 DIAGNOSIS — I83.893 VARICOSE VEINS OF BILATERAL LOWER EXTREMITIES WITH OTHER COMPLICATIONS: ICD-10-CM

## 2021-08-06 DIAGNOSIS — L97.822 NON-PRESSURE CHRONIC ULCER OF OTHER PART OF LEFT LOWER LEG WITH FAT LAYER EXPOSED: ICD-10-CM

## 2021-08-06 DIAGNOSIS — Z87.891 PERSONAL HISTORY OF NICOTINE DEPENDENCE: ICD-10-CM

## 2021-08-06 DIAGNOSIS — R01.1 CARDIAC MURMUR, UNSPECIFIED: ICD-10-CM

## 2021-08-06 DIAGNOSIS — I34.0 NONRHEUMATIC MITRAL (VALVE) INSUFFICIENCY: ICD-10-CM

## 2021-08-06 DIAGNOSIS — M19.90 UNSPECIFIED OSTEOARTHRITIS, UNSPECIFIED SITE: ICD-10-CM

## 2021-08-06 PROCEDURE — 99213 OFFICE O/P EST LOW 20 MIN: CPT

## 2021-08-06 PROCEDURE — G0463: CPT

## 2021-08-06 NOTE — ASSESSMENT
[Verbal] : Verbal [Demo] : Demo [Patient] : Patient [Good - alert, interested, motivated] : Good - alert, interested, motivated [Verbalizes knowledge/Understanding] : Verbalizes knowledge/understanding [Dressing changes] : dressing changes [Skin Care] : skin care [Signs and symptoms of infection] : sign and symptoms of infection [Venous Disease] : venous disease [How and When to Call] : how and when to call [Compression Therapy] : compression therapy [Patient responsibility to plan of care] : patient responsibility to plan of care [Stable] : stable [Home] : Home [Cane] : Cane [] : Yes [FreeTextEntry2] : Compression Therapy \par Localized Wound Care \par Infection Prevention  [FreeTextEntry4] : Pt to F/U to [facility] in 2 Weeks

## 2021-08-06 NOTE — HISTORY OF PRESENT ILLNESS
[FreeTextEntry1] : 84 yo WM, here for f/u of chronic VSU involving his left lateral ankle. Healing very well with islands of epithel forming. Using own comp stockings. Pt will be away in [place] for next 2 wks. Instructed pt not to swim in pools/beaches/jacuzzi.\par 8/6./21 lateral left lower leg has improved.

## 2021-08-06 NOTE — PHYSICAL EXAM
[4 x 4] : 4 x 4  [Normal Thyroid] : the thyroid was normal [Normal Breath Sounds] : Normal breath sounds [Normal Heart Sounds] : normal heart sounds [Normal Rate and Rhythm] : normal rate and rhythm [Ankle Swelling (On Exam)] : present [Ankle Swelling On The Left] : moderate [] : of the left leg [Ankle Swelling On The Right] : mild [Alert] : alert [Oriented to Person] : oriented to person [Oriented to Place] : oriented to place [Oriented to Time] : oriented to time [Calm] : calm [JVD] : no jugular venous distention  [Abdomen Masses] : No abdominal massess [Abdomen Tenderness] : ~T ~M No abdominal tenderness [Tender] : nontender [Enlarged] : not enlarged [de-identified] : elderly WM, NAD, alert, Ox3 [FreeTextEntry1] : Left Lateral Lower Leg [FreeTextEntry2] : 3.5 [FreeTextEntry3] : 1.3 [FreeTextEntry4] : 0.1 [de-identified] : Small Serosanguineous [de-identified] : Intact [de-identified] : 90% [de-identified] : 10% [de-identified] : Pt's own compression stockings [de-identified] : Silver Alginate/ Dry Dressing & Kerlix [de-identified] : Cleansed with Normal saline\par  [de-identified] : None [de-identified] : None [de-identified] : 100% [de-identified] : Yes [de-identified] : 3x Weekly

## 2021-08-26 ENCOUNTER — OUTPATIENT (OUTPATIENT)
Dept: OUTPATIENT SERVICES | Facility: HOSPITAL | Age: 86
LOS: 1 days | Discharge: HOME | End: 2021-08-26
Payer: COMMERCIAL

## 2021-08-26 ENCOUNTER — APPOINTMENT (OUTPATIENT)
Dept: WOUND CARE | Facility: HOSPITAL | Age: 86
End: 2021-08-26
Payer: MEDICARE

## 2021-08-26 VITALS
HEART RATE: 69 BPM | HEIGHT: 78 IN | BODY MASS INDEX: 22.45 KG/M2 | OXYGEN SATURATION: 99 % | WEIGHT: 194 LBS | RESPIRATION RATE: 20 BRPM | DIASTOLIC BLOOD PRESSURE: 69 MMHG | TEMPERATURE: 97.6 F | SYSTOLIC BLOOD PRESSURE: 111 MMHG

## 2021-08-26 DIAGNOSIS — I83.228 VARICOSE VEINS OF LEFT LOWER EXTREMITY WITH BOTH ULCER OF OTHER PART OF LOWER EXTREMITY AND INFLAMMATION: ICD-10-CM

## 2021-08-26 PROCEDURE — 99213 OFFICE O/P EST LOW 20 MIN: CPT

## 2021-08-26 PROCEDURE — G0463: CPT

## 2021-08-26 NOTE — ASSESSMENT
[Verbal] : Verbal [Demo] : Demo [Patient] : Patient [Good - alert, interested, motivated] : Good - alert, interested, motivated [Verbalizes knowledge/Understanding] : Verbalizes knowledge/understanding [Dressing changes] : dressing changes [Skin Care] : skin care [Venous Disease] : venous disease [Signs and symptoms of infection] : sign and symptoms of infection [How and When to Call] : how and when to call [Compression Therapy] : compression therapy [Patient responsibility to plan of care] : patient responsibility to plan of care [] : Yes [Stable] : stable [Home] : Home [Cane] : Cane [FreeTextEntry2] : Compression Therapy \par Localized Wound Care \par Infection Prevention \par F/U 2 weeks [FreeTextEntry4] :  F/U 2 weeks

## 2021-08-26 NOTE — HISTORY OF PRESENT ILLNESS
[FreeTextEntry1] : 86 yo WM, here for f/u of a chronic LLE VSU. Only a small lateral ulcer left. Using compression stockings.

## 2021-08-26 NOTE — PHYSICAL EXAM
[4 x 4] : 4 x 4  [JVD] : no jugular venous distention  [Normal Thyroid] : the thyroid was normal [Normal Breath Sounds] : Normal breath sounds [Normal Heart Sounds] : normal heart sounds [Normal Rate and Rhythm] : normal rate and rhythm [Ankle Swelling (On Exam)] : present [Ankle Swelling On The Left] : moderate [] : of the left leg [Ankle Swelling On The Right] : mild [Abdomen Masses] : No abdominal massess [Abdomen Tenderness] : ~T ~M No abdominal tenderness [Tender] : nontender [Enlarged] : not enlarged [Alert] : alert [Oriented to Person] : oriented to person [Oriented to Place] : oriented to place [Oriented to Time] : oriented to time [Calm] : calm [de-identified] : elderly Wm, NAD, alert, Ox 3. [FreeTextEntry1] :  Lateral Lower Leg [FreeTextEntry2] : 2.1 [FreeTextEntry3] : 0.4 [FreeTextEntry4] : 0.2 [de-identified] :  Serosanguineous [de-identified] : Intact [de-identified] : 90% [de-identified] : 10% [de-identified] : Pt's own compression stockings [de-identified] : Silver Alginate [de-identified] : Cleansed with Normal saline\par  [TWNoteComboBox1] : Left [TWNoteComboBox4] : Small [de-identified] : None [de-identified] : None [de-identified] : Yes [de-identified] : 3x Weekly [de-identified] : Primary Dressing

## 2021-08-26 NOTE — PLAN
[FreeTextEntry1] : leg elevation\par ag alginate, DD, comp stockings\par f/u 2 wks\par \par time spent 20 mins.

## 2021-08-26 NOTE — VITALS
[Pain related to present condition?] : The patient's  pain is not related to present condition. [] : No [de-identified] : 0

## 2021-08-27 DIAGNOSIS — G20 PARKINSON'S DISEASE: ICD-10-CM

## 2021-08-27 DIAGNOSIS — Z79.899 OTHER LONG TERM (CURRENT) DRUG THERAPY: ICD-10-CM

## 2021-08-27 DIAGNOSIS — L97.822 NON-PRESSURE CHRONIC ULCER OF OTHER PART OF LEFT LOWER LEG WITH FAT LAYER EXPOSED: ICD-10-CM

## 2021-08-27 DIAGNOSIS — M19.90 UNSPECIFIED OSTEOARTHRITIS, UNSPECIFIED SITE: ICD-10-CM

## 2021-08-27 DIAGNOSIS — I83.893 VARICOSE VEINS OF BILATERAL LOWER EXTREMITIES WITH OTHER COMPLICATIONS: ICD-10-CM

## 2021-08-27 DIAGNOSIS — I49.3 VENTRICULAR PREMATURE DEPOLARIZATION: ICD-10-CM

## 2021-08-27 DIAGNOSIS — I83.228 VARICOSE VEINS OF LEFT LOWER EXTREMITY WITH BOTH ULCER OF OTHER PART OF LOWER EXTREMITY AND INFLAMMATION: ICD-10-CM

## 2021-08-27 DIAGNOSIS — R01.1 CARDIAC MURMUR, UNSPECIFIED: ICD-10-CM

## 2021-08-27 DIAGNOSIS — Z87.891 PERSONAL HISTORY OF NICOTINE DEPENDENCE: ICD-10-CM

## 2021-08-27 DIAGNOSIS — Z80.0 FAMILY HISTORY OF MALIGNANT NEOPLASM OF DIGESTIVE ORGANS: ICD-10-CM

## 2021-08-27 DIAGNOSIS — I34.0 NONRHEUMATIC MITRAL (VALVE) INSUFFICIENCY: ICD-10-CM

## 2021-09-06 ENCOUNTER — RX RENEWAL (OUTPATIENT)
Age: 86
End: 2021-09-06

## 2021-09-09 ENCOUNTER — OUTPATIENT (OUTPATIENT)
Dept: OUTPATIENT SERVICES | Facility: HOSPITAL | Age: 86
LOS: 1 days | Discharge: HOME | End: 2021-09-09
Payer: COMMERCIAL

## 2021-09-09 ENCOUNTER — APPOINTMENT (OUTPATIENT)
Dept: WOUND CARE | Facility: HOSPITAL | Age: 86
End: 2021-09-09
Payer: MEDICARE

## 2021-09-09 VITALS
TEMPERATURE: 98.1 F | DIASTOLIC BLOOD PRESSURE: 69 MMHG | RESPIRATION RATE: 20 BRPM | SYSTOLIC BLOOD PRESSURE: 101 MMHG | HEART RATE: 100 BPM | BODY MASS INDEX: 22.45 KG/M2 | OXYGEN SATURATION: 98 % | HEIGHT: 78 IN | WEIGHT: 194 LBS

## 2021-09-09 DIAGNOSIS — I83.218 VARICOSE VEINS OF RIGHT LOWER EXTREMITY WITH BOTH ULCER OF OTHER PART OF LOWER EXTREMITY AND INFLAMMATION: ICD-10-CM

## 2021-09-09 PROCEDURE — 99213 OFFICE O/P EST LOW 20 MIN: CPT

## 2021-09-09 PROCEDURE — G0463: CPT

## 2021-09-09 NOTE — HISTORY OF PRESENT ILLNESS
[FreeTextEntry1] : 84 yo WM, here for f/u of a chronic LLE VSU. Only a small lateral ulcer left. Using compression stockings. \par

## 2021-09-09 NOTE — PHYSICAL EXAM
[4 x 4] : 4 x 4  [JVD] : no jugular venous distention  [Normal Thyroid] : the thyroid was normal [Normal Breath Sounds] : Normal breath sounds [Normal Heart Sounds] : normal heart sounds [Normal Rate and Rhythm] : normal rate and rhythm [Ankle Swelling (On Exam)] : present [Ankle Swelling On The Left] : moderate [] : of the left leg [Ankle Swelling On The Right] : mild [Abdomen Masses] : No abdominal massess [Abdomen Tenderness] : ~T ~M No abdominal tenderness [Tender] : nontender [Enlarged] : not enlarged [Alert] : alert [Oriented to Person] : oriented to person [Oriented to Place] : oriented to place [Oriented to Time] : oriented to time [Calm] : calm [de-identified] : elderly WM, alert, Ox 3. [FreeTextEntry1] :  Lateral Lower Leg [FreeTextEntry2] : 1.0 [FreeTextEntry4] : 0.2 [FreeTextEntry3] : 0.3 [de-identified] :  Serosanguineous [de-identified] : Intact [de-identified] : 90% [de-identified] : 10% [de-identified] : Pt's own compression stockings [de-identified] : Cleansed with Normal saline\par  [de-identified] : Ani  [TWNoteComboBox1] : Left [TWNoteComboBox4] : Small [de-identified] : None [de-identified] : None [de-identified] : 3x Weekly [de-identified] : Yes [de-identified] : Primary Dressing

## 2021-09-09 NOTE — ASSESSMENT
[Verbal] : Verbal [Demo] : Demo [Patient] : Patient [Good - alert, interested, motivated] : Good - alert, interested, motivated [Verbalizes knowledge/Understanding] : Verbalizes knowledge/understanding [Dressing changes] : dressing changes [Skin Care] : skin care [Signs and symptoms of infection] : sign and symptoms of infection [Venous Disease] : venous disease [How and When to Call] : how and when to call [Compression Therapy] : compression therapy [Patient responsibility to plan of care] : patient responsibility to plan of care [] : Yes [Stable] : stable [Home] : Home [Cane] : Cane [FreeTextEntry2] : Compression Therapy \par Localized Wound Care \par Infection Prevention \par Collagen Matrix Therapy  [FreeTextEntry4] : Pt to F/U to [facility] in 2 Weeks

## 2021-09-09 NOTE — PLAN
[FreeTextEntry1] : [employer], compr. stockings\par leg elevation\par f/u 2 wks\par \par time spent 20 mins.

## 2021-09-10 DIAGNOSIS — Z87.891 PERSONAL HISTORY OF NICOTINE DEPENDENCE: ICD-10-CM

## 2021-09-10 DIAGNOSIS — I83.228 VARICOSE VEINS OF LEFT LOWER EXTREMITY WITH BOTH ULCER OF OTHER PART OF LOWER EXTREMITY AND INFLAMMATION: ICD-10-CM

## 2021-09-10 DIAGNOSIS — I49.3 VENTRICULAR PREMATURE DEPOLARIZATION: ICD-10-CM

## 2021-09-10 DIAGNOSIS — R01.1 CARDIAC MURMUR, UNSPECIFIED: ICD-10-CM

## 2021-09-10 DIAGNOSIS — I83.893 VARICOSE VEINS OF BILATERAL LOWER EXTREMITIES WITH OTHER COMPLICATIONS: ICD-10-CM

## 2021-09-10 DIAGNOSIS — L97.822 NON-PRESSURE CHRONIC ULCER OF OTHER PART OF LEFT LOWER LEG WITH FAT LAYER EXPOSED: ICD-10-CM

## 2021-09-10 DIAGNOSIS — Z79.899 OTHER LONG TERM (CURRENT) DRUG THERAPY: ICD-10-CM

## 2021-09-10 DIAGNOSIS — I34.0 NONRHEUMATIC MITRAL (VALVE) INSUFFICIENCY: ICD-10-CM

## 2021-09-10 DIAGNOSIS — Z80.0 FAMILY HISTORY OF MALIGNANT NEOPLASM OF DIGESTIVE ORGANS: ICD-10-CM

## 2021-09-10 DIAGNOSIS — G20 PARKINSON'S DISEASE: ICD-10-CM

## 2021-09-10 DIAGNOSIS — M19.90 UNSPECIFIED OSTEOARTHRITIS, UNSPECIFIED SITE: ICD-10-CM

## 2021-09-23 ENCOUNTER — OUTPATIENT (OUTPATIENT)
Dept: OUTPATIENT SERVICES | Facility: HOSPITAL | Age: 86
LOS: 1 days | Discharge: HOME | End: 2021-09-23
Payer: COMMERCIAL

## 2021-09-23 ENCOUNTER — APPOINTMENT (OUTPATIENT)
Dept: WOUND CARE | Facility: HOSPITAL | Age: 86
End: 2021-09-23
Payer: MEDICARE

## 2021-09-23 VITALS
RESPIRATION RATE: 20 BRPM | DIASTOLIC BLOOD PRESSURE: 84 MMHG | HEIGHT: 78 IN | SYSTOLIC BLOOD PRESSURE: 137 MMHG | OXYGEN SATURATION: 98 % | WEIGHT: 194 LBS | HEART RATE: 83 BPM | BODY MASS INDEX: 22.45 KG/M2 | TEMPERATURE: 97.6 F

## 2021-09-23 DIAGNOSIS — I83.228 VARICOSE VEINS OF LEFT LOWER EXTREMITY WITH BOTH ULCER OF OTHER PART OF LOWER EXTREMITY AND INFLAMMATION: ICD-10-CM

## 2021-09-23 PROCEDURE — G0463: CPT

## 2021-09-23 PROCEDURE — 99213 OFFICE O/P EST LOW 20 MIN: CPT

## 2021-09-23 NOTE — VITALS
[] : No [de-identified] : Pt rates pain 0/10.  Patient denies any pain or discomfort at the present

## 2021-09-23 NOTE — ASSESSMENT
[Verbal] : Verbal [Written] : Written [Patient] : Patient [Good - alert, interested, motivated] : Good - alert, interested, motivated [Verbalizes knowledge/Understanding] : Verbalizes knowledge/understanding [Dressing changes] : dressing changes [Skin Care] : skin care [Pressure relief] : pressure relief [Signs and symptoms of infection] : sign and symptoms of infection [Venous Disease] : venous disease [Nutrition] : nutrition [How and When to Call] : how and when to call [Compression Therapy] : compression therapy [Patient responsibility to plan of care] : patient responsibility to plan of care [Stable] : stable [Home] : Home [Cane] : Cane [Not Applicable - Long Term Care/Home Health Agency] : Long Term Care/Home Health Agency: Not Applicable [] : No [FreeTextEntry2] : Infection Prevention\par Localized Wound Care\par Promote Optimal Skin Integrity \par Maintain acceptable pain level with use of pharmacological and nonpharmacological interventions\par Collagen Matrix \par Edema Control: Compression, Elevation and Low Na+ Diet\par Compression Therapy - own compression stockings  [FreeTextEntry3] : New wound formation and increase in existing wound size  [FreeTextEntry4] : F/U to [facility] for an assessment in two weeks

## 2021-09-23 NOTE — PHYSICAL EXAM
[4 x 4] : 4 x 4  [Normal Thyroid] : the thyroid was normal [Normal Breath Sounds] : Normal breath sounds [Normal Heart Sounds] : normal heart sounds [Normal Rate and Rhythm] : normal rate and rhythm [Ankle Swelling (On Exam)] : present [Ankle Swelling On The Left] : moderate [] : of the left leg [Ankle Swelling On The Right] : mild [Alert] : alert [Oriented to Person] : oriented to person [Oriented to Place] : oriented to place [Oriented to Time] : oriented to time [Calm] : calm [JVD] : no jugular venous distention  [Abdomen Masses] : No abdominal massess [Abdomen Tenderness] : ~T ~M No abdominal tenderness [Tender] : nontender [Enlarged] : not enlarged [de-identified] : elderly WM, NAD, alert, Ox3. [FreeTextEntry1] :  Lateral Lower Leg [FreeTextEntry2] : 1.3 [FreeTextEntry3] : 0.7 [FreeTextEntry4] : 0.2 [de-identified] :  Serosanguineous [de-identified] : Intact [de-identified] : Pt's own compression stockings [de-identified] : Ani  [de-identified] : Cleansed with Normal saline\par Cloth Tape  [FreeTextEntry7] : Left Anterior Leg - NEW  [FreeTextEntry8] : 1.2 [de-identified] : 0.1 [FreeTextEntry9] : 0.9 [de-identified] : Serosanguineous  [de-identified] : Ani  [de-identified] : Cleansed with Normal Saline \par Cloth tape  [TWNoteComboBox1] : Left [TWNoteComboBox4] : Small [de-identified] : None [de-identified] : None [de-identified] : 100% [de-identified] : 3x Weekly [de-identified] : No [de-identified] : Primary Dressing [de-identified] : Small [de-identified] : Normal [de-identified] : None [de-identified] : None [de-identified] : 100% [de-identified] : No [de-identified] : 3x Weekly

## 2021-09-23 NOTE — PLAN
[FreeTextEntry1] : leg elevation\par yasemin, DD, comp. stockings.\par f/u 2 wks\par \par time spent 25 mins.

## 2021-09-23 NOTE — HISTORY OF PRESENT ILLNESS
[FreeTextEntry1] : 86 yo WM, here for f/u of chronic LLE VSU. Using yasemin and own compression stocking. A new VSU has developed on his LLE this wk. Reinforced importance of leg elevation/compression to help in healing/prevention of VSU.

## 2021-09-24 DIAGNOSIS — I83.893 VARICOSE VEINS OF BILATERAL LOWER EXTREMITIES WITH OTHER COMPLICATIONS: ICD-10-CM

## 2021-09-24 DIAGNOSIS — I83.228 VARICOSE VEINS OF LEFT LOWER EXTREMITY WITH BOTH ULCER OF OTHER PART OF LOWER EXTREMITY AND INFLAMMATION: ICD-10-CM

## 2021-09-24 DIAGNOSIS — M19.90 UNSPECIFIED OSTEOARTHRITIS, UNSPECIFIED SITE: ICD-10-CM

## 2021-09-24 DIAGNOSIS — I34.0 NONRHEUMATIC MITRAL (VALVE) INSUFFICIENCY: ICD-10-CM

## 2021-09-24 DIAGNOSIS — Z87.891 PERSONAL HISTORY OF NICOTINE DEPENDENCE: ICD-10-CM

## 2021-09-24 DIAGNOSIS — R01.1 CARDIAC MURMUR, UNSPECIFIED: ICD-10-CM

## 2021-09-24 DIAGNOSIS — Z80.0 FAMILY HISTORY OF MALIGNANT NEOPLASM OF DIGESTIVE ORGANS: ICD-10-CM

## 2021-09-24 DIAGNOSIS — Z79.899 OTHER LONG TERM (CURRENT) DRUG THERAPY: ICD-10-CM

## 2021-09-24 DIAGNOSIS — I49.3 VENTRICULAR PREMATURE DEPOLARIZATION: ICD-10-CM

## 2021-09-24 DIAGNOSIS — L97.822 NON-PRESSURE CHRONIC ULCER OF OTHER PART OF LEFT LOWER LEG WITH FAT LAYER EXPOSED: ICD-10-CM

## 2021-09-24 DIAGNOSIS — G20 PARKINSON'S DISEASE: ICD-10-CM

## 2021-10-02 ENCOUNTER — RX RENEWAL (OUTPATIENT)
Age: 86
End: 2021-10-02

## 2021-10-07 ENCOUNTER — OUTPATIENT (OUTPATIENT)
Dept: OUTPATIENT SERVICES | Facility: HOSPITAL | Age: 86
LOS: 1 days | Discharge: HOME | End: 2021-10-07
Payer: COMMERCIAL

## 2021-10-07 ENCOUNTER — APPOINTMENT (OUTPATIENT)
Dept: WOUND CARE | Facility: HOSPITAL | Age: 86
End: 2021-10-07
Payer: MEDICARE

## 2021-10-07 VITALS
DIASTOLIC BLOOD PRESSURE: 88 MMHG | HEIGHT: 78 IN | SYSTOLIC BLOOD PRESSURE: 134 MMHG | BODY MASS INDEX: 22.45 KG/M2 | TEMPERATURE: 97.3 F | WEIGHT: 194 LBS | RESPIRATION RATE: 20 BRPM | OXYGEN SATURATION: 100 % | HEART RATE: 83 BPM

## 2021-10-07 DIAGNOSIS — I49.3 VENTRICULAR PREMATURE DEPOLARIZATION: ICD-10-CM

## 2021-10-07 DIAGNOSIS — R01.1 CARDIAC MURMUR, UNSPECIFIED: ICD-10-CM

## 2021-10-07 DIAGNOSIS — I34.0 NONRHEUMATIC MITRAL (VALVE) INSUFFICIENCY: ICD-10-CM

## 2021-10-07 DIAGNOSIS — I83.228 VARICOSE VEINS OF LEFT LOWER EXTREMITY WITH BOTH ULCER OF OTHER PART OF LOWER EXTREMITY AND INFLAMMATION: ICD-10-CM

## 2021-10-07 DIAGNOSIS — Z79.899 OTHER LONG TERM (CURRENT) DRUG THERAPY: ICD-10-CM

## 2021-10-07 DIAGNOSIS — L97.822 NON-PRESSURE CHRONIC ULCER OF OTHER PART OF LEFT LOWER LEG WITH FAT LAYER EXPOSED: ICD-10-CM

## 2021-10-07 DIAGNOSIS — I83.893 VARICOSE VEINS OF BILATERAL LOWER EXTREMITIES WITH OTHER COMPLICATIONS: ICD-10-CM

## 2021-10-07 DIAGNOSIS — Z80.0 FAMILY HISTORY OF MALIGNANT NEOPLASM OF DIGESTIVE ORGANS: ICD-10-CM

## 2021-10-07 DIAGNOSIS — G20 PARKINSON'S DISEASE: ICD-10-CM

## 2021-10-07 DIAGNOSIS — Z87.891 PERSONAL HISTORY OF NICOTINE DEPENDENCE: ICD-10-CM

## 2021-10-07 DIAGNOSIS — M19.90 UNSPECIFIED OSTEOARTHRITIS, UNSPECIFIED SITE: ICD-10-CM

## 2021-10-07 PROCEDURE — 99213 OFFICE O/P EST LOW 20 MIN: CPT

## 2021-10-07 PROCEDURE — G0463: CPT

## 2021-10-07 NOTE — PLAN
[FreeTextEntry1] : leg elevation\par yasemin, DD, ace\par f/u 2 wks\par \par \par time spent 25 mins.

## 2021-10-07 NOTE — PHYSICAL EXAM
[JVD] : no jugular venous distention  [Normal Thyroid] : the thyroid was normal [Normal Breath Sounds] : Normal breath sounds [Normal Heart Sounds] : normal heart sounds [Normal Rate and Rhythm] : normal rate and rhythm [Ankle Swelling (On Exam)] : present [] : of the left leg [Ankle Swelling On The Left] : moderate [Abdomen Masses] : No abdominal massess [Abdomen Tenderness] : ~T ~M No abdominal tenderness [Tender] : nontender [Enlarged] : not enlarged [Alert] : alert [Oriented to Person] : oriented to person [Oriented to Place] : oriented to place [Oriented to Time] : oriented to time [Calm] : calm [de-identified] : elderly WM, NAD, alert, Ox 3. [FreeTextEntry1] : Left Lateral Lower Leg [FreeTextEntry2] : 1.7 [FreeTextEntry3] : 0.9 [FreeTextEntry4] : 0.2 [de-identified] : Serosanguineous [de-identified] : 25% [de-identified] : Ani [de-identified] : Cleansed with Normal Saline\par  [FreeTextEntry7] : Left Anterior Leg- Dry Eschar [FreeTextEntry8] : 0.7 [FreeTextEntry9] : 0.4 [de-identified] : 0.1 [de-identified] : No Product [de-identified] : Cleansed with Normal Saline\par Allevyn Border [TWNoteComboBox4] : Small [TWNoteComboBox5] : No [de-identified] : No [de-identified] : Normal [de-identified] : None [de-identified] : None [de-identified] : >75% [de-identified] : Yes [de-identified] : 3x Weekly [de-identified] : None [de-identified] : No [de-identified] : No [de-identified] : Normal [de-identified] : None [de-identified] : 100% [de-identified] : None [de-identified] : No

## 2021-10-07 NOTE — ASSESSMENT
[Verbal] : Verbal [Patient] : Patient [Good - alert, interested, motivated] : Good - alert, interested, motivated [Verbalizes knowledge/Understanding] : Verbalizes knowledge/understanding [Dressing changes] : dressing changes [Foot Care] : foot care [Skin Care] : skin care [Signs and symptoms of infection] : sign and symptoms of infection [How and When to Call] : how and when to call [Patient responsibility to plan of care] : patient responsibility to plan of care [Stable] : stable [Home] : Home [Ambulatory] : Ambulatory [Not Applicable - Long Term Care/Home Health Agency] : Long Term Care/Home Health Agency: Not Applicable [] : No [FreeTextEntry2] : Restore Skin Integrity\par Infection Control\par Localized wound care\par Maintain acceptable pain levels at satisfactory relief.\par Demonstrates use of both nonpharmacological and pharmacological pain relief strategies [FreeTextEntry4] : F/U to [facility] in 2 weeks

## 2021-10-28 ENCOUNTER — APPOINTMENT (OUTPATIENT)
Dept: WOUND CARE | Facility: HOSPITAL | Age: 86
End: 2021-10-28
Payer: MEDICARE

## 2021-10-28 ENCOUNTER — OUTPATIENT (OUTPATIENT)
Dept: OUTPATIENT SERVICES | Facility: HOSPITAL | Age: 86
LOS: 1 days | Discharge: HOME | End: 2021-10-28
Payer: COMMERCIAL

## 2021-10-28 VITALS
TEMPERATURE: 208.4 F | DIASTOLIC BLOOD PRESSURE: 71 MMHG | BODY MASS INDEX: 22.45 KG/M2 | RESPIRATION RATE: 18 BRPM | SYSTOLIC BLOOD PRESSURE: 123 MMHG | HEIGHT: 78 IN | OXYGEN SATURATION: 100 % | HEART RATE: 82 BPM | WEIGHT: 194 LBS

## 2021-10-28 DIAGNOSIS — I83.228 VARICOSE VEINS OF LEFT LOWER EXTREMITY WITH BOTH ULCER OF OTHER PART OF LOWER EXTREMITY AND INFLAMMATION: ICD-10-CM

## 2021-10-28 DIAGNOSIS — I34.0 NONRHEUMATIC MITRAL (VALVE) INSUFFICIENCY: ICD-10-CM

## 2021-10-28 DIAGNOSIS — M19.90 UNSPECIFIED OSTEOARTHRITIS, UNSPECIFIED SITE: ICD-10-CM

## 2021-10-28 DIAGNOSIS — R01.1 CARDIAC MURMUR, UNSPECIFIED: ICD-10-CM

## 2021-10-28 DIAGNOSIS — I83.893 VARICOSE VEINS OF BILATERAL LOWER EXTREMITIES WITH OTHER COMPLICATIONS: ICD-10-CM

## 2021-10-28 DIAGNOSIS — Z80.0 FAMILY HISTORY OF MALIGNANT NEOPLASM OF DIGESTIVE ORGANS: ICD-10-CM

## 2021-10-28 DIAGNOSIS — L97.822 NON-PRESSURE CHRONIC ULCER OF OTHER PART OF LEFT LOWER LEG WITH FAT LAYER EXPOSED: ICD-10-CM

## 2021-10-28 DIAGNOSIS — G20 PARKINSON'S DISEASE: ICD-10-CM

## 2021-10-28 DIAGNOSIS — Z87.891 PERSONAL HISTORY OF NICOTINE DEPENDENCE: ICD-10-CM

## 2021-10-28 DIAGNOSIS — I49.3 VENTRICULAR PREMATURE DEPOLARIZATION: ICD-10-CM

## 2021-10-28 DIAGNOSIS — Z79.899 OTHER LONG TERM (CURRENT) DRUG THERAPY: ICD-10-CM

## 2021-10-28 PROCEDURE — G0463: CPT

## 2021-10-28 PROCEDURE — 99213 OFFICE O/P EST LOW 20 MIN: CPT

## 2021-10-28 NOTE — PHYSICAL EXAM
[4 x 4] : 4 x 4  [FreeTextEntry1] : Left Lateral Lower Leg [FreeTextEntry2] : 1.4 [FreeTextEntry3] : 0.7 [FreeTextEntry4] : 0.1-0.2 [de-identified] : Serosanguineous [de-identified] : 25% [de-identified] : Ani [de-identified] : Cleansed with Normal Saline\par  [FreeTextEntry7] : Left Anterior Leg- Dry Eschar [FreeTextEntry8] : 0.7 [FreeTextEntry9] : 0.4 [de-identified] : 0.1 [de-identified] : No Product [de-identified] : Cleansed with Normal Saline\par Allevyn Border [TWNoteComboBox4] : Small [TWNoteComboBox5] : No [de-identified] : No [de-identified] : Normal [de-identified] : None [de-identified] : None [de-identified] : >75% [de-identified] : Yes [de-identified] : 3x Weekly [de-identified] : None [de-identified] : No [de-identified] : No [de-identified] : Normal [de-identified] : None [de-identified] : 100% [de-identified] : None [de-identified] : No

## 2021-10-28 NOTE — PLAN
[FreeTextEntry1] : 20 minutes spent reviewing records, assessing patient and discussing ongoing wound care/management.\par Left lateral leg ulcer, clean base.  Healing nicely.\par Continue local wound care:  Ani 3x/wk with compressive stockings\par \par f/u in 2 weeks.

## 2021-10-28 NOTE — ASSESSMENT
[Verbal] : Verbal [Patient] : Patient [Good - alert, interested, motivated] : Good - alert, interested, motivated [Verbalizes knowledge/Understanding] : Verbalizes knowledge/understanding [Dressing changes] : dressing changes [Foot Care] : foot care [Skin Care] : skin care [Signs and symptoms of infection] : sign and symptoms of infection [How and When to Call] : how and when to call [Patient responsibility to plan of care] : patient responsibility to plan of care [Stable] : stable [Home] : Home [] : Yes [Cane] : Cane [FreeTextEntry2] : Collagen Matrix Therapy \par Localized Wound Care \par Infection Prevention\par Restore Optimal Skin Integrity\par Compression Therapy  [FreeTextEntry4] : Pt to F/U to [facility] in 2 weeks

## 2021-10-29 ENCOUNTER — APPOINTMENT (OUTPATIENT)
Dept: NEUROLOGY | Facility: CLINIC | Age: 86
End: 2021-10-29
Payer: MEDICARE

## 2021-10-29 VITALS — SYSTOLIC BLOOD PRESSURE: 146 MMHG | DIASTOLIC BLOOD PRESSURE: 74 MMHG | HEART RATE: 55 BPM

## 2021-10-29 PROCEDURE — 96132 NRPSYC TST EVAL PHYS/QHP 1ST: CPT | Mod: 59

## 2021-10-29 PROCEDURE — 99214 OFFICE O/P EST MOD 30 MIN: CPT | Mod: 25

## 2021-10-29 RX ORDER — CARBIDOPA AND LEVODOPA 25; 100 MG/1; MG/1
25-100 TABLET ORAL 3 TIMES DAILY
Refills: 0 | Status: COMPLETED | COMMUNITY
End: 2021-10-29

## 2021-10-29 NOTE — DISCUSSION/SUMMARY
[FreeTextEntry1] : 85-year-old RH male with PMHx of HTN, Knee arthritis, venous insufficiency; diagnosed with Parkinson's disease few years ago, presents for evaluation of mildly memory loss. initial MoCA score 26/30\par \par # Mild-moderate cognitive impairment; Early Dementia; Cognitive test reveals Global score 99.3, one SD below average not noted in any domains, below average in 5 domains; memory, exec function, attention, information processing speed and motor skills. \par \par - Recommended continued donepezil 10 mg daily\par - Advised patient to continue cognitive exercises, stay physically and socially active\par \par # Parkinsons ds, unstable gait\par \par - Continue carbidopa levodopa 25/100 mg 3 times a day.\par - physical therapy for balance and gait training\par - F/U in 3 months

## 2021-10-29 NOTE — PROCEDURE
[FreeTextEntry1] : Global cognitive score 99.3\par More than one standard deviation below average in domains: None\par Below average in domains: Memory 88.1, executive function 97.3, attention 99.9, information processing speed 98.6,  motor skills 90.4\par Above average in domains: visual-spatial 115.9, verbal function 104.6

## 2021-10-29 NOTE — REASON FOR VISIT
[Follow-Up: _____] : a [unfilled] follow-up visit [Spouse] : spouse [FreeTextEntry1] : for memory issues, Parkinson's disease and for cognitive assessment

## 2021-10-29 NOTE — DATA REVIEWED
[No studies available for review at this time.] : No studies available for review at this time. [de-identified] : 2/4/21: MRI brain - no acute findings [de-identified] : 3/15/21- Labs: B12, TSH level normal

## 2021-10-29 NOTE — HISTORY OF PRESENT ILLNESS
[FreeTextEntry1] : Pt is here for a followup visit today, last seen on 6/4/21. Patient reports he is taking Donepezil 10 mg daily, has not had any adverse effects, he Is not noted any significant change, is here for cognitive assessment today.\par \par Patient also reports that he continues to have tremors of hands, his fine motor movements are slow, he continues to have unstable gait and is using a cane, he also has severe pain in both knees, this makes his walking more difficult. Patient is taking carbidopa levodopa 25/100 mg 3 times a day, no adverse affects\par \par

## 2021-10-29 NOTE — PHYSICAL EXAM
[General Appearance - Alert] : alert [General Appearance - In No Acute Distress] : in no acute distress [General Appearance - Well-Appearing] : healthy appearing [Mood] : the mood was normal [Person] : oriented to person [Place] : oriented to place [Time] : oriented to time [Registration Intact] : recent registration memory intact [Concentration Intact] : normal concentrating ability [Naming Objects] : no difficulty naming common objects [Repeating Phrases] : no difficulty repeating a phrase [Fluency] : fluency intact [Comprehension] : comprehension intact [Past History] : adequate knowledge of personal past history [Cranial Nerves Optic (II)] : visual acuity intact bilaterally,  visual fields full to confrontation, pupils equal round and reactive to light [Cranial Nerves Oculomotor (III)] : extraocular motion intact [Cranial Nerves Trigeminal (V)] : facial sensation intact symmetrically [Cranial Nerves Facial (VII)] : face symmetrical [Cranial Nerves Vestibulocochlear (VIII)] : hearing was intact bilaterally [Cranial Nerves Glossopharyngeal (IX)] : tongue and palate midline [Cranial Nerves Accessory (XI - Cranial And Spinal)] : head turning and shoulder shrug symmetric [Cranial Nerves Hypoglossal (XII)] : there was no tongue deviation with protrusion [No Muscle Atrophy] : normal bulk in all four extremities [Sensation Tactile Decrease] : light touch was intact [2+] : Brachioradialis left 2+ [1+] : Patella left 1+ [0] : Ankle jerk left 0 [Oropharynx] : the oropharynx was normal [Past-pointing] : there was no past-pointing [Tremor] : no tremor present [Coordination - Dysmetria Impaired Finger-to-Nose Bilateral] : not present [Plantar Reflex Right Only] : normal on the right [Plantar Reflex Left Only] : normal on the left [FreeTextEntry1] : Hypomimia with mild hypophonia and mild micrographia.\par No dysarthria\par Constant pill-rolling tremors right >left hand, rigidity and bradykinesia and decreased finger tapping on the right. Raul posture with small shuffling steps decreased right arm swing\par

## 2021-10-29 NOTE — REVIEW OF SYSTEMS
[As Noted in HPI] : as noted in HPI [Memory Lapses or Loss] : memory loss [Decr. Concentrating Ability] : decreased concentrating ability [Poor Coordination] : poor coordination [Lower Ext Edema] : lower extremity edema [Negative] : Heme/Lymph [Palpitations] : no palpitations [Diarrhea] : no diarrhea [FreeTextEntry9] : Arthritis knees

## 2021-11-10 ENCOUNTER — RX RENEWAL (OUTPATIENT)
Age: 86
End: 2021-11-10

## 2021-11-11 ENCOUNTER — APPOINTMENT (OUTPATIENT)
Dept: WOUND CARE | Facility: HOSPITAL | Age: 86
End: 2021-11-11
Payer: MEDICARE

## 2021-11-11 ENCOUNTER — OUTPATIENT (OUTPATIENT)
Dept: OUTPATIENT SERVICES | Facility: HOSPITAL | Age: 86
LOS: 1 days | Discharge: HOME | End: 2021-11-11
Payer: COMMERCIAL

## 2021-11-11 VITALS
RESPIRATION RATE: 18 BRPM | DIASTOLIC BLOOD PRESSURE: 78 MMHG | BODY MASS INDEX: 22.45 KG/M2 | HEIGHT: 78 IN | WEIGHT: 194 LBS | HEART RATE: 72 BPM | OXYGEN SATURATION: 95 % | SYSTOLIC BLOOD PRESSURE: 130 MMHG | TEMPERATURE: 207.5 F

## 2021-11-11 DIAGNOSIS — G20 PARKINSON'S DISEASE: ICD-10-CM

## 2021-11-11 DIAGNOSIS — I83.228 VARICOSE VEINS OF LEFT LOWER EXTREMITY WITH BOTH ULCER OF OTHER PART OF LOWER EXTREMITY AND INFLAMMATION: ICD-10-CM

## 2021-11-11 DIAGNOSIS — I83.893 VARICOSE VEINS OF BILATERAL LOWER EXTREMITIES WITH OTHER COMPLICATIONS: ICD-10-CM

## 2021-11-11 DIAGNOSIS — R01.1 CARDIAC MURMUR, UNSPECIFIED: ICD-10-CM

## 2021-11-11 DIAGNOSIS — I49.3 VENTRICULAR PREMATURE DEPOLARIZATION: ICD-10-CM

## 2021-11-11 DIAGNOSIS — L97.822 NON-PRESSURE CHRONIC ULCER OF OTHER PART OF LEFT LOWER LEG WITH FAT LAYER EXPOSED: ICD-10-CM

## 2021-11-11 DIAGNOSIS — I34.0 NONRHEUMATIC MITRAL (VALVE) INSUFFICIENCY: ICD-10-CM

## 2021-11-11 DIAGNOSIS — M19.90 UNSPECIFIED OSTEOARTHRITIS, UNSPECIFIED SITE: ICD-10-CM

## 2021-11-11 DIAGNOSIS — Z87.891 PERSONAL HISTORY OF NICOTINE DEPENDENCE: ICD-10-CM

## 2021-11-11 DIAGNOSIS — Z79.899 OTHER LONG TERM (CURRENT) DRUG THERAPY: ICD-10-CM

## 2021-11-11 DIAGNOSIS — Z80.0 FAMILY HISTORY OF MALIGNANT NEOPLASM OF DIGESTIVE ORGANS: ICD-10-CM

## 2021-11-11 PROCEDURE — 99213 OFFICE O/P EST LOW 20 MIN: CPT

## 2021-11-11 PROCEDURE — G0463: CPT

## 2021-11-24 ENCOUNTER — APPOINTMENT (OUTPATIENT)
Dept: WOUND CARE | Facility: HOSPITAL | Age: 86
End: 2021-11-24
Payer: COMMERCIAL

## 2021-11-24 ENCOUNTER — OUTPATIENT (OUTPATIENT)
Dept: OUTPATIENT SERVICES | Facility: HOSPITAL | Age: 86
LOS: 1 days | Discharge: HOME | End: 2021-11-24
Payer: COMMERCIAL

## 2021-11-24 VITALS
DIASTOLIC BLOOD PRESSURE: 74 MMHG | TEMPERATURE: 207.68 F | OXYGEN SATURATION: 100 % | RESPIRATION RATE: 18 BRPM | HEART RATE: 67 BPM | HEIGHT: 78 IN | SYSTOLIC BLOOD PRESSURE: 148 MMHG | WEIGHT: 194 LBS | BODY MASS INDEX: 22.45 KG/M2

## 2021-11-24 DIAGNOSIS — I83.228 VARICOSE VEINS OF LEFT LOWER EXTREMITY WITH BOTH ULCER OF OTHER PART OF LOWER EXTREMITY AND INFLAMMATION: ICD-10-CM

## 2021-11-24 PROCEDURE — G0463: CPT

## 2021-11-24 PROCEDURE — 99213 OFFICE O/P EST LOW 20 MIN: CPT

## 2021-11-24 NOTE — PHYSICAL EXAM
[Normal Thyroid] : the thyroid was normal [Normal Breath Sounds] : Normal breath sounds [Normal Heart Sounds] : normal heart sounds [Normal Rate and Rhythm] : normal rate and rhythm [Alert] : alert [Oriented to Person] : oriented to person [Oriented to Place] : oriented to place [Oriented to Time] : oriented to time [Calm] : calm [4 x 4] : 4 x 4  [Abdominal Pad] : Abdominal Pad [JVD] : no jugular venous distention  [Abdomen Masses] : No abdominal massess [Abdomen Tenderness] : ~T ~M No abdominal tenderness [Tender] : nontender [Enlarged] : not enlarged [de-identified] : elderly WM, NAD, alert, Ox 3 [de-identified] : Patient verbalized comfort post compression application [FreeTextEntry1] : Lateral Lower Leg [FreeTextEntry2] : 6.7 [FreeTextEntry3] : 7.8 [FreeTextEntry4] : 0.1-0.3 [de-identified] : Serosanguineous [de-identified] : 75% [de-identified] : 25% [de-identified] : No neurovascular deficits noted. [de-identified] : Silver Alginate [de-identified] : Cleansed with Normal Saline\par  [FreeTextEntry7] :  Anterior Leg- CLOSED [de-identified] : No treatment required [de-identified] : Cleansed with Normal Saline\par  [TWNoteComboBox1] : Left [TWNoteComboBox4] : Moderate [TWNoteComboBox5] : No [de-identified] : No [de-identified] : Normal [de-identified] : None [de-identified] : None [de-identified] : >75% [de-identified] : Yes [de-identified] : Ace wraps [de-identified] : 3x Weekly [de-identified] : Primary Dressing [TWNoteComboBox9] : Left [de-identified] : None [de-identified] : Yes [de-identified] : No [de-identified] : Normal [de-identified] : None [de-identified] : None [de-identified] : None [de-identified] : No [de-identified] : 3x Weekly [de-identified] : Primary Dressing

## 2021-11-24 NOTE — ASSESSMENT
[Verbal] : Verbal [Patient] : Patient [Good - alert, interested, motivated] : Good - alert, interested, motivated [Verbalizes knowledge/Understanding] : Verbalizes knowledge/understanding [Dressing changes] : dressing changes [Foot Care] : foot care [Skin Care] : skin care [Signs and symptoms of infection] : sign and symptoms of infection [How and When to Call] : how and when to call [Patient responsibility to plan of care] : patient responsibility to plan of care [Stable] : stable [Home] : Home [Cane] : Cane [] : No [FreeTextEntry2] : Localized Wound Care \par Infection Prevention\par Restore Optimal Skin Integrity\par Compression Therapy \par F/U 1 week [FreeTextEntry4] : F/U 1 week [FreeTextEntry1] : Left lateral lower extremity ulcer with increased surrounding excoriation.  NO purulence or exudate.\par Had been applying yasemin\par \par Tissue bed deep red in color and appears viable.  Will change dressing to alginate to improve absorption\par \par Vascular studies have been ordered and remain pending.  Pt does have strong DP pulse.  Lower extremity and ankles remain edematous.\par \par Elevate at rest.\par \par f/u one week.\par \par 20 minutes spent reviewing records, assessing patient and discussing ongoing wound care/management.\par

## 2021-11-24 NOTE — VITALS
[Pain related to present condition?] : The patient's  pain is not related to present condition. [] : No [de-identified] : 0

## 2021-11-26 DIAGNOSIS — I83.893 VARICOSE VEINS OF BILATERAL LOWER EXTREMITIES WITH OTHER COMPLICATIONS: ICD-10-CM

## 2021-11-26 DIAGNOSIS — Z80.0 FAMILY HISTORY OF MALIGNANT NEOPLASM OF DIGESTIVE ORGANS: ICD-10-CM

## 2021-11-26 DIAGNOSIS — I34.0 NONRHEUMATIC MITRAL (VALVE) INSUFFICIENCY: ICD-10-CM

## 2021-11-26 DIAGNOSIS — I83.228 VARICOSE VEINS OF LEFT LOWER EXTREMITY WITH BOTH ULCER OF OTHER PART OF LOWER EXTREMITY AND INFLAMMATION: ICD-10-CM

## 2021-11-26 DIAGNOSIS — G20 PARKINSON'S DISEASE: ICD-10-CM

## 2021-11-26 DIAGNOSIS — M19.90 UNSPECIFIED OSTEOARTHRITIS, UNSPECIFIED SITE: ICD-10-CM

## 2021-11-26 DIAGNOSIS — Z79.899 OTHER LONG TERM (CURRENT) DRUG THERAPY: ICD-10-CM

## 2021-11-26 DIAGNOSIS — Z87.891 PERSONAL HISTORY OF NICOTINE DEPENDENCE: ICD-10-CM

## 2021-11-26 DIAGNOSIS — I49.3 VENTRICULAR PREMATURE DEPOLARIZATION: ICD-10-CM

## 2021-11-26 DIAGNOSIS — R01.1 CARDIAC MURMUR, UNSPECIFIED: ICD-10-CM

## 2021-11-26 DIAGNOSIS — L97.822 NON-PRESSURE CHRONIC ULCER OF OTHER PART OF LEFT LOWER LEG WITH FAT LAYER EXPOSED: ICD-10-CM

## 2021-12-01 ENCOUNTER — NON-APPOINTMENT (OUTPATIENT)
Age: 86
End: 2021-12-01

## 2021-12-01 ENCOUNTER — OUTPATIENT (OUTPATIENT)
Dept: OUTPATIENT SERVICES | Facility: HOSPITAL | Age: 86
LOS: 1 days | Discharge: HOME | End: 2021-12-01
Payer: COMMERCIAL

## 2021-12-01 ENCOUNTER — APPOINTMENT (OUTPATIENT)
Dept: WOUND CARE | Facility: HOSPITAL | Age: 86
End: 2021-12-01
Payer: COMMERCIAL

## 2021-12-01 VITALS
WEIGHT: 194 LBS | DIASTOLIC BLOOD PRESSURE: 71 MMHG | OXYGEN SATURATION: 100 % | SYSTOLIC BLOOD PRESSURE: 136 MMHG | BODY MASS INDEX: 22.45 KG/M2 | TEMPERATURE: 207.5 F | HEIGHT: 78 IN | HEART RATE: 75 BPM | RESPIRATION RATE: 18 BRPM

## 2021-12-01 DIAGNOSIS — I83.228 VARICOSE VEINS OF LEFT LOWER EXTREMITY WITH BOTH ULCER OF OTHER PART OF LOWER EXTREMITY AND INFLAMMATION: ICD-10-CM

## 2021-12-01 PROCEDURE — G0463: CPT

## 2021-12-01 PROCEDURE — 99213 OFFICE O/P EST LOW 20 MIN: CPT

## 2021-12-01 NOTE — PLAN
[FreeTextEntry1] : AgAlginate, DD, Ace Wraps QOD\par leg elevation\par Vascular studies pending\par Will need also to consider bx of ulcer site\par f/u 1 wk\par \par time spent 25 mins.

## 2021-12-01 NOTE — VITALS
[] : No [de-identified] : Pain scale:  0/10 - Patient reports no c/o pains or discomforts at present.

## 2021-12-01 NOTE — HISTORY OF PRESENT ILLNESS
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 Using yasemin.No change in the VSU size. Pt had used apligrafs as well as epifix  several times in 2020.\par \par 12/1/21 some areas of questionable epithelium

## 2021-12-01 NOTE — PHYSICAL EXAM
[4 x 4] : 4 x 4  [Abdominal Pad] : Abdominal Pad [JVD] : no jugular venous distention  [Normal Thyroid] : the thyroid was normal [Normal Breath Sounds] : Normal breath sounds [Normal Heart Sounds] : normal heart sounds [Normal Rate and Rhythm] : normal rate and rhythm [Abdomen Masses] : No abdominal massess [Abdomen Tenderness] : ~T ~M No abdominal tenderness [Tender] : nontender [Enlarged] : not enlarged [Alert] : alert [Oriented to Person] : oriented to person [Oriented to Place] : oriented to place [Oriented to Time] : oriented to time [Calm] : calm [de-identified] : elderly WM, NAD, alert, Ox 3 [FreeTextEntry1] : Left lateral lower leg [FreeTextEntry2] : 7.8 [FreeTextEntry3] : 6.6 [FreeTextEntry4] : 0.1 - 0.3 [de-identified] : moderate serosanguineous [de-identified] : mild erythema [de-identified] : mild [de-identified] : 80% [de-identified] : 20% [de-identified] : alginate Ag [de-identified] : NSC [de-identified] : Ace wraps [de-identified] : Every other day

## 2021-12-01 NOTE — ASSESSMENT
[Verbal] : Verbal [Patient] : Patient [Good - alert, interested, motivated] : Good - alert, interested, motivated [Verbalizes knowledge/Understanding] : Verbalizes knowledge/understanding [Dressing changes] : dressing changes [Skin Care] : skin care [Signs and symptoms of infection] : sign and symptoms of infection [Venous Disease] : venous disease [How and When to Call] : how and when to call [Compression Therapy] : compression therapy [Patient responsibility to plan of care] : patient responsibility to plan of care [Stable] : stable [Home] : Home [Cane] : Cane [Not Applicable - Long Term Care/Home Health Agency] : Long Term Care/Home Health Agency: Not Applicable [] : No [FreeTextEntry2] : Promote optimal skin integrity, infection prevention, edema control\par  [FreeTextEntry3] : no change in wound size, mild odor and mild erythema [FreeTextEntry4] : Patient reports that he is scheduled to have arterial vascular studies done in 1/2022.\par \par f/u 1 week

## 2021-12-02 DIAGNOSIS — Z79.899 OTHER LONG TERM (CURRENT) DRUG THERAPY: ICD-10-CM

## 2021-12-02 DIAGNOSIS — I49.3 VENTRICULAR PREMATURE DEPOLARIZATION: ICD-10-CM

## 2021-12-02 DIAGNOSIS — M19.90 UNSPECIFIED OSTEOARTHRITIS, UNSPECIFIED SITE: ICD-10-CM

## 2021-12-02 DIAGNOSIS — G20 PARKINSON'S DISEASE: ICD-10-CM

## 2021-12-02 DIAGNOSIS — I83.893 VARICOSE VEINS OF BILATERAL LOWER EXTREMITIES WITH OTHER COMPLICATIONS: ICD-10-CM

## 2021-12-02 DIAGNOSIS — R01.1 CARDIAC MURMUR, UNSPECIFIED: ICD-10-CM

## 2021-12-02 DIAGNOSIS — Z80.0 FAMILY HISTORY OF MALIGNANT NEOPLASM OF DIGESTIVE ORGANS: ICD-10-CM

## 2021-12-02 DIAGNOSIS — I34.0 NONRHEUMATIC MITRAL (VALVE) INSUFFICIENCY: ICD-10-CM

## 2021-12-02 DIAGNOSIS — L97.822 NON-PRESSURE CHRONIC ULCER OF OTHER PART OF LEFT LOWER LEG WITH FAT LAYER EXPOSED: ICD-10-CM

## 2021-12-02 DIAGNOSIS — Z87.891 PERSONAL HISTORY OF NICOTINE DEPENDENCE: ICD-10-CM

## 2021-12-02 DIAGNOSIS — I83.228 VARICOSE VEINS OF LEFT LOWER EXTREMITY WITH BOTH ULCER OF OTHER PART OF LOWER EXTREMITY AND INFLAMMATION: ICD-10-CM

## 2021-12-08 ENCOUNTER — APPOINTMENT (OUTPATIENT)
Dept: WOUND CARE | Facility: HOSPITAL | Age: 86
End: 2021-12-08
Payer: MEDICARE

## 2021-12-08 ENCOUNTER — OUTPATIENT (OUTPATIENT)
Dept: OUTPATIENT SERVICES | Facility: HOSPITAL | Age: 86
LOS: 1 days | Discharge: HOME | End: 2021-12-08
Payer: COMMERCIAL

## 2021-12-08 ENCOUNTER — INPATIENT (INPATIENT)
Facility: HOSPITAL | Age: 86
LOS: 4 days | Discharge: HOME | DRG: 603 | End: 2021-12-13
Attending: INTERNAL MEDICINE | Admitting: INTERNAL MEDICINE
Payer: COMMERCIAL

## 2021-12-08 VITALS
RESPIRATION RATE: 18 BRPM | DIASTOLIC BLOOD PRESSURE: 84 MMHG | OXYGEN SATURATION: 99 % | HEART RATE: 68 BPM | HEIGHT: 78 IN | BODY MASS INDEX: 22.45 KG/M2 | SYSTOLIC BLOOD PRESSURE: 139 MMHG | WEIGHT: 194 LBS | TEMPERATURE: 97.8 F

## 2021-12-08 VITALS
OXYGEN SATURATION: 96 % | DIASTOLIC BLOOD PRESSURE: 70 MMHG | HEART RATE: 81 BPM | TEMPERATURE: 98 F | RESPIRATION RATE: 16 BRPM | WEIGHT: 192.02 LBS | SYSTOLIC BLOOD PRESSURE: 160 MMHG

## 2021-12-08 DIAGNOSIS — T14.8XXA OTHER INJURY OF UNSPECIFIED BODY REGION, INITIAL ENCOUNTER: ICD-10-CM

## 2021-12-08 DIAGNOSIS — I83.228 VARICOSE VEINS OF LEFT LOWER EXTREMITY WITH BOTH ULCER OF OTHER PART OF LOWER EXTREMITY AND INFLAMMATION: ICD-10-CM

## 2021-12-08 LAB
ALBUMIN SERPL ELPH-MCNC: 2.9 G/DL — LOW (ref 3.3–5)
ALP SERPL-CCNC: 65 U/L — SIGNIFICANT CHANGE UP (ref 40–120)
ALT FLD-CCNC: 12 U/L — SIGNIFICANT CHANGE UP (ref 12–78)
ANION GAP SERPL CALC-SCNC: 3 MMOL/L — LOW (ref 5–17)
AST SERPL-CCNC: 14 U/L — LOW (ref 15–37)
BASOPHILS # BLD AUTO: 0.04 K/UL — SIGNIFICANT CHANGE UP (ref 0–0.2)
BASOPHILS NFR BLD AUTO: 0.5 % — SIGNIFICANT CHANGE UP (ref 0–2)
BILIRUB SERPL-MCNC: 0.6 MG/DL — SIGNIFICANT CHANGE UP (ref 0.2–1.2)
BUN SERPL-MCNC: 18 MG/DL — SIGNIFICANT CHANGE UP (ref 7–23)
CALCIUM SERPL-MCNC: 8.3 MG/DL — LOW (ref 8.5–10.1)
CHLORIDE SERPL-SCNC: 106 MMOL/L — SIGNIFICANT CHANGE UP (ref 96–108)
CO2 SERPL-SCNC: 33 MMOL/L — HIGH (ref 22–31)
CREAT SERPL-MCNC: 1 MG/DL — SIGNIFICANT CHANGE UP (ref 0.5–1.3)
EOSINOPHIL # BLD AUTO: 0.09 K/UL — SIGNIFICANT CHANGE UP (ref 0–0.5)
EOSINOPHIL NFR BLD AUTO: 1.1 % — SIGNIFICANT CHANGE UP (ref 0–6)
GLUCOSE SERPL-MCNC: 95 MG/DL — SIGNIFICANT CHANGE UP (ref 70–99)
HCT VFR BLD CALC: 38 % — LOW (ref 39–50)
HGB BLD-MCNC: 12.6 G/DL — LOW (ref 13–17)
IMM GRANULOCYTES NFR BLD AUTO: 0.4 % — SIGNIFICANT CHANGE UP (ref 0–1.5)
LYMPHOCYTES # BLD AUTO: 0.88 K/UL — LOW (ref 1–3.3)
LYMPHOCYTES # BLD AUTO: 11 % — LOW (ref 13–44)
MCHC RBC-ENTMCNC: 30.8 PG — SIGNIFICANT CHANGE UP (ref 27–34)
MCHC RBC-ENTMCNC: 33.2 GM/DL — SIGNIFICANT CHANGE UP (ref 32–36)
MCV RBC AUTO: 92.9 FL — SIGNIFICANT CHANGE UP (ref 80–100)
MONOCYTES # BLD AUTO: 0.88 K/UL — SIGNIFICANT CHANGE UP (ref 0–0.9)
MONOCYTES NFR BLD AUTO: 11 % — SIGNIFICANT CHANGE UP (ref 2–14)
NEUTROPHILS # BLD AUTO: 6.07 K/UL — SIGNIFICANT CHANGE UP (ref 1.8–7.4)
NEUTROPHILS NFR BLD AUTO: 76 % — SIGNIFICANT CHANGE UP (ref 43–77)
NRBC # BLD: 0 /100 WBCS — SIGNIFICANT CHANGE UP (ref 0–0)
PLATELET # BLD AUTO: 206 K/UL — SIGNIFICANT CHANGE UP (ref 150–400)
POTASSIUM SERPL-MCNC: 4.1 MMOL/L — SIGNIFICANT CHANGE UP (ref 3.5–5.3)
POTASSIUM SERPL-SCNC: 4.1 MMOL/L — SIGNIFICANT CHANGE UP (ref 3.5–5.3)
PROT SERPL-MCNC: 6.9 G/DL — SIGNIFICANT CHANGE UP (ref 6–8.3)
RBC # BLD: 4.09 M/UL — LOW (ref 4.2–5.8)
RBC # FLD: 12.8 % — SIGNIFICANT CHANGE UP (ref 10.3–14.5)
SARS-COV-2 RNA SPEC QL NAA+PROBE: SIGNIFICANT CHANGE UP
SODIUM SERPL-SCNC: 142 MMOL/L — SIGNIFICANT CHANGE UP (ref 135–145)
WBC # BLD: 7.99 K/UL — SIGNIFICANT CHANGE UP (ref 3.8–10.5)
WBC # FLD AUTO: 7.99 K/UL — SIGNIFICANT CHANGE UP (ref 3.8–10.5)

## 2021-12-08 PROCEDURE — 99213 OFFICE O/P EST LOW 20 MIN: CPT

## 2021-12-08 PROCEDURE — G0463: CPT

## 2021-12-08 PROCEDURE — 71045 X-RAY EXAM CHEST 1 VIEW: CPT | Mod: 26

## 2021-12-08 PROCEDURE — 99284 EMERGENCY DEPT VISIT MOD MDM: CPT

## 2021-12-08 PROCEDURE — 93010 ELECTROCARDIOGRAM REPORT: CPT

## 2021-12-08 PROCEDURE — 93971 EXTREMITY STUDY: CPT | Mod: 26,LT

## 2021-12-08 RX ORDER — CARBIDOPA AND LEVODOPA 25; 100 MG/1; MG/1
0 TABLET ORAL
Qty: 0 | Refills: 0 | DISCHARGE

## 2021-12-08 RX ORDER — VANCOMYCIN HCL 1 G
1000 VIAL (EA) INTRAVENOUS ONCE
Refills: 0 | Status: COMPLETED | OUTPATIENT
Start: 2021-12-08 | End: 2021-12-08

## 2021-12-08 RX ORDER — CEFAZOLIN SODIUM 1 G
2000 VIAL (EA) INJECTION EVERY 8 HOURS
Refills: 0 | Status: DISCONTINUED | OUTPATIENT
Start: 2021-12-08 | End: 2021-12-13

## 2021-12-08 RX ORDER — CARBIDOPA AND LEVODOPA 25; 100 MG/1; MG/1
1 TABLET ORAL THREE TIMES A DAY
Refills: 0 | Status: DISCONTINUED | OUTPATIENT
Start: 2021-12-08 | End: 2021-12-13

## 2021-12-08 RX ORDER — DONEPEZIL HYDROCHLORIDE 10 MG/1
1 TABLET, FILM COATED ORAL
Qty: 0 | Refills: 0 | DISCHARGE

## 2021-12-08 RX ORDER — PIPERACILLIN AND TAZOBACTAM 4; .5 G/20ML; G/20ML
3.38 INJECTION, POWDER, LYOPHILIZED, FOR SOLUTION INTRAVENOUS ONCE
Refills: 0 | Status: COMPLETED | OUTPATIENT
Start: 2021-12-08 | End: 2021-12-08

## 2021-12-08 RX ORDER — DONEPEZIL HYDROCHLORIDE 10 MG/1
10 TABLET ORAL DAILY
Qty: 90 | Refills: 0 | Status: COMPLETED | COMMUNITY
Start: 2021-03-03 | End: 2021-12-08

## 2021-12-08 RX ORDER — ACETAMINOPHEN 500 MG
650 TABLET ORAL EVERY 6 HOURS
Refills: 0 | Status: DISCONTINUED | OUTPATIENT
Start: 2021-12-08 | End: 2021-12-13

## 2021-12-08 RX ORDER — ENOXAPARIN SODIUM 100 MG/ML
40 INJECTION SUBCUTANEOUS DAILY
Refills: 0 | Status: DISCONTINUED | OUTPATIENT
Start: 2021-12-08 | End: 2021-12-13

## 2021-12-08 RX ORDER — ASPIRIN/CALCIUM CARB/MAGNESIUM 324 MG
81 TABLET ORAL DAILY
Refills: 0 | Status: DISCONTINUED | OUTPATIENT
Start: 2021-12-08 | End: 2021-12-13

## 2021-12-08 RX ORDER — HYDROCHLOROTHIAZIDE 25 MG
25 TABLET ORAL DAILY
Refills: 0 | Status: DISCONTINUED | OUTPATIENT
Start: 2021-12-08 | End: 2021-12-13

## 2021-12-08 RX ORDER — OXYCODONE AND ACETAMINOPHEN 5; 325 MG/1; MG/1
1 TABLET ORAL EVERY 6 HOURS
Refills: 0 | Status: DISCONTINUED | OUTPATIENT
Start: 2021-12-08 | End: 2021-12-13

## 2021-12-08 RX ADMIN — PIPERACILLIN AND TAZOBACTAM 200 GRAM(S): 4; .5 INJECTION, POWDER, LYOPHILIZED, FOR SOLUTION INTRAVENOUS at 12:11

## 2021-12-08 RX ADMIN — Medication 100 MILLIGRAM(S): at 21:52

## 2021-12-08 RX ADMIN — ENOXAPARIN SODIUM 40 MILLIGRAM(S): 100 INJECTION SUBCUTANEOUS at 18:53

## 2021-12-08 RX ADMIN — Medication 1000 MILLIGRAM(S): at 14:33

## 2021-12-08 RX ADMIN — Medication 81 MILLIGRAM(S): at 16:30

## 2021-12-08 RX ADMIN — CARBIDOPA AND LEVODOPA 1 TABLET(S): 25; 100 TABLET ORAL at 21:52

## 2021-12-08 RX ADMIN — Medication 250 MILLIGRAM(S): at 13:33

## 2021-12-08 RX ADMIN — PIPERACILLIN AND TAZOBACTAM 3.38 GRAM(S): 4; .5 INJECTION, POWDER, LYOPHILIZED, FOR SOLUTION INTRAVENOUS at 12:56

## 2021-12-08 NOTE — CONSULT NOTE ADULT - SUBJECTIVE AND OBJECTIVE BOX
[facility], Division of Infectious Diseases  RASHID Rosas, PAT Lopez  [phone]    DENG SIERRA  85y, Male  [id number]    HPI--  HPI: 84 y/o M sent from wound care due to concern for left wound infection. Pt states wound was getting smaller and then started getting bigger, leg is very swollen. Is seen by wound care every week, last time was last week. When he went this morning, wound was examined and he was instructed to go to ED. Pt denies fever, chills, CP, SOB, nausea, vomiting.    ID c/s called for further evaluation    pablo greer @ wound care center who states wound appears to be worsening, leg very swollen, black area, concerned that wound is infected, recommended evaluation and ID consult, possible admission.       Active Medications--    Antimicrobials:     Immunologic:     ROS:  CONSTITUTIONAL: No fevers or chills. No weakness or headache. No weight changes.  EYES/ENT: No visual or hearing changes. No sore throat or throat pain .  NECK: No pain or stiffness  RESPIRATORY: No cough, wheezing, or hemoptysis. No shortness of breath  CARDIOVASCULAR: No chest pain or palpitations  GASTROINTESTINAL: No abdominal pain. No nausea or vomiting. No diarrhea or constipation.  GENITOURINARY: No dysuria, frequency or hematuria  NEUROLOGICAL: No numbness or weakness  SKIN: No itching or rashes  PSYCHIATRIC: Pleasant. Appropriate affect    Allergies: No Known Allergies    PMH -- Parkinsons    HTN (hypertension)      PSH --   FH --   Social History --  EtOH: denies   Tobacco: denies   Drug Use: denies     Travel/Environmental/Occupational History:    Physical Exam--  Vital Signs Last 24 Hrs  T(F): 98 (08 Dec 2021 10:35), Max: 98 (08 Dec 2021 10:35)  HR: 81 (08 Dec 2021 10:35) (81 - 81)  BP: 160/70 (08 Dec 2021 10:35) (160/70 - 160/70)  RR: 16 (08 Dec 2021 10:35) (16 - 16)  SpO2: 96% (08 Dec 2021 10:35) (96% - 96%)  General: nontoxic-appearing, no acute distress  HEENT: NC/AT, EOMI, anicteric, conjunctiva pink and moist, oropharynx clear, dentition fair  Neck: Not rigid. No sense of mass. No LAD  Lungs: Clear bilaterally without rales, wheezing or rhonchi  Heart: Regular rate and rhythm. No murmur, rub or gallop.  Abdomen: Soft. Nondistended. Nontender. Bowel sounds present. No organomegaly.  Back: No spinal tenderness. No costovertebral angle tenderness.  Extremities: LLE: lower leg swollen with large area of erythema, warmth, wound to distal lateral lower leg with approx [id number] area of dark coloration      Laboratory & Imaging Data--  CBC:                       12.6   7.99  )-----------( 206      ( 08 Dec 2021 12:16 )             38.0     CMP: 12-08    142  |  106  |  18  ----------------------------<  95  4.1   |  33<H>  |  1.00    Ca    8.3<L>      08 Dec 2021 12:16    TPro  6.9  /  Alb  2.9<L>  /  TBili  0.6  /  DBili  x   /  AST  14<L>  /  ALT  12  /  AlkPhos  65  12-08    LIVER FUNCTIONS - ( 08 Dec 2021 12:16 )  Alb: 2.9 g/dL / Pro: 6.9 g/dL / ALK PHOS: 65 U/L / ALT: 12 U/L / AST: 14 U/L / GGT: x               Microbiology: reviewed      Radiology: reviewed  < from: US Duplex Venous Lower Ext Ltd, Left (12.08.21 @ 13:16) >    ACC: [id number] EXAM:  US DPLX LWR EXT VEINS LTD LT                          PROCEDURE DATE:  12/08/2021          INTERPRETATION:  CLINICAL INFORMATION: Lower extremity swelling.    COMPARISON: Left lower extremity duplex of 1/25/2021.    TECHNIQUE: Duplex sonography of the LEFT LOWER extremity veins with color   and spectral Doppler, with and without compression.    FINDINGS:    There is normal compressibility of the left common femoral, femoral and   popliteal veins.  The contralateral common femoral vein is patent.  Doppler examination shows normal spontaneous and phasic flow.    No deep calf vein thrombosis is detected.    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    IMPRESSION:  No evidence of left lower extremity deep venous thrombosis.          --- End of Report ---            ZACARIAS NEELY MD; Attending Radiologist  This document has been electronically signed. Dec  8 2021  1:28PM    < end of copied text >     [facility], Division of Infectious Diseases  RASHID Rosas, PAT Lopez  [phone]    DENG SIERRA  85y, Male  [id number]    HPI--  HPI: 86 y/o M sent from wound care due to concern for left wound infection. Pt states wound was getting smaller and then started getting bigger, leg is very swollen. Is seen by wound care every week, last time was last week. When he went this morning, wound was examined and he was instructed to go to ED. Pt denies fever, chills, CP, SOB, nausea, vomiting.    ID c/s called for further evaluation  Pt seen and examined at bedside in the ED  Denies f/c/n/v/d  Reporting pain x2 years but acute worsening for about 2-3 wks.   Denies bodyaches/muscle aches    Active Medications--    Antimicrobials:     Immunologic:     ROS:  CONSTITUTIONAL: No fevers or chills. No weakness or headache. No weight changes.  EYES/ENT: No visual or hearing changes. No sore throat or throat pain .  NECK: No pain or stiffness  RESPIRATORY: No cough, wheezing, or hemoptysis. No shortness of breath  CARDIOVASCULAR: No chest pain or palpitations  GASTROINTESTINAL: No abdominal pain. No nausea or vomiting. No diarrhea or constipation.  GENITOURINARY: No dysuria, frequency or hematuria  NEUROLOGICAL: No numbness or weakness  SKIN: No itching or rashes  PSYCHIATRIC: Pleasant. Appropriate affect    Allergies: No Known Allergies    PMH -- Parkinsons    HTN (hypertension)      PSH --   FH --   Social History --  EtOH: denies   Tobacco: denies   Drug Use: denies     Travel/Environmental/Occupational History:    Physical Exam--  Vital Signs Last 24 Hrs  T(F): 98 (08 Dec 2021 10:35), Max: 98 (08 Dec 2021 10:35)  HR: 81 (08 Dec 2021 10:35) (81 - 81)  BP: 160/70 (08 Dec 2021 10:35) (160/70 - 160/70)  RR: 16 (08 Dec 2021 10:35) (16 - 16)  SpO2: 96% (08 Dec 2021 10:35) (96% - 96%)  General: nontoxic-appearing, no acute distress  HEENT: NC/AT, EOMI, anicteric, conjunctiva pink and moist, oropharynx clear, dentition fair  Neck: Not rigid. No sense of mass. No LAD  Lungs: Clear bilaterally without rales, wheezing or rhonchi  Heart: Regular rate and rhythm. No murmur, rub or gallop.  Abdomen: Soft. Nondistended. Nontender. Bowel sounds present. No organomegaly.  Back: No spinal tenderness. No costovertebral angle tenderness.  Extremities: LLE: lower leg swollen with large area of erythema, warmth, wound to distal lateral lower leg with approx [id number] area of dark coloration      Laboratory & Imaging Data--  CBC:                       12.6   7.99  )-----------( 206      ( 08 Dec 2021 12:16 )             38.0     CMP: 12-08    142  |  106  |  18  ----------------------------<  95  4.1   |  33<H>  |  1.00    Ca    8.3<L>      08 Dec 2021 12:16    TPro  6.9  /  Alb  2.9<L>  /  TBili  0.6  /  DBili  x   /  AST  14<L>  /  ALT  12  /  AlkPhos  65  12-08    LIVER FUNCTIONS - ( 08 Dec 2021 12:16 )  Alb: 2.9 g/dL / Pro: 6.9 g/dL / ALK PHOS: 65 U/L / ALT: 12 U/L / AST: 14 U/L / GGT: x               Microbiology: reviewed      Radiology: reviewed  < from: US Duplex Venous Lower Ext Ltd, Left (12.08.21 @ 13:16) >    ACC: [id number] EXAM:  US DPLX LWR EXT VEINS LTD LT                          PROCEDURE DATE:  12/08/2021          INTERPRETATION:  CLINICAL INFORMATION: Lower extremity swelling.    COMPARISON: Left lower extremity duplex of 1/25/2021.    TECHNIQUE: Duplex sonography of the LEFT LOWER extremity veins with color   and spectral Doppler, with and without compression.    FINDINGS:    There is normal compressibility of the left common femoral, femoral and   popliteal veins.  The contralateral common femoral vein is patent.  Doppler examination shows normal spontaneous and phasic flow.    No deep calf vein thrombosis is detected.    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    IMPRESSION:  No evidence of left lower extremity deep venous thrombosis.          --- End of Report ---            ZACARIAS NEELY MD; Attending Radiologist  This document has been electronically signed. Dec  8 2021  1:28PM    < end of copied text >

## 2021-12-08 NOTE — HISTORY OF PRESENT ILLNESS
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 Using yasemin.No change in the VSU size. Pt had used apligrafs as well as epifix  several times in 2020.\par \par 12/1/21 some areas of questionable epithelium

## 2021-12-08 NOTE — H&P ADULT - HISTORY OF PRESENT ILLNESS
84 y/o M sent from wound care due to concern for left wound infection. Pt states wound was getting smaller and then started getting bigger, leg is very swollen. Is seen by wound care every week, last time was last week. When he went this morning, wound was examined and he was instructed to go to ED. Pt denies fever, chills, CP, SOB, nausea, vomiting. 86 y/o M sent from wound care due to concern for left leg wound infection. Pt states wound was getting smaller and then started getting bigger, leg is very swollen. Is seen by wound care every week, last time was last week. When he went this morning, wound was examined and he was instructed to go to ED. Pt denies fever, chills, CP, SOB, nausea, vomiting.

## 2021-12-08 NOTE — ED PROVIDER NOTE - ATTENDING CONTRIBUTION TO CARE
I have personally performed a face to face diagnostic evaluation on this patient.  I have reviewed the PA's note and agree with the history, exam, and plan of care, except as noted.  History and Exam by me shows 84 y/o M sent from wound care due to concern for left wound infection. Pt states wound was getting smaller and then started getting bigger, leg is very swollen. Is seen by wound care every week, last time was last week. When he went this morning, wound was examined and he was instructed to go to ED. Pt denies fever, chills, CP, SOB, nausea, vomiting. .  Patient is NAD.  A n O x 3. Head NC/AT. Lungs cta bl. Heart s1,s2, rrr, no murmurs. Abd-soft, nt, no guarding, no rebound, no distension, no cva tenderness. Ext- FROM actively, skin- left lower leg cellulitis.   ambulating s any difficulty.  Labs, us, and ct  and cxr were unremarkable.

## 2021-12-08 NOTE — ED PROVIDER NOTE - SKIN, MLM
LLE: lower leg swollen with large area of erythema, warmth, wound to distal lateral lower leg with approx [id number] area of dark coloration

## 2021-12-08 NOTE — ED PROVIDER NOTE - CLINICAL SUMMARY MEDICAL DECISION MAKING FREE TEXT BOX
86 y/o M sent from wound care due to concern for left wound infection. - LLE wound with erythema, warmth, swelling - labs, abx, EKG, CXR, ID, admit

## 2021-12-08 NOTE — PHYSICAL EXAM
[4 x 4] : 4 x 4  [Abdominal Pad] : Abdominal Pad [JVD] : no jugular venous distention  [Normal Thyroid] : the thyroid was normal [Normal Breath Sounds] : Normal breath sounds [Normal Heart Sounds] : normal heart sounds [Normal Rate and Rhythm] : normal rate and rhythm [Abdomen Masses] : No abdominal massess [Abdomen Tenderness] : ~T ~M No abdominal tenderness [Tender] : nontender [Enlarged] : not enlarged [Alert] : alert [Oriented to Person] : oriented to person [Oriented to Place] : oriented to place [Oriented to Time] : oriented to time [Calm] : calm [de-identified] : elderly WM, NAD, alert, Ox 3 [FreeTextEntry1] : Left Lateral Lower Leg [FreeTextEntry2] : 9.5 [FreeTextEntry3] : 10.4 [FreeTextEntry4] : 0.1 - 0.3 [de-identified] : Serosanguineous  [de-identified] : Moderate [de-identified] : 80% [de-identified] : 20% [de-identified] : Silver Alginate  [de-identified] : Cleansed with Normal Saline.  [TWNoteComboBox4] : Large [de-identified] : Erythema [de-identified] : Mild [de-identified] : Ace wraps [de-identified] : Every other day

## 2021-12-08 NOTE — H&P ADULT - ASSESSMENT
84 y/o M sent from wound care due to concern for left leg wound infection. Pt states wound was getting smaller and then started getting bigger, leg is very swollen. Is seen by wound care every week, last time was last week. When he went this morning, wound was examined and he was instructed to go to ED. Pt denies fever, chills, CP, SOB, nausea, vomiting.    1 Left leg wound  - cw cefazolin  - ID and wound care fu  - fu cultures  - pain control  - will monitorr r    2 HTN  - cw home meds    3 Parkinson's disease  - cw sinemet    Lovenox for DVT prophylaxis

## 2021-12-08 NOTE — ED PROVIDER NOTE - WR ORDER NAME 1
Your test for COVID-19, also known as novel coronavirus, came back negative. No virus was detected from the sample collected. Testing is not 100%. Until your symptoms are fully resolved, you may still be contagious. We recommend that you remain isolated for 7 days minimum or 72 hours after your symptoms have completely resolved, whichever is longer. If you were exposed to a known positive COIVID-19 patient, then you must remain isolated for 14 days. If you were tested for a pre-op, then you remain in isolated until your procedure. Continually monitor symptoms. Contact a medical provider if symptoms are worsening. If you have any additional questions, contact your PCP.     For additional information, please visit the Centers for Disease Control and Prevention [email or web address]
Xray Chest 1 View- PORTABLE-Routine

## 2021-12-08 NOTE — ED ADULT NURSE NOTE - NSIMPLEMENTINTERV_GEN_ALL_ED
Implemented All Universal Safety Interventions:  [place] to call system. Call bell, personal items and telephone within reach. Instruct patient to call for assistance. Room bathroom lighting operational. Non-slip footwear when patient is off stretcher. Physically safe environment: no spills, clutter or unnecessary equipment. Stretcher in lowest position, wheels locked, appropriate side rails in place.

## 2021-12-08 NOTE — H&P ADULT - NSHPPHYSICALEXAM_GEN_ALL_CORE
General: WN/WD NAD  PERRLA  Neurology: A&Ox3, nonfocal, MAY x 4  Respiratory: CTA B/L  CV: RRR, S1S2, no murmurs, rubs or gallops  Abdominal: Soft, NT, ND +BS, Last BM  Extremities: No edema, + peripheral pulses  Skin Normal

## 2021-12-08 NOTE — ASSESSMENT
[Verbal] : Verbal [Patient] : Patient [Good - alert, interested, motivated] : Good - alert, interested, motivated [Verbalizes knowledge/Understanding] : Verbalizes knowledge/understanding [Dressing changes] : dressing changes [Skin Care] : skin care [Signs and symptoms of infection] : sign and symptoms of infection [Venous Disease] : venous disease [How and When to Call] : how and when to call [Compression Therapy] : compression therapy [Patient responsibility to plan of care] : patient responsibility to plan of care [Stable] : stable [Emergency Room] : Emergency Room [Wheelchair] : Wheelchair [] : No [FreeTextEntry2] : Restore Optimal Skin Integrity \par Infection Prevention\par Cellulitis\par Localized Wound Care \par Compression Therapy \par \par  [FreeTextEntry3] : Wound is larger due to skin breakdown from drainage. Wound also has an area with slight necrosis. Wound is edematous.  [FreeTextEntry4] : Patient reports that he is scheduled to have arterial vascular studies done in 1/2022.\par Pt was sent to the ER for Cellulitis of Left Leg\par Pt to F/U to [facility] in 1 Week \par

## 2021-12-08 NOTE — ED ADULT NURSE REASSESSMENT NOTE - NS ED NURSE REASSESS COMMENT FT1
Pt received from [facility]mod[facility]. Pt is AOx4 has wound on LLE. denies pain. plan of care explained to go to the floor. Will reassess.

## 2021-12-08 NOTE — ED PROVIDER NOTE - OBJECTIVE STATEMENT
86 y/o M sent from wound care due to concern for left wound infection. Pt states wound was getting smaller and then started getting bigger, leg is very swollen. Is seen by wound care every week, last time was last week. When he went this morning, wound was examined and he was instructed to go to ED. Pt denies fever, chills, CP, SOB, nausea, vomiting.

## 2021-12-08 NOTE — H&P ADULT - NSHPLABSRESULTS_GEN_ALL_CORE
Lab Results:  CBC  CBC Full  -  ( 08 Dec 2021 12:16 )  WBC Count : 7.99 K/uL  RBC Count : 4.09 M/uL  Hemoglobin : 12.6 g/dL  Hematocrit : 38.0 %  Platelet Count - Automated : 206 K/uL  Mean Cell Volume : 92.9 fl  Mean Cell Hemoglobin : 30.8 pg  Mean Cell Hemoglobin Concentration : 33.2 gm/dL  Auto Neutrophil # : 6.07 K/uL  Auto Lymphocyte # : 0.88 K/uL  Auto Monocyte # : 0.88 K/uL  Auto Eosinophil # : 0.09 K/uL  Auto Basophil # : 0.04 K/uL  Auto Neutrophil % : 76.0 %  Auto Lymphocyte % : 11.0 %  Auto Monocyte % : 11.0 %  Auto Eosinophil % : 1.1 %  Auto Basophil % : 0.5 %    .		Differential:	[] Automated		[] Manual  Chemistry                        12.6   7.99  )-----------( 206      ( 08 Dec 2021 12:16 )             38.0     12-08    142  |  106  |  18  ----------------------------<  95  4.1   |  33<H>  |  1.00    Ca    8.3<L>      08 Dec 2021 12:16    TPro  6.9  /  Alb  2.9<L>  /  TBili  0.6  /  DBili  x   /  AST  14<L>  /  ALT  12  /  AlkPhos  65  12-08    LIVER FUNCTIONS - ( 08 Dec 2021 12:16 )  Alb: 2.9 g/dL / Pro: 6.9 g/dL / ALK PHOS: 65 U/L / ALT: 12 U/L / AST: 14 U/L / GGT: x                     MICROBIOLOGY/CULTURES:      RADIOLOGY RESULTS: reviewed

## 2021-12-08 NOTE — ED ADULT NURSE REASSESSMENT NOTE - NSIMPLEMENTINTERV_GEN_ALL_ED
Implemented All Fall with Harm Risk Interventions:  [place] to call system. Call bell, personal items and telephone within reach. Instruct patient to call for assistance. Room bathroom lighting operational. Non-slip footwear when patient is off stretcher. Physically safe environment: no spills, clutter or unnecessary equipment. Stretcher in lowest position, wheels locked, appropriate side rails in place. Provide visual cue, wrist band, yellow gown, etc. Monitor gait and stability. Monitor for mental status changes and reorient to person, place, and time. Review medications for side effects contributing to fall risk. Reinforce activity limits and safety measures with patient and family. Provide visual clues: red socks.

## 2021-12-08 NOTE — ED PROVIDER NOTE - PROGRESS NOTE DETAILS
pablo greer @ wound care center who states wound appears to be worsening, leg very swollen, black area, concerned that wound is infected, recommended evaluation and ID consult, possible admission pablo hernandez

## 2021-12-08 NOTE — PATIENT PROFILE ADULT - FALL HARM RISK - HARM RISK INTERVENTIONS
Assistance with ambulation/Assistance OOB with selected safe patient handling equipment/Communicate Risk of Fall with Harm to all staff/Discuss with provider need for PT consult/Monitor gait and stability/Provide patient with walking aids - walker, cane, crutches/Reinforce activity limits and safety measures with patient and family/Reorient to person, place and time as needed/Tailored Fall Risk Interventions/Use of alarms - bed, chair and/or voice tab/Visual Cue: Yellow wristband and red socks/Bed in lowest position, wheels locked, appropriate side rails in place/Call bell, personal items and telephone in reach/Instruct patient to call for assistance before getting out of bed or chair/Non-slip footwear when patient is out of bed/[place] to call system/Physically safe environment - no spills, clutter or unnecessary equipment/Purposeful Proactive Rounding/Room/bathroom lighting operational, light cord in reach

## 2021-12-08 NOTE — CONSULT NOTE ADULT - ASSESSMENT
86 y/o M sent from wound care due to concern for left wound infection. Pt states wound was getting smaller and then started getting bigger, leg is very swollen. Is seen by wound care every week, last time was last week. When he went this morning, wound was examined and he was instructed to go to ED. Pt denies fever, chills, CP, SOB, nausea, vomiting.    ID c/s called for further evaluation    Full consult note to follow pending evaluation.     Infectious Diseases will continue to follow. Please call with any questions.   Erin Lynn M.D.  [facility], Division of Infectious Diseases [phone]  For over the weekend and after hours, please call [phone]   86 y/o M sent from wound care due to concern for left wound infection. Pt states wound was getting smaller and then started getting bigger, leg is very swollen. Is seen by wound care every week, last time was last week. When he went this morning, wound was examined and he was instructed to go to ED. Pt denies fever, chills, CP, SOB, nausea, vomiting.  ID c/s called for further evaluation    LLE cellulitis  LLE edema  -infectious w/u pending  --BCx ordered  --MRSA swab ordered  -imaging reviewed  --DVT study negative  --get XR leg r/o OM  -supportive care/leg elevation to reduce swelling  S/p vanc/zosyn in the ED. Causative agents usually GPC (MSSA and Streptococcus)  -started on cefazolin 2gm q8h  Will adjust therapy pending clinical improvement    Infectious Diseases will continue to follow. Please call with any questions.   Erin Lynn M.D.  [facility], Division of Infectious Diseases [phone]  For over the weekend and after hours, please call [phone]

## 2021-12-08 NOTE — ED ADULT NURSE NOTE - OBJECTIVE STATEMENT
84 y/o male received in the ER, AA&Ox4, breathing unlabored, reports that he was sent from wound care clinic for increased swelling and warmth to L lower extremity. Swelling and redness noted to left lower extremity. pt denies any pain, fever/chills, able to ambulate. Pt was evaluated by provider, pending orders. Will continue to monitor.

## 2021-12-09 DIAGNOSIS — Z79.899 OTHER LONG TERM (CURRENT) DRUG THERAPY: ICD-10-CM

## 2021-12-09 DIAGNOSIS — R01.1 CARDIAC MURMUR, UNSPECIFIED: ICD-10-CM

## 2021-12-09 DIAGNOSIS — M19.90 UNSPECIFIED OSTEOARTHRITIS, UNSPECIFIED SITE: ICD-10-CM

## 2021-12-09 DIAGNOSIS — L97.822 NON-PRESSURE CHRONIC ULCER OF OTHER PART OF LEFT LOWER LEG WITH FAT LAYER EXPOSED: ICD-10-CM

## 2021-12-09 DIAGNOSIS — I49.3 VENTRICULAR PREMATURE DEPOLARIZATION: ICD-10-CM

## 2021-12-09 DIAGNOSIS — Z87.891 PERSONAL HISTORY OF NICOTINE DEPENDENCE: ICD-10-CM

## 2021-12-09 DIAGNOSIS — G20 PARKINSON'S DISEASE: ICD-10-CM

## 2021-12-09 DIAGNOSIS — I34.0 NONRHEUMATIC MITRAL (VALVE) INSUFFICIENCY: ICD-10-CM

## 2021-12-09 DIAGNOSIS — I83.228 VARICOSE VEINS OF LEFT LOWER EXTREMITY WITH BOTH ULCER OF OTHER PART OF LOWER EXTREMITY AND INFLAMMATION: ICD-10-CM

## 2021-12-09 DIAGNOSIS — Z80.0 FAMILY HISTORY OF MALIGNANT NEOPLASM OF DIGESTIVE ORGANS: ICD-10-CM

## 2021-12-09 DIAGNOSIS — I83.893 VARICOSE VEINS OF BILATERAL LOWER EXTREMITIES WITH OTHER COMPLICATIONS: ICD-10-CM

## 2021-12-09 LAB
ALBUMIN SERPL ELPH-MCNC: 2.7 G/DL — LOW (ref 3.3–5)
ALP SERPL-CCNC: 59 U/L — SIGNIFICANT CHANGE UP (ref 40–120)
ALT FLD-CCNC: <6 U/L — LOW (ref 12–78)
ANION GAP SERPL CALC-SCNC: 3 MMOL/L — LOW (ref 5–17)
AST SERPL-CCNC: 13 U/L — LOW (ref 15–37)
BILIRUB SERPL-MCNC: 0.6 MG/DL — SIGNIFICANT CHANGE UP (ref 0.2–1.2)
BUN SERPL-MCNC: 16 MG/DL — SIGNIFICANT CHANGE UP (ref 7–23)
CALCIUM SERPL-MCNC: 8.7 MG/DL — SIGNIFICANT CHANGE UP (ref 8.5–10.1)
CHLORIDE SERPL-SCNC: 107 MMOL/L — SIGNIFICANT CHANGE UP (ref 96–108)
CO2 SERPL-SCNC: 31 MMOL/L — SIGNIFICANT CHANGE UP (ref 22–31)
CREAT SERPL-MCNC: 1.1 MG/DL — SIGNIFICANT CHANGE UP (ref 0.5–1.3)
GLUCOSE SERPL-MCNC: 92 MG/DL — SIGNIFICANT CHANGE UP (ref 70–99)
HCT VFR BLD CALC: 36.1 % — LOW (ref 39–50)
HGB BLD-MCNC: 11.9 G/DL — LOW (ref 13–17)
MCHC RBC-ENTMCNC: 30.8 PG — SIGNIFICANT CHANGE UP (ref 27–34)
MCHC RBC-ENTMCNC: 33 GM/DL — SIGNIFICANT CHANGE UP (ref 32–36)
MCV RBC AUTO: 93.5 FL — SIGNIFICANT CHANGE UP (ref 80–100)
NRBC # BLD: 0 /100 WBCS — SIGNIFICANT CHANGE UP (ref 0–0)
PLATELET # BLD AUTO: 200 K/UL — SIGNIFICANT CHANGE UP (ref 150–400)
POTASSIUM SERPL-MCNC: 3.9 MMOL/L — SIGNIFICANT CHANGE UP (ref 3.5–5.3)
POTASSIUM SERPL-SCNC: 3.9 MMOL/L — SIGNIFICANT CHANGE UP (ref 3.5–5.3)
PROT SERPL-MCNC: 6.4 G/DL — SIGNIFICANT CHANGE UP (ref 6–8.3)
RBC # BLD: 3.86 M/UL — LOW (ref 4.2–5.8)
RBC # FLD: 12.5 % — SIGNIFICANT CHANGE UP (ref 10.3–14.5)
SODIUM SERPL-SCNC: 141 MMOL/L — SIGNIFICANT CHANGE UP (ref 135–145)
WBC # BLD: 6.03 K/UL — SIGNIFICANT CHANGE UP (ref 3.8–10.5)
WBC # FLD AUTO: 6.03 K/UL — SIGNIFICANT CHANGE UP (ref 3.8–10.5)

## 2021-12-09 RX ADMIN — Medication 25 MILLIGRAM(S): at 05:34

## 2021-12-09 RX ADMIN — Medication 100 MILLIGRAM(S): at 05:33

## 2021-12-09 RX ADMIN — CARBIDOPA AND LEVODOPA 1 TABLET(S): 25; 100 TABLET ORAL at 05:33

## 2021-12-09 RX ADMIN — Medication 81 MILLIGRAM(S): at 12:15

## 2021-12-09 RX ADMIN — CARBIDOPA AND LEVODOPA 1 TABLET(S): 25; 100 TABLET ORAL at 13:26

## 2021-12-09 RX ADMIN — Medication 100 MILLIGRAM(S): at 13:26

## 2021-12-09 RX ADMIN — ENOXAPARIN SODIUM 40 MILLIGRAM(S): 100 INJECTION SUBCUTANEOUS at 12:15

## 2021-12-09 RX ADMIN — CARBIDOPA AND LEVODOPA 1 TABLET(S): 25; 100 TABLET ORAL at 21:31

## 2021-12-09 RX ADMIN — Medication 100 MILLIGRAM(S): at 21:36

## 2021-12-09 NOTE — PROGRESS NOTE ADULT - SUBJECTIVE AND OBJECTIVE BOX
[facility], Division of Infectious Diseases  RASHID Rosas Y. Patel, S. Shah  [phone]  ([phone] - weekdays after 5pm and weekends)    Name: DENG SIERRA  Age/Gender: 85y Male  MRN: [id number]    Interval History:  Patient seen this morning.   Notes reviewed.   Afebrile.   Reports drainage of fluid from leg wound    Allergies: No Known Allergies      Objective:  Vitals:   T(F): 98.6 (12-09-21 @ 04:49), Max: 99.4 (12-08-21 @ 14:54)  HR: 75 (12-09-21 @ 05:34) (68 - 76)  BP: 126/71 (12-09-21 @ 05:34) (109/70 - 126/71)  RR: 18 (12-09-21 @ 04:49) (17 - 18)  SpO2: 93% (12-09-21 @ 04:49) (93% - 97%)  Physical Examination:  General: no acute distress, nontoxic appearing  HEENT: anicteric  Cardio: S1, S2 present, normal rate  Resp: breathing comfortably on RA  Abd: soft, ND, NT  Ext: b/l LE edema L>R, b/l venous stasis dermatitis, LLE ulcer, no warmth or tenderness  Skin: warm, dry, no visible rash   Lines:    Laboratory Studies:  CBC:                       11.9   6.03  )-----------( 200      ( 09 Dec 2021 08:36 )             36.1     WBC Trend:  6.03 12-09-21 @ 08:36  7.99 12-08-21 @ 12:16    CMP: 12-09    141  |  107  |  16  ----------------------------<  92  3.9   |  31  |  1.10    Ca    8.7      09 Dec 2021 08:36    TPro  6.4  /  Alb  2.7<L>  /  TBili  0.6  /  DBili  x   /  AST  13<L>  /  ALT  <6<L>  /  AlkPhos  59  12-09      LIVER FUNCTIONS - ( 09 Dec 2021 08:36 )  Alb: 2.7 g/dL / Pro: 6.4 g/dL / ALK PHOS: 59 U/L / ALT: <6 U/L / AST: 13 U/L / GGT: x               Microbiology: reviewed     Radiology: reviewed     Medications:  acetaminophen     Tablet .. 650 milliGRAM(s) Oral every 6 hours PRN  aspirin  chewable 81 milliGRAM(s) Oral daily  carbidopa/levodopa  25/100 1 Tablet(s) Oral three times a day  ceFAZolin   IVPB 2000 milliGRAM(s) IV Intermittent every 8 hours  enoxaparin Injectable 40 milliGRAM(s) SubCutaneous daily  hydrochlorothiazide 25 milliGRAM(s) Oral daily  oxycodone    5 mG/acetaminophen 325 mG 1 Tablet(s) Oral every 6 hours PRN    Antimicrobials:  ceFAZolin   IVPB 2000 milliGRAM(s) IV Intermittent every 8 hours

## 2021-12-09 NOTE — CONSULT NOTE ADULT - ASSESSMENT
84 y/o M sent from wound care due to concern for left leg wound infection. Pt states wound was getting smaller and then started getting bigger, leg is very swollen. Is seen by wound care every week, last time was last week. When he went this morning, wound was examined and he was instructed to go to ED. Pt denies fever, chills, CP, SOB, nausea, vomiting.  Pt seen as output recently by Dr Durbin to assess abnormal EKG with undetermined rhythm.  Baseline with artifact due to Parkinson's.  Otherwise with no significant suspicious finding.   Outpt echo was scheduled for Jan 5th which we can arrange for hospital.     -abnormal EKG  Poor quality.  Repeat EKG in am.   Schedule echo.     -HTN   BP is in good range on HCT 25 mg.      will follow.

## 2021-12-09 NOTE — PROGRESS NOTE ADULT - SUBJECTIVE AND OBJECTIVE BOX
Patient is a 85y old  Male who presents with a chief complaint of left wound infection (09 Dec 2021 10:55)    Date of servie : 12-09-21 @ 15:26  INTERVAL HPI/OVERNIGHT EVENTS:  T(C): 36.7 (12-09-21 @ 13:31), Max: 37.1 (12-08-21 @ 20:33)  HR: 84 (12-09-21 @ 13:31) (68 - 84)  BP: 108/67 (12-09-21 @ 13:31) (108/67 - 126/71)  RR: 17 (12-09-21 @ 13:31) (17 - 18)  SpO2: 96% (12-09-21 @ 13:31) (93% - 96%)  Wt(kg): --  I&O's Summary    09 Dec 2021 07:01  -  09 Dec 2021 15:26  --------------------------------------------------------  IN: 50 mL / OUT: 0 mL / NET: 50 mL        LABS:                        11.9   6.03  )-----------( 200      ( 09 Dec 2021 08:36 )             36.1     12-09    141  |  107  |  16  ----------------------------<  92  3.9   |  31  |  1.10    Ca    8.7      09 Dec 2021 08:36    TPro  6.4  /  Alb  2.7<L>  /  TBili  0.6  /  DBili  x   /  AST  13<L>  /  ALT  <6<L>  /  AlkPhos  59  12-09        CAPILLARY BLOOD GLUCOSE                MEDICATIONS  (STANDING):  aspirin  chewable 81 milliGRAM(s) Oral daily  carbidopa/levodopa  25/100 1 Tablet(s) Oral three times a day  ceFAZolin   IVPB 2000 milliGRAM(s) IV Intermittent every 8 hours  enoxaparin Injectable 40 milliGRAM(s) SubCutaneous daily  hydrochlorothiazide 25 milliGRAM(s) Oral daily    MEDICATIONS  (PRN):  acetaminophen     Tablet .. 650 milliGRAM(s) Oral every 6 hours PRN Temp greater or equal to 38C (100.4F), Mild Pain (1 - 3)  oxycodone    5 mG/acetaminophen 325 mG 1 Tablet(s) Oral every 6 hours PRN Moderate Pain (4 - 6)          PHYSICAL EXAM:  GENERAL: NAD, well-groomed, well-developed  HEAD:  Atraumatic, Normocephalic  CHEST/LUNG: Clear to percussion bilaterally; No rales, rhonchi, wheezing, or rubs  HEART: Regular rate and rhythm; No murmurs, rubs, or gallops  ABDOMEN: Soft, Nontender, Nondistended; Bowel sounds present  EXTREMITIES:  bl le chronic venous changes, left leg wound     Care Discussed with Consultants/Other Providers [x ] YES  [ ] NO

## 2021-12-09 NOTE — CONSULT NOTE ADULT - SUBJECTIVE AND OBJECTIVE BOX
CARDIOLOGY CONSULT NOTE    Patient is a 85y Male with a known history of :    HPI:  86 y/o M sent from wound care due to concern for left leg wound infection. Pt states wound was getting smaller and then started getting bigger, leg is very swollen. Is seen by wound care every week, last time was last week. When he went this morning, wound was examined and he was instructed to go to ED. Pt denies fever, chills, CP, SOB, nausea, vomiting. (08 Dec 2021 15:11)      REVIEW OF SYSTEMS:    CONSTITUTIONAL: No fever, weight loss, or fatigue  EYES: No eye pain, visual disturbances, or discharge  ENMT:  No difficulty hearing, tinnitus, vertigo; No sinus or throat pain  NECK: No pain or stiffness  BREASTS: No pain, masses, or nipple discharge  RESPIRATORY: No cough, wheezing, chills or hemoptysis; No shortness of breath  CARDIOVASCULAR: No chest pain, palpitations, dizziness, or leg swelling  GASTROINTESTINAL: No abdominal or epigastric pain. No nausea, vomiting, or hematemesis; No diarrhea or constipation. No melena or hematochezia.  GENITOURINARY: No dysuria, frequency, hematuria, or incontinence  NEUROLOGICAL: No headaches, memory loss, loss of strength, numbness, or tremors  SKIN: No itching, burning, rashes, or lesions   LYMPH NODES: No enlarged glands  ENDOCRINE: No heat or cold intolerance; No hair loss  MUSCULOSKELETAL: No joint pain or swelling; No muscle, back, or extremity pain  PSYCHIATRIC: No depression, anxiety, mood swings, or difficulty sleeping  HEME/LYMPH: No easy bruising, or bleeding gums  ALLERGY AND IMMUNOLOGIC: No hives or eczema    MEDICATIONS  (STANDING):  aspirin  chewable 81 milliGRAM(s) Oral daily  carbidopa/levodopa  25/100 1 Tablet(s) Oral three times a day  ceFAZolin   IVPB 2000 milliGRAM(s) IV Intermittent every 8 hours  enoxaparin Injectable 40 milliGRAM(s) SubCutaneous daily  hydrochlorothiazide 25 milliGRAM(s) Oral daily    MEDICATIONS  (PRN):  acetaminophen     Tablet .. 650 milliGRAM(s) Oral every 6 hours PRN Temp greater or equal to 38C (100.4F), Mild Pain (1 - 3)  oxycodone    5 mG/acetaminophen 325 mG 1 Tablet(s) Oral every 6 hours PRN Moderate Pain (4 - 6)      ALLERGIES: No Known Allergies      FAMILY HISTORY:  No pertinent family history in first degree relatives        PHYSICAL EXAMINATION:  -----------------------------  T(C): 36.7 (12-09-21 @ 13:31), Max: 37.1 (12-08-21 @ 20:33)  HR: 84 (12-09-21 @ 13:31) (68 - 84)  BP: 108/67 (12-09-21 @ 13:31) (108/67 - 126/71)  RR: 17 (12-09-21 @ 13:31) (17 - 18)  SpO2: 96% (12-09-21 @ 13:31) (93% - 96%)  Wt(kg): --    12-09 @ 07:01  -  12-09 @ 15:33  --------------------------------------------------------  IN:    IV PiggyBack: 50 mL  Total IN: 50 mL    OUT:  Total OUT: 0 mL    Total NET: 50 mL            Constitutional: well developed, normal appearance, well groomed, well nourished, no deformities and no acute distress.   Eyes: the conjunctiva exhibited no abnormalities and the eyelids demonstrated no xanthelasmas.   HEENT: normal oral mucosa, no oral pallor and no oral cyanosis.   Neck: normal jugular venous A waves present, normal jugular venous V waves present and no jugular venous calix A waves.   Pulmonary: no respiratory distress, normal respiratory rhythm and effort, no accessory muscle use and lungs were clear to auscultation bilaterally.   Cardiovascular: heart rate and rhythm were normal, normal S1 and S2 and no murmur, gallop, rub, heave or thrill are present.   Abdomen: soft, non-tender, no hepato-splenomegaly and no abdominal mass palpated.   Musculoskeletal: the gait could not be assessed..   Extremities: no clubbing of the fingernails, no localized cyanosis, no petechial hemorrhages and no ischemic changes.   Skin: normal skin color and pigmentation, no rash, no venous stasis, no skin lesions, no skin ulcer and no xanthoma was observed.   Psychiatric: oriented to person, place, and time, the affect was normal, the mood was normal and not feeling anxious.     LABS:   --------  12-09    141  |  107  |  16  ----------------------------<  92  3.9   |  31  |  1.10    Ca    8.7      09 Dec 2021 08:36    TPro  6.4  /  Alb  2.7<L>  /  TBili  0.6  /  DBili  x   /  AST  13<L>  /  ALT  <6<L>  /  AlkPhos  59  12-09                         11.9   6.03  )-----------( 200      ( 09 Dec 2021 08:36 )             36.1                 RADIOLOGY:  -----------------        ECG:

## 2021-12-10 LAB
ALBUMIN SERPL ELPH-MCNC: 2.8 G/DL — LOW (ref 3.3–5)
ALP SERPL-CCNC: 62 U/L — SIGNIFICANT CHANGE UP (ref 40–120)
ALT FLD-CCNC: 7 U/L — LOW (ref 12–78)
ANION GAP SERPL CALC-SCNC: 2 MMOL/L — LOW (ref 5–17)
AST SERPL-CCNC: 14 U/L — LOW (ref 15–37)
BILIRUB SERPL-MCNC: 0.6 MG/DL — SIGNIFICANT CHANGE UP (ref 0.2–1.2)
BUN SERPL-MCNC: 19 MG/DL — SIGNIFICANT CHANGE UP (ref 7–23)
CALCIUM SERPL-MCNC: 8.6 MG/DL — SIGNIFICANT CHANGE UP (ref 8.5–10.1)
CHLORIDE SERPL-SCNC: 106 MMOL/L — SIGNIFICANT CHANGE UP (ref 96–108)
CO2 SERPL-SCNC: 32 MMOL/L — HIGH (ref 22–31)
CREAT SERPL-MCNC: 1.2 MG/DL — SIGNIFICANT CHANGE UP (ref 0.5–1.3)
GLUCOSE SERPL-MCNC: 102 MG/DL — HIGH (ref 70–99)
HCT VFR BLD CALC: 38.1 % — LOW (ref 39–50)
HGB BLD-MCNC: 12.7 G/DL — LOW (ref 13–17)
MCHC RBC-ENTMCNC: 30.8 PG — SIGNIFICANT CHANGE UP (ref 27–34)
MCHC RBC-ENTMCNC: 33.3 GM/DL — SIGNIFICANT CHANGE UP (ref 32–36)
MCV RBC AUTO: 92.5 FL — SIGNIFICANT CHANGE UP (ref 80–100)
MRSA PCR RESULT.: DETECTED
NRBC # BLD: 0 /100 WBCS — SIGNIFICANT CHANGE UP (ref 0–0)
PLATELET # BLD AUTO: 218 K/UL — SIGNIFICANT CHANGE UP (ref 150–400)
POTASSIUM SERPL-MCNC: 4.2 MMOL/L — SIGNIFICANT CHANGE UP (ref 3.5–5.3)
POTASSIUM SERPL-SCNC: 4.2 MMOL/L — SIGNIFICANT CHANGE UP (ref 3.5–5.3)
PROT SERPL-MCNC: 6.8 G/DL — SIGNIFICANT CHANGE UP (ref 6–8.3)
RBC # BLD: 4.12 M/UL — LOW (ref 4.2–5.8)
RBC # FLD: 12.4 % — SIGNIFICANT CHANGE UP (ref 10.3–14.5)
S AUREUS DNA NOSE QL NAA+PROBE: DETECTED
SODIUM SERPL-SCNC: 140 MMOL/L — SIGNIFICANT CHANGE UP (ref 135–145)
WBC # BLD: 5.44 K/UL — SIGNIFICANT CHANGE UP (ref 3.8–10.5)
WBC # FLD AUTO: 5.44 K/UL — SIGNIFICANT CHANGE UP (ref 3.8–10.5)

## 2021-12-10 RX ADMIN — Medication 100 MILLIGRAM(S): at 22:25

## 2021-12-10 RX ADMIN — ENOXAPARIN SODIUM 40 MILLIGRAM(S): 100 INJECTION SUBCUTANEOUS at 14:13

## 2021-12-10 RX ADMIN — Medication 100 MILLIGRAM(S): at 14:13

## 2021-12-10 RX ADMIN — CARBIDOPA AND LEVODOPA 1 TABLET(S): 25; 100 TABLET ORAL at 14:13

## 2021-12-10 RX ADMIN — CARBIDOPA AND LEVODOPA 1 TABLET(S): 25; 100 TABLET ORAL at 22:25

## 2021-12-10 RX ADMIN — CARBIDOPA AND LEVODOPA 1 TABLET(S): 25; 100 TABLET ORAL at 06:13

## 2021-12-10 RX ADMIN — Medication 81 MILLIGRAM(S): at 14:13

## 2021-12-10 RX ADMIN — Medication 100 MILLIGRAM(S): at 06:13

## 2021-12-10 RX ADMIN — Medication 25 MILLIGRAM(S): at 06:13

## 2021-12-10 NOTE — SWALLOW BEDSIDE ASSESSMENT ADULT - SLP PERTINENT HISTORY OF CURRENT PROBLEM
Per charting, "86 y/o M sent from wound care due to concern for left leg wound infection. Pt states wound was getting smaller and then started getting bigger, leg is very swollen. Is seen by wound care every week, last time was last week. When he went this morning, wound was examined and he was instructed to go to ED. Pt denies fever, chills, CP, SOB, nausea, vomiting."

## 2021-12-10 NOTE — SWALLOW BEDSIDE ASSESSMENT ADULT - SWALLOW EVAL: DIAGNOSIS
Pt p/w a mild oral dysphagia when given regular solids marked by adequate retrieval and containment, prolonged mastication likely 2/2 improper fitting dentures with delayed manipulation, timely transfer, and adequate clearance post swallow. Functional oral phase when given puree, moderately thick liquids, and mildly thick liquids marked by adequate retrieval and containment, timely manipulation and transfer, and adequate clearance post swallow. Pharyngeal dysphagia marked by suspected brief delay in swallow onset, +laryngeal elevation to palpation. Pt p/w immediate/reflexive cough post swallow when given mildly thick liquids. Pt did not demonstrate overt s/sx of aspiration with moderately thick liquids, puree, and regular solids. Recommend diet downgrade to soft and bite sized with moderately thick liquids +aspiration precautions. Position upright 90 degrees, small bite/sip, pace meal slowly. Recommend MBS to objectively assess oropharyngeal swallow mechanism and determine safest/least

## 2021-12-10 NOTE — SWALLOW BEDSIDE ASSESSMENT ADULT - COMMENTS
Pt received upright in bed, awake and cooperative, on room air. Pt's wife and daughter present at bedside. Pt was agreeable to assessment. Pt followed simple commands independently. Pt's vocal quality was clear, though with reduced vocal intensity. Pt denied pain pre and post assessment.  Per RN report, pt p/w coughing when given PO meds with water. Pt reported he intermittently experiences "gagging" when consuming thin liquids.    CXR 12/8: "No radiographic evidence of active chest disease." Pt's WBC is WFL, no fever.

## 2021-12-10 NOTE — PROGRESS NOTE ADULT - SUBJECTIVE AND OBJECTIVE BOX
Patient is a 85y old  Male who presents with a chief complaint of left wound infection (10 Dec 2021 11:11)    Date of servie : 12-10-21 @ 13:50  INTERVAL HPI/OVERNIGHT EVENTS:  T(C): 37 (12-10-21 @ 12:30), Max: 37 (12-10-21 @ 04:55)  HR: 71 (12-10-21 @ 12:30) (61 - 71)  BP: 138/63 (12-10-21 @ 12:30) (118/71 - 140/71)  RR: 18 (12-10-21 @ 12:30) (17 - 18)  SpO2: 95% (12-10-21 @ 12:30) (95% - 95%)  Wt(kg): --  I&O's Summary    09 Dec 2021 07:01  -  10 Dec 2021 07:00  --------------------------------------------------------  IN: 50 mL / OUT: 0 mL / NET: 50 mL        LABS:                        12.7   5.44  )-----------( 218      ( 10 Dec 2021 08:23 )             38.1     12-10    140  |  106  |  19  ----------------------------<  102<H>  4.2   |  32<H>  |  1.20    Ca    8.6      10 Dec 2021 08:23    TPro  6.8  /  Alb  2.8<L>  /  TBili  0.6  /  DBili  x   /  AST  14<L>  /  ALT  7<L>  /  AlkPhos  62  12-10        CAPILLARY BLOOD GLUCOSE                MEDICATIONS  (STANDING):  aspirin  chewable 81 milliGRAM(s) Oral daily  carbidopa/levodopa  25/100 1 Tablet(s) Oral three times a day  ceFAZolin   IVPB 2000 milliGRAM(s) IV Intermittent every 8 hours  enoxaparin Injectable 40 milliGRAM(s) SubCutaneous daily  hydrochlorothiazide 25 milliGRAM(s) Oral daily    MEDICATIONS  (PRN):  acetaminophen     Tablet .. 650 milliGRAM(s) Oral every 6 hours PRN Temp greater or equal to 38C (100.4F), Mild Pain (1 - 3)  oxycodone    5 mG/acetaminophen 325 mG 1 Tablet(s) Oral every 6 hours PRN Moderate Pain (4 - 6)          PHYSICAL EXAM:  GENERAL: NAD, well-groomed, well-developed  HEAD:  Atraumatic, Normocephalic  CHEST/LUNG: Clear to percussion bilaterally; No rales, rhonchi, wheezing, or rubs  HEART: Regular rate and rhythm; No murmurs, rubs, or gallops  ABDOMEN: Soft, Nontender, Nondistended; Bowel sounds present  EXTREMITIES:  2+ Peripheral Pulses, No clubbing, cyanosis, or edema  LYMPH: No lymphadenopathy noted  SKIN: No rashes or lesions    Care Discussed with Consultants/Other Providers [ ] YES  [ ] NO

## 2021-12-10 NOTE — PROGRESS NOTE ADULT - SUBJECTIVE AND OBJECTIVE BOX
[facility] Cardiology    CHIEF COMPLAINT: Patient is a 85y old  Male who presents with a chief complaint of left wound infection (09 Dec 2021 15:32)      Follow Up: [ ] Chest Pain      [ ] Dyspnea     [ ] Palpitations    [ ] Atrial Fibrillation     [ ] Ventricular Dysrhythmia    [ ] Abnormal EKG                      [ ] Abnormal Cardiac Enzymes     [ ] Valvular Disease    HPI:  86 y/o M sent from wound care due to concern for left leg wound infection. Pt states wound was getting smaller and then started getting bigger, leg is very swollen. Is seen by wound care every week, last time was last week. When he went this morning, wound was examined and he was instructed to go to ED. Pt denies fever, chills, CP, SOB, nausea, vomiting. (08 Dec 2021 15:11)    PAST MEDICAL & SURGICAL HISTORY:  Parkinsons    HTN (hypertension)    No significant past surgical history      MEDICATIONS  (STANDING):  aspirin  chewable 81 milliGRAM(s) Oral daily  carbidopa/levodopa  25/100 1 Tablet(s) Oral three times a day  ceFAZolin   IVPB 2000 milliGRAM(s) IV Intermittent every 8 hours  enoxaparin Injectable 40 milliGRAM(s) SubCutaneous daily  hydrochlorothiazide 25 milliGRAM(s) Oral daily    MEDICATIONS  (PRN):  acetaminophen     Tablet .. 650 milliGRAM(s) Oral every 6 hours PRN Temp greater or equal to 38C (100.4F), Mild Pain (1 - 3)  oxycodone    5 mG/acetaminophen 325 mG 1 Tablet(s) Oral every 6 hours PRN Moderate Pain (4 - 6)    Allergies    No Known Allergies    Intolerances        REVIEW OF SYSTEMS:    CONSTITUTIONAL: No weakness, fevers or chills.   EYES/ENT: No visual changes;    NECK: No pain or stiffness  RESPIRATORY: No cough, wheezing, No shortness of breath  CARDIOVASCULAR: No chest pain or palpitations  GASTROINTESTINAL: No abdominal pain, or hematochezia.  GENITOURINARY: No dysuria orhematuria  NEUROLOGICAL: No numbness or weakness  SKIN: No itching, burning, rashes  All other review of systems is negative unless indicated above    Vital Signs Last 24 Hrs  T(C): 37 (10 Dec 2021 04:55), Max: 37 (10 Dec 2021 04:55)  T(F): 98.6 (10 Dec 2021 04:55), Max: 98.6 (10 Dec 2021 04:55)  HR: 63 (10 Dec 2021 04:55) (61 - 84)  BP: 140/71 (10 Dec 2021 04:55) (108/67 - 140/71)  BP(mean): --  RR: 17 (10 Dec 2021 04:55) (17 - 17)  SpO2: 95% (10 Dec 2021 04:55) (95% - 96%)  I&O's Summary    09 Dec 2021 07:01  -  10 Dec 2021 07:00  --------------------------------------------------------  IN: 50 mL / OUT: 0 mL / NET: 50 mL        PHYSICAL EXAM:  Constitutional: NAD  Neurological: Alert, no focal deficits  HEENT: no JVD, EOMI  Cardiovascular: Regular, S1 and S2, no murmur  Pulmonary: breath sounds bilaterally  Gastrointestinal: Bowel Sounds present, soft, nontender  EXT:  no peripheral edema  Skin: No rashes.  Psych:  Mood calm  LABS: All Labs Reviewed:                          12.7   5.44  )-----------( 218      ( 10 Dec 2021 08:23 )             38.1     12-10    140  |  106  |  19  ----------------------------<  102<H>  4.2   |  32<H>  |  1.20    Ca    8.6      10 Dec 2021 08:23    TPro  6.8  /  Alb  2.8<L>  /  TBili  0.6  /  DBili  x   /  AST  14<L>  /  ALT  7<L>  /  AlkPhos  62  12-10    12 Lead ECG:   Ventricular Rate 66 BPM    Atrial Rate 66 BPM    P-R Interval 164 ms    QRS Duration 82 ms    Q-T Interval 410 ms    QTC Calculation(Bazett) 429 ms    P Axis 61 degrees    R Axis 7 degrees    T Axis 32 degrees    Diagnosis Line *** Poor data quality, interpretation may be adversely affected  Undetermined rhythm  Septal infarct , age undetermined  Abnormal ECG  No previous ECGs available  Confirmed by KATELYN LONGO ([id number]) on 12/8/2021 9:15:59 PM (12-08-21 @ 12:20)    · Assessment	  86 y/o M sent from wound care due to concern for left leg wound infection. Pt states wound was getting smaller and then started getting bigger, leg is very swollen. Is seen by wound care every week, last time was last week. When he went this morning, wound was examined and he was instructed to go to ED. Pt denies fever, chills, CP, SOB, nausea, vomiting.  Pt seen as output recently by Dr Durbin to assess abnormal EKG with undetermined rhythm.  Baseline with artifact due to Parkinson's.  Otherwise with no significant suspicious finding.   Outpt echo was scheduled for Jan 5th which we can arrange for hospital.     -abnormal EKG, Poor quality.  Schedule echo as per Dr Enrique    -HTN   BP is in good range on HCT 25 mg.      will follow prn

## 2021-12-10 NOTE — SWALLOW BEDSIDE ASSESSMENT ADULT - SWALLOW EVAL: RECOMMENDED FEEDING/EATING TECHNIQUES
allow for swallow between intakes/alternate food with liquid/crush medication (when feasible)/maintain upright posture during/after eating for 30 mins/oral hygiene/position upright (90 degrees)/small sips/bites

## 2021-12-10 NOTE — PROGRESS NOTE ADULT - SUBJECTIVE AND OBJECTIVE BOX
[facility], Division of Infectious Diseases  RASHID Rosas Y. Patel, S. Shah  [phone]  ([phone] - weekdays after 5pm and weekends)    Name: DENG SIERRA  Age/Gender: 85y Male  MRN: [id number]    Interval History:  Patient seen this morning.   Notes reviewed.   No concerning overnight events.  Afebrile.   States he feels well & hopes to see improvement in his legs    Allergies: No Known Allergies      Objective:  Vitals:   T(F): 98.6 (12-10-21 @ 12:30), Max: 98.6 (12-10-21 @ 04:55)  HR: 71 (12-10-21 @ 12:30) (61 - 71)  BP: 138/63 (12-10-21 @ 12:30) (118/71 - 140/71)  RR: 18 (12-10-21 @ 12:30) (17 - 18)  SpO2: 95% (12-10-21 @ 12:30) (95% - 95%)  Physical Examination:  General: no acute distress, nontoxic appearing  HEENT: anicteric  Cardio: S1, S2 present, normal rate  Resp: breathing comfortably on RA  Abd: soft, ND, NT  Ext: b/l LE edema L>R, b/l venous stasis dermatitis, LLE ulcer, no warmth or tenderness  Skin: warm, dry, no visible rash   Lines:    Laboratory Studies:  CBC:                       12.7   5.44  )-----------( 218      ( 10 Dec 2021 08:23 )             38.1     WBC Trend:  5.44 12-10-21 @ 08:23  6.03 12-09-21 @ 08:36  7.99 12-08-21 @ 12:16    CMP: 12-10    140  |  106  |  19  ----------------------------<  102<H>  4.2   |  32<H>  |  1.20    Ca    8.6      10 Dec 2021 08:23    TPro  6.8  /  Alb  2.8<L>  /  TBili  0.6  /  DBili  x   /  AST  14<L>  /  ALT  7<L>  /  AlkPhos  62  12-10      LIVER FUNCTIONS - ( 10 Dec 2021 08:23 )  Alb: 2.8 g/dL / Pro: 6.8 g/dL / ALK PHOS: 62 U/L / ALT: 7 U/L / AST: 14 U/L / GGT: x               Microbiology: reviewed     Culture - Blood (collected 12-09-21 @ 01:04)  Source: .Blood Blood-Peripheral  Preliminary Report (12-10-21 @ 02:02):    No growth to date.    Culture - Blood (collected 12-09-21 @ 01:02)  Source: .Blood Blood-Peripheral  Preliminary Report (12-10-21 @ 02:01):    No growth to date.      Radiology: reviewed     Medications:  acetaminophen     Tablet .. 650 milliGRAM(s) Oral every 6 hours PRN  aspirin  chewable 81 milliGRAM(s) Oral daily  carbidopa/levodopa  25/100 1 Tablet(s) Oral three times a day  ceFAZolin   IVPB 2000 milliGRAM(s) IV Intermittent every 8 hours  enoxaparin Injectable 40 milliGRAM(s) SubCutaneous daily  hydrochlorothiazide 25 milliGRAM(s) Oral daily  oxycodone    5 mG/acetaminophen 325 mG 1 Tablet(s) Oral every 6 hours PRN    Antimicrobials:  ceFAZolin   IVPB 2000 milliGRAM(s) IV Intermittent every 8 hours

## 2021-12-10 NOTE — PROGRESS NOTE ADULT - SUBJECTIVE AND OBJECTIVE BOX
Neurology follow up note    DENG [id number]      Interval History:    Patient feels ok no new complaints.    Allergies    No Known Allergies    Intolerances        MEDICATIONS    acetaminophen     Tablet .. 650 milliGRAM(s) Oral every 6 hours PRN  aspirin  chewable 81 milliGRAM(s) Oral daily  carbidopa/levodopa  25/100 1 Tablet(s) Oral three times a day  ceFAZolin   IVPB 2000 milliGRAM(s) IV Intermittent every 8 hours  enoxaparin Injectable 40 milliGRAM(s) SubCutaneous daily  hydrochlorothiazide 25 milliGRAM(s) Oral daily  oxycodone    5 mG/acetaminophen 325 mG 1 Tablet(s) Oral every 6 hours PRN              Vital Signs Last 24 Hrs  T(C): 37 (10 Dec 2021 04:55), Max: 37 (10 Dec 2021 04:55)  T(F): 98.6 (10 Dec 2021 04:55), Max: 98.6 (10 Dec 2021 04:55)  HR: 63 (10 Dec 2021 04:55) (61 - 84)  BP: 140/71 (10 Dec 2021 04:55) (108/67 - 140/71)  BP(mean): --  RR: 17 (10 Dec 2021 04:55) (17 - 17)  SpO2: 95% (10 Dec 2021 04:55) (95% - 96%)      REVIEW OF SYSTEMS:  Constitutional:  No fever, chills, or night sweats.  Head:  No headache.  Eyes:  No double vision or blurry vision.  Ears:  No ringing in the ears.  Neck:  No neck pain.  Respiratory:  No shortness of breath.  Cardiovascular:  No chest pain.  Abdomen:  No nausea, vomiting, or abdominal pain.  Extremities/Neurological:  No numbness or tingling.  Musculoskeletal:  Positive pain in his left leg where he has an ulcer.    PHYSICAL EXAMINATION: HEENT:  Head:  Normocephalic, atraumatic.  Eyes:  No scleral icterus.  Ears:  Hearing bilaterally intact.  NECK:  Supple.  RESPIRATORY:  Good air entry bilaterally.  CARDIOVASCULAR:  S1 and S2 heard.  ABDOMEN:  Soft and nontender.  EXTREMITIES  No clubbing or cyanosis was noted. Positive ulcer was noted on the left leg, with dressing.      NEUROLOGIC:  The patient is awake and alert.  Location was hospital, year was 2020, month; with choices was December.  Recall was 1/3, able to name simple objects.  Extraocular movements were intact.  Speech was fluent.  Smile was symmetric.  Motor:  Bilateral upper and lower were 4/5.  Positive resting tremors were noted which dampen with movement.  Positive cogwheel rigidity were noted right greater than left.            LABS:  CBC Full  -  ( 10 Dec 2021 08:23 )  WBC Count : 5.44 K/uL  RBC Count : 4.12 M/uL  Hemoglobin : 12.7 g/dL  Hematocrit : 38.1 %  Platelet Count - Automated : 218 K/uL  Mean Cell Volume : 92.5 fl  Mean Cell Hemoglobin : 30.8 pg  Mean Cell Hemoglobin Concentration : 33.3 gm/dL  Auto Neutrophil # : x  Auto Lymphocyte # : x  Auto Monocyte # : x  Auto Eosinophil # : x  Auto Basophil # : x  Auto Neutrophil % : x  Auto Lymphocyte % : x  Auto Monocyte % : x  Auto Eosinophil % : x  Auto Basophil % : x      12-10    140  |  106  |  19  ----------------------------<  102<H>  4.2   |  32<H>  |  1.20    Ca    8.6      10 Dec 2021 08:23    TPro  6.8  /  Alb  2.8<L>  /  TBili  0.6  /  DBili  x   /  AST  14<L>  /  ALT  7<L>  /  AlkPhos  62  12-10    Hemoglobin A1C:     LIVER FUNCTIONS - ( 10 Dec 2021 08:23 )  Alb: 2.8 g/dL / Pro: 6.8 g/dL / ALK PHOS: 62 U/L / ALT: 7 U/L / AST: 14 U/L / GGT: x           Vitamin B12         RADIOLOGY        ANALYSIS AND PLAN:  An 85-year-old with memory issues and Parkinson's.  For Parkinson's disease, can become exacerbated with general medical issues.  For now, we will continue the patient on his Sinemet at the current dose.  For mild cognitive impairment versus subtle dementia, It appears that he did try medications in the past with side effects.  For history of hypertension, monitor systolic blood pressure.  neurologic wise stable     Spoke with the son, Chacorta, at [phone] 12/10.  He does understand while in the hospital setting may start to become slightly more disoriented with a change in location.    Greater than 40 minutes of time was spent with the patient, plan of care, reviewing data, speaking to multidisciplinary healthcare team with greater than 50% of that time in counseling and care coordination.    Thank you for the courtesy of this consultation.

## 2021-12-11 LAB
ALBUMIN SERPL ELPH-MCNC: 2.6 G/DL — LOW (ref 3.3–5)
ALP SERPL-CCNC: 56 U/L — SIGNIFICANT CHANGE UP (ref 40–120)
ALT FLD-CCNC: 7 U/L — LOW (ref 12–78)
ANION GAP SERPL CALC-SCNC: 3 MMOL/L — LOW (ref 5–17)
AST SERPL-CCNC: 18 U/L — SIGNIFICANT CHANGE UP (ref 15–37)
BILIRUB SERPL-MCNC: 0.5 MG/DL — SIGNIFICANT CHANGE UP (ref 0.2–1.2)
BUN SERPL-MCNC: 19 MG/DL — SIGNIFICANT CHANGE UP (ref 7–23)
CALCIUM SERPL-MCNC: 8.8 MG/DL — SIGNIFICANT CHANGE UP (ref 8.5–10.1)
CHLORIDE SERPL-SCNC: 107 MMOL/L — SIGNIFICANT CHANGE UP (ref 96–108)
CO2 SERPL-SCNC: 32 MMOL/L — HIGH (ref 22–31)
CREAT SERPL-MCNC: 1.2 MG/DL — SIGNIFICANT CHANGE UP (ref 0.5–1.3)
GLUCOSE SERPL-MCNC: 93 MG/DL — SIGNIFICANT CHANGE UP (ref 70–99)
HCT VFR BLD CALC: 36.1 % — LOW (ref 39–50)
HGB BLD-MCNC: 12.2 G/DL — LOW (ref 13–17)
MCHC RBC-ENTMCNC: 30.9 PG — SIGNIFICANT CHANGE UP (ref 27–34)
MCHC RBC-ENTMCNC: 33.8 GM/DL — SIGNIFICANT CHANGE UP (ref 32–36)
MCV RBC AUTO: 91.4 FL — SIGNIFICANT CHANGE UP (ref 80–100)
NRBC # BLD: 0 /100 WBCS — SIGNIFICANT CHANGE UP (ref 0–0)
PLATELET # BLD AUTO: 215 K/UL — SIGNIFICANT CHANGE UP (ref 150–400)
POTASSIUM SERPL-MCNC: 3.8 MMOL/L — SIGNIFICANT CHANGE UP (ref 3.5–5.3)
POTASSIUM SERPL-SCNC: 3.8 MMOL/L — SIGNIFICANT CHANGE UP (ref 3.5–5.3)
PROT SERPL-MCNC: 6.1 G/DL — SIGNIFICANT CHANGE UP (ref 6–8.3)
RBC # BLD: 3.95 M/UL — LOW (ref 4.2–5.8)
RBC # FLD: 12.6 % — SIGNIFICANT CHANGE UP (ref 10.3–14.5)
SODIUM SERPL-SCNC: 142 MMOL/L — SIGNIFICANT CHANGE UP (ref 135–145)
WBC # BLD: 4.93 K/UL — SIGNIFICANT CHANGE UP (ref 3.8–10.5)
WBC # FLD AUTO: 4.93 K/UL — SIGNIFICANT CHANGE UP (ref 3.8–10.5)

## 2021-12-11 RX ADMIN — ENOXAPARIN SODIUM 40 MILLIGRAM(S): 100 INJECTION SUBCUTANEOUS at 13:00

## 2021-12-11 RX ADMIN — Medication 25 MILLIGRAM(S): at 06:47

## 2021-12-11 RX ADMIN — Medication 100 MILLIGRAM(S): at 14:00

## 2021-12-11 RX ADMIN — Medication 81 MILLIGRAM(S): at 13:00

## 2021-12-11 RX ADMIN — Medication 100 MILLIGRAM(S): at 22:24

## 2021-12-11 RX ADMIN — CARBIDOPA AND LEVODOPA 1 TABLET(S): 25; 100 TABLET ORAL at 14:00

## 2021-12-11 RX ADMIN — CARBIDOPA AND LEVODOPA 1 TABLET(S): 25; 100 TABLET ORAL at 06:47

## 2021-12-11 RX ADMIN — CARBIDOPA AND LEVODOPA 1 TABLET(S): 25; 100 TABLET ORAL at 22:24

## 2021-12-11 RX ADMIN — Medication 100 MILLIGRAM(S): at 06:47

## 2021-12-11 NOTE — PROGRESS NOTE ADULT - SUBJECTIVE AND OBJECTIVE BOX
Patient is a 85y old  Male who presents with a chief complaint of left wound infection (11 Dec 2021 07:46)    Date of servie : 12-11-21 @ 12:36  INTERVAL HPI/OVERNIGHT EVENTS:  T(C): 36.9 (12-11-21 @ 05:17), Max: 36.9 (12-11-21 @ 05:17)  HR: 80 (12-11-21 @ 05:17) (65 - 80)  BP: 118/68 (12-11-21 @ 05:17) (118/68 - 144/83)  RR: 18 (12-11-21 @ 05:17) (18 - 18)  SpO2: 93% (12-11-21 @ 05:17) (93% - 94%)  Wt(kg): --  I&O's Summary      LABS:                        12.2   4.93  )-----------( 215      ( 11 Dec 2021 08:47 )             36.1     12-11    142  |  107  |  19  ----------------------------<  93  3.8   |  32<H>  |  1.20    Ca    8.8      11 Dec 2021 08:47    TPro  6.1  /  Alb  2.6<L>  /  TBili  0.5  /  DBili  x   /  AST  18  /  ALT  7<L>  /  AlkPhos  56  12-11        CAPILLARY BLOOD GLUCOSE                MEDICATIONS  (STANDING):  aspirin  chewable 81 milliGRAM(s) Oral daily  carbidopa/levodopa  25/100 1 Tablet(s) Oral three times a day  ceFAZolin   IVPB 2000 milliGRAM(s) IV Intermittent every 8 hours  enoxaparin Injectable 40 milliGRAM(s) SubCutaneous daily  hydrochlorothiazide 25 milliGRAM(s) Oral daily    MEDICATIONS  (PRN):  acetaminophen     Tablet .. 650 milliGRAM(s) Oral every 6 hours PRN Temp greater or equal to 38C (100.4F), Mild Pain (1 - 3)  oxycodone    5 mG/acetaminophen 325 mG 1 Tablet(s) Oral every 6 hours PRN Moderate Pain (4 - 6)          PHYSICAL EXAM:  GENERAL: NAD, well-groomed, well-developed  HEAD:  Atraumatic, Normocephalic  CHEST/LUNG: Clear to percussion bilaterally; No rales, rhonchi, wheezing, or rubs  HEART: Regular rate and rhythm; No murmurs, rubs, or gallops  ABDOMEN: Soft, Nontender, Nondistended; Bowel sounds present  EXTREMITIES:  2+ Peripheral Pulses, No clubbing, cyanosis, or edema  LYMPH: No lymphadenopathy noted  SKIN: No rashes or lesions    Care Discussed with Consultants/Other Providers [ ] YES  [ ] NO

## 2021-12-11 NOTE — PROGRESS NOTE ADULT - SUBJECTIVE AND OBJECTIVE BOX
Neurology follow up note    DENG [id number]      Interval History:    Patient feels ok no new complaints.    Allergies    No Known Allergies    Intolerances        MEDICATIONS    acetaminophen     Tablet .. 650 milliGRAM(s) Oral every 6 hours PRN  aspirin  chewable 81 milliGRAM(s) Oral daily  carbidopa/levodopa  25/100 1 Tablet(s) Oral three times a day  ceFAZolin   IVPB 2000 milliGRAM(s) IV Intermittent every 8 hours  enoxaparin Injectable 40 milliGRAM(s) SubCutaneous daily  hydrochlorothiazide 25 milliGRAM(s) Oral daily  oxycodone    5 mG/acetaminophen 325 mG 1 Tablet(s) Oral every 6 hours PRN              Vital Signs Last 24 Hrs  T(C): 36.9 (11 Dec 2021 05:17), Max: 37 (10 Dec 2021 12:30)  T(F): 98.4 (11 Dec 2021 05:17), Max: 98.6 (10 Dec 2021 12:30)  HR: 80 (11 Dec 2021 05:17) (65 - 80)  BP: 118/68 (11 Dec 2021 05:17) (118/68 - 144/83)  BP(mean): --  RR: 18 (11 Dec 2021 05:17) (18 - 18)  SpO2: 93% (11 Dec 2021 05:17) (93% - 95%)    REVIEW OF SYSTEMS:  Constitutional:  No fever, chills, or night sweats.  Head:  No headache.  Eyes:  No double vision or blurry vision.  Ears:  No ringing in the ears.  Neck:  No neck pain.  Respiratory:  No shortness of breath.  Cardiovascular:  No chest pain.  Abdomen:  No nausea, vomiting, or abdominal pain.  Extremities/Neurological:  No numbness or tingling.  Musculoskeletal:  Positive pain in his left leg where he has an ulcer.    PHYSICAL EXAMINATION: HEENT:  Head:  Normocephalic, atraumatic.  Eyes:  No scleral icterus.  Ears:  Hearing bilaterally intact.  NECK:  Supple.  RESPIRATORY:  Good air entry bilaterally.  CARDIOVASCULAR:  S1 and S2 heard.  ABDOMEN:  Soft and nontender.  EXTREMITIES  No clubbing or cyanosis was noted. Positive ulcer was noted on the left leg, with dressing.      NEUROLOGIC:  The patient is awake and alert.  Location was [facility], year was 2020, month; with choices was December.  Recall was 1/3, able to name simple objects.  Extraocular movements were intact.  Speech was fluent.  Smile was symmetric.  Motor:  Bilateral upper and lower were 4/5.  Positive resting tremors were noted which dampen with movement.  Positive cogwheel rigidity were noted right greater than left.                 LABS:  CBC Full  -  ( 10 Dec 2021 08:23 )  WBC Count : 5.44 K/uL  RBC Count : 4.12 M/uL  Hemoglobin : 12.7 g/dL  Hematocrit : 38.1 %  Platelet Count - Automated : 218 K/uL  Mean Cell Volume : 92.5 fl  Mean Cell Hemoglobin : 30.8 pg  Mean Cell Hemoglobin Concentration : 33.3 gm/dL  Auto Neutrophil # : x  Auto Lymphocyte # : x  Auto Monocyte # : x  Auto Eosinophil # : x  Auto Basophil # : x  Auto Neutrophil % : x  Auto Lymphocyte % : x  Auto Monocyte % : x  Auto Eosinophil % : x  Auto Basophil % : x      12-10    140  |  106  |  19  ----------------------------<  102<H>  4.2   |  32<H>  |  1.20    Ca    8.6      10 Dec 2021 08:23    TPro  6.8  /  Alb  2.8<L>  /  TBili  0.6  /  DBili  x   /  AST  14<L>  /  ALT  7<L>  /  AlkPhos  62  12-10    Hemoglobin A1C:     LIVER FUNCTIONS - ( 10 Dec 2021 08:23 )  Alb: 2.8 g/dL / Pro: 6.8 g/dL / ALK PHOS: 62 U/L / ALT: 7 U/L / AST: 14 U/L / GGT: x           Vitamin B12         RADIOLOGY      ANALYSIS AND PLAN:  An 85-year-old with memory issues and Parkinson's.  For Parkinson's disease, can become exacerbated with general medical issues.  For now, we will continue the patient on his Sinemet at the current dose.  For mild cognitive impairment versus subtle dementia, It appears that he did try medications in the past with side effects.  For history of hypertension, monitor systolic blood pressure.  neurologic wise stable     Spoke with the son, Chacorta, at [phone] 12/10.  He does understand while in the hospital setting may start to become slightly more disoriented with a change in location.    Greater than 40 minutes of time was spent with the patient, plan of care, reviewing data, speaking to multidisciplinary healthcare team with greater than 50% of that time in counseling and care coordination.    Thank you for the courtesy of this consultation. Neurology follow up note    DENG [id number]      Interval History:    Patient feels ok no new complaints.    Allergies    No Known Allergies    Intolerances        MEDICATIONS    acetaminophen     Tablet .. 650 milliGRAM(s) Oral every 6 hours PRN  aspirin  chewable 81 milliGRAM(s) Oral daily  carbidopa/levodopa  25/100 1 Tablet(s) Oral three times a day  ceFAZolin   IVPB 2000 milliGRAM(s) IV Intermittent every 8 hours  enoxaparin Injectable 40 milliGRAM(s) SubCutaneous daily  hydrochlorothiazide 25 milliGRAM(s) Oral daily  oxycodone    5 mG/acetaminophen 325 mG 1 Tablet(s) Oral every 6 hours PRN              Vital Signs Last 24 Hrs  T(C): 36.9 (11 Dec 2021 05:17), Max: 37 (10 Dec 2021 12:30)  T(F): 98.4 (11 Dec 2021 05:17), Max: 98.6 (10 Dec 2021 12:30)  HR: 80 (11 Dec 2021 05:17) (65 - 80)  BP: 118/68 (11 Dec 2021 05:17) (118/68 - 144/83)  BP(mean): --  RR: 18 (11 Dec 2021 05:17) (18 - 18)  SpO2: 93% (11 Dec 2021 05:17) (93% - 95%)    REVIEW OF SYSTEMS:  Constitutional:  No fever, chills, or night sweats.  Head:  No headache.  Eyes:  No double vision or blurry vision.  Ears:  No ringing in the ears.  Neck:  No neck pain.  Respiratory:  No shortness of breath.  Cardiovascular:  No chest pain.  Abdomen:  No nausea, vomiting, or abdominal pain.  Extremities/Neurological:  No numbness or tingling.  Musculoskeletal:  Positive pain in his left leg where he has an ulcer.    PHYSICAL EXAMINATION: HEENT:  Head:  Normocephalic, atraumatic.  Eyes:  No scleral icterus.  Ears:  Hearing bilaterally intact.  NECK:  Supple.  RESPIRATORY:  Good air entry bilaterally.  CARDIOVASCULAR:  S1 and S2 heard.  ABDOMEN:  Soft and nontender.  EXTREMITIES  No clubbing or cyanosis was noted. Positive ulcer was noted on the left leg, with dressing.      NEUROLOGIC:  The patient is awake and alert.  Location was [facility], year was 2020, month; with choices was December.  Recall was 1/3, able to name simple objects.  Extraocular movements were intact.  Speech was fluent.  Smile was symmetric.  Motor:  Bilateral upper and lower were 4/5.  Positive resting tremors were noted which dampen with movement.  Positive cogwheel rigidity were noted right greater than left.                 LABS:  CBC Full  -  ( 10 Dec 2021 08:23 )  WBC Count : 5.44 K/uL  RBC Count : 4.12 M/uL  Hemoglobin : 12.7 g/dL  Hematocrit : 38.1 %  Platelet Count - Automated : 218 K/uL  Mean Cell Volume : 92.5 fl  Mean Cell Hemoglobin : 30.8 pg  Mean Cell Hemoglobin Concentration : 33.3 gm/dL  Auto Neutrophil # : x  Auto Lymphocyte # : x  Auto Monocyte # : x  Auto Eosinophil # : x  Auto Basophil # : x  Auto Neutrophil % : x  Auto Lymphocyte % : x  Auto Monocyte % : x  Auto Eosinophil % : x  Auto Basophil % : x      12-10    140  |  106  |  19  ----------------------------<  102<H>  4.2   |  32<H>  |  1.20    Ca    8.6      10 Dec 2021 08:23    TPro  6.8  /  Alb  2.8<L>  /  TBili  0.6  /  DBili  x   /  AST  14<L>  /  ALT  7<L>  /  AlkPhos  62  12-10    Hemoglobin A1C:     LIVER FUNCTIONS - ( 10 Dec 2021 08:23 )  Alb: 2.8 g/dL / Pro: 6.8 g/dL / ALK PHOS: 62 U/L / ALT: 7 U/L / AST: 14 U/L / GGT: x           Vitamin B12         RADIOLOGY      ANALYSIS AND PLAN:  An 85-year-old with memory issues and Parkinson's.  For Parkinson's disease, can become exacerbated with general medical issues.  For now, we will continue the patient on his Sinemet at the current dose.  For mild cognitive impairment versus subtle dementia, It appears that he did try medications in the past with side effects.  For history of hypertension, monitor systolic blood pressure.  neurologic wise stable   fall precautions     Spoke with the son, Chacorta, at [phone] 12/11.  He does understand while in the hospital setting may start to become slightly more disoriented with a change in location.    Greater than 34 minutes of time was spent with the patient, plan of care, reviewing data, speaking to multidisciplinary healthcare team with greater than 50% of that time in counseling and care coordination.    Thank you for the courtesy of this consultation.

## 2021-12-12 LAB
ALBUMIN SERPL ELPH-MCNC: 2.9 G/DL — LOW (ref 3.3–5)
ALP SERPL-CCNC: 64 U/L — SIGNIFICANT CHANGE UP (ref 40–120)
ALT FLD-CCNC: 12 U/L — SIGNIFICANT CHANGE UP (ref 12–78)
ANION GAP SERPL CALC-SCNC: 7 MMOL/L — SIGNIFICANT CHANGE UP (ref 5–17)
AST SERPL-CCNC: 31 U/L — SIGNIFICANT CHANGE UP (ref 15–37)
BILIRUB SERPL-MCNC: 0.4 MG/DL — SIGNIFICANT CHANGE UP (ref 0.2–1.2)
BUN SERPL-MCNC: 19 MG/DL — SIGNIFICANT CHANGE UP (ref 7–23)
CALCIUM SERPL-MCNC: 8.9 MG/DL — SIGNIFICANT CHANGE UP (ref 8.5–10.1)
CHLORIDE SERPL-SCNC: 106 MMOL/L — SIGNIFICANT CHANGE UP (ref 96–108)
CO2 SERPL-SCNC: 28 MMOL/L — SIGNIFICANT CHANGE UP (ref 22–31)
CREAT SERPL-MCNC: 1.2 MG/DL — SIGNIFICANT CHANGE UP (ref 0.5–1.3)
GLUCOSE SERPL-MCNC: 133 MG/DL — HIGH (ref 70–99)
HCT VFR BLD CALC: 40.5 % — SIGNIFICANT CHANGE UP (ref 39–50)
HGB BLD-MCNC: 13.3 G/DL — SIGNIFICANT CHANGE UP (ref 13–17)
MCHC RBC-ENTMCNC: 30.3 PG — SIGNIFICANT CHANGE UP (ref 27–34)
MCHC RBC-ENTMCNC: 32.8 GM/DL — SIGNIFICANT CHANGE UP (ref 32–36)
MCV RBC AUTO: 92.3 FL — SIGNIFICANT CHANGE UP (ref 80–100)
NRBC # BLD: 0 /100 WBCS — SIGNIFICANT CHANGE UP (ref 0–0)
PLATELET # BLD AUTO: 220 K/UL — SIGNIFICANT CHANGE UP (ref 150–400)
POTASSIUM SERPL-MCNC: 3.7 MMOL/L — SIGNIFICANT CHANGE UP (ref 3.5–5.3)
POTASSIUM SERPL-SCNC: 3.7 MMOL/L — SIGNIFICANT CHANGE UP (ref 3.5–5.3)
PROT SERPL-MCNC: 6.9 G/DL — SIGNIFICANT CHANGE UP (ref 6–8.3)
RBC # BLD: 4.39 M/UL — SIGNIFICANT CHANGE UP (ref 4.2–5.8)
RBC # FLD: 12.5 % — SIGNIFICANT CHANGE UP (ref 10.3–14.5)
SODIUM SERPL-SCNC: 141 MMOL/L — SIGNIFICANT CHANGE UP (ref 135–145)
WBC # BLD: 5.93 K/UL — SIGNIFICANT CHANGE UP (ref 3.8–10.5)
WBC # FLD AUTO: 5.93 K/UL — SIGNIFICANT CHANGE UP (ref 3.8–10.5)

## 2021-12-12 RX ADMIN — CARBIDOPA AND LEVODOPA 1 TABLET(S): 25; 100 TABLET ORAL at 21:33

## 2021-12-12 RX ADMIN — CARBIDOPA AND LEVODOPA 1 TABLET(S): 25; 100 TABLET ORAL at 05:27

## 2021-12-12 RX ADMIN — Medication 100 MILLIGRAM(S): at 05:27

## 2021-12-12 RX ADMIN — Medication 100 MILLIGRAM(S): at 13:47

## 2021-12-12 RX ADMIN — Medication 100 MILLIGRAM(S): at 21:33

## 2021-12-12 RX ADMIN — Medication 25 MILLIGRAM(S): at 05:26

## 2021-12-12 RX ADMIN — CARBIDOPA AND LEVODOPA 1 TABLET(S): 25; 100 TABLET ORAL at 13:47

## 2021-12-12 RX ADMIN — Medication 81 MILLIGRAM(S): at 11:31

## 2021-12-12 RX ADMIN — ENOXAPARIN SODIUM 40 MILLIGRAM(S): 100 INJECTION SUBCUTANEOUS at 11:31

## 2021-12-12 NOTE — PROGRESS NOTE ADULT - SUBJECTIVE AND OBJECTIVE BOX
Neurology follow up note    DENG [id number]      Interval History:    Patient feels ok no new complaints.    Allergies    No Known Allergies    Intolerances        MEDICATIONS    acetaminophen     Tablet .. 650 milliGRAM(s) Oral every 6 hours PRN  aspirin  chewable 81 milliGRAM(s) Oral daily  carbidopa/levodopa  25/100 1 Tablet(s) Oral three times a day  ceFAZolin   IVPB 2000 milliGRAM(s) IV Intermittent every 8 hours  enoxaparin Injectable 40 milliGRAM(s) SubCutaneous daily  hydrochlorothiazide 25 milliGRAM(s) Oral daily  oxycodone    5 mG/acetaminophen 325 mG 1 Tablet(s) Oral every 6 hours PRN              Vital Signs Last 24 Hrs  T(C): 36.8 (12 Dec 2021 05:10), Max: 37.1 (11 Dec 2021 13:10)  T(F): 98.2 (12 Dec 2021 05:10), Max: 98.7 (11 Dec 2021 13:10)  HR: 60 (12 Dec 2021 05:10) (60 - 70)  BP: 116/70 (12 Dec 2021 05:10) (104/58 - 116/70)  BP(mean): --  RR: 18 (12 Dec 2021 05:10) (16 - 19)  SpO2: 95% (12 Dec 2021 05:10) (93% - 96%)      REVIEW OF SYSTEMS:  Constitutional:  No fever, chills, or night sweats.  Head:  No headache.  Eyes:  No double vision or blurry vision.  Ears:  No ringing in the ears.  Neck:  No neck pain.  Respiratory:  No shortness of breath.  Cardiovascular:  No chest pain.  Abdomen:  No nausea, vomiting, or abdominal pain.  Extremities/Neurological:  No numbness or tingling.  Musculoskeletal:  Positive pain in his left leg where he has an ulcer.    PHYSICAL EXAMINATION: HEENT:  Head:  Normocephalic, atraumatic.  Eyes:  No scleral icterus.  Ears:  Hearing bilaterally intact.  NECK:  Supple.  RESPIRATORY:  Good air entry bilaterally.  CARDIOVASCULAR:  S1 and S2 heard.  ABDOMEN:  Soft and nontender.  EXTREMITIES  No clubbing or cyanosis was noted. Positive ulcer was noted on the left leg, with dressing.      NEUROLOGIC:  The patient is awake and alert.  Location [facility] hospitals, year was 2020, month; with choices was December.  Recall was 1/3, able to name simple objects.  Extraocular movements were intact.  Speech was fluent.  Smile was symmetric.  Motor:  Bilateral upper and lower were 4/5.  Positive resting tremors were noted which dampen with movement.  Positive cogwheel rigidity were noted right greater than left.               LABS:  CBC Full  -  ( 11 Dec 2021 08:47 )  WBC Count : 4.93 K/uL  RBC Count : 3.95 M/uL  Hemoglobin : 12.2 g/dL  Hematocrit : 36.1 %  Platelet Count - Automated : 215 K/uL  Mean Cell Volume : 91.4 fl  Mean Cell Hemoglobin : 30.9 pg  Mean Cell Hemoglobin Concentration : 33.8 gm/dL  Auto Neutrophil # : x  Auto Lymphocyte # : x  Auto Monocyte # : x  Auto Eosinophil # : x  Auto Basophil # : x  Auto Neutrophil % : x  Auto Lymphocyte % : x  Auto Monocyte % : x  Auto Eosinophil % : x  Auto Basophil % : x      12-11    142  |  107  |  19  ----------------------------<  93  3.8   |  32<H>  |  1.20    Ca    8.8      11 Dec 2021 08:47    TPro  6.1  /  Alb  2.6<L>  /  TBili  0.5  /  DBili  x   /  AST  18  /  ALT  7<L>  /  AlkPhos  56  12-11    Hemoglobin A1C:     LIVER FUNCTIONS - ( 11 Dec 2021 08:47 )  Alb: 2.6 g/dL / Pro: 6.1 g/dL / ALK PHOS: 56 U/L / ALT: 7 U/L / AST: 18 U/L / GGT: x           Vitamin B12         RADIOLOGY        ANALYSIS AND PLAN:  An 85-year-old with memory issues and Parkinson's.  For Parkinson's disease, can become exacerbated with general medical issues.  For now, we will continue the patient on his Sinemet at the current dose.  For mild cognitive impairment versus subtle dementia, It appears that he did try medications in the past with side effects.  For history of hypertension, monitor systolic blood pressure.  neurologic wise stable   fall precautions     Spoke with the son, Chacorta, at [phone] 12/11.  He does understand while in the hospital setting may start to become slightly more disoriented with a change in location.    Greater than 31 minutes of time was spent with the patient, plan of care, reviewing data, speaking to multidisciplinary healthcare team with greater than 50% of that time in counseling and care coordination.    Thank you for the courtesy of this consultation.   Neurology follow up note    DENG [id number]      Interval History:    Patient feels ok no new complaints.    Allergies    No Known Allergies    Intolerances        MEDICATIONS    acetaminophen     Tablet .. 650 milliGRAM(s) Oral every 6 hours PRN  aspirin  chewable 81 milliGRAM(s) Oral daily  carbidopa/levodopa  25/100 1 Tablet(s) Oral three times a day  ceFAZolin   IVPB 2000 milliGRAM(s) IV Intermittent every 8 hours  enoxaparin Injectable 40 milliGRAM(s) SubCutaneous daily  hydrochlorothiazide 25 milliGRAM(s) Oral daily  oxycodone    5 mG/acetaminophen 325 mG 1 Tablet(s) Oral every 6 hours PRN              Vital Signs Last 24 Hrs  T(C): 36.8 (12 Dec 2021 05:10), Max: 37.1 (11 Dec 2021 13:10)  T(F): 98.2 (12 Dec 2021 05:10), Max: 98.7 (11 Dec 2021 13:10)  HR: 60 (12 Dec 2021 05:10) (60 - 70)  BP: 116/70 (12 Dec 2021 05:10) (104/58 - 116/70)  BP(mean): --  RR: 18 (12 Dec 2021 05:10) (16 - 19)  SpO2: 95% (12 Dec 2021 05:10) (93% - 96%)      REVIEW OF SYSTEMS:  Constitutional:  No fever, chills, or night sweats.  Head:  No headache.  Eyes:  No double vision or blurry vision.  Ears:  No ringing in the ears.  Neck:  No neck pain.  Respiratory:  No shortness of breath.  Cardiovascular:  No chest pain.  Abdomen:  No nausea, vomiting, or abdominal pain.  Extremities/Neurological:  No numbness or tingling.  Musculoskeletal:  Positive pain in his left leg where he has an ulcer.    PHYSICAL EXAMINATION: HEENT:  Head:  Normocephalic, atraumatic.  Eyes:  No scleral icterus.  Ears:  Hearing bilaterally intact.  NECK:  Supple.  RESPIRATORY:  Good air entry bilaterally.  CARDIOVASCULAR:  S1 and S2 heard.  ABDOMEN:  Soft and nontender.  EXTREMITIES  No clubbing or cyanosis was noted. Positive ulcer was noted on the left leg, with dressing.      NEUROLOGIC:  The patient is awake and alert.  Location was [facility], year was 2020, month; with choices was December.  Recall was 1/3, able to name simple objects.  Extraocular movements were intact.  Speech was fluent.  Smile was symmetric.  Motor:  Bilateral upper and lower were 4/5.  Positive resting tremors were noted which dampen with movement.  Positive cogwheel rigidity were noted right greater than left.               LABS:  CBC Full  -  ( 11 Dec 2021 08:47 )  WBC Count : 4.93 K/uL  RBC Count : 3.95 M/uL  Hemoglobin : 12.2 g/dL  Hematocrit : 36.1 %  Platelet Count - Automated : 215 K/uL  Mean Cell Volume : 91.4 fl  Mean Cell Hemoglobin : 30.9 pg  Mean Cell Hemoglobin Concentration : 33.8 gm/dL  Auto Neutrophil # : x  Auto Lymphocyte # : x  Auto Monocyte # : x  Auto Eosinophil # : x  Auto Basophil # : x  Auto Neutrophil % : x  Auto Lymphocyte % : x  Auto Monocyte % : x  Auto Eosinophil % : x  Auto Basophil % : x      12-11    142  |  107  |  19  ----------------------------<  93  3.8   |  32<H>  |  1.20    Ca    8.8      11 Dec 2021 08:47    TPro  6.1  /  Alb  2.6<L>  /  TBili  0.5  /  DBili  x   /  AST  18  /  ALT  7<L>  /  AlkPhos  56  12-11    Hemoglobin A1C:     LIVER FUNCTIONS - ( 11 Dec 2021 08:47 )  Alb: 2.6 g/dL / Pro: 6.1 g/dL / ALK PHOS: 56 U/L / ALT: 7 U/L / AST: 18 U/L / GGT: x           Vitamin B12         RADIOLOGY        ANALYSIS AND PLAN:  An 85-year-old with memory issues and Parkinson's.  For Parkinson's disease, can become exacerbated with general medical issues.  For now, we will continue the patient on his Sinemet at the current dose.  For mild cognitive impairment versus subtle dementia, It appears that he did try medications in the past with side effects.  For history of hypertension, monitor systolic blood pressure.  neurologic wise stable   fall precautions   aspiration precautions   monitor oral intake as needed     Spoke with the son, Chacorta, at [phone] 12/12.  He does understand while in the hospital setting may start to become slightly more disoriented with a change in location.    Greater than 22 minutes of time was spent with the patient, plan of care, reviewing data, speaking to multidisciplinary healthcare team with greater than 50% of that time in counseling and care coordination.    Thank you for the courtesy of this consultation.

## 2021-12-12 NOTE — PROGRESS NOTE ADULT - SUBJECTIVE AND OBJECTIVE BOX
Patient is a 85y old  Male who presents with a chief complaint of left wound infection (12 Dec 2021 07:46)    Date of servie : 12-12-21 @ 12:20  INTERVAL HPI/OVERNIGHT EVENTS:  T(C): 36.8 (12-12-21 @ 05:10), Max: 37.1 (12-11-21 @ 13:10)  HR: 60 (12-12-21 @ 05:10) (60 - 70)  BP: 116/70 (12-12-21 @ 05:10) (104/58 - 116/70)  RR: 18 (12-12-21 @ 05:10) (16 - 19)  SpO2: 95% (12-12-21 @ 05:10) (93% - 96%)  Wt(kg): --  I&O's Summary    11 Dec 2021 07:01  -  12 Dec 2021 07:00  --------------------------------------------------------  IN: 415 mL / OUT: 0 mL / NET: 415 mL        LABS:                        13.3   5.93  )-----------( 220      ( 12 Dec 2021 09:24 )             40.5     12-12    141  |  106  |  19  ----------------------------<  133<H>  3.7   |  28  |  1.20    Ca    8.9      12 Dec 2021 09:24    TPro  6.9  /  Alb  2.9<L>  /  TBili  0.4  /  DBili  x   /  AST  31  /  ALT  12  /  AlkPhos  64  12-12        CAPILLARY BLOOD GLUCOSE                MEDICATIONS  (STANDING):  aspirin  chewable 81 milliGRAM(s) Oral daily  carbidopa/levodopa  25/100 1 Tablet(s) Oral three times a day  ceFAZolin   IVPB 2000 milliGRAM(s) IV Intermittent every 8 hours  enoxaparin Injectable 40 milliGRAM(s) SubCutaneous daily  hydrochlorothiazide 25 milliGRAM(s) Oral daily    MEDICATIONS  (PRN):  acetaminophen     Tablet .. 650 milliGRAM(s) Oral every 6 hours PRN Temp greater or equal to 38C (100.4F), Mild Pain (1 - 3)  oxycodone    5 mG/acetaminophen 325 mG 1 Tablet(s) Oral every 6 hours PRN Moderate Pain (4 - 6)          PHYSICAL EXAM:  GENERAL: NAD, well-groomed, well-developed  HEAD:  Atraumatic, Normocephalic  CHEST/LUNG: Clear to percussion bilaterally; No rales, rhonchi, wheezing, or rubs  HEART: Regular rate and rhythm; No murmurs, rubs, or gallops  ABDOMEN: Soft, Nontender, Nondistended; Bowel sounds present  EXTREMITIES: edema+    Care Discussed with Consultants/Other Providers [ ] YES  [ ] NO

## 2021-12-13 ENCOUNTER — TRANSCRIPTION ENCOUNTER (OUTPATIENT)
Age: 86
End: 2021-12-13

## 2021-12-13 VITALS
OXYGEN SATURATION: 98 % | DIASTOLIC BLOOD PRESSURE: 74 MMHG | HEART RATE: 82 BPM | SYSTOLIC BLOOD PRESSURE: 124 MMHG | RESPIRATION RATE: 16 BRPM | TEMPERATURE: 98 F

## 2021-12-13 DIAGNOSIS — T14.8XXA OTHER INJURY OF UNSPECIFIED BODY REGION, INITIAL ENCOUNTER: ICD-10-CM

## 2021-12-13 LAB
ALBUMIN SERPL ELPH-MCNC: 2.6 G/DL — LOW (ref 3.3–5)
ALP SERPL-CCNC: 63 U/L — SIGNIFICANT CHANGE UP (ref 40–120)
ALT FLD-CCNC: 8 U/L — LOW (ref 12–78)
ANION GAP SERPL CALC-SCNC: 6 MMOL/L — SIGNIFICANT CHANGE UP (ref 5–17)
AST SERPL-CCNC: 34 U/L — SIGNIFICANT CHANGE UP (ref 15–37)
BILIRUB SERPL-MCNC: 0.4 MG/DL — SIGNIFICANT CHANGE UP (ref 0.2–1.2)
BUN SERPL-MCNC: 19 MG/DL — SIGNIFICANT CHANGE UP (ref 7–23)
CALCIUM SERPL-MCNC: 8.7 MG/DL — SIGNIFICANT CHANGE UP (ref 8.5–10.1)
CHLORIDE SERPL-SCNC: 104 MMOL/L — SIGNIFICANT CHANGE UP (ref 96–108)
CO2 SERPL-SCNC: 30 MMOL/L — SIGNIFICANT CHANGE UP (ref 22–31)
CREAT SERPL-MCNC: 1.1 MG/DL — SIGNIFICANT CHANGE UP (ref 0.5–1.3)
GLUCOSE SERPL-MCNC: 126 MG/DL — HIGH (ref 70–99)
HCT VFR BLD CALC: 40.5 % — SIGNIFICANT CHANGE UP (ref 39–50)
HGB BLD-MCNC: 13.6 G/DL — SIGNIFICANT CHANGE UP (ref 13–17)
MCHC RBC-ENTMCNC: 30.8 PG — SIGNIFICANT CHANGE UP (ref 27–34)
MCHC RBC-ENTMCNC: 33.6 GM/DL — SIGNIFICANT CHANGE UP (ref 32–36)
MCV RBC AUTO: 91.8 FL — SIGNIFICANT CHANGE UP (ref 80–100)
NRBC # BLD: 0 /100 WBCS — SIGNIFICANT CHANGE UP (ref 0–0)
PLATELET # BLD AUTO: 236 K/UL — SIGNIFICANT CHANGE UP (ref 150–400)
POTASSIUM SERPL-MCNC: 3.7 MMOL/L — SIGNIFICANT CHANGE UP (ref 3.5–5.3)
POTASSIUM SERPL-SCNC: 3.7 MMOL/L — SIGNIFICANT CHANGE UP (ref 3.5–5.3)
PROT SERPL-MCNC: 7.2 G/DL — SIGNIFICANT CHANGE UP (ref 6–8.3)
RBC # BLD: 4.41 M/UL — SIGNIFICANT CHANGE UP (ref 4.2–5.8)
RBC # FLD: 12.5 % — SIGNIFICANT CHANGE UP (ref 10.3–14.5)
SODIUM SERPL-SCNC: 140 MMOL/L — SIGNIFICANT CHANGE UP (ref 135–145)
WBC # BLD: 6.18 K/UL — SIGNIFICANT CHANGE UP (ref 3.8–10.5)
WBC # FLD AUTO: 6.18 K/UL — SIGNIFICANT CHANGE UP (ref 3.8–10.5)

## 2021-12-13 PROCEDURE — 74230 X-RAY XM SWLNG FUNCJ C+: CPT | Mod: 26

## 2021-12-13 PROCEDURE — 99221 1ST HOSP IP/OBS SF/LOW 40: CPT

## 2021-12-13 RX ADMIN — CARBIDOPA AND LEVODOPA 1 TABLET(S): 25; 100 TABLET ORAL at 05:50

## 2021-12-13 RX ADMIN — ENOXAPARIN SODIUM 40 MILLIGRAM(S): 100 INJECTION SUBCUTANEOUS at 12:44

## 2021-12-13 RX ADMIN — Medication 25 MILLIGRAM(S): at 05:50

## 2021-12-13 RX ADMIN — Medication 100 MILLIGRAM(S): at 05:50

## 2021-12-13 RX ADMIN — Medication 100 MILLIGRAM(S): at 13:44

## 2021-12-13 RX ADMIN — CARBIDOPA AND LEVODOPA 1 TABLET(S): 25; 100 TABLET ORAL at 13:44

## 2021-12-13 RX ADMIN — Medication 81 MILLIGRAM(S): at 12:44

## 2021-12-13 NOTE — SWALLOW VFSS/MBS ASSESSMENT ADULT - ADDITIONAL RECOMMENDATIONS
This department will continue to follow Patient for dysphagia remediation during this admission, as schedule permits. Recommend swallowing therapy upon discharge from [facility] to maximize swallow function (rehab vs home care vs out patient- outpatient services can be scheduled at [facility]: [phone]).

## 2021-12-13 NOTE — CONSULT NOTE ADULT - SUBJECTIVE AND OBJECTIVE BOX
HPI:  84 y/o M sent from wound care due to concern for left leg wound infection. Pt states wound was getting smaller and then started getting bigger, leg is very swollen. Is seen by wound care every week, last time was last week. When he went  morning, wound was examined and he was instructed to go to ED.     PAST MEDICAL & SURGICAL HISTORY:  Parkinsons    HTN (hypertension)    No significant past surgical history          REVIEW OF SYSTEMS  CONSTITUTIONAL: +weakness, no fevers or chills  HEENT: denies blurred visions, sore throat  SKIN: denies new lesions, rash  CARDIOVASCULAR: Denies chest pain, palpitations  RESPIRATORY: denies shortness of breath, sputum production  GASTROINTESTINAL: denies nausea, vomiting, diarrhea, abdominal pain  all other ROS is negative unless indicated above      MEDICATIONS  (STANDING):  aspirin  chewable 81 milliGRAM(s) Oral daily  carbidopa/levodopa  25/100 1 Tablet(s) Oral three times a day  ceFAZolin   IVPB 2000 milliGRAM(s) IV Intermittent every 8 hours  enoxaparin Injectable 40 milliGRAM(s) SubCutaneous daily  hydrochlorothiazide 25 milliGRAM(s) Oral daily    MEDICATIONS  (PRN):  acetaminophen     Tablet .. 650 milliGRAM(s) Oral every 6 hours PRN Temp greater or equal to 38C (100.4F), Mild Pain (1 - 3)  oxycodone    5 mG/acetaminophen 325 mG 1 Tablet(s) Oral every 6 hours PRN Moderate Pain (4 - 6)      Allergies    No Known Allergies    Intolerances    SOCIAL HISTORY:      FAMILY HISTORY:  No pertinent family history in first degree relatives        Vital Signs Last 24 Hrs  T(C): 36.9 (13 Dec 2021 05:36), Max: 37.1 (12 Dec 2021 19:02)  T(F): 98.4 (13 Dec 2021 05:36), Max: 98.7 (12 Dec 2021 19:02)  HR: 60 (13 Dec 2021 05:36) (60 - 80)  BP: 137/60 (13 Dec 2021 05:36) (105/65 - 137/60)  BP(mean): --  RR: 18 (13 Dec 2021 05:36) (18 - 18)  SpO2: 97% (13 Dec 2021 05:36) (93% - 97%)    NAD /   A&Ox3/ Alert  within defined normal limits  Total [facility] Sport/ Versa Care P500 bed                            13.6   6.18  )-----------( 236      ( 13 Dec 2021 10:02 )             40.5     12-13    140  |  104  |  19  ----------------------------<  126<H>  3.7   |  30  |  1.10    Ca    8.7      13 Dec 2021 10:02    TPro  7.2  /  Alb  2.6<L>  /  TBili  0.4  /  DBili  x   /  AST  34  /  ALT  8<L>  /  AlkPhos  63  12-13      Neurology  weakened strength & sensation grossly intact  verbal, Can follow commands    Musculoskeletal/Vascular:  ROM intact  No edema, warm, no calf tenderness, no hair growth  + DP/PT  no deformities/ contractures    Skin:   frail,  ecchymosis w/o hematoma  serosanguinous drainage, erythema  No odor, warmth, tenderness, induration, fluctuance          RADIOLOGY & ADDITIONAL STUDIES       < from: US Duplex Venous Lower Ext Ltd, Left (12.08.21 @ 13:16) >    ACC: [id number] EXAM:  US DPLX LWR EXT VEINS LTD LT                          PROCEDURE DATE:  12/08/2021          INTERPRETATION:  CLINICAL INFORMATION: Lower extremity swelling.    COMPARISON: Left lower extremity duplex of 1/25/2021.    TECHNIQUE: Duplex sonography of the LEFT LOWER extremity veins with color   and spectral Doppler, with and without compression.    FINDINGS:    There is normal compressibility of the left common femoral, femoral and   popliteal veins.  The contralateral common femoral vein is patent.  Doppler examination shows normal spontaneous and phasic flow.    No deep calf vein thrombosis is detected.    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    IMPRESSION:  No evidence of left lower extremity deep venous thrombosis.          --- End of Report ---            ZACARIAS NEELY MD; Attending Radiologist  This document has been electronically signed. Dec  8 2021  1:28PM    < end of copied text >

## 2021-12-13 NOTE — DISCHARGE NOTE NURSING/CASE MANAGEMENT/SOCIAL WORK - NSDCPEFALRISK_GEN_ALL_CORE
For information on Fall & Injury Prevention, visit: [email or web address] OR  [email or web address] OR  https://www.cdc.gov/steadi/patient.html

## 2021-12-13 NOTE — PROGRESS NOTE ADULT - SUBJECTIVE AND OBJECTIVE BOX
Patient is a 85y old  Male who presents with a chief complaint of left wound infection (13 Dec 2021 11:18)    Date of servie : 12-13-21 @ 12:46  INTERVAL HPI/OVERNIGHT EVENTS:  T(C): 36.9 (12-13-21 @ 05:36), Max: 37.1 (12-12-21 @ 19:02)  HR: 60 (12-13-21 @ 05:36) (60 - 80)  BP: 137/60 (12-13-21 @ 05:36) (105/65 - 137/60)  RR: 18 (12-13-21 @ 05:36) (18 - 18)  SpO2: 97% (12-13-21 @ 05:36) (93% - 97%)  Wt(kg): --  I&O's Summary      LABS:                        13.6   6.18  )-----------( 236      ( 13 Dec 2021 10:02 )             40.5     12-13    140  |  104  |  19  ----------------------------<  126<H>  3.7   |  30  |  1.10    Ca    8.7      13 Dec 2021 10:02    TPro  7.2  /  Alb  2.6<L>  /  TBili  0.4  /  DBili  x   /  AST  34  /  ALT  8<L>  /  AlkPhos  63  12-13        CAPILLARY BLOOD GLUCOSE                MEDICATIONS  (STANDING):  aspirin  chewable 81 milliGRAM(s) Oral daily  carbidopa/levodopa  25/100 1 Tablet(s) Oral three times a day  ceFAZolin   IVPB 2000 milliGRAM(s) IV Intermittent every 8 hours  enoxaparin Injectable 40 milliGRAM(s) SubCutaneous daily  hydrochlorothiazide 25 milliGRAM(s) Oral daily    MEDICATIONS  (PRN):  acetaminophen     Tablet .. 650 milliGRAM(s) Oral every 6 hours PRN Temp greater or equal to 38C (100.4F), Mild Pain (1 - 3)  oxycodone    5 mG/acetaminophen 325 mG 1 Tablet(s) Oral every 6 hours PRN Moderate Pain (4 - 6)          PHYSICAL EXAM:  GENERAL: NAD, well-groomed, well-developed  HEAD:  Atraumatic, Normocephalic  CHEST/LUNG: Clear to percussion bilaterally; No rales, rhonchi, wheezing, or rubs  HEART: Regular rate and rhythm; No murmurs, rubs, or gallops  ABDOMEN: Soft, Nontender, Nondistended; Bowel sounds present  EXTREMITIES:  2+ Peripheral Pulses, No clubbing, cyanosis, or edema  LYMPH: No lymphadenopathy noted  SKIN: No rashes or lesions    Care Discussed with Consultants/Other Providers [ ] YES  [ ] NO

## 2021-12-13 NOTE — DISCHARGE NOTE NURSING/CASE MANAGEMENT/SOCIAL WORK - PATIENT PORTAL LINK FT
You can access the FollowMyHealth Patient Portal offered by [facility] by registering at the following website: [email or web address]. By joining [facility]’s FollowMyHealth portal, you will also be able to view your health information using other applications (apps) compatible with our system.

## 2021-12-13 NOTE — SWALLOW VFSS/MBS ASSESSMENT ADULT - COMMENTS
Patient received in Radiology Suite this AM for Modified Barium Swallow Study, at which time he was alert, cooperative, and denied pain. Patient familiar to this department and was seen for bedside swallow assessment on 12/10/21, at which time a soft & bite-sized and moderately thick liquid diet level and MBSS were recommended (see report for details). Patient received in Radiology Suite this AM for Modified Barium Swallow Study, at which time he was alert, cooperative, and denied pain. Patient familiar to this department and was seen for bedside swallow assessment on 12/10/21, at which time a soft & bite-sized and moderately thick liquid diet level and MBSS were recommended (see report for details).    Per charting, Patient is a "84 y/o M sent from wound care due to concern for left leg wound infection. Pt states wound was getting smaller and then started getting bigger, leg is very swollen. Is seen by wound care every week, last time was last week. When he went this morning, wound was examined and he was instructed to go to ED. Pt denies fever, chills, CP, SOB, nausea, vomiting."    WBC WFL. CXR 12/8/21 revealed "No radiographic evidence of active chest disease."    Discussed results and recommendations with Patient and Dr. Simon via phone.

## 2021-12-13 NOTE — PROGRESS NOTE ADULT - SUBJECTIVE AND OBJECTIVE BOX
[facility] Cardiology Progress Note [phone] (Dr. Marx, Iva, Lincoln, Kendrick)    CHIEF COMPLAINT: Patient is a 85y old  Male who presents with a chief complaint of left wound infection (12 Dec 2021 12:19)      Follow Up Today: The patient denies any chest discomfort or shortness of breath.    HPI:  84 y/o M sent from wound care due to concern for left leg wound infection. Pt states wound was getting smaller and then started getting bigger, leg is very swollen. Is seen by wound care every week, last time was last week. When he went this morning, wound was examined and he was instructed to go to ED. Pt denies fever, chills, CP, SOB, nausea, vomiting. (08 Dec 2021 15:11)      PAST MEDICAL & SURGICAL HISTORY:  Parkinsons    HTN (hypertension)    No significant past surgical history        MEDICATIONS  (STANDING):  aspirin  chewable 81 milliGRAM(s) Oral daily  carbidopa/levodopa  25/100 1 Tablet(s) Oral three times a day  ceFAZolin   IVPB 2000 milliGRAM(s) IV Intermittent every 8 hours  enoxaparin Injectable 40 milliGRAM(s) SubCutaneous daily  hydrochlorothiazide 25 milliGRAM(s) Oral daily    MEDICATIONS  (PRN):  acetaminophen     Tablet .. 650 milliGRAM(s) Oral every 6 hours PRN Temp greater or equal to 38C (100.4F), Mild Pain (1 - 3)  oxycodone    5 mG/acetaminophen 325 mG 1 Tablet(s) Oral every 6 hours PRN Moderate Pain (4 - 6)      Allergies    No Known Allergies    Intolerances        REVIEW OF SYSTEMS:    All other review of systems is negative unless indicated above    Vital Signs Last 24 Hrs  T(C): 36.9 (13 Dec 2021 05:36), Max: 37.1 (12 Dec 2021 19:02)  T(F): 98.4 (13 Dec 2021 05:36), Max: 98.7 (12 Dec 2021 19:02)  HR: 60 (13 Dec 2021 05:36) (60 - 80)  BP: 137/60 (13 Dec 2021 05:36) (105/65 - 137/60)  BP(mean): --  RR: 18 (13 Dec 2021 05:36) (18 - 18)  SpO2: 97% (13 Dec 2021 05:36) (93% - 97%)    I&O's Summary      PHYSICAL EXAM:    Constitutional: NAD, awake and alert, well-developed  Eyes:  EOMI,  Pupils round, No oral cyanosis.  HEENT: No exudate or erythema  Pulmonary: Non-labored, breath sounds are clear bilaterally, No wheezing, rales or rhonchi  Cardiovascular: Regular, S1 and S2, No murmurs, rubs, gallops oir clicks  Gastrointestinal: Bowel Sounds present, soft, nontender.   Ext: No significant LE edema with good pulses x 4  Neurological: Alert, no gross focal motor deficits  Skin: No rashes.  Psych:  Mood & affect appropriate    LABS: All Labs Reviewed:                        13.3   5.93  )-----------( 220      ( 12 Dec 2021 09:24 )             40.5                         12.2   4.93  )-----------( 215      ( 11 Dec 2021 08:47 )             36.1     12 Dec 2021 09:24    141    |  106    |  19     ----------------------------<  133    3.7     |  28     |  1.20   11 Dec 2021 08:47    142    |  107    |  19     ----------------------------<  93     3.8     |  32     |  1.20     Ca    8.9        12 Dec 2021 09:24  Ca    8.8        11 Dec 2021 08:47    TPro  6.9    /  Alb  2.9    /  TBili  0.4    /  DBili  x      /  AST  31     /  ALT  12     /  AlkPhos  64     12 Dec 2021 09:24  TPro  6.1    /  Alb  2.6    /  TBili  0.5    /  DBili  x      /  AST  18     /  ALT  7      /  AlkPhos  56     11 Dec 2021 08:47          Blood Culture: Organism --  Gram Stain Blood -- Gram Stain --  Specimen Source .Blood Blood-Peripheral  Culture-Blood --    Organism --  Gram Stain Blood -- Gram Stain --  Specimen Source .Blood Blood-Peripheral  Culture-Blood --            RADIOLOGY/EKG:    Attending Attestation:   20 minutes spent on total encounter; more than 50% of the visit was spent counseling and/or coordinating care by the attending physician.     ASSESSMENT AND PLAN

## 2021-12-13 NOTE — PROGRESS NOTE ADULT - SUBJECTIVE AND OBJECTIVE BOX
Neurology follow up note    DENG [id number]      Interval History:    Patient feels ok no new complaints.    Allergies    No Known Allergies    Intolerances        MEDICATIONS    acetaminophen     Tablet .. 650 milliGRAM(s) Oral every 6 hours PRN  aspirin  chewable 81 milliGRAM(s) Oral daily  carbidopa/levodopa  25/100 1 Tablet(s) Oral three times a day  enoxaparin Injectable 40 milliGRAM(s) SubCutaneous daily  hydrochlorothiazide 25 milliGRAM(s) Oral daily  oxycodone    5 mG/acetaminophen 325 mG 1 Tablet(s) Oral every 6 hours PRN              Vital Signs Last 24 Hrs  T(C): 37 (13 Dec 2021 13:27), Max: 37.1 (12 Dec 2021 19:02)  T(F): 98.6 (13 Dec 2021 13:27), Max: 98.7 (12 Dec 2021 19:02)  HR: 71 (13 Dec 2021 13:27) (60 - 80)  BP: 118/68 (13 Dec 2021 13:27) (105/65 - 137/60)  BP(mean): --  RR: 17 (13 Dec 2021 13:27) (17 - 18)  SpO2: 97% (13 Dec 2021 13:27) (93% - 97%)    bhREVIEW OF SYSTEMS:  Constitutional:  No fever, chills, or night sweats.  Head:  No headache.  Eyes:  No double vision or blurry vision.  Ears:  No ringing in the ears.  Neck:  No neck pain.  Respiratory:  No shortness of breath.  Cardiovascular:  No chest pain.  Abdomen:  No nausea, vomiting, or abdominal pain.  Extremities/Neurological:  No numbness or tingling.  Musculoskeletal:  Positive pain in his left leg where he has an ulcer.    PHYSICAL EXAMINATION: HEENT:  Head:  Normocephalic, atraumatic.  Eyes:  No scleral icterus.  Ears:  Hearing bilaterally intact.  NECK:  Supple.  RESPIRATORY:  Good air entry bilaterally.  CARDIOVASCULAR:  S1 and S2 heard.  ABDOMEN:  Soft and nontender.  EXTREMITIES  No clubbing or cyanosis was noted. Positive ulcer was noted on the left leg, with dressing.      NEUROLOGIC:  The patient is awake and alert.  Location was [facility], year was 2020, month; with choices was December.  Recall was 1/3, able to name simple objects.  Extraocular movements were intact.  Speech was fluent.  Smile was symmetric.  Motor:  Bilateral upper and lower were 4/5.  Positive resting tremors were noted which dampen with movement.  Positive cogwheel rigidity were noted right greater than left.                    LABS:  CBC Full  -  ( 13 Dec 2021 10:02 )  WBC Count : 6.18 K/uL  RBC Count : 4.41 M/uL  Hemoglobin : 13.6 g/dL  Hematocrit : 40.5 %  Platelet Count - Automated : 236 K/uL  Mean Cell Volume : 91.8 fl  Mean Cell Hemoglobin : 30.8 pg  Mean Cell Hemoglobin Concentration : 33.6 gm/dL  Auto Neutrophil # : x  Auto Lymphocyte # : x  Auto Monocyte # : x  Auto Eosinophil # : x  Auto Basophil # : x  Auto Neutrophil % : x  Auto Lymphocyte % : x  Auto Monocyte % : x  Auto Eosinophil % : x  Auto Basophil % : x      12-13    140  |  104  |  19  ----------------------------<  126<H>  3.7   |  30  |  1.10    Ca    8.7      13 Dec 2021 10:02    TPro  7.2  /  Alb  2.6<L>  /  TBili  0.4  /  DBili  x   /  AST  34  /  ALT  8<L>  /  AlkPhos  63  12-13    Hemoglobin A1C:     LIVER FUNCTIONS - ( 13 Dec 2021 10:02 )  Alb: 2.6 g/dL / Pro: 7.2 g/dL / ALK PHOS: 63 U/L / ALT: 8 U/L / AST: 34 U/L / GGT: x           Vitamin B12         RADIOLOGY      ANALYSIS AND PLAN:  An 85-year-old with memory issues and Parkinson's.  For Parkinson's disease, can become exacerbated with general medical issues.  For now, we will continue the patient on his Sinemet at the current dose.  For mild cognitive impairment versus subtle dementia, It appears that he did try medications in the past with side effects.  For history of hypertension, monitor systolic blood pressure.  neurologic wise stable   fall precautions   aspiration precautions   monitor oral intake as needed     Spoke with the son, Chacorta, at [phone] 12/12.  He does understand while in the hospital setting may start to become slightly more disoriented with a change in location.    Greater than 20 minutes of time was spent with the patient, plan of care, reviewing data, speaking to multidisciplinary healthcare team with greater than 50% of that time in counseling and care coordination.    Thank you for the courtesy of this consultation.

## 2021-12-13 NOTE — PROGRESS NOTE ADULT - PROVIDER SPECIALTY LIST ADULT
Neurology
Cardiology
Hospitalist
Neurology
Cardiology
Hospitalist
Hospitalist
Infectious Disease
Infectious Disease
Neurology
Neurology
Hospitalist
Hospitalist
Infectious Disease

## 2021-12-13 NOTE — SWALLOW VFSS/MBS ASSESSMENT ADULT - RECOMMENDED FEEDING/EATING TECHNIQUES
allow for swallow between intakes/alternate food with liquid/maintain upright posture during/after eating for 30 mins/no straws/oral hygiene/position upright (90 degrees)

## 2021-12-13 NOTE — SWALLOW VFSS/MBS ASSESSMENT ADULT - ROSENBEK'S PENETRATION ASPIRATION SCALE
with large cup sip of mildly thick liquids and consecutive cup sips with thin liquids, not reduplicated across multiple trials of single, small cup sips of mildly thick and thin liquids./(3) contrast remains above the vocal cords, visible residue remains (penetration) (1) no aspiration, contrast does not enter airway

## 2021-12-13 NOTE — PROGRESS NOTE ADULT - SUBJECTIVE AND OBJECTIVE BOX
[facility], Division of Infectious Diseases  RASHID Rosas Y. Patel, S. Shah  [phone]  ([phone] - weekdays after 5pm and weekends)    Name: DENG SIERRA  Age/Gender: 85y Male  MRN: [id number]    Interval History:  Patient seen this morning.   Notes reviewed.   Afebrile.   wound care re-dressed wound today    Allergies: No Known Allergies      Objective:  Vitals:   T(F): 98.4 (12-13-21 @ 05:36), Max: 98.7 (12-12-21 @ 19:02)  HR: 60 (12-13-21 @ 05:36) (60 - 80)  BP: 137/60 (12-13-21 @ 05:36) (105/65 - 137/60)  RR: 18 (12-13-21 @ 05:36) (18 - 18)  SpO2: 97% (12-13-21 @ 05:36) (93% - 97%)  Physical Examination:  General: no acute distress, nontoxic appearing  HEENT: anicteric  Cardio: S1, S2 present, normal rate  Resp: breathing comfortably on RA  Abd: soft, ND, NT  Ext: b/l LE edema L>R, b/l venous stasis dermatitis, L leg wound wrapped w/ dressings, no warmth or tenderness  Skin: warm, dry, no visible rash   Lines:    Laboratory Studies:  CBC:                       13.6   6.18  )-----------( 236      ( 13 Dec 2021 10:02 )             40.5     WBC Trend:  6.18 12-13-21 @ 10:02  5.93 12-12-21 @ 09:24  4.93 12-11-21 @ 08:47  5.44 12-10-21 @ 08:23  6.03 12-09-21 @ 08:36  7.99 12-08-21 @ 12:16    CMP: 12-12    141  |  106  |  19  ----------------------------<  133<H>  3.7   |  28  |  1.20    Ca    8.9      12 Dec 2021 09:24    TPro  6.9  /  Alb  2.9<L>  /  TBili  0.4  /  DBili  x   /  AST  31  /  ALT  12  /  AlkPhos  64  12-12      LIVER FUNCTIONS - ( 12 Dec 2021 09:24 )  Alb: 2.9 g/dL / Pro: 6.9 g/dL / ALK PHOS: 64 U/L / ALT: 12 U/L / AST: 31 U/L / GGT: x               Microbiology: reviewed     Culture - Blood (collected 12-09-21 @ 01:04)  Source: .Blood Blood-Peripheral  Preliminary Report (12-10-21 @ 02:02):    No growth to date.    Culture - Blood (collected 12-09-21 @ 01:02)  Source: .Blood Blood-Peripheral  Preliminary Report (12-10-21 @ 02:01):    No growth to date.      Radiology: reviewed     Medications:  acetaminophen     Tablet .. 650 milliGRAM(s) Oral every 6 hours PRN  aspirin  chewable 81 milliGRAM(s) Oral daily  carbidopa/levodopa  25/100 1 Tablet(s) Oral three times a day  ceFAZolin   IVPB 2000 milliGRAM(s) IV Intermittent every 8 hours  enoxaparin Injectable 40 milliGRAM(s) SubCutaneous daily  hydrochlorothiazide 25 milliGRAM(s) Oral daily  oxycodone    5 mG/acetaminophen 325 mG 1 Tablet(s) Oral every 6 hours PRN    Antimicrobials:  ceFAZolin   IVPB 2000 milliGRAM(s) IV Intermittent every 8 hours

## 2021-12-13 NOTE — DISCHARGE NOTE PROVIDER - HOSPITAL COURSE
86 y/o M sent from wound care due to concern for left leg wound infection. Pt states wound was getting smaller and then started getting bigger, leg is very swollen. Is seen by wound care every week, last time was last week. When he went this morning, wound was examined and he was instructed to go to ED. Pt denies fever, chills, CP, SOB, nausea, vomiting.    1 Left leg wound  - cw cefazolin  - ID and wound care fu  - fu cultures  - pain control  - will monitor    2 HTN  - cw home meds    3 Parkinson's disease  - cw sinemet    4 Dysphagia  - MBS today   - diet as per  speech and swallow      84 y/o M sent from wound care due to concern for left leg wound infection. Pt states wound was getting smaller and then started getting bigger, leg is very swollen. Is seen by wound care every week, last time was last week. When he went this morning, wound was examined and he was instructed to go to ED. Pt denies fever, chills, CP, SOB, nausea, vomiting.    1 Left leg wound  - cw cefazolin  - ID and wound care fu  - fu cultures  - pain control  - will monitor    2 HTN  - cw home meds    3 Parkinson's disease  - cw sinemet    4 Dysphagia  - sp MBS today   - diet as per  speech and swallow

## 2021-12-13 NOTE — SWALLOW VFSS/MBS ASSESSMENT ADULT - DIAGNOSTIC IMPRESSIONS
1. Patient demonstrates a functional oral phase for pureed, regular solids, moderately thick, mildly thick, and thin liquids marked by adequate oral acceptance and timely collection, transfer, and AP transit time. Trace lingual and palatal stasis visualized post swallow, which cleared with subsequent swallow.   2. Patient demonstrates a mild pharyngeal dysphagia for pureed, regular solids, and moderately thick liquids marked by timely pharyngeal swallow trigger with reduced tongue base retraction, reduced hyolaryngeal elevation, adequate epiglottic deflection, and reduced pharyngeal contractility resulting in trace/mild stasis at the BOT, posterior pharyngeal wall, and pyriform sinuses and moderate stasis at the vallecula, which reduced with subsequent swallow. No laryngeal penetration/aspiration visualized with pureed, regular solids, and moderately thick liquids.   3. Patient demonstrates a mild pharyngeal dysphagia for mildly thick and thin liquids marked by delayed pharyngeal swallow trigger with reduced tongue base retraction, reduced hyolaryngeal elevation, reduced epiglottic deflection, and reduced pharyngeal contractility resulting in trace/mild stasis at the BOT, posterior pharyngeal wall, and pyriform sinuses and moderate stasis at the vallecula, which reduced with subsequent swallow. Incomplete closure of the laryngeal vestibule visualized with large cup sip of mildly thick liquids and consecutive cup sips of thin liquids, resulting in laryngeal penetration during the swallow without full retrieval. No laryngeal penetration/aspiration visualized with single, small cup sips of mildly thick and thin liquids.

## 2021-12-13 NOTE — DISCHARGE NOTE PROVIDER - NSDCMRMEDTOKEN_GEN_ALL_CORE_FT
aspirin 81 mg oral tablet: 1 tab(s) orally once a day (in the evening)  carbidopa-levodopa 25 mg-100 mg oral tablet: 1 tab(s) orally 3 times a day  cefadroxil 500 mg oral capsule: 1 cap(s) orally every 12 hours  hydroCHLOROthiazide 25 mg oral tablet: 1 tab(s) orally once a day

## 2021-12-13 NOTE — PROGRESS NOTE ADULT - REASON FOR ADMISSION
left wound infection

## 2021-12-13 NOTE — PROGRESS NOTE ADULT - ASSESSMENT
84 y/o M sent from wound care due to concern for left wound infection. Pt states wound was getting smaller and then started getting bigger, leg is very swollen. Is seen by wound care every week, last time was last week. When he went this morning, wound was examined and he was instructed to go to ED. Pt denies fever, chills, CP, SOB, nausea, vomiting.  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 Causative agents usually GPC (staph and Streptococcus)  appears to have improved on cefazolin  can transition from cefazolin to cefadroxil 500mg bid x 5 days  discharge planning  f/u w/ wound care outpt    Thai Hanson M.D.  [phone]  [facility], Division of Infectious Diseases  [phone]  After 5pm on weekdays and all day on weekends - please call [phone] 
86 y/o M sent from wound care due to concern for left leg wound infection. Pt states wound was getting smaller and then started getting bigger, leg is very swollen. Is seen by wound care every week, last time was last week. When he went this morning, wound was examined and he was instructed to go to ED. Pt denies fever, chills, CP, SOB, nausea, vomiting.    1 Left leg wound  - cw cefazolin  - ID and wound care fu  - fu cultures  - pain control  - will monitorr r    2 HTN  - cw home meds    3 Parkinson's disease  - cw sinemet    4 Dysohagia  - MBS in am  - diet as per  speech and swallow     case dw family at bedside     
84 y/o M sent from wound care due to concern for left wound infection. Pt states wound was getting smaller and then started getting bigger, leg is very swollen. Is seen by wound care every week, last time was last week. When he went this morning, wound was examined and he was instructed to go to ED. Pt denies fever, chills, CP, SOB, nausea, vomiting.  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 Causative agents usually GPC (staph and Streptococcus)  -c/w cefazolin 2gm q8h  wound re-dressed today  recommend wound care consult  appears to be improving on current antibiotic     Thai Hanson M.D.  [phone]  [facility], Division of Infectious Diseases  [phone]  After 5pm on weekdays and all day on weekends - please call [phone] 
84 y/o M sent from wound care due to concern for left wound infection. Pt states wound was getting smaller and then started getting bigger, leg is very swollen. Is seen by wound care every week, last time was last week. When he went this morning, wound was examined and he was instructed to go to ED. Pt denies fever, chills, CP, SOB, nausea, vomiting.  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 Causative agents usually GPC (staph and Streptococcus)  -c/w cefazolin 2gm q8h  would consult wound care   Will adjust therapy pending clinical improvement    Thai Hanson M.D.  [phone]  [facility], Division of Infectious Diseases  [phone]  After 5pm on weekdays and all day on weekends - please call [phone] 
86 y/o M sent from wound care due to concern for left leg wound infection. Pt states wound was getting smaller and then started getting bigger, leg is very swollen. Is seen by wound care every week, last time was last week. When he went this morning, wound was examined and he was instructed to go to ED. Pt denies fever, chills, CP, SOB, nausea, vomiting.    1 Left leg wound  - cw cefazolin  - ID and wound care fu  - fu cultures  - pain control  - will monitor    2 HTN  - cw home meds    3 Parkinson's disease  - cw sinemet    4 Dysphagia  - MBS today   - diet as per  speech and swallow     
86 y/o M sent from wound care due to concern for left leg wound infection. Pt states wound was getting smaller and then started getting bigger, leg is very swollen. Is seen by wound care every week, last time was last week. When he went this morning, wound was examined and he was instructed to go to ED. Pt denies fever, chills, CP, SOB, nausea, vomiting.    1 Left leg wound  - cw cefazolin  - ID and wound care fu  - fu cultures  - pain control  - will monitorr r    2 HTN  - cw home meds    3 Parkinson's disease  - cw sinemet    Lovenox for DVT prophylaxis     
86 y/o M sent from wound care due to concern for left leg wound infection. Pt states wound was getting smaller and then started getting bigger, leg is very swollen. Is seen by wound care every week, last time was last week. When he went this morning, wound was examined and he was instructed to go to ED. Pt denies fever, chills, CP, SOB, nausea, vomiting.    1 Left leg wound  - cw cefazolin  - ID and wound care fu  - fu cultures  - pain control  - will monitorr r    2 HTN  - cw home meds    3 Parkinson's disease  - cw sinemet    Lovenox for DVT prophylaxis       case dw family 
86 y/o M sent from wound care due to concern for left leg wound infection. Pt states wound was getting smaller and then started getting bigger, leg is very swollen. Is seen by wound care every week, last time was last week. When he went this morning, wound was examined and he was instructed to go to ED. Pt denies fever, chills, CP, SOB, nausea, vomiting.    1 Left leg wound  - cw cefazolin  - ID and wound care fu  - fu cultures  - pain control  - will monitorr r    2 HTN  - cw home meds    3 Parkinson's disease  - cw sinemet    Lovenox for DVT prophylaxis       case dw family 
84 y/o M sent from wound care due to concern for left leg wound infection. Pt states wound was getting smaller and then started getting bigger, leg is very swollen. Is seen by wound care every week, last time was last week. When he went this morning, wound was examined and he was instructed to go to ED. Pt denies fever, chills, CP, SOB, nausea, vomiting.  Pt seen as output recently by Dr Durbin to assess abnormal EKG with undetermined rhythm.  Baseline with artifact due to Parkinson's.  Otherwise with no significant suspicious finding.   Outpt echo was scheduled for Jan 5th which we can arrange for hospital.     -abnormal EKG, Poor quality.  Schedule echo as per Dr Enrique    -HTN   BP is in good range on HCT 25 mg.      will follow prn

## 2021-12-13 NOTE — DISCHARGE NOTE PROVIDER - CARE PROVIDER_API CALL
Chacorta Sumner)  Plastic Surgery  [address] [place] [address]  Phone: [phone]  Fax: [phone]  Follow Up Time:

## 2021-12-13 NOTE — SWALLOW VFSS/MBS ASSESSMENT ADULT - RECOMMENDED CONSISTENCY
1. Regular and thin liquids, via single small cup sips only!, as tolerated  2. Thin liquids via single, small cup sips only  3. Patient must perform TWO swallows with all solids and liquids to assist in pharyngeal clearance  4. Slow pacing, single/small bites, and alternate solids with liquids  5. Maintain upright position during all meals  6. Maintain aspiration precautions  7. Maintain strict oral care  8. Swallowing therapy recommended to maximize swallow function

## 2021-12-13 NOTE — CONSULT NOTE ADULT - ASSESSMENT
Patient presents with:   1. Left anterior gaiter wound 4.5 x 3 x 0.7 red non-granulating tissue patient denies TTP, + pulses: mild serosanguinous drainage, no odor    Continue  Nutrition (as tolerated)  Continue  Offloading   Apply cair boots at all times while in bed.   Provide skin checks and foot placement q8h.  Care as per medicine will follow w/ you  Follow up as outpatient at [facility]   Findings and recommendations discussed with RUFUS Simon  Thank you for this consult  Maile Morgan NP, [facility] [phone]

## 2021-12-14 ENCOUNTER — NON-APPOINTMENT (OUTPATIENT)
Age: 86
End: 2021-12-14

## 2021-12-14 PROBLEM — G20 PARKINSON'S DISEASE: Chronic | Status: ACTIVE | Noted: 2021-12-08

## 2021-12-14 PROBLEM — I10 ESSENTIAL (PRIMARY) HYPERTENSION: Chronic | Status: ACTIVE | Noted: 2021-12-08

## 2021-12-14 LAB
CULTURE RESULTS: SIGNIFICANT CHANGE UP
CULTURE RESULTS: SIGNIFICANT CHANGE UP
SPECIMEN SOURCE: SIGNIFICANT CHANGE UP
SPECIMEN SOURCE: SIGNIFICANT CHANGE UP

## 2021-12-14 NOTE — PLAN
[FreeTextEntry1] : yasemin, DD, comp stockings\par leg elevation\par Vascular studies\par Will need also to consider bx of ulcer site\par f/u 1 wk\par \par time spent 25 mins.

## 2021-12-14 NOTE — HISTORY OF PRESENT ILLNESS
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 Using yasemin.No change in the VSU size. Pt had used apligrafs as well as epifix  several times in 2020.

## 2021-12-14 NOTE — PHYSICAL EXAM
[Normal Thyroid] : the thyroid was normal [Normal Breath Sounds] : Normal breath sounds [Normal Heart Sounds] : normal heart sounds [Normal Rate and Rhythm] : normal rate and rhythm [Alert] : alert [Oriented to Person] : oriented to person [Oriented to Place] : oriented to place [Oriented to Time] : oriented to time [Calm] : calm [4 x 4] : 4 x 4  [JVD] : no jugular venous distention  [Abdomen Masses] : No abdominal massess [Abdomen Tenderness] : ~T ~M No abdominal tenderness [Tender] : nontender [Enlarged] : not enlarged [de-identified] : elderly WM, NAD, alert, Ox 3 [FreeTextEntry1] : Left Lateral Lower Leg [FreeTextEntry2] : 1.3 [FreeTextEntry3] : 0.6 [FreeTextEntry4] : 0.1-0.2 [de-identified] : Serosanguineous [de-identified] : 5% [de-identified] : Own compression stockings  [de-identified] : Ani [de-identified] : Cleansed with Normal Saline\par Cloth tape \par  [FreeTextEntry7] : Left Anterior Leg- Dry Eschar [FreeTextEntry8] : 0.7 [FreeTextEntry9] : 0.4 [de-identified] : 0.1 [de-identified] : No Product [de-identified] : Cleansed with Normal Saline\par Allevyn Border [TWNoteComboBox4] : Small [de-identified] : Normal [de-identified] : None [de-identified] : None [de-identified] : >75% [de-identified] : Yes [de-identified] : Other [de-identified] : 3x Weekly [de-identified] : Primary Dressing [de-identified] : None [de-identified] : No [de-identified] : No [de-identified] : Normal [de-identified] : None [de-identified] : 100% [de-identified] : None [de-identified] : No

## 2021-12-14 NOTE — ASSESSMENT
[Verbal] : Verbal [Written] : Written [Demo] : Demo [Patient] : Patient [Good - alert, interested, motivated] : Good - alert, interested, motivated [Verbalizes knowledge/Understanding] : Verbalizes knowledge/understanding [Dressing changes] : dressing changes [Skin Care] : skin care [Signs and symptoms of infection] : sign and symptoms of infection [Venous Disease] : venous disease [Nutrition] : nutrition [How and When to Call] : how and when to call [Pain Management] : pain management [Compression Therapy] : compression therapy [Patient responsibility to plan of care] : patient responsibility to plan of care [] : Yes [Stable] : stable [Home] : Home [Cane] : Cane [Not Applicable - Long Term Care/Home Health Agency] : Long Term Care/Home Health Agency: Not Applicable [FreeTextEntry2] : Infection prevention\par Localized wound care \par Goal of remaining pain free regarding wounds.\par Compression therapy \par Collagen matrix therapy  [FreeTextEntry4] : follow up in 2 weeks

## 2021-12-15 ENCOUNTER — APPOINTMENT (OUTPATIENT)
Dept: WOUND CARE | Facility: HOSPITAL | Age: 86
End: 2021-12-15
Payer: COMMERCIAL

## 2021-12-15 ENCOUNTER — OUTPATIENT (OUTPATIENT)
Dept: OUTPATIENT SERVICES | Facility: HOSPITAL | Age: 86
LOS: 1 days | Discharge: HOME | End: 2021-12-15
Payer: COMMERCIAL

## 2021-12-15 VITALS
BODY MASS INDEX: 22.45 KG/M2 | WEIGHT: 194 LBS | DIASTOLIC BLOOD PRESSURE: 85 MMHG | OXYGEN SATURATION: 98 % | TEMPERATURE: 97.4 F | HEART RATE: 110 BPM | HEIGHT: 78 IN | RESPIRATION RATE: 18 BRPM | SYSTOLIC BLOOD PRESSURE: 142 MMHG

## 2021-12-15 DIAGNOSIS — I83.228 VARICOSE VEINS OF LEFT LOWER EXTREMITY WITH BOTH ULCER OF OTHER PART OF LOWER EXTREMITY AND INFLAMMATION: ICD-10-CM

## 2021-12-15 PROCEDURE — 99213 OFFICE O/P EST LOW 20 MIN: CPT

## 2021-12-15 PROCEDURE — G0463: CPT

## 2021-12-15 RX ORDER — CEFADROXIL 500 MG/1
500 CAPSULE ORAL
Refills: 0 | Status: ACTIVE | COMMUNITY

## 2021-12-16 NOTE — ASSESSMENT
[Verbal] : Verbal [Patient] : Patient [Good - alert, interested, motivated] : Good - alert, interested, motivated [Verbalizes knowledge/Understanding] : Verbalizes knowledge/understanding [Dressing changes] : dressing changes [Skin Care] : skin care [Signs and symptoms of infection] : sign and symptoms of infection [Venous Disease] : venous disease [How and When to Call] : how and when to call [Compression Therapy] : compression therapy [Patient responsibility to plan of care] : patient responsibility to plan of care [Stable] : stable [Home] : Home [Cane] : Cane [Not Applicable - Long Term Care/Home Health Agency] : Long Term Care/Home Health Agency: Not Applicable [] : No [FreeTextEntry2] : Promote optimal skin integrity, infection prevention, edema control\par  [FreeTextEntry3] : no change in wound size, mild odor and mild erythema [FreeTextEntry4] : S/P hospitalization for tx of left leg celllulitis with IV antibiotics.  Patient was d/c'd home with oral Cefadroxil (in progress).  Upon discharge from hospital,  Patient reports that SN home care could not be initiated due to patient choosing continue to have PT for Parkinsons disease at a facility.  Patient opted to have dressing changes done at [facility].\par \par Patient is scheduled to have vascular studies done at [facility] on 1/10/21 @ 9:30 AM.\par \par F/U on Friday and Monday for a dressing changes and 1 week for an assessment.  \par \par f/u 1 week

## 2021-12-16 NOTE — PLAN
[FreeTextEntry1] : leg elevation\par ag alginate, DD\par f/u 1 wk\par Vascular study.\par \par time spent 25 mins.

## 2021-12-16 NOTE — VITALS
[] : No [de-identified] : Pain scale:  0/10 - Patient reports no c/o pains or discomforts at present. [FreeTextEntry5] : Cefadroxil

## 2021-12-16 NOTE — HISTORY OF PRESENT ILLNESS
[FreeTextEntry1] : 86 yo WM, here for f/u of a chronic LLE VSU. Pt was hospitalized for 3 days last wk for cellulitis of the LLE. Today, the LLE VSU has improved this wk. Still awaiting pt to go for a Vascular study.

## 2021-12-16 NOTE — PHYSICAL EXAM
[4 x 4] : 4 x 4  [Abdominal Pad] : Abdominal Pad [JVD] : no jugular venous distention  [Normal Thyroid] : the thyroid was normal [Normal Breath Sounds] : Normal breath sounds [Normal Heart Sounds] : normal heart sounds [Normal Rate and Rhythm] : normal rate and rhythm [Ankle Swelling On The Left] : of the left ankle [] : of the left leg [Ankle Swelling On The Right] : mild [Abdomen Masses] : No abdominal massess [Abdomen Tenderness] : ~T ~M No abdominal tenderness [Tender] : nontender [Enlarged] : not enlarged [Alert] : alert [Oriented to Person] : oriented to person [Oriented to Place] : oriented to place [Oriented to Time] : oriented to time [Calm] : calm [de-identified] : adult, elderly WM, NAD, alert, Ox 3. [de-identified] : MD removed periwound dry eschar\par No cellulitis/erythema. [FreeTextEntry1] : Left lateral lower leg [FreeTextEntry2] : 5.0 [FreeTextEntry3] : 3.5 [FreeTextEntry4] : 0.1 - 0.3 [de-identified] : moderate serosanguineous [de-identified] : dry peeling epithelium/eschar, a few satellite small open areas [de-identified] : none [de-identified] : 80% [de-identified] : 20% [de-identified] : alginate Ag [de-identified] : NSC [de-identified] : Ace wraps [de-identified] : Every other day

## 2021-12-17 ENCOUNTER — APPOINTMENT (OUTPATIENT)
Dept: WOUND CARE | Facility: HOSPITAL | Age: 86
End: 2021-12-17
Payer: MEDICARE

## 2021-12-17 ENCOUNTER — OUTPATIENT (OUTPATIENT)
Dept: OUTPATIENT SERVICES | Facility: HOSPITAL | Age: 86
LOS: 1 days | Discharge: HOME | End: 2021-12-17
Payer: COMMERCIAL

## 2021-12-17 VITALS
HEART RATE: 73 BPM | RESPIRATION RATE: 18 BRPM | HEIGHT: 78 IN | SYSTOLIC BLOOD PRESSURE: 123 MMHG | TEMPERATURE: 97.2 F | WEIGHT: 194 LBS | BODY MASS INDEX: 22.45 KG/M2 | DIASTOLIC BLOOD PRESSURE: 72 MMHG | OXYGEN SATURATION: 100 %

## 2021-12-17 DIAGNOSIS — Z80.0 FAMILY HISTORY OF MALIGNANT NEOPLASM OF DIGESTIVE ORGANS: ICD-10-CM

## 2021-12-17 DIAGNOSIS — I83.228 VARICOSE VEINS OF LEFT LOWER EXTREMITY WITH BOTH ULCER OF OTHER PART OF LOWER EXTREMITY AND INFLAMMATION: ICD-10-CM

## 2021-12-17 DIAGNOSIS — Z79.899 OTHER LONG TERM (CURRENT) DRUG THERAPY: ICD-10-CM

## 2021-12-17 DIAGNOSIS — R01.1 CARDIAC MURMUR, UNSPECIFIED: ICD-10-CM

## 2021-12-17 DIAGNOSIS — I34.0 NONRHEUMATIC MITRAL (VALVE) INSUFFICIENCY: ICD-10-CM

## 2021-12-17 DIAGNOSIS — Z87.891 PERSONAL HISTORY OF NICOTINE DEPENDENCE: ICD-10-CM

## 2021-12-17 DIAGNOSIS — M19.90 UNSPECIFIED OSTEOARTHRITIS, UNSPECIFIED SITE: ICD-10-CM

## 2021-12-17 DIAGNOSIS — I83.893 VARICOSE VEINS OF BILATERAL LOWER EXTREMITIES WITH OTHER COMPLICATIONS: ICD-10-CM

## 2021-12-17 DIAGNOSIS — G20 PARKINSON'S DISEASE: ICD-10-CM

## 2021-12-17 DIAGNOSIS — I49.3 VENTRICULAR PREMATURE DEPOLARIZATION: ICD-10-CM

## 2021-12-17 DIAGNOSIS — L97.822 NON-PRESSURE CHRONIC ULCER OF OTHER PART OF LEFT LOWER LEG WITH FAT LAYER EXPOSED: ICD-10-CM

## 2021-12-17 PROCEDURE — G0463: CPT

## 2021-12-17 PROCEDURE — ZZZZZ: CPT

## 2021-12-18 DIAGNOSIS — L97.822 NON-PRESSURE CHRONIC ULCER OF OTHER PART OF LEFT LOWER LEG WITH FAT LAYER EXPOSED: ICD-10-CM

## 2021-12-18 DIAGNOSIS — I83.228 VARICOSE VEINS OF LEFT LOWER EXTREMITY WITH BOTH ULCER OF OTHER PART OF LOWER EXTREMITY AND INFLAMMATION: ICD-10-CM

## 2021-12-20 ENCOUNTER — APPOINTMENT (OUTPATIENT)
Dept: WOUND CARE | Facility: HOSPITAL | Age: 86
End: 2021-12-20
Payer: MEDICARE

## 2021-12-20 ENCOUNTER — OUTPATIENT (OUTPATIENT)
Dept: OUTPATIENT SERVICES | Facility: HOSPITAL | Age: 86
LOS: 1 days | Discharge: HOME | End: 2021-12-20
Payer: COMMERCIAL

## 2021-12-20 VITALS
TEMPERATURE: 97.2 F | RESPIRATION RATE: 18 BRPM | BODY MASS INDEX: 22.45 KG/M2 | DIASTOLIC BLOOD PRESSURE: 67 MMHG | OXYGEN SATURATION: 99 % | HEIGHT: 78 IN | WEIGHT: 194 LBS | SYSTOLIC BLOOD PRESSURE: 123 MMHG | HEART RATE: 73 BPM

## 2021-12-20 DIAGNOSIS — I83.228 VARICOSE VEINS OF LEFT LOWER EXTREMITY WITH BOTH ULCER OF OTHER PART OF LOWER EXTREMITY AND INFLAMMATION: ICD-10-CM

## 2021-12-20 DIAGNOSIS — L97.822 NON-PRESSURE CHRONIC ULCER OF OTHER PART OF LEFT LOWER LEG WITH FAT LAYER EXPOSED: ICD-10-CM

## 2021-12-20 PROCEDURE — G0463: CPT

## 2021-12-20 PROCEDURE — ZZZZZ: CPT

## 2021-12-22 ENCOUNTER — OUTPATIENT (OUTPATIENT)
Dept: OUTPATIENT SERVICES | Facility: HOSPITAL | Age: 86
LOS: 1 days | Discharge: HOME | End: 2021-12-22
Payer: COMMERCIAL

## 2021-12-22 ENCOUNTER — APPOINTMENT (OUTPATIENT)
Dept: WOUND CARE | Facility: HOSPITAL | Age: 86
End: 2021-12-22
Payer: MEDICARE

## 2021-12-22 VITALS
TEMPERATURE: 97.1 F | SYSTOLIC BLOOD PRESSURE: 89 MMHG | HEIGHT: 78 IN | RESPIRATION RATE: 18 BRPM | WEIGHT: 194 LBS | OXYGEN SATURATION: 99 % | HEART RATE: 77 BPM | DIASTOLIC BLOOD PRESSURE: 62 MMHG | BODY MASS INDEX: 22.45 KG/M2

## 2021-12-22 DIAGNOSIS — I83.228 VARICOSE VEINS OF LEFT LOWER EXTREMITY WITH BOTH ULCER OF OTHER PART OF LOWER EXTREMITY AND INFLAMMATION: ICD-10-CM

## 2021-12-22 PROCEDURE — 87641 MR-STAPH DNA AMP PROBE: CPT

## 2021-12-22 PROCEDURE — 93971 EXTREMITY STUDY: CPT

## 2021-12-22 PROCEDURE — 96367 TX/PROPH/DG ADDL SEQ IV INF: CPT

## 2021-12-22 PROCEDURE — 93306 TTE W/DOPPLER COMPLETE: CPT

## 2021-12-22 PROCEDURE — 74230 X-RAY XM SWLNG FUNCJ C+: CPT

## 2021-12-22 PROCEDURE — 85027 COMPLETE CBC AUTOMATED: CPT

## 2021-12-22 PROCEDURE — U0003: CPT

## 2021-12-22 PROCEDURE — 87640 STAPH A DNA AMP PROBE: CPT

## 2021-12-22 PROCEDURE — 99213 OFFICE O/P EST LOW 20 MIN: CPT

## 2021-12-22 PROCEDURE — 93005 ELECTROCARDIOGRAM TRACING: CPT

## 2021-12-22 PROCEDURE — 87040 BLOOD CULTURE FOR BACTERIA: CPT

## 2021-12-22 PROCEDURE — 36415 COLL VENOUS BLD VENIPUNCTURE: CPT

## 2021-12-22 PROCEDURE — 99285 EMERGENCY DEPT VISIT HI MDM: CPT | Mod: 25

## 2021-12-22 PROCEDURE — 85025 COMPLETE CBC W/AUTO DIFF WBC: CPT

## 2021-12-22 PROCEDURE — 92610 EVALUATE SWALLOWING FUNCTION: CPT

## 2021-12-22 PROCEDURE — 96365 THER/PROPH/DIAG IV INF INIT: CPT

## 2021-12-22 PROCEDURE — G0463: CPT

## 2021-12-22 PROCEDURE — 92611 MOTION FLUOROSCOPY/SWALLOW: CPT

## 2021-12-22 PROCEDURE — 71045 X-RAY EXAM CHEST 1 VIEW: CPT

## 2021-12-22 PROCEDURE — A9698: CPT

## 2021-12-22 PROCEDURE — 80053 COMPREHEN METABOLIC PANEL: CPT

## 2021-12-22 PROCEDURE — U0005: CPT

## 2021-12-22 NOTE — PHYSICAL EXAM
[4 x 4] : 4 x 4  [Abdominal Pad] : Abdominal Pad [Normal Thyroid] : the thyroid was normal [Normal Breath Sounds] : Normal breath sounds [Normal Heart Sounds] : normal heart sounds [Normal Rate and Rhythm] : normal rate and rhythm [Ankle Swelling (On Exam)] : present [] : of the left leg [Ankle Swelling On The Left] : moderate [Alert] : alert [Oriented to Person] : oriented to person [Oriented to Place] : oriented to place [Oriented to Time] : oriented to time [Calm] : calm [JVD] : no jugular venous distention  [Abdomen Masses] : No abdominal massess [Abdomen Tenderness] : ~T ~M No abdominal tenderness [Tender] : nontender [Enlarged] : not enlarged [de-identified] : elderly WM, NAD, alert, Ox3. [de-identified] : MD removed periwound dry eschar\par No cellulitis/erythema. [FreeTextEntry1] : Lateral Lower Leg [FreeTextEntry2] : 4.0 [FreeTextEntry3] : 2.3 [FreeTextEntry4] : 0.1 - 0.2 [de-identified] : serosanguineous [de-identified] : dry peeling epithelium/eschar, a few satellite small open areas [de-identified] : 80% [de-identified] : 20% [de-identified] : Ani [de-identified] : Cleansed with Normal saline\par  [TWNoteComboBox1] : Left [TWNoteComboBox4] : Moderate [TWNoteComboBox5] : No [TWNoteComboBox6] : Venous [de-identified] : No [de-identified] : other [de-identified] : None [de-identified] : None [de-identified] : Ace wraps [de-identified] : 3x Weekly [de-identified] : Primary Dressing

## 2021-12-22 NOTE — ASSESSMENT
[Verbal] : Verbal [Patient] : Patient [Good - alert, interested, motivated] : Good - alert, interested, motivated [Verbalizes knowledge/Understanding] : Verbalizes knowledge/understanding [Dressing changes] : dressing changes [Skin Care] : skin care [Signs and symptoms of infection] : sign and symptoms of infection [Venous Disease] : venous disease [How and When to Call] : how and when to call [Compression Therapy] : compression therapy [Patient responsibility to plan of care] : patient responsibility to plan of care [Stable] : stable [Home] : Home [Cane] : Cane [Not Applicable - Long Term Care/Home Health Agency] : Long Term Care/Home Health Agency: Not Applicable [] : No [FreeTextEntry2] : Promote optimal skin integrity\par Infection prevention\par Edema control\par Collagen Matrix\par F/U 1 week for an assessment and 1 weekly  for a dressing change \par  [FreeTextEntry4] : F/U 1 week for an assessment and 1 weekly  for a dressing change \par

## 2021-12-22 NOTE — HISTORY OF PRESENT ILLNESS
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 Ulcer has contracted this wk with yasemin.

## 2021-12-22 NOTE — VITALS
[Pain related to present condition?] : The patient's  pain is not related to present condition. [] : No [de-identified] : 0/10

## 2021-12-24 DIAGNOSIS — I49.3 VENTRICULAR PREMATURE DEPOLARIZATION: ICD-10-CM

## 2021-12-24 DIAGNOSIS — R01.1 CARDIAC MURMUR, UNSPECIFIED: ICD-10-CM

## 2021-12-24 DIAGNOSIS — Z79.899 OTHER LONG TERM (CURRENT) DRUG THERAPY: ICD-10-CM

## 2021-12-24 DIAGNOSIS — M19.90 UNSPECIFIED OSTEOARTHRITIS, UNSPECIFIED SITE: ICD-10-CM

## 2021-12-24 DIAGNOSIS — I83.228 VARICOSE VEINS OF LEFT LOWER EXTREMITY WITH BOTH ULCER OF OTHER PART OF LOWER EXTREMITY AND INFLAMMATION: ICD-10-CM

## 2021-12-24 DIAGNOSIS — I83.893 VARICOSE VEINS OF BILATERAL LOWER EXTREMITIES WITH OTHER COMPLICATIONS: ICD-10-CM

## 2021-12-24 DIAGNOSIS — L97.822 NON-PRESSURE CHRONIC ULCER OF OTHER PART OF LEFT LOWER LEG WITH FAT LAYER EXPOSED: ICD-10-CM

## 2021-12-24 DIAGNOSIS — I34.0 NONRHEUMATIC MITRAL (VALVE) INSUFFICIENCY: ICD-10-CM

## 2021-12-24 DIAGNOSIS — Z80.0 FAMILY HISTORY OF MALIGNANT NEOPLASM OF DIGESTIVE ORGANS: ICD-10-CM

## 2021-12-24 DIAGNOSIS — Z87.891 PERSONAL HISTORY OF NICOTINE DEPENDENCE: ICD-10-CM

## 2021-12-24 DIAGNOSIS — G20 PARKINSON'S DISEASE: ICD-10-CM

## 2021-12-26 ENCOUNTER — NON-APPOINTMENT (OUTPATIENT)
Age: 86
End: 2021-12-26

## 2021-12-27 ENCOUNTER — APPOINTMENT (OUTPATIENT)
Dept: WOUND CARE | Facility: HOSPITAL | Age: 86
End: 2021-12-27
Payer: COMMERCIAL

## 2021-12-27 ENCOUNTER — OUTPATIENT (OUTPATIENT)
Dept: OUTPATIENT SERVICES | Facility: HOSPITAL | Age: 86
LOS: 1 days | Discharge: HOME | End: 2021-12-27
Payer: COMMERCIAL

## 2021-12-27 VITALS
RESPIRATION RATE: 18 BRPM | HEIGHT: 78 IN | BODY MASS INDEX: 22.45 KG/M2 | WEIGHT: 194 LBS | SYSTOLIC BLOOD PRESSURE: 143 MMHG | OXYGEN SATURATION: 99 % | TEMPERATURE: 97.6 F | HEART RATE: 71 BPM | DIASTOLIC BLOOD PRESSURE: 76 MMHG

## 2021-12-27 DIAGNOSIS — I83.228 VARICOSE VEINS OF LEFT LOWER EXTREMITY WITH BOTH ULCER OF OTHER PART OF LOWER EXTREMITY AND INFLAMMATION: ICD-10-CM

## 2021-12-27 PROCEDURE — 99213 OFFICE O/P EST LOW 20 MIN: CPT

## 2021-12-27 PROCEDURE — G0463: CPT

## 2021-12-27 NOTE — ASSESSMENT
[Verbal] : Verbal [Patient] : Patient [Good - alert, interested, motivated] : Good - alert, interested, motivated [Verbalizes knowledge/Understanding] : Verbalizes knowledge/understanding [Dressing changes] : dressing changes [Skin Care] : skin care [Signs and symptoms of infection] : sign and symptoms of infection [Venous Disease] : venous disease [How and When to Call] : how and when to call [Compression Therapy] : compression therapy [Patient responsibility to plan of care] : patient responsibility to plan of care [Stable] : stable [Home] : Home [Cane] : Cane [Not Applicable - Long Term Care/Home Health Agency] : Long Term Care/Home Health Agency: Not Applicable [] : Yes [FreeTextEntry2] : Promote optimal skin integrity\par Infection prevention\par Edema control\par Collagen Matrix\par F/U 1 week for an assessment and 1 weekly for a dressing change \par  [FreeTextEntry4] : F/U 1 week for an assessment and 1 weekly for a dressing change \par

## 2021-12-27 NOTE — VITALS
[Pain related to present condition?] : The patient's  pain is not related to present condition. [] : No [de-identified] : 0/10

## 2021-12-27 NOTE — HISTORY OF PRESENT ILLNESS
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 Ulcer has contracted this wk with yasemin.\par 12/27/21 wound very clean.

## 2021-12-27 NOTE — PHYSICAL EXAM
[4 x 4] : 4 x 4  [Abdominal Pad] : Abdominal Pad [JVD] : no jugular venous distention  [Normal Thyroid] : the thyroid was normal [Normal Breath Sounds] : Normal breath sounds [Normal Heart Sounds] : normal heart sounds [Normal Rate and Rhythm] : normal rate and rhythm [Ankle Swelling (On Exam)] : present [] : of the left leg [Ankle Swelling On The Left] : moderate [Abdomen Masses] : No abdominal massess [Abdomen Tenderness] : ~T ~M No abdominal tenderness [Tender] : nontender [Enlarged] : not enlarged [Alert] : alert [Oriented to Person] : oriented to person [Oriented to Place] : oriented to place [Oriented to Time] : oriented to time [Calm] : calm [de-identified] : elderly WM, NAD, alert, Ox3. [de-identified] : MD removed periwound dry eschar\par  [FreeTextEntry1] : Lateral Lower Leg [FreeTextEntry2] : 4.1 [FreeTextEntry3] : 2.5 [FreeTextEntry4] : 0.1 - 0.2 [de-identified] : serosanguineous [de-identified] : mildly  [de-identified] : 90% [de-identified] : 10% [de-identified] : Ani [de-identified] : Cleansed with Normal saline\par  [TWNoteComboBox1] : Left [TWNoteComboBox4] : Moderate [TWNoteComboBox5] : No [TWNoteComboBox6] : Venous [de-identified] : No [de-identified] : Macerated [de-identified] : None [de-identified] : None [de-identified] : Ace wraps [de-identified] : 3x Weekly [de-identified] : Primary Dressing

## 2021-12-27 NOTE — PLAN
[FreeTextEntry1] : leg elevation\par Ani, DD, ace wrap\par \par f/u 1 wk\par \par time spent 25 mins.

## 2021-12-29 DIAGNOSIS — G20 PARKINSON'S DISEASE: ICD-10-CM

## 2021-12-29 DIAGNOSIS — M19.90 UNSPECIFIED OSTEOARTHRITIS, UNSPECIFIED SITE: ICD-10-CM

## 2021-12-29 DIAGNOSIS — Z80.0 FAMILY HISTORY OF MALIGNANT NEOPLASM OF DIGESTIVE ORGANS: ICD-10-CM

## 2021-12-29 DIAGNOSIS — L97.822 NON-PRESSURE CHRONIC ULCER OF OTHER PART OF LEFT LOWER LEG WITH FAT LAYER EXPOSED: ICD-10-CM

## 2021-12-29 DIAGNOSIS — I83.893 VARICOSE VEINS OF BILATERAL LOWER EXTREMITIES WITH OTHER COMPLICATIONS: ICD-10-CM

## 2021-12-29 DIAGNOSIS — I49.3 VENTRICULAR PREMATURE DEPOLARIZATION: ICD-10-CM

## 2021-12-29 DIAGNOSIS — Z87.891 PERSONAL HISTORY OF NICOTINE DEPENDENCE: ICD-10-CM

## 2021-12-29 DIAGNOSIS — R01.1 CARDIAC MURMUR, UNSPECIFIED: ICD-10-CM

## 2021-12-29 DIAGNOSIS — Z79.899 OTHER LONG TERM (CURRENT) DRUG THERAPY: ICD-10-CM

## 2021-12-29 DIAGNOSIS — I83.228 VARICOSE VEINS OF LEFT LOWER EXTREMITY WITH BOTH ULCER OF OTHER PART OF LOWER EXTREMITY AND INFLAMMATION: ICD-10-CM

## 2021-12-29 DIAGNOSIS — I34.0 NONRHEUMATIC MITRAL (VALVE) INSUFFICIENCY: ICD-10-CM

## 2021-12-30 ENCOUNTER — APPOINTMENT (OUTPATIENT)
Dept: WOUND CARE | Facility: HOSPITAL | Age: 86
End: 2021-12-30
Payer: MEDICARE

## 2021-12-30 ENCOUNTER — OUTPATIENT (OUTPATIENT)
Dept: OUTPATIENT SERVICES | Facility: HOSPITAL | Age: 86
LOS: 1 days | Discharge: HOME | End: 2021-12-30
Payer: COMMERCIAL

## 2021-12-30 VITALS
TEMPERATURE: 97.4 F | OXYGEN SATURATION: 95 % | WEIGHT: 194 LBS | HEIGHT: 78 IN | DIASTOLIC BLOOD PRESSURE: 87 MMHG | SYSTOLIC BLOOD PRESSURE: 138 MMHG | BODY MASS INDEX: 22.45 KG/M2 | HEART RATE: 70 BPM | RESPIRATION RATE: 20 BRPM

## 2021-12-30 DIAGNOSIS — I83.228 VARICOSE VEINS OF LEFT LOWER EXTREMITY WITH BOTH ULCER OF OTHER PART OF LOWER EXTREMITY AND INFLAMMATION: ICD-10-CM

## 2021-12-30 PROCEDURE — G0463: CPT

## 2021-12-30 PROCEDURE — ZZZZZ: CPT

## 2021-12-31 DIAGNOSIS — L97.822 NON-PRESSURE CHRONIC ULCER OF OTHER PART OF LEFT LOWER LEG WITH FAT LAYER EXPOSED: ICD-10-CM

## 2021-12-31 DIAGNOSIS — I83.228 VARICOSE VEINS OF LEFT LOWER EXTREMITY WITH BOTH ULCER OF OTHER PART OF LOWER EXTREMITY AND INFLAMMATION: ICD-10-CM

## 2022-01-03 ENCOUNTER — APPOINTMENT (OUTPATIENT)
Dept: WOUND CARE | Facility: HOSPITAL | Age: 87
End: 2022-01-03
Payer: MEDICARE

## 2022-01-03 ENCOUNTER — OUTPATIENT (OUTPATIENT)
Dept: OUTPATIENT SERVICES | Facility: HOSPITAL | Age: 87
LOS: 1 days | Discharge: HOME | End: 2022-01-03
Payer: MEDICARE

## 2022-01-03 VITALS
RESPIRATION RATE: 20 BRPM | HEIGHT: 78 IN | HEART RATE: 82 BPM | TEMPERATURE: 97.5 F | OXYGEN SATURATION: 99 % | DIASTOLIC BLOOD PRESSURE: 88 MMHG | WEIGHT: 194 LBS | BODY MASS INDEX: 22.45 KG/M2 | SYSTOLIC BLOOD PRESSURE: 147 MMHG

## 2022-01-03 DIAGNOSIS — I83.228 VARICOSE VEINS OF LEFT LOWER EXTREMITY WITH BOTH ULCER OF OTHER PART OF LOWER EXTREMITY AND INFLAMMATION: ICD-10-CM

## 2022-01-03 PROCEDURE — 99213 OFFICE O/P EST LOW 20 MIN: CPT

## 2022-01-03 PROCEDURE — G0463: CPT

## 2022-01-03 NOTE — PHYSICAL EXAM
[4 x 4] : 4 x 4  [Abdominal Pad] : Abdominal Pad [Normal Thyroid] : the thyroid was normal [Normal Breath Sounds] : Normal breath sounds [Normal Heart Sounds] : normal heart sounds [Normal Rate and Rhythm] : normal rate and rhythm [Ankle Swelling (On Exam)] : present [] : of the left leg [Ankle Swelling On The Left] : moderate [Alert] : alert [Oriented to Person] : oriented to person [Oriented to Place] : oriented to place [Oriented to Time] : oriented to time [Calm] : calm [JVD] : no jugular venous distention  [Abdomen Masses] : No abdominal massess [Abdomen Tenderness] : ~T ~M No abdominal tenderness [Tender] : nontender [Enlarged] : not enlarged [de-identified] : elderly WM, NAD, alert, Ox3. [de-identified] : Circ neuromuscular function WNL post ace compression, pt expressed comfort.\par \par  [FreeTextEntry1] : Lateral Lower Leg [FreeTextEntry2] : 3.5 [FreeTextEntry3] : 1.1 [FreeTextEntry4] : 0.1 [de-identified] : serosanguineous [de-identified] : 95-99% [de-identified] : 1-5% [de-identified] : Ani/adaptic touch [de-identified] : Cleansed with Normal saline\par  [TWNoteComboBox1] : Left [TWNoteComboBox4] : Small [TWNoteComboBox5] : No [TWNoteComboBox6] : Venous [de-identified] : No [de-identified] : Normal [de-identified] : None [de-identified] : None [de-identified] : >75% [de-identified] : Yes [de-identified] : Ace wraps [de-identified] : 2x Weekly [de-identified] : Primary Dressing

## 2022-01-03 NOTE — HISTORY OF PRESENT ILLNESS
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 Ulcer has contracted this wk with yasemin.\par 12/27/21 wound very clean.\par 1/3/22 left lateral lower leg ulcer smaller and

## 2022-01-03 NOTE — ASSESSMENT
[Verbal] : Verbal [Patient] : Patient [Good - alert, interested, motivated] : Good - alert, interested, motivated [Verbalizes knowledge/Understanding] : Verbalizes knowledge/understanding [Dressing changes] : dressing changes [Skin Care] : skin care [Signs and symptoms of infection] : sign and symptoms of infection [Venous Disease] : venous disease [Nutrition] : nutrition [How and When to Call] : how and when to call [Compression Therapy] : compression therapy [Patient responsibility to plan of care] : patient responsibility to plan of care [Stable] : stable [Home] : Home [Cane] : Cane [Not Applicable - Long Term Care/Home Health Agency] : Long Term Care/Home Health Agency: Not Applicable [Off-loading] : off-loading [] : No [FreeTextEntry2] : Infection Prevention\par Promote Skin Integrity\par Offloading\par Elevation\par Compression Compliance\par Low Na+ Diet\par Maintain acceptable levels of pain\par Demonstrates use of both pharmacological and nonpharmacological pain management interventions\par  [FreeTextEntry4] : F/U 1 week for an assessment and 1 X for a dressing change \par

## 2022-01-04 DIAGNOSIS — Z87.891 PERSONAL HISTORY OF NICOTINE DEPENDENCE: ICD-10-CM

## 2022-01-04 DIAGNOSIS — I49.3 VENTRICULAR PREMATURE DEPOLARIZATION: ICD-10-CM

## 2022-01-04 DIAGNOSIS — I83.228 VARICOSE VEINS OF LEFT LOWER EXTREMITY WITH BOTH ULCER OF OTHER PART OF LOWER EXTREMITY AND INFLAMMATION: ICD-10-CM

## 2022-01-04 DIAGNOSIS — Z79.899 OTHER LONG TERM (CURRENT) DRUG THERAPY: ICD-10-CM

## 2022-01-04 DIAGNOSIS — R01.1 CARDIAC MURMUR, UNSPECIFIED: ICD-10-CM

## 2022-01-04 DIAGNOSIS — Z80.0 FAMILY HISTORY OF MALIGNANT NEOPLASM OF DIGESTIVE ORGANS: ICD-10-CM

## 2022-01-04 DIAGNOSIS — I34.0 NONRHEUMATIC MITRAL (VALVE) INSUFFICIENCY: ICD-10-CM

## 2022-01-04 DIAGNOSIS — M19.90 UNSPECIFIED OSTEOARTHRITIS, UNSPECIFIED SITE: ICD-10-CM

## 2022-01-04 DIAGNOSIS — L97.822 NON-PRESSURE CHRONIC ULCER OF OTHER PART OF LEFT LOWER LEG WITH FAT LAYER EXPOSED: ICD-10-CM

## 2022-01-04 DIAGNOSIS — I83.893 VARICOSE VEINS OF BILATERAL LOWER EXTREMITIES WITH OTHER COMPLICATIONS: ICD-10-CM

## 2022-01-04 DIAGNOSIS — G20 PARKINSON'S DISEASE: ICD-10-CM

## 2022-01-06 ENCOUNTER — APPOINTMENT (OUTPATIENT)
Dept: WOUND CARE | Facility: HOSPITAL | Age: 87
End: 2022-01-06
Payer: MEDICARE

## 2022-01-06 ENCOUNTER — OUTPATIENT (OUTPATIENT)
Dept: OUTPATIENT SERVICES | Facility: HOSPITAL | Age: 87
LOS: 1 days | Discharge: HOME | End: 2022-01-06
Payer: MEDICARE

## 2022-01-06 VITALS
HEIGHT: 78 IN | BODY MASS INDEX: 22.45 KG/M2 | HEART RATE: 88 BPM | OXYGEN SATURATION: 100 % | WEIGHT: 194 LBS | DIASTOLIC BLOOD PRESSURE: 88 MMHG | RESPIRATION RATE: 20 BRPM | SYSTOLIC BLOOD PRESSURE: 152 MMHG | TEMPERATURE: 97.5 F

## 2022-01-06 DIAGNOSIS — I83.228 VARICOSE VEINS OF LEFT LOWER EXTREMITY WITH BOTH ULCER OF OTHER PART OF LOWER EXTREMITY AND INFLAMMATION: ICD-10-CM

## 2022-01-06 DIAGNOSIS — L97.822 NON-PRESSURE CHRONIC ULCER OF OTHER PART OF LEFT LOWER LEG WITH FAT LAYER EXPOSED: ICD-10-CM

## 2022-01-06 PROCEDURE — G0463: CPT

## 2022-01-06 PROCEDURE — ZZZZZ: CPT

## 2022-01-08 ENCOUNTER — APPOINTMENT (OUTPATIENT)
Dept: WOUND CARE | Facility: HOSPITAL | Age: 87
End: 2022-01-08

## 2022-01-10 ENCOUNTER — OUTPATIENT (OUTPATIENT)
Dept: OUTPATIENT SERVICES | Facility: HOSPITAL | Age: 87
LOS: 1 days | End: 2022-01-10
Payer: MEDICARE

## 2022-01-10 ENCOUNTER — OUTPATIENT (OUTPATIENT)
Dept: OUTPATIENT SERVICES | Facility: HOSPITAL | Age: 87
LOS: 1 days | Discharge: HOME | End: 2022-01-10
Payer: MEDICARE

## 2022-01-10 ENCOUNTER — APPOINTMENT (OUTPATIENT)
Dept: WOUND CARE | Facility: HOSPITAL | Age: 87
End: 2022-01-10
Payer: MEDICARE

## 2022-01-10 ENCOUNTER — RESULT REVIEW (OUTPATIENT)
Age: 87
End: 2022-01-10

## 2022-01-10 VITALS
OXYGEN SATURATION: 96 % | SYSTOLIC BLOOD PRESSURE: 132 MMHG | TEMPERATURE: 98.6 F | HEART RATE: 72 BPM | HEIGHT: 78 IN | DIASTOLIC BLOOD PRESSURE: 77 MMHG | BODY MASS INDEX: 22.45 KG/M2 | RESPIRATION RATE: 20 BRPM | WEIGHT: 194 LBS

## 2022-01-10 DIAGNOSIS — I83.893 VARICOSE VEINS OF BILATERAL LOWER EXTREMITIES WITH OTHER COMPLICATIONS: ICD-10-CM

## 2022-01-10 DIAGNOSIS — L97.822 NON-PRESSURE CHRONIC ULCER OF OTHER PART OF LEFT LOWER LEG WITH FAT LAYER EXPOSED: ICD-10-CM

## 2022-01-10 DIAGNOSIS — I83.228 VARICOSE VEINS OF LEFT LOWER EXTREMITY WITH BOTH ULCER OF OTHER PART OF LOWER EXTREMITY AND INFLAMMATION: ICD-10-CM

## 2022-01-10 PROCEDURE — 99213 OFFICE O/P EST LOW 20 MIN: CPT

## 2022-01-10 PROCEDURE — 93923 UPR/LXTR ART STDY 3+ LVLS: CPT | Mod: 26

## 2022-01-10 PROCEDURE — 93923 UPR/LXTR ART STDY 3+ LVLS: CPT

## 2022-01-10 PROCEDURE — G0463: CPT

## 2022-01-10 NOTE — HISTORY OF PRESENT ILLNESS
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 Ulcer has contracted this wk with yasemin.\par 12/27/21 wound very clean.\par 1/3/22 left lateral lower leg ulcer smaller and \par 1/10/22 wound smaller

## 2022-01-10 NOTE — PHYSICAL EXAM
[4 x 4] : 4 x 4  [JVD] : no jugular venous distention  [Normal Thyroid] : the thyroid was normal [Normal Breath Sounds] : Normal breath sounds [Normal Heart Sounds] : normal heart sounds [Normal Rate and Rhythm] : normal rate and rhythm [Ankle Swelling (On Exam)] : present [] : of the left leg [Ankle Swelling On The Left] : moderate [Abdomen Masses] : No abdominal massess [Abdomen Tenderness] : ~T ~M No abdominal tenderness [Tender] : nontender [Enlarged] : not enlarged [Alert] : alert [Oriented to Person] : oriented to person [Oriented to Place] : oriented to place [Oriented to Time] : oriented to time [Calm] : calm [de-identified] : elderly WM, NAD, alert, Ox3. [de-identified] : \par \par  [FreeTextEntry1] : Lateral Lower Leg [FreeTextEntry2] : 3.0 [FreeTextEntry3] : 1.0 [FreeTextEntry4] : 0.1 [de-identified] : serosanguineous [de-identified] : 90% [de-identified] : 1-10% [de-identified] : Ani/adaptic touch [de-identified] : Cleansed with Normal saline\par  [TWNoteComboBox1] : Left [TWNoteComboBox4] : Small [TWNoteComboBox5] : No [TWNoteComboBox6] : Venous [de-identified] : No [de-identified] : Normal [de-identified] : None [de-identified] : None [de-identified] : False [de-identified] : Yes [de-identified] : Ace wraps [de-identified] : 2x Weekly [de-identified] : Primary Dressing

## 2022-01-10 NOTE — ASSESSMENT
[Verbal] : Verbal [Patient] : Patient [Good - alert, interested, motivated] : Good - alert, interested, motivated [Verbalizes knowledge/Understanding] : Verbalizes knowledge/understanding [Dressing changes] : dressing changes [Skin Care] : skin care [Signs and symptoms of infection] : sign and symptoms of infection [Venous Disease] : venous disease [Nutrition] : nutrition [How and When to Call] : how and when to call [Off-loading] : off-loading [Compression Therapy] : compression therapy [Patient responsibility to plan of care] : patient responsibility to plan of care [Stable] : stable [Home] : Home [Cane] : Cane [Not Applicable - Long Term Care/Home Health Agency] : Long Term Care/Home Health Agency: Not Applicable [] : Yes [FreeTextEntry2] : Compression Therapy \par Elevation \par Infection Prevention \par Restore Optimal Skin Integrity  [FreeTextEntry4] : Pt to F/U to [facility] in 1 Week for an Assessment & Thursday for Dressing Change.  \par

## 2022-01-10 NOTE — PLAN
[FreeTextEntry1] : leg elevation\par Ani,adaptic touch, DD, ace wrap 2X/week\par \par f/u 1 wk\par \par time spent 25 mins.

## 2022-01-11 DIAGNOSIS — Z87.891 PERSONAL HISTORY OF NICOTINE DEPENDENCE: ICD-10-CM

## 2022-01-11 DIAGNOSIS — I34.0 NONRHEUMATIC MITRAL (VALVE) INSUFFICIENCY: ICD-10-CM

## 2022-01-11 DIAGNOSIS — Z80.0 FAMILY HISTORY OF MALIGNANT NEOPLASM OF DIGESTIVE ORGANS: ICD-10-CM

## 2022-01-11 DIAGNOSIS — Z79.899 OTHER LONG TERM (CURRENT) DRUG THERAPY: ICD-10-CM

## 2022-01-11 DIAGNOSIS — I83.893 VARICOSE VEINS OF BILATERAL LOWER EXTREMITIES WITH OTHER COMPLICATIONS: ICD-10-CM

## 2022-01-11 DIAGNOSIS — R01.1 CARDIAC MURMUR, UNSPECIFIED: ICD-10-CM

## 2022-01-11 DIAGNOSIS — I49.3 VENTRICULAR PREMATURE DEPOLARIZATION: ICD-10-CM

## 2022-01-11 DIAGNOSIS — G20 PARKINSON'S DISEASE: ICD-10-CM

## 2022-01-11 DIAGNOSIS — L97.822 NON-PRESSURE CHRONIC ULCER OF OTHER PART OF LEFT LOWER LEG WITH FAT LAYER EXPOSED: ICD-10-CM

## 2022-01-11 DIAGNOSIS — M19.90 UNSPECIFIED OSTEOARTHRITIS, UNSPECIFIED SITE: ICD-10-CM

## 2022-01-11 DIAGNOSIS — I83.228 VARICOSE VEINS OF LEFT LOWER EXTREMITY WITH BOTH ULCER OF OTHER PART OF LOWER EXTREMITY AND INFLAMMATION: ICD-10-CM

## 2022-01-13 ENCOUNTER — APPOINTMENT (OUTPATIENT)
Dept: WOUND CARE | Facility: HOSPITAL | Age: 87
End: 2022-01-13
Payer: MEDICARE

## 2022-01-13 ENCOUNTER — OUTPATIENT (OUTPATIENT)
Dept: OUTPATIENT SERVICES | Facility: HOSPITAL | Age: 87
LOS: 1 days | Discharge: HOME | End: 2022-01-13
Payer: MEDICARE

## 2022-01-13 VITALS
BODY MASS INDEX: 22.45 KG/M2 | HEIGHT: 78 IN | HEART RATE: 68 BPM | RESPIRATION RATE: 20 BRPM | SYSTOLIC BLOOD PRESSURE: 132 MMHG | TEMPERATURE: 98.7 F | OXYGEN SATURATION: 99 % | WEIGHT: 194 LBS | DIASTOLIC BLOOD PRESSURE: 81 MMHG

## 2022-01-13 DIAGNOSIS — I83.228 VARICOSE VEINS OF LEFT LOWER EXTREMITY WITH BOTH ULCER OF OTHER PART OF LOWER EXTREMITY AND INFLAMMATION: ICD-10-CM

## 2022-01-13 DIAGNOSIS — L97.822 NON-PRESSURE CHRONIC ULCER OF OTHER PART OF LEFT LOWER LEG WITH FAT LAYER EXPOSED: ICD-10-CM

## 2022-01-13 PROCEDURE — ZZZZZ: CPT

## 2022-01-13 PROCEDURE — G0463: CPT

## 2022-01-17 ENCOUNTER — OUTPATIENT (OUTPATIENT)
Dept: OUTPATIENT SERVICES | Facility: HOSPITAL | Age: 87
LOS: 1 days | Discharge: HOME | End: 2022-01-17
Payer: MEDICARE

## 2022-01-17 ENCOUNTER — APPOINTMENT (OUTPATIENT)
Dept: WOUND CARE | Facility: HOSPITAL | Age: 87
End: 2022-01-17
Payer: MEDICARE

## 2022-01-17 VITALS
BODY MASS INDEX: 22.45 KG/M2 | HEIGHT: 78 IN | DIASTOLIC BLOOD PRESSURE: 73 MMHG | SYSTOLIC BLOOD PRESSURE: 137 MMHG | RESPIRATION RATE: 20 BRPM | OXYGEN SATURATION: 98 % | HEART RATE: 85 BPM | WEIGHT: 194 LBS | TEMPERATURE: 97.1 F

## 2022-01-17 DIAGNOSIS — I83.228 VARICOSE VEINS OF LEFT LOWER EXTREMITY WITH BOTH ULCER OF OTHER PART OF LOWER EXTREMITY AND INFLAMMATION: ICD-10-CM

## 2022-01-17 PROCEDURE — 99213 OFFICE O/P EST LOW 20 MIN: CPT

## 2022-01-17 PROCEDURE — G0463: CPT

## 2022-01-17 NOTE — REVIEW OF SYSTEMS
[Fever] : no fever [Eye Pain] : no eye pain [Earache] : no earache [Chest Pain] : no chest pain [Shortness Of Breath] : no shortness of breath [Abdominal Pain] : no abdominal pain [Skin Wound] : skin wound [FreeTextEntry9] : phill [de-identified] : left lateral lower leg venous stasis ulceration down to skin and subcutaneous tissue with stasis dermatitis

## 2022-01-17 NOTE — PHYSICAL EXAM
[4 x 4] : 4 x 4  [JVD] : no jugular venous distention  [2+] : left 2+ [Ankle Swelling (On Exam)] : present [Varicose Veins Of Lower Extremities] : present [Varicose Veins Of The Left Leg] : of the left leg [] : of the left leg [Ankle Swelling On The Left] : moderate [Skin Ulcer] : ulcer [de-identified] : calm [de-identified] : 4/5 strength in all quadrants [de-identified] : left lateral lower leg venous stasis ulceration down to skin and subcutaneous tissue with stasis dermatitis [de-identified] : \par \par  [FreeTextEntry1] : Lateral Lower Leg [FreeTextEntry2] : 3.0 [FreeTextEntry3] : 0.8 [FreeTextEntry4] : 0.1 [de-identified] : serosanguineous [de-identified] : 95% [de-identified] : 5% [de-identified] : Circulatory and neuromuscular function WNL post ACE Application   [de-identified] : Ani, Adaptic Touch [de-identified] : Cleansed with Normal saline\par  [TWNoteComboBox1] : Left [TWNoteComboBox4] : Small [TWNoteComboBox5] : No [TWNoteComboBox6] : Venous [de-identified] : No [de-identified] : Normal [de-identified] : None [de-identified] : None [de-identified] : Yes [de-identified] : Ace wraps [de-identified] : 2x Weekly

## 2022-01-17 NOTE — PLAN
[FreeTextEntry1] : Patient examined and evaluated at this time.\par Continue local wound care and offloading.\par Will auth for [facility].\par Spent 20 minutes for patient care and medical decision making.\par Patient to follow up in 1 week.\par

## 2022-01-17 NOTE — ASSESSMENT
[Verbal] : Verbal [Patient] : Patient [Good - alert, interested, motivated] : Good - alert, interested, motivated [Verbalizes knowledge/Understanding] : Verbalizes knowledge/understanding [Dressing changes] : dressing changes [Skin Care] : skin care [Signs and symptoms of infection] : sign and symptoms of infection [Venous Disease] : venous disease [Nutrition] : nutrition [How and When to Call] : how and when to call [Off-loading] : off-loading [Compression Therapy] : compression therapy [Patient responsibility to plan of care] : patient responsibility to plan of care [] : Yes [Stable] : stable [Home] : Home [Cane] : Cane [Not Applicable - Long Term Care/Home Health Agency] : Long Term Care/Home Health Agency: Not Applicable [FreeTextEntry2] : Compression Therapy \par Skin Graft\par Elevation \par Infection Prevention \par Restore Optimal Skin Integrity  [FreeTextEntry4] : DPM feels Pt would benefit from [facility] Application. Submitted for Authorization for #1 [facility].\par Pt to F/U to [facility] in 1 Week for an Assessment & Thursday for Dressing Change.  \par

## 2022-01-18 DIAGNOSIS — I34.0 NONRHEUMATIC MITRAL (VALVE) INSUFFICIENCY: ICD-10-CM

## 2022-01-18 DIAGNOSIS — M19.90 UNSPECIFIED OSTEOARTHRITIS, UNSPECIFIED SITE: ICD-10-CM

## 2022-01-18 DIAGNOSIS — Z87.891 PERSONAL HISTORY OF NICOTINE DEPENDENCE: ICD-10-CM

## 2022-01-18 DIAGNOSIS — L97.822 NON-PRESSURE CHRONIC ULCER OF OTHER PART OF LEFT LOWER LEG WITH FAT LAYER EXPOSED: ICD-10-CM

## 2022-01-18 DIAGNOSIS — Z79.899 OTHER LONG TERM (CURRENT) DRUG THERAPY: ICD-10-CM

## 2022-01-18 DIAGNOSIS — R01.1 CARDIAC MURMUR, UNSPECIFIED: ICD-10-CM

## 2022-01-18 DIAGNOSIS — G20 PARKINSON'S DISEASE: ICD-10-CM

## 2022-01-18 DIAGNOSIS — I49.3 VENTRICULAR PREMATURE DEPOLARIZATION: ICD-10-CM

## 2022-01-18 DIAGNOSIS — I83.228 VARICOSE VEINS OF LEFT LOWER EXTREMITY WITH BOTH ULCER OF OTHER PART OF LOWER EXTREMITY AND INFLAMMATION: ICD-10-CM

## 2022-01-18 DIAGNOSIS — I83.893 VARICOSE VEINS OF BILATERAL LOWER EXTREMITIES WITH OTHER COMPLICATIONS: ICD-10-CM

## 2022-01-18 DIAGNOSIS — Z80.0 FAMILY HISTORY OF MALIGNANT NEOPLASM OF DIGESTIVE ORGANS: ICD-10-CM

## 2022-01-20 ENCOUNTER — OUTPATIENT (OUTPATIENT)
Dept: OUTPATIENT SERVICES | Facility: HOSPITAL | Age: 87
LOS: 1 days | Discharge: HOME | End: 2022-01-20
Payer: MEDICARE

## 2022-01-20 ENCOUNTER — APPOINTMENT (OUTPATIENT)
Dept: WOUND CARE | Facility: HOSPITAL | Age: 87
End: 2022-01-20
Payer: MEDICARE

## 2022-01-20 VITALS
SYSTOLIC BLOOD PRESSURE: 134 MMHG | WEIGHT: 194 LBS | HEART RATE: 66 BPM | BODY MASS INDEX: 22.45 KG/M2 | HEIGHT: 78 IN | RESPIRATION RATE: 22 BRPM | TEMPERATURE: 97.3 F | DIASTOLIC BLOOD PRESSURE: 75 MMHG | OXYGEN SATURATION: 99 %

## 2022-01-20 DIAGNOSIS — I83.228 VARICOSE VEINS OF LEFT LOWER EXTREMITY WITH BOTH ULCER OF OTHER PART OF LOWER EXTREMITY AND INFLAMMATION: ICD-10-CM

## 2022-01-20 DIAGNOSIS — L97.822 NON-PRESSURE CHRONIC ULCER OF OTHER PART OF LEFT LOWER LEG WITH FAT LAYER EXPOSED: ICD-10-CM

## 2022-01-20 PROCEDURE — G0463: CPT

## 2022-01-20 PROCEDURE — ZZZZZ: CPT

## 2022-01-21 ENCOUNTER — APPOINTMENT (OUTPATIENT)
Dept: NEUROLOGY | Facility: CLINIC | Age: 87
End: 2022-01-21
Payer: MEDICARE

## 2022-01-21 DIAGNOSIS — F09 UNSPECIFIED MENTAL DISORDER DUE TO KNOWN PHYSIOLOGICAL CONDITION: ICD-10-CM

## 2022-01-21 DIAGNOSIS — G20 PARKINSON'S DISEASE: ICD-10-CM

## 2022-01-21 PROCEDURE — 99214 OFFICE O/P EST MOD 30 MIN: CPT | Mod: 95

## 2022-01-21 NOTE — DATA REVIEWED
[No studies available for review at this time.] : No studies available for review at this time. [de-identified] : 2/4/21: MRI brain - no acute findings [de-identified] : 10/29/21: Global cognitive score 99.3\par More than one standard deviation below average in domains: None\par Below average in domains: Memory 88.1, executive function 97.3, attention 99.9, information processing speed 98.6,  motor skills 90.4\par Above average in domains: visual-spatial 115.9, verbal function 104.6 [de-identified] : 3/15/21- Labs: B12, TSH level normal

## 2022-01-21 NOTE — DISCUSSION/SUMMARY
[FreeTextEntry1] : 86-year-old RH male with PMHx of HTN, Knee arthritis, venous insufficiency; diagnosed with Parkinson's disease few years ago, presents for evaluation of mildly memory loss. initial MoCA score 26/30\par \par # Mild-moderate cognitive impairment; Early Dementia; Cognitive test reveals Global score 99.3, one SD below average not noted in any domains, below average in 5 domains; memory, exec function, attention, information processing speed and motor skills. \par \par - Recommended continue donepezil 10 mg daily\par - Advised patient to continue cognitive exercises, stay physically and socially active\par -At follow-up, will reassess and may consider adding memantine\par \par # Parkinsons ds, unstable gait\par \par - increase carbidopa levodopa 25/100 mg 4 times a day.\par - physical therapy for balance and gait training\par - F/U in 3 months

## 2022-01-21 NOTE — REVIEW OF SYSTEMS
[As Noted in HPI] : as noted in HPI [Memory Lapses or Loss] : memory loss [Decr. Concentrating Ability] : decreased concentrating ability [Poor Coordination] : poor coordination [Lower Ext Edema] : lower extremity edema [Negative] : Heme/Lymph [Palpitations] : no palpitations [Diarrhea] : no diarrhea [FreeTextEntry9] : Arthritis knees, left > right

## 2022-01-21 NOTE — PHYSICAL EXAM
[General Appearance - Alert] : alert [General Appearance - In No Acute Distress] : in no acute distress [General Appearance - Well-Appearing] : healthy appearing [Mood] : the mood was normal [Person] : oriented to person [Place] : oriented to place [Time] : oriented to time [Naming Objects] : no difficulty naming common objects [Repeating Phrases] : no difficulty repeating a phrase [Fluency] : fluency intact [Comprehension] : comprehension intact [Cranial Nerves Optic (II)] : visual acuity intact bilaterally,  visual fields full to confrontation, pupils equal round and reactive to light [Cranial Nerves Oculomotor (III)] : extraocular motion intact [Cranial Nerves Facial (VII)] : face symmetrical [Cranial Nerves Vestibulocochlear (VIII)] : hearing was intact bilaterally [Cranial Nerves Accessory (XI - Cranial And Spinal)] : head turning and shoulder shrug symmetric [Cranial Nerves Hypoglossal (XII)] : there was no tongue deviation with protrusion [FreeTextEntry6] : Limited virtual motor exam; tremors of left hand < right hand - constant

## 2022-01-21 NOTE — HISTORY OF PRESENT ILLNESS
[Home] : at home, [unfilled] , at the time of the visit. [Medical Office: ([facility])___] : at the medical office located in  [Verbal consent obtained from patient] : the patient, [unfilled] [FreeTextEntry1] : Patient for telehealth follow-up visit, last seen on 10/29/2021.  Patient reports that his balance is worsened little bit, he has started using the cane for outdoor activities, he has not had any falls, he continues to have tremors of both hands mainly at rest and sometimes during activities he has not noted any significant dexterity of hand movements but his movements are generally slower.  Continues to take carbidopa levodopa 25/100 mg 3 times daily.\par \par Patient continues to have short-term memory issues, he reports these are getting worse, this morning he has difficulty connecting to the audiovisual meeting, needed a lot of help to get it connected. He is taking donepezil 10 mg daily.

## 2022-01-23 ENCOUNTER — NON-APPOINTMENT (OUTPATIENT)
Age: 87
End: 2022-01-23

## 2022-01-24 ENCOUNTER — APPOINTMENT (OUTPATIENT)
Dept: WOUND CARE | Facility: HOSPITAL | Age: 87
End: 2022-01-24
Payer: MEDICARE

## 2022-01-24 ENCOUNTER — OUTPATIENT (OUTPATIENT)
Dept: OUTPATIENT SERVICES | Facility: HOSPITAL | Age: 87
LOS: 1 days | Discharge: HOME | End: 2022-01-24
Payer: MEDICARE

## 2022-01-24 VITALS
RESPIRATION RATE: 22 BRPM | HEART RATE: 61 BPM | SYSTOLIC BLOOD PRESSURE: 143 MMHG | TEMPERATURE: 97.5 F | DIASTOLIC BLOOD PRESSURE: 84 MMHG | WEIGHT: 194 LBS | HEIGHT: 78 IN | BODY MASS INDEX: 22.45 KG/M2 | OXYGEN SATURATION: 99 %

## 2022-01-24 DIAGNOSIS — I83.228 VARICOSE VEINS OF LEFT LOWER EXTREMITY WITH BOTH ULCER OF OTHER PART OF LOWER EXTREMITY AND INFLAMMATION: ICD-10-CM

## 2022-01-24 PROCEDURE — 15271 SKIN SUB GRAFT TRNK/ARM/LEG: CPT

## 2022-01-24 NOTE — PROCEDURE
[Other:___] : [unfilled] [Hydrated with saline] : hydrated with saline [____ % was used] : and [unfilled] % was used [____ % was discarded] : and [unfilled] % was discarded [FreeTextEntry1] : yasemin, adaptic, dry dressing, coban multilayer compression dressing [FreeTextEntry9] : 152 [de-identified] : left lateral lower leg venous stasis ulceration down to skin and subcutaneous tissue with stasis dermatitis [de-identified] : loretta [de-identified] : neff [FreeTextEntry6] : left lateral lower leg venous stasis ulceration down to skin and subcutaneous tissue with stasis dermatitis [FreeTextEntry7] : left lateral lower leg venous stasis ulceration down to skin and subcutaneous tissue with stasis dermatitis

## 2022-01-24 NOTE — PHYSICAL EXAM
[4 x 4] : 4 x 4  [2+] : left 2+ [Ankle Swelling (On Exam)] : present [Varicose Veins Of Lower Extremities] : present [Varicose Veins Of The Left Leg] : of the left leg [] : of the left leg [Ankle Swelling On The Left] : moderate [Skin Ulcer] : ulcer [JVD] : no jugular venous distention  [de-identified] : calm [de-identified] : 4/5 strength in all quadrants [de-identified] : left lateral lower leg venous stasis ulceration down to skin and subcutaneous tissue with stasis dermatitis [de-identified] : Circ neuromuscular function WNL post COBAN LITE compression, pt expressed comfort.\par \par  [FreeTextEntry1] : Lateral Lower Leg [FreeTextEntry2] : 2.9 [FreeTextEntry3] : 1.0 [FreeTextEntry4] : 0.1 [de-identified] : serosanguineous [de-identified] : 95-99% [de-identified] : 1-5% [de-identified] : [facility] 2 x 3 [de-identified] : Ya, Ani, Adaptic Touch [de-identified] : Cleansed with Normal saline\par Reconstituted in NS\par Lot: 28 - 209 - 4B - 01\par Exp: 04/01/2024\par \par Post debridement measurements- 3.0 x  1.1 x 0.2\par \par \par Nushield  2 x 3 cm\par [id number]\par Exp: 08/03/2026\par ID# [id number]\par 50% Implanted 50% Discarded\par  [TWNoteComboBox1] : Left [TWNoteComboBox4] : Small [TWNoteComboBox5] : No [TWNoteComboBox6] : Venous [de-identified] : No [de-identified] : Normal [de-identified] : None [de-identified] : None [de-identified] : Yes [TWNoteComboBox7] : Jorge [de-identified] : Application of skin substitute [de-identified] : Multilayer other compression wrap [de-identified] : Weekly [de-identified] : Primary Dressing

## 2022-01-24 NOTE — REVIEW OF SYSTEMS
[Skin Wound] : skin wound [FreeTextEntry1] : 3590 [Fever] : no fever [Eye Pain] : no eye pain [Earache] : no earache [Chest Pain] : no chest pain [Shortness Of Breath] : no shortness of breath [Abdominal Pain] : no abdominal pain [FreeTextEntry9] : phill [de-identified] : left lateral lower leg venous stasis ulceration down to skin and subcutaneous tissue with stasis dermatitis

## 2022-01-24 NOTE — ASSESSMENT
[Verbal] : Verbal [Patient] : Patient [Good - alert, interested, motivated] : Good - alert, interested, motivated [Verbalizes knowledge/Understanding] : Verbalizes knowledge/understanding [Dressing changes] : dressing changes [Skin Care] : skin care [Signs and symptoms of infection] : sign and symptoms of infection [Venous Disease] : venous disease [Nutrition] : nutrition [How and When to Call] : how and when to call [Off-loading] : off-loading [Compression Therapy] : compression therapy [Patient responsibility to plan of care] : patient responsibility to plan of care [] : Yes [Stable] : stable [Home] : Home [Cane] : Cane [Not Applicable - Long Term Care/Home Health Agency] : Long Term Care/Home Health Agency: Not Applicable [FreeTextEntry2] : Infection Prevention\par Promote Skin Integrity\par Offloading\par Elevation\par Compression Compliance\par Low Na+ Diet\par Maintain acceptable levels of pain\par Demonstrates use of both pharmacological and nonpharmacological pain management interventions\par  [FreeTextEntry4] : F/U 1 week for assessment\par Preauth for [facility] and Nushield #2 submitted for next week\par

## 2022-01-25 DIAGNOSIS — I49.3 VENTRICULAR PREMATURE DEPOLARIZATION: ICD-10-CM

## 2022-01-25 DIAGNOSIS — Z79.899 OTHER LONG TERM (CURRENT) DRUG THERAPY: ICD-10-CM

## 2022-01-25 DIAGNOSIS — I83.228 VARICOSE VEINS OF LEFT LOWER EXTREMITY WITH BOTH ULCER OF OTHER PART OF LOWER EXTREMITY AND INFLAMMATION: ICD-10-CM

## 2022-01-25 DIAGNOSIS — G20 PARKINSON'S DISEASE: ICD-10-CM

## 2022-01-25 DIAGNOSIS — Z80.0 FAMILY HISTORY OF MALIGNANT NEOPLASM OF DIGESTIVE ORGANS: ICD-10-CM

## 2022-01-25 DIAGNOSIS — I83.893 VARICOSE VEINS OF BILATERAL LOWER EXTREMITIES WITH OTHER COMPLICATIONS: ICD-10-CM

## 2022-01-25 DIAGNOSIS — R01.1 CARDIAC MURMUR, UNSPECIFIED: ICD-10-CM

## 2022-01-25 DIAGNOSIS — I34.0 NONRHEUMATIC MITRAL (VALVE) INSUFFICIENCY: ICD-10-CM

## 2022-01-25 DIAGNOSIS — L97.822 NON-PRESSURE CHRONIC ULCER OF OTHER PART OF LEFT LOWER LEG WITH FAT LAYER EXPOSED: ICD-10-CM

## 2022-01-25 DIAGNOSIS — Z87.891 PERSONAL HISTORY OF NICOTINE DEPENDENCE: ICD-10-CM

## 2022-01-25 DIAGNOSIS — M19.90 UNSPECIFIED OSTEOARTHRITIS, UNSPECIFIED SITE: ICD-10-CM

## 2022-01-31 ENCOUNTER — OUTPATIENT (OUTPATIENT)
Dept: OUTPATIENT SERVICES | Facility: HOSPITAL | Age: 87
LOS: 1 days | Discharge: HOME | End: 2022-01-31
Payer: MEDICARE

## 2022-01-31 ENCOUNTER — APPOINTMENT (OUTPATIENT)
Dept: WOUND CARE | Facility: HOSPITAL | Age: 87
End: 2022-01-31
Payer: MEDICARE

## 2022-01-31 VITALS
RESPIRATION RATE: 20 BRPM | HEART RATE: 68 BPM | OXYGEN SATURATION: 99 % | SYSTOLIC BLOOD PRESSURE: 136 MMHG | BODY MASS INDEX: 22.45 KG/M2 | TEMPERATURE: 97.5 F | WEIGHT: 194 LBS | HEIGHT: 78 IN | DIASTOLIC BLOOD PRESSURE: 79 MMHG

## 2022-01-31 DIAGNOSIS — I83.228 VARICOSE VEINS OF LEFT LOWER EXTREMITY WITH BOTH ULCER OF OTHER PART OF LOWER EXTREMITY AND INFLAMMATION: ICD-10-CM

## 2022-01-31 PROCEDURE — 15271 SKIN SUB GRAFT TRNK/ARM/LEG: CPT

## 2022-02-01 DIAGNOSIS — I83.228 VARICOSE VEINS OF LEFT LOWER EXTREMITY WITH BOTH ULCER OF OTHER PART OF LOWER EXTREMITY AND INFLAMMATION: ICD-10-CM

## 2022-02-01 DIAGNOSIS — I34.0 NONRHEUMATIC MITRAL (VALVE) INSUFFICIENCY: ICD-10-CM

## 2022-02-01 DIAGNOSIS — L97.822 NON-PRESSURE CHRONIC ULCER OF OTHER PART OF LEFT LOWER LEG WITH FAT LAYER EXPOSED: ICD-10-CM

## 2022-02-01 DIAGNOSIS — G20 PARKINSON'S DISEASE: ICD-10-CM

## 2022-02-01 DIAGNOSIS — I49.3 VENTRICULAR PREMATURE DEPOLARIZATION: ICD-10-CM

## 2022-02-01 DIAGNOSIS — M19.90 UNSPECIFIED OSTEOARTHRITIS, UNSPECIFIED SITE: ICD-10-CM

## 2022-02-01 DIAGNOSIS — Z87.891 PERSONAL HISTORY OF NICOTINE DEPENDENCE: ICD-10-CM

## 2022-02-01 DIAGNOSIS — Z79.899 OTHER LONG TERM (CURRENT) DRUG THERAPY: ICD-10-CM

## 2022-02-01 DIAGNOSIS — R01.1 CARDIAC MURMUR, UNSPECIFIED: ICD-10-CM

## 2022-02-01 DIAGNOSIS — I83.893 VARICOSE VEINS OF BILATERAL LOWER EXTREMITIES WITH OTHER COMPLICATIONS: ICD-10-CM

## 2022-02-01 NOTE — REVIEW OF SYSTEMS
[Skin Wound] : skin wound [[facility]] : 2969 [Fever] : no fever [Eye Pain] : no eye pain [Earache] : no earache [Chest Pain] : no chest pain [Shortness Of Breath] : no shortness of breath [Abdominal Pain] : no abdominal pain [FreeTextEntry9] : phill [de-identified] : left lateral lower leg venous stasis ulceration down to skin and subcutaneous tissue with stasis dermatitis

## 2022-02-01 NOTE — ASSESSMENT
[Verbal] : Verbal [Written] : Written [Demo] : Demo [Patient] : Patient [Good - alert, interested, motivated] : Good - alert, interested, motivated [Demonstrates independently] : demonstrates independently [Stable] : stable [Home] : Home [Cane] : Cane [Not Applicable - Long Term Care/Home Health Agency] : Long Term Care/Home Health Agency: Not Applicable [] : No [FreeTextEntry2] : Infection prevention \par Wound care (dressing changes)\par Maintain optimal skin integrity to high pressure areas\par Compression therapy\par Elevation and low sodium compliance.\par Skin substitute therapy [FreeTextEntry4] : Auth submitted for [facility] # 3 to be applied at next visit\par F/u in 1 week.

## 2022-02-01 NOTE — PHYSICAL EXAM
[4 x 4] : 4 x 4  [2+] : left 2+ [Ankle Swelling (On Exam)] : present [Varicose Veins Of Lower Extremities] : present [Varicose Veins Of The Left Leg] : of the left leg [] : of the left leg [Ankle Swelling On The Left] : moderate [Skin Ulcer] : ulcer [JVD] : no jugular venous distention  [de-identified] : calm [de-identified] : 4/5 strength in all quadrants [de-identified] : left lateral lower leg venous stasis ulceration down to skin and subcutaneous tissue with stasis dermatitis [de-identified] : Circ neuromuscular function WNL post COBAN LITE compression, pt expressed comfort.\par \par  [FreeTextEntry1] : Lateral Lower Leg [FreeTextEntry2] : 2.9 [FreeTextEntry3] : 0.6 [FreeTextEntry4] : 0.1 [de-identified] : serosanguineous [de-identified] : 95-99% [de-identified] : 1-5% [FreeTextEntry5] : POST DEBRIDEMENT MEASUREMENTS: 3.0 X 0.7 X 0.1 cm [de-identified] : Nushield 2 x 3 cm [de-identified] : * COBAN LITE *  [de-identified] : Ya, Ani, Adaptic Touch [de-identified] : NSC\par DD\par \par \par Nushield Application # 2 (2x3cm) unit applied to Left Lateral Leg\par Item # [id number]\par Exp # 08/03/2026\par Lot # NA \par Applied 40 %\par Discarded 60 %\par \par \par \par Reconstituted with NS\par Lot # [id number]\par Expiration 04/01/2024\par  [TWNoteComboBox1] : Left [TWNoteComboBox4] : Small [TWNoteComboBox5] : No [TWNoteComboBox6] : Venous [de-identified] : No [de-identified] : Normal [de-identified] : None [de-identified] : None [de-identified] : Yes [TWNoteComboBox7] : Jorge [de-identified] : Application of skin substitute [de-identified] : Multilayer other compression wrap [de-identified] : Weekly [de-identified] : Primary Dressing

## 2022-02-01 NOTE — PROCEDURE
[Hydrated with saline] : hydrated with saline [____ % was used] : and [unfilled] % was used [____ % was discarded] : and [unfilled] % was discarded [Sharp curette] : sharp curette [Other:___] : [unfilled] [Other: ___] : [unfilled] [FreeTextEntry1] : yasemin, adaptic, dry dressing, coban multilayer compression dressing [] : No [FreeTextEntry9] : 7786 [de-identified] : left lateral lower leg wound [de-identified] : jayme greer [de-identified] : none [FreeTextEntry6] : left lateral lower leg venous stasis ulceration down to skin and subcutaneous tissue with stasis dermatitis [FreeTextEntry7] : left lateral lower leg venous stasis ulceration down to skin and subcutaneous tissue with stasis dermatitis [de-identified] : none [de-identified] : 1ml [de-identified] : yes, not sent

## 2022-02-07 ENCOUNTER — APPOINTMENT (OUTPATIENT)
Dept: WOUND CARE | Facility: HOSPITAL | Age: 87
End: 2022-02-07
Payer: MEDICARE

## 2022-02-07 ENCOUNTER — OUTPATIENT (OUTPATIENT)
Dept: OUTPATIENT SERVICES | Facility: HOSPITAL | Age: 87
LOS: 1 days | Discharge: HOME | End: 2022-02-07
Payer: MEDICARE

## 2022-02-07 VITALS
SYSTOLIC BLOOD PRESSURE: 148 MMHG | HEIGHT: 78 IN | TEMPERATURE: 97.6 F | OXYGEN SATURATION: 99 % | BODY MASS INDEX: 22.45 KG/M2 | DIASTOLIC BLOOD PRESSURE: 78 MMHG | WEIGHT: 194 LBS | HEART RATE: 62 BPM | RESPIRATION RATE: 22 BRPM

## 2022-02-07 DIAGNOSIS — I83.228 VARICOSE VEINS OF LEFT LOWER EXTREMITY WITH BOTH ULCER OF OTHER PART OF LOWER EXTREMITY AND INFLAMMATION: ICD-10-CM

## 2022-02-07 PROCEDURE — 15271 SKIN SUB GRAFT TRNK/ARM/LEG: CPT

## 2022-02-07 NOTE — REVIEW OF SYSTEMS
[Skin Wound] : skin wound [FreeTextEntry1] : 0972 [Fever] : no fever [Eye Pain] : no eye pain [Earache] : no earache [Chest Pain] : no chest pain [Shortness Of Breath] : no shortness of breath [Abdominal Pain] : no abdominal pain [FreeTextEntry9] : phill [de-identified] : left lateral lower leg venous stasis ulceration down to skin and subcutaneous tissue with stasis dermatitis

## 2022-02-07 NOTE — ASSESSMENT
[Verbal] : Verbal [Written] : Written [Demo] : Demo [Patient] : Patient [Good - alert, interested, motivated] : Good - alert, interested, motivated [Demonstrates independently] : demonstrates independently [] : Yes [Stable] : stable [Home] : Home [Cane] : Cane [Not Applicable - Long Term Care/Home Health Agency] : Long Term Care/Home Health Agency: Not Applicable [FreeTextEntry2] : Infection prevention \par Wound care (dressing changes)\par Maintain optimal skin integrity to high pressure areas\par Compression therapy\par Elevation and low sodium compliance.\par Skin substitute therapy [FreeTextEntry4] : Auth submitted for [facility] # 4 to be applied at next visit\par F/u in 1 week.

## 2022-02-07 NOTE — PHYSICAL EXAM
[4 x 4] : 4 x 4  [2+] : left 2+ [Ankle Swelling (On Exam)] : present [Varicose Veins Of Lower Extremities] : present [Varicose Veins Of The Left Leg] : of the left leg [] : of the left leg [Ankle Swelling On The Left] : moderate [Skin Ulcer] : ulcer [JVD] : no jugular venous distention  [de-identified] : calm [de-identified] : 4/5 strength in all quadrants [de-identified] : left lateral lower leg venous stasis ulceration down to skin and subcutaneous tissue with stasis dermatitis [de-identified] : Circ neuromuscular function WNL post COBAN LITE compression, pt expressed comfort.\par \par  [FreeTextEntry1] : Lateral Lower Leg [FreeTextEntry2] : 2.6 [FreeTextEntry3] : 0.7 [FreeTextEntry4] : 0.1 [de-identified] : serosanguineous [de-identified] : 95-99% [de-identified] : 1-5% [FreeTextEntry5] : POST DEBRIDEMENT MEASUREMENTS: 2.7 X 0.8 X 0.1 cm [de-identified] : Nushield 2 x 3 cm [de-identified] : * COBAN LITE *  [de-identified] : Ya, Ani, Adaptic Touch [de-identified] : NSC\par DD\par \par \par Nushield Application # 3 (2x3cm) unit applied to Left Lateral Leg\par Item # [id number]\par Exp # 09/04/2026\par Lot # NA \par Applied 50 %\par Discarded 50 %\par \par \par \par Reconstituted with NS\par Lot # [id number]\par Expiration 04/01/2024\par  [TWNoteComboBox1] : Left [TWNoteComboBox4] : Small [TWNoteComboBox5] : No [TWNoteComboBox6] : Venous [de-identified] : No [de-identified] : Normal [de-identified] : None [de-identified] : None [de-identified] : Yes [TWNoteComboBox7] : Jorge [de-identified] : Application of skin substitute [de-identified] : Multilayer other compression wrap [de-identified] : Weekly [de-identified] : Primary Dressing

## 2022-02-07 NOTE — PROCEDURE
[Sharp curette] : sharp curette [Other:___] : [unfilled] [Subcutaneous Tissue] : subcutaneous tissue [Hydrated with saline] : hydrated with saline [Other: ___] : [unfilled] [____ % was used] : and [unfilled] % was used [FreeTextEntry2] : left lower lateral lower leg [FreeTextEntry1] : yasemin,adaptic touch,DD,Coban lite [] : No [FreeTextEntry9] : 0225 [de-identified] : left lateral lower leg ulcer [de-identified] : jayme greer [de-identified] : none [FreeTextEntry6] : VSU left lower leg [FreeTextEntry7] : same [de-identified] : none [de-identified] : 1ml [de-identified] : yes, not sent

## 2022-02-08 DIAGNOSIS — R01.1 CARDIAC MURMUR, UNSPECIFIED: ICD-10-CM

## 2022-02-08 DIAGNOSIS — L97.822 NON-PRESSURE CHRONIC ULCER OF OTHER PART OF LEFT LOWER LEG WITH FAT LAYER EXPOSED: ICD-10-CM

## 2022-02-08 DIAGNOSIS — Z79.899 OTHER LONG TERM (CURRENT) DRUG THERAPY: ICD-10-CM

## 2022-02-08 DIAGNOSIS — Z87.891 PERSONAL HISTORY OF NICOTINE DEPENDENCE: ICD-10-CM

## 2022-02-08 DIAGNOSIS — I49.3 VENTRICULAR PREMATURE DEPOLARIZATION: ICD-10-CM

## 2022-02-08 DIAGNOSIS — I34.0 NONRHEUMATIC MITRAL (VALVE) INSUFFICIENCY: ICD-10-CM

## 2022-02-08 DIAGNOSIS — G20 PARKINSON'S DISEASE: ICD-10-CM

## 2022-02-08 DIAGNOSIS — I83.228 VARICOSE VEINS OF LEFT LOWER EXTREMITY WITH BOTH ULCER OF OTHER PART OF LOWER EXTREMITY AND INFLAMMATION: ICD-10-CM

## 2022-02-08 DIAGNOSIS — I83.893 VARICOSE VEINS OF BILATERAL LOWER EXTREMITIES WITH OTHER COMPLICATIONS: ICD-10-CM

## 2022-02-08 DIAGNOSIS — M19.90 UNSPECIFIED OSTEOARTHRITIS, UNSPECIFIED SITE: ICD-10-CM

## 2022-02-14 ENCOUNTER — APPOINTMENT (OUTPATIENT)
Dept: WOUND CARE | Facility: HOSPITAL | Age: 87
End: 2022-02-14
Payer: MEDICARE

## 2022-02-14 ENCOUNTER — OUTPATIENT (OUTPATIENT)
Dept: OUTPATIENT SERVICES | Facility: HOSPITAL | Age: 87
LOS: 1 days | Discharge: HOME | End: 2022-02-14
Payer: MEDICARE

## 2022-02-14 VITALS
RESPIRATION RATE: 20 BRPM | WEIGHT: 194 LBS | TEMPERATURE: 97.5 F | BODY MASS INDEX: 22.45 KG/M2 | SYSTOLIC BLOOD PRESSURE: 140 MMHG | HEART RATE: 71 BPM | OXYGEN SATURATION: 99 % | DIASTOLIC BLOOD PRESSURE: 80 MMHG | HEIGHT: 78 IN

## 2022-02-14 DIAGNOSIS — I83.228 VARICOSE VEINS OF LEFT LOWER EXTREMITY WITH BOTH ULCER OF OTHER PART OF LOWER EXTREMITY AND INFLAMMATION: ICD-10-CM

## 2022-02-14 PROCEDURE — 15271 SKIN SUB GRAFT TRNK/ARM/LEG: CPT

## 2022-02-14 NOTE — PROCEDURE
[Sharp curette] : sharp curette [Other:___] : [unfilled] [Subcutaneous Tissue] : subcutaneous tissue [Hydrated with saline] : hydrated with saline [Other: ___] : [unfilled] [____ % was used] : and [unfilled] % was used [FreeTextEntry2] : left lower lateral lower leg [FreeTextEntry1] : yasemin,adaptic touch,DD,Coban lite [] : No [FreeTextEntry9] : 8964 [de-identified] : left lateral lower leg ulcer [de-identified] : jayme greer [de-identified] : none [FreeTextEntry6] : VSU left lower leg [FreeTextEntry7] : same [de-identified] : none [de-identified] : 1ml [de-identified] : yes, not sent

## 2022-02-14 NOTE — REVIEW OF SYSTEMS
[Skin Wound] : skin wound [FreeTextEntry1] : 9558 [Fever] : no fever [Eye Pain] : no eye pain [Earache] : no earache [Chest Pain] : no chest pain [Shortness Of Breath] : no shortness of breath [Abdominal Pain] : no abdominal pain [FreeTextEntry9] : phill [de-identified] : left lateral lower leg venous stasis ulceration down to skin and subcutaneous tissue with stasis dermatitis

## 2022-02-14 NOTE — PHYSICAL EXAM
[2+] : left 2+ [Ankle Swelling (On Exam)] : present [Varicose Veins Of Lower Extremities] : present [Varicose Veins Of The Left Leg] : of the left leg [] : of the left leg [Ankle Swelling On The Left] : moderate [Skin Ulcer] : ulcer [JVD] : no jugular venous distention  [de-identified] : calm [de-identified] : 4/5 strength in all quadrants [de-identified] : left lateral lower leg venous stasis ulceration down to skin and subcutaneous tissue with stasis dermatitis [de-identified] : \par  [FreeTextEntry1] : Left Lateral Lower Leg [FreeTextEntry2] : 2.6 [FreeTextEntry3] : 0.6 [FreeTextEntry4] : 0.2 [de-identified] : Small Serosanguineous [de-identified] : Intact [de-identified] : Bedside Sharps Debridement & Application of NuShield 1.6cm Disc by Dr Sumner [de-identified] : Coban Lite [de-identified] : Alan/ Ani/ Adaptic Touch/ Dry Dressing & Kerlix applied by Dr Sumner [de-identified] : Cleansed with Normal saline\par \par \par \par Nushield Application # 4 (1.6cm Disc) unit applied to Left Lateral Leg\par Item # [id number]\par Exp # 07/14/2026\par Lot # NA \par Applied 100%\par Discarded 0 %\par \par \par \par Reconstituted with N/S\par Lot # [id number]\par Expiration 04/01/2024\par  [TWNoteComboBox1] : Left [TWNoteComboBox4] : Small [TWNoteComboBox5] : No [TWNoteComboBox6] : Venous [de-identified] : No [de-identified] : Normal [de-identified] : None [de-identified] : None [de-identified] : 100% [de-identified] : No [TWNoteComboBox7] : Jorge [de-identified] : Application of skin substitute [de-identified] : Multilayer other compression wrap [de-identified] : Weekly [de-identified] : Primary Dressing

## 2022-02-14 NOTE — ASSESSMENT
[Verbal] : Verbal [Demo] : Demo [Patient] : Patient [Good - alert, interested, motivated] : Good - alert, interested, motivated [Verbalizes knowledge/Understanding] : Verbalizes knowledge/understanding [Dressing changes] : dressing changes [Skin Care] : skin care [Pressure relief] : pressure relief [Signs and symptoms of infection] : sign and symptoms of infection [Venous Disease] : venous disease [How and When to Call] : how and when to call [Off-loading] : off-loading [Compression Therapy] : compression therapy [Patient responsibility to plan of care] : patient responsibility to plan of care [Stable] : stable [Home] : Home [Cane] : Cane [] : No [FreeTextEntry2] : Alteration in skin integrity- promote optimal skin integrity\par  [FreeTextEntry4] : Dr Sumner\par Alan #4 applied by Dr Sumner today\par Alan #5 ordered by Dr Sumner- shar sheet submitted\par F/U to [facility] in 1 week

## 2022-02-15 DIAGNOSIS — M19.90 UNSPECIFIED OSTEOARTHRITIS, UNSPECIFIED SITE: ICD-10-CM

## 2022-02-15 DIAGNOSIS — Z79.899 OTHER LONG TERM (CURRENT) DRUG THERAPY: ICD-10-CM

## 2022-02-15 DIAGNOSIS — I83.893 VARICOSE VEINS OF BILATERAL LOWER EXTREMITIES WITH OTHER COMPLICATIONS: ICD-10-CM

## 2022-02-15 DIAGNOSIS — G20 PARKINSON'S DISEASE: ICD-10-CM

## 2022-02-15 DIAGNOSIS — I49.3 VENTRICULAR PREMATURE DEPOLARIZATION: ICD-10-CM

## 2022-02-15 DIAGNOSIS — R01.1 CARDIAC MURMUR, UNSPECIFIED: ICD-10-CM

## 2022-02-15 DIAGNOSIS — I83.228 VARICOSE VEINS OF LEFT LOWER EXTREMITY WITH BOTH ULCER OF OTHER PART OF LOWER EXTREMITY AND INFLAMMATION: ICD-10-CM

## 2022-02-15 DIAGNOSIS — L97.822 NON-PRESSURE CHRONIC ULCER OF OTHER PART OF LEFT LOWER LEG WITH FAT LAYER EXPOSED: ICD-10-CM

## 2022-02-15 DIAGNOSIS — I34.0 NONRHEUMATIC MITRAL (VALVE) INSUFFICIENCY: ICD-10-CM

## 2022-02-15 DIAGNOSIS — Z87.891 PERSONAL HISTORY OF NICOTINE DEPENDENCE: ICD-10-CM

## 2022-02-20 ENCOUNTER — NON-APPOINTMENT (OUTPATIENT)
Age: 87
End: 2022-02-20

## 2022-02-21 ENCOUNTER — APPOINTMENT (OUTPATIENT)
Dept: WOUND CARE | Facility: HOSPITAL | Age: 87
End: 2022-02-21
Payer: MEDICARE

## 2022-02-21 ENCOUNTER — OUTPATIENT (OUTPATIENT)
Dept: OUTPATIENT SERVICES | Facility: HOSPITAL | Age: 87
LOS: 1 days | Discharge: HOME | End: 2022-02-21
Payer: MEDICARE

## 2022-02-21 VITALS
HEART RATE: 70 BPM | OXYGEN SATURATION: 99 % | SYSTOLIC BLOOD PRESSURE: 148 MMHG | BODY MASS INDEX: 22.45 KG/M2 | TEMPERATURE: 97.5 F | WEIGHT: 194 LBS | RESPIRATION RATE: 20 BRPM | HEIGHT: 78 IN | DIASTOLIC BLOOD PRESSURE: 80 MMHG

## 2022-02-21 DIAGNOSIS — I83.228 VARICOSE VEINS OF LEFT LOWER EXTREMITY WITH BOTH ULCER OF OTHER PART OF LOWER EXTREMITY AND INFLAMMATION: ICD-10-CM

## 2022-02-21 PROCEDURE — 99213 OFFICE O/P EST LOW 20 MIN: CPT | Mod: 25

## 2022-02-21 PROCEDURE — 15271 SKIN SUB GRAFT TRNK/ARM/LEG: CPT

## 2022-02-21 NOTE — HISTORY OF PRESENT ILLNESS
[FreeTextEntry1] : Mr. Lua returns to office today as part of his overall care and continued follow-up regarding his bilateral venous stasis disease and LLE lateral ulcer/ open wound...

## 2022-02-21 NOTE — PLAN
[FreeTextEntry1] : As above in notation.\par Patient personally seen and examined.\par No new events since last visit.\par Vitals non-suggestive.\par Wound clean and as described above.\par No new labs required and no imaging pending.\par Clinically, improving overall.\par Surgically, stable at present for application of Nu Shield graft today.\par To continue present local and supportive care.\par [facility] in 1 week.\par Please note that more than 50% of time was spent in counseling and coordination of care.

## 2022-02-22 DIAGNOSIS — I34.0 NONRHEUMATIC MITRAL (VALVE) INSUFFICIENCY: ICD-10-CM

## 2022-02-22 DIAGNOSIS — Z79.899 OTHER LONG TERM (CURRENT) DRUG THERAPY: ICD-10-CM

## 2022-02-22 DIAGNOSIS — I83.893 VARICOSE VEINS OF BILATERAL LOWER EXTREMITIES WITH OTHER COMPLICATIONS: ICD-10-CM

## 2022-02-22 DIAGNOSIS — M19.90 UNSPECIFIED OSTEOARTHRITIS, UNSPECIFIED SITE: ICD-10-CM

## 2022-02-22 DIAGNOSIS — I49.3 VENTRICULAR PREMATURE DEPOLARIZATION: ICD-10-CM

## 2022-02-22 DIAGNOSIS — G20 PARKINSON'S DISEASE: ICD-10-CM

## 2022-02-22 DIAGNOSIS — L97.822 NON-PRESSURE CHRONIC ULCER OF OTHER PART OF LEFT LOWER LEG WITH FAT LAYER EXPOSED: ICD-10-CM

## 2022-02-22 DIAGNOSIS — Z87.891 PERSONAL HISTORY OF NICOTINE DEPENDENCE: ICD-10-CM

## 2022-02-22 DIAGNOSIS — R01.1 CARDIAC MURMUR, UNSPECIFIED: ICD-10-CM

## 2022-02-22 DIAGNOSIS — I83.228 VARICOSE VEINS OF LEFT LOWER EXTREMITY WITH BOTH ULCER OF OTHER PART OF LOWER EXTREMITY AND INFLAMMATION: ICD-10-CM

## 2022-02-22 NOTE — PHYSICAL EXAM
[4 x 4] : 4 x 4  [de-identified] : Circ neuromuscular function WNL post coban compression, pt expressed comfort.\par  [FreeTextEntry1] : Left Lateral Lower Leg [FreeTextEntry2] : 2.6 [FreeTextEntry3] : 0.8 [FreeTextEntry4] : 0.2 [de-identified] : Small Serosanguineous [de-identified] : Intact/dry skin [de-identified] : 1-5% [de-identified] : Bedside Sharps Debridement & Nushield 2 x 3 [de-identified] : COBAN [de-identified] : Alan/ Ani/ Adaptic Touch/ Dry Dressing & Kerlix applied by Dr Sumner [de-identified] : Cleansed with Normal saline\par \par \par \par Nushield Application # 5 (2 x 3) unit applied to Left Lateral Leg\par ID# [id number]\par Exp # 09/30/2026\par Lot # NA \par Applied 100%\par Discarded 0 %\par \par \par \par Reconstituted with N/S\par Lot # [id number]\par Expiration 04/01/2024\par  [TWNoteComboBox5] : No [TWNoteComboBox6] : Venous [de-identified] : No [de-identified] : None [de-identified] : None [de-identified] : >75% [de-identified] : Yes [de-identified] : Multilayer other compression wrap [de-identified] : Weekly [de-identified] : Primary Dressing

## 2022-02-22 NOTE — REVIEW OF SYSTEMS
[Feeling Poorly] : not feeling poorly [Feeling Tired] : not feeling tired [Arthralgias] : arthralgias [As Noted in HPI] : as noted in HPI [Negative] : Heme/Lymph [de-identified] : "tremor..." [FreeTextEntry9] : "arthritis..." [FreeTextEntry1] : 3447

## 2022-02-22 NOTE — PROCEDURE
[Saline] : saline [Sharp curette] : sharp curette [Other:___] : [unfilled] [Hydrated with saline] : hydrated with saline [Other: ___] : [unfilled] [____ % was used] : and [unfilled] % was used [____ % was discarded] : and [unfilled] % was discarded [FreeTextEntry1] : Ani, DSD and compressive dressing. [FreeTextEntry9] : 7289

## 2022-02-22 NOTE — ASSESSMENT
[Verbal] : Verbal [Demo] : Demo [Patient] : Patient [Good - alert, interested, motivated] : Good - alert, interested, motivated [Verbalizes knowledge/Understanding] : Verbalizes knowledge/understanding [Dressing changes] : dressing changes [Skin Care] : skin care [Pressure relief] : pressure relief [Signs and symptoms of infection] : sign and symptoms of infection [Venous Disease] : venous disease [How and When to Call] : how and when to call [Off-loading] : off-loading [Compression Therapy] : compression therapy [Patient responsibility to plan of care] : patient responsibility to plan of care [Stable] : stable [Home] : Home [Cane] : Cane [] : No [FreeTextEntry2] : Infection Prevention\par Promote Skin Integrity\par Offloading\par Elevation\par Compression Compliance\par Low Na+ Diet\par Maintain acceptable levels of pain\par Demonstrates use of both pharmacological and nonpharmacological pain management interventions\par  [FreeTextEntry3] : wound is slightly larger [FreeTextEntry4] : F/U 1 week\par Prealexia for Sharp debridement and nushield #6 submitted

## 2022-02-22 NOTE — PROCEDURE
[Saline] : saline [Sharp curette] : sharp curette [Other:___] : [unfilled] [Hydrated with saline] : hydrated with saline [Other: ___] : [unfilled] [____ % was used] : and [unfilled] % was used [____ % was discarded] : and [unfilled] % was discarded [FreeTextEntry1] : Ani, DSD and compressive dressing. [FreeTextEntry9] : 6891

## 2022-02-22 NOTE — PHYSICAL EXAM
[4 x 4] : 4 x 4  [de-identified] : Circ neuromuscular function WNL post coban compression, pt expressed comfort.\par  [FreeTextEntry1] : Left Lateral Lower Leg [FreeTextEntry2] : 2.6 [FreeTextEntry3] : 0.8 [FreeTextEntry4] : 0.2 [de-identified] : Small Serosanguineous [de-identified] : Intact/dry skin [de-identified] : 1-5% [de-identified] : Bedside Sharps Debridement & Nushield 2 x 3 [de-identified] : COBAN [de-identified] : Alan/ Ani/ Adaptic Touch/ Dry Dressing & Kerlix applied by Dr Sumner [de-identified] : Cleansed with Normal saline\par \par \par \par Nushield Application # 5 (2 x 3) unit applied to Left Lateral Leg\par ID# [id number]\par Exp # 09/30/2026\par Lot # NA \par Applied 100%\par Discarded 0 %\par \par \par \par Reconstituted with N/S\par Lot # [id number]\par Expiration 04/01/2024\par  [TWNoteComboBox5] : No [TWNoteComboBox6] : Venous [de-identified] : No [de-identified] : None [de-identified] : None [de-identified] : >75% [de-identified] : Yes [de-identified] : Multilayer other compression wrap [de-identified] : Weekly [de-identified] : Primary Dressing

## 2022-02-22 NOTE — REVIEW OF SYSTEMS
[Feeling Poorly] : not feeling poorly [Feeling Tired] : not feeling tired [Arthralgias] : arthralgias [As Noted in HPI] : as noted in HPI [Negative] : Heme/Lymph [FreeTextEntry9] : "arthritis..." [de-identified] : "tremor..." [FreeTextEntry1] : 7579

## 2022-02-28 ENCOUNTER — OUTPATIENT (OUTPATIENT)
Dept: OUTPATIENT SERVICES | Facility: HOSPITAL | Age: 87
LOS: 1 days | Discharge: HOME | End: 2022-02-28
Payer: MEDICARE

## 2022-02-28 ENCOUNTER — APPOINTMENT (OUTPATIENT)
Dept: WOUND CARE | Facility: HOSPITAL | Age: 87
End: 2022-02-28
Payer: MEDICARE

## 2022-02-28 VITALS
BODY MASS INDEX: 22.45 KG/M2 | DIASTOLIC BLOOD PRESSURE: 77 MMHG | HEIGHT: 78 IN | RESPIRATION RATE: 20 BRPM | WEIGHT: 194 LBS | OXYGEN SATURATION: 97 % | HEART RATE: 54 BPM | TEMPERATURE: 97.3 F | SYSTOLIC BLOOD PRESSURE: 124 MMHG

## 2022-02-28 DIAGNOSIS — M19.90 UNSPECIFIED OSTEOARTHRITIS, UNSPECIFIED SITE: ICD-10-CM

## 2022-02-28 DIAGNOSIS — I83.228 VARICOSE VEINS OF LEFT LOWER EXTREMITY WITH BOTH ULCER OF OTHER PART OF LOWER EXTREMITY AND INFLAMMATION: ICD-10-CM

## 2022-02-28 DIAGNOSIS — I49.3 VENTRICULAR PREMATURE DEPOLARIZATION: ICD-10-CM

## 2022-02-28 DIAGNOSIS — Z79.899 OTHER LONG TERM (CURRENT) DRUG THERAPY: ICD-10-CM

## 2022-02-28 DIAGNOSIS — I83.893 VARICOSE VEINS OF BILATERAL LOWER EXTREMITIES WITH OTHER COMPLICATIONS: ICD-10-CM

## 2022-02-28 DIAGNOSIS — Z87.891 PERSONAL HISTORY OF NICOTINE DEPENDENCE: ICD-10-CM

## 2022-02-28 DIAGNOSIS — R01.1 CARDIAC MURMUR, UNSPECIFIED: ICD-10-CM

## 2022-02-28 DIAGNOSIS — L97.822 NON-PRESSURE CHRONIC ULCER OF OTHER PART OF LEFT LOWER LEG WITH FAT LAYER EXPOSED: ICD-10-CM

## 2022-02-28 DIAGNOSIS — G20 PARKINSON'S DISEASE: ICD-10-CM

## 2022-02-28 DIAGNOSIS — I34.0 NONRHEUMATIC MITRAL (VALVE) INSUFFICIENCY: ICD-10-CM

## 2022-02-28 PROCEDURE — 15271 SKIN SUB GRAFT TRNK/ARM/LEG: CPT

## 2022-03-06 ENCOUNTER — NON-APPOINTMENT (OUTPATIENT)
Age: 87
End: 2022-03-06

## 2022-03-07 ENCOUNTER — APPOINTMENT (OUTPATIENT)
Dept: WOUND CARE | Facility: HOSPITAL | Age: 87
End: 2022-03-07
Payer: MEDICARE

## 2022-03-07 ENCOUNTER — OUTPATIENT (OUTPATIENT)
Dept: OUTPATIENT SERVICES | Facility: HOSPITAL | Age: 87
LOS: 1 days | Discharge: HOME | End: 2022-03-07
Payer: MEDICARE

## 2022-03-07 VITALS
TEMPERATURE: 97.2 F | WEIGHT: 194 LBS | HEIGHT: 78 IN | SYSTOLIC BLOOD PRESSURE: 143 MMHG | BODY MASS INDEX: 22.45 KG/M2 | HEART RATE: 70 BPM | OXYGEN SATURATION: 99 % | DIASTOLIC BLOOD PRESSURE: 79 MMHG | RESPIRATION RATE: 20 BRPM

## 2022-03-07 DIAGNOSIS — I83.228 VARICOSE VEINS OF LEFT LOWER EXTREMITY WITH BOTH ULCER OF OTHER PART OF LOWER EXTREMITY AND INFLAMMATION: ICD-10-CM

## 2022-03-07 PROCEDURE — 15271 SKIN SUB GRAFT TRNK/ARM/LEG: CPT

## 2022-03-07 NOTE — PHYSICAL EXAM
[de-identified] : \par  [FreeTextEntry1] : Left Lateral Lower Leg [FreeTextEntry2] : 2.0 [FreeTextEntry3] : 0. 8 [FreeTextEntry4] : 0.2 [de-identified] : Small Serosanguineous [de-identified] : Bedside Sharps Debridement & Application of NuShield by Dr Sumner [de-identified] : Intact [de-identified] : Coban [de-identified] : Nu Shield/ Ani/ Adaptic Touch/ Dry Dressing & Kerlix applied by Dr Sumner [de-identified] : Cleansed with Normal saline\par \par \par \par \par Nu shield Application # 7 (1.6cm Disc) unit applied to Left Lateral Leg\par Exp # 08/03/2026\par Applied 100%\par Discarded 0 %\par \par \par \par Reconstituted with N/S\par Lot # [id number]\par Expiration 04/01/2024 [de-identified] : Normal [de-identified] : None [de-identified] : None [de-identified] : 100% [de-identified] : No [de-identified] : Application of skin substitute [de-identified] : Ace wraps

## 2022-03-07 NOTE — ASSESSMENT
[Verbal] : Verbal [Written] : Written [Demo] : Demo [Patient] : Patient [Good - alert, interested, motivated] : Good - alert, interested, motivated [Verbalizes knowledge/Understanding] : Verbalizes knowledge/understanding [Dressing changes] : dressing changes [Skin Care] : skin care [Signs and symptoms of infection] : sign and symptoms of infection [Venous Disease] : venous disease [How and When to Call] : how and when to call [Pain Management] : pain management [Compression Therapy] : compression therapy [Patient responsibility to plan of care] : patient responsibility to plan of care [] : Yes [Stable] : stable [Home] : Home [Cane] : Cane [Not Applicable - Long Term Care/Home Health Agency] : Long Term Care/Home Health Agency: Not Applicable [FreeTextEntry2] : Infection prevention\par Localized wound care \par Goal remaining pain free regarding wounds\par Skin substitute therapy  [FreeTextEntry4] : Auth submitted for [facility] # 7 \par Follow up in 1 week

## 2022-03-07 NOTE — PROCEDURE
[Sharp curette] : sharp curette [Other:___] : [unfilled] [Subcutaneous Tissue] : subcutaneous tissue [Hydrated with saline] : hydrated with saline [Other: ___] : [unfilled] [____ % was used] : and [unfilled] % was used [FreeTextEntry2] : left lateral lower leg [FreeTextEntry1] : yasemin,adaptic touch.vivian CORREA. coban lite [] : No [FreeTextEntry9] : 7366 [de-identified] : ulcer left lateral lower leg [de-identified] : jayme greer [de-identified] : none [FreeTextEntry6] : VSU left lateral lower leg [FreeTextEntry7] : same [de-identified] : none [de-identified] : 1ml [de-identified] : yes, not sent

## 2022-03-07 NOTE — REVIEW OF SYSTEMS
[Arthralgias] : arthralgias [As Noted in HPI] : as noted in HPI [Negative] : Heme/Lymph [FreeTextEntry1] : 1052 [Feeling Poorly] : not feeling poorly [Feeling Tired] : not feeling tired [FreeTextEntry9] : "arthritis..." [de-identified] : "tremor..."

## 2022-03-07 NOTE — ASSESSMENT
[Verbal] : Verbal [Demo] : Demo [Patient] : Patient [Good - alert, interested, motivated] : Good - alert, interested, motivated [Verbalizes knowledge/Understanding] : Verbalizes knowledge/understanding [Dressing changes] : dressing changes [Skin Care] : skin care [Pressure relief] : pressure relief [Signs and symptoms of infection] : sign and symptoms of infection [Venous Disease] : venous disease [How and When to Call] : how and when to call [Off-loading] : off-loading [Compression Therapy] : compression therapy [Patient responsibility to plan of care] : patient responsibility to plan of care [] : Yes [Stable] : stable [Home] : Home [Cane] : Cane [FreeTextEntry2] : Alteration in skin integrity- promote optimal skin integrity\par  [FreeTextEntry4] : Dr Sumner/ Photo taken\par [facility] #8 ordered by Dr Sumner for application next visit- preauth sheet submitted\par F/U to [facility] in 1 week

## 2022-03-07 NOTE — PHYSICAL EXAM
[de-identified] : \par  [FreeTextEntry1] : Left Lateral Lower Leg [FreeTextEntry2] : 2.4 [FreeTextEntry3] : 0. 7 [FreeTextEntry4] : 0.2 [de-identified] : Small Serosanguineous [de-identified] : Ya  [de-identified] : Expressed comfort post Coban application. [de-identified] : Nu Shield/ Ani/ Adaptic Touch/ Dry Dressing & Kerlix applied by Dr Sumner [de-identified] : Cleansed with Normal saline\par Kerlix\par \par *Post debridement measurement: 2.5/0.8/0.2\par \par \par Nu shield Application # 6 (1.6cm Disc) unit applied to Left Lateral Leg\par Exp # 07/14/2026\par Applied 100%\par Discarded 0 %\par \par \par \par Reconstituted with N/S\par Lot # [id number]\par Expiration 04/01/2024 [de-identified] : Normal [de-identified] : None [de-identified] : None [de-identified] : 100% [de-identified] : No [de-identified] : Application of skin substitute [de-identified] : Multilayer other compression wrap

## 2022-03-07 NOTE — PROCEDURE
[Sharp curette] : sharp curette [Other:___] : [unfilled] [Subcutaneous Tissue] : subcutaneous tissue [Hydrated with saline] : hydrated with saline [Other: ___] : [unfilled] [____ % was used] : and [unfilled] % was used [FreeTextEntry2] : left lateral lower leg [FreeTextEntry1] : yasemin,adaptic touch.vivian CORREA. coban lite [FreeTextEntry9] : 1448hr [de-identified] : ulcer left lateral lower leg [de-identified] : jayme greer [de-identified] : none [FreeTextEntry6] : VSU left lateral lower leg [FreeTextEntry7] : same [de-identified] : 1ml [de-identified] : none [de-identified] : yes, not sent

## 2022-03-07 NOTE — REVIEW OF SYSTEMS
[Arthralgias] : arthralgias [As Noted in HPI] : as noted in HPI [Negative] : Heme/Lymph [FreeTextEntry1] : 1424hr [Feeling Poorly] : not feeling poorly [Feeling Tired] : not feeling tired [FreeTextEntry9] : "arthritis..." [de-identified] : "tremor..."

## 2022-03-08 DIAGNOSIS — Z79.899 OTHER LONG TERM (CURRENT) DRUG THERAPY: ICD-10-CM

## 2022-03-08 DIAGNOSIS — Z87.891 PERSONAL HISTORY OF NICOTINE DEPENDENCE: ICD-10-CM

## 2022-03-08 DIAGNOSIS — L97.822 NON-PRESSURE CHRONIC ULCER OF OTHER PART OF LEFT LOWER LEG WITH FAT LAYER EXPOSED: ICD-10-CM

## 2022-03-08 DIAGNOSIS — I83.228 VARICOSE VEINS OF LEFT LOWER EXTREMITY WITH BOTH ULCER OF OTHER PART OF LOWER EXTREMITY AND INFLAMMATION: ICD-10-CM

## 2022-03-08 DIAGNOSIS — I34.0 NONRHEUMATIC MITRAL (VALVE) INSUFFICIENCY: ICD-10-CM

## 2022-03-08 DIAGNOSIS — M19.90 UNSPECIFIED OSTEOARTHRITIS, UNSPECIFIED SITE: ICD-10-CM

## 2022-03-08 DIAGNOSIS — I83.893 VARICOSE VEINS OF BILATERAL LOWER EXTREMITIES WITH OTHER COMPLICATIONS: ICD-10-CM

## 2022-03-08 DIAGNOSIS — G20 PARKINSON'S DISEASE: ICD-10-CM

## 2022-03-08 DIAGNOSIS — I49.3 VENTRICULAR PREMATURE DEPOLARIZATION: ICD-10-CM

## 2022-03-08 DIAGNOSIS — R01.1 CARDIAC MURMUR, UNSPECIFIED: ICD-10-CM

## 2022-03-14 ENCOUNTER — APPOINTMENT (OUTPATIENT)
Dept: WOUND CARE | Facility: HOSPITAL | Age: 87
End: 2022-03-14
Payer: MEDICARE

## 2022-03-14 ENCOUNTER — OUTPATIENT (OUTPATIENT)
Dept: OUTPATIENT SERVICES | Facility: HOSPITAL | Age: 87
LOS: 1 days | Discharge: HOME | End: 2022-03-14
Payer: MEDICARE

## 2022-03-14 VITALS
DIASTOLIC BLOOD PRESSURE: 75 MMHG | HEIGHT: 78 IN | RESPIRATION RATE: 20 BRPM | OXYGEN SATURATION: 100 % | HEART RATE: 86 BPM | SYSTOLIC BLOOD PRESSURE: 127 MMHG | WEIGHT: 194 LBS | TEMPERATURE: 97.1 F | BODY MASS INDEX: 22.45 KG/M2

## 2022-03-14 DIAGNOSIS — I83.228 VARICOSE VEINS OF LEFT LOWER EXTREMITY WITH BOTH ULCER OF OTHER PART OF LOWER EXTREMITY AND INFLAMMATION: ICD-10-CM

## 2022-03-14 PROCEDURE — 15271 SKIN SUB GRAFT TRNK/ARM/LEG: CPT

## 2022-03-14 NOTE — PHYSICAL EXAM
[de-identified] : Pt expressed comfort post COBAN application. Circ/Neuromuscular functions WNL post application.\par \par  [FreeTextEntry1] : Left Lateral Lower Leg [FreeTextEntry2] : 2.1 [FreeTextEntry3] : 1.1 [FreeTextEntry4] : 0.2 [de-identified] : Small Serosanguineous [de-identified] : Intact [FreeTextEntry5] : Post debridement measurements: 2.3 X 1.1 X 0.2 cm [de-identified] : Bedside Sharps Debridement & Application of NuShield by Dr Sumner [de-identified] : Coban [de-identified] : Nu Shield/ Ani/ Adaptic Touch/ Dry Dressing & Kerlix applied by Dr Sumner [de-identified] : Cleansed with Normal saline\par \par \par \par \par Nu shield Application # 8 (1.6cm Disc) unit applied to Left Lateral Leg\par Exp #07/22/2026\par Applied 100%\par Discarded 0 %\par \par \par \par Reconstituted with N/S\par Lot # [id number]\par Expiration 04/01/2024\par \par Current Tissue :\par Tissue Product:  BIO_TISSUE\par Tissue Type:  HUMAN\par Added On The Fly:  No\par Serial Number:  [id number]\par Lot Number:  \par Ext. Ref. Code:  [id number]\par Expiration Date:  07/22/2026\par Received Date:  02/18/2022\par Tissue Status:  Implanted\par Tissue Size:  1.6 cm disc [de-identified] : None [de-identified] : None [de-identified] : 100% [de-identified] : No [TWNoteComboBox7] : Jorge [de-identified] : Weekly [de-identified] : Other

## 2022-03-14 NOTE — PROCEDURE
[Sharp curette] : sharp curette [Other:___] : [unfilled] [Subcutaneous Tissue] : subcutaneous tissue [Hydrated with saline] : hydrated with saline [Other: ___] : [unfilled] [____ % was used] : and [unfilled] % was used [FreeTextEntry2] : left lateral lower leg [FreeTextEntry1] : yasemin,adaptic touch.vivian CORREA. coban lite [] : [facility] 9199 [de-identified] : ulcer left lateral lower leg [de-identified] : jayme greer [de-identified] : none [FreeTextEntry6] : VSU left lateral lower leg [FreeTextEntry7] : same [de-identified] : none [de-identified] : 1ml [de-identified] : yes, not sent

## 2022-03-14 NOTE — ASSESSMENT
[Verbal] : Verbal [Demo] : Demo [Patient] : Patient [Good - alert, interested, motivated] : Good - alert, interested, motivated [Verbalizes knowledge/Understanding] : Verbalizes knowledge/understanding [Dressing changes] : dressing changes [Skin Care] : skin care [Pressure relief] : pressure relief [Signs and symptoms of infection] : sign and symptoms of infection [Venous Disease] : venous disease [How and When to Call] : how and when to call [Off-loading] : off-loading [Compression Therapy] : compression therapy [Patient responsibility to plan of care] : patient responsibility to plan of care [Stable] : stable [Home] : Home [Cane] : Cane [Not Applicable - Long Term Care/Home Health Agency] : Long Term Care/Home Health Agency: Not Applicable [] : No [FreeTextEntry2] : Infection prevention \par Wound care (dressing changes)\par Maintain optimal skin integrity to high pressure areas\par Compression therapy\par Elevation and low sodium compliance.\par Skin substitute therapy. [FreeTextEntry4] : Auth submitted for [facility] # 9 \par F/u in 1 week

## 2022-03-15 DIAGNOSIS — I34.0 NONRHEUMATIC MITRAL (VALVE) INSUFFICIENCY: ICD-10-CM

## 2022-03-15 DIAGNOSIS — I49.3 VENTRICULAR PREMATURE DEPOLARIZATION: ICD-10-CM

## 2022-03-15 DIAGNOSIS — G20 PARKINSON'S DISEASE: ICD-10-CM

## 2022-03-15 DIAGNOSIS — I83.893 VARICOSE VEINS OF BILATERAL LOWER EXTREMITIES WITH OTHER COMPLICATIONS: ICD-10-CM

## 2022-03-15 DIAGNOSIS — Z87.891 PERSONAL HISTORY OF NICOTINE DEPENDENCE: ICD-10-CM

## 2022-03-15 DIAGNOSIS — R01.1 CARDIAC MURMUR, UNSPECIFIED: ICD-10-CM

## 2022-03-15 DIAGNOSIS — Z79.899 OTHER LONG TERM (CURRENT) DRUG THERAPY: ICD-10-CM

## 2022-03-15 DIAGNOSIS — L97.822 NON-PRESSURE CHRONIC ULCER OF OTHER PART OF LEFT LOWER LEG WITH FAT LAYER EXPOSED: ICD-10-CM

## 2022-03-15 DIAGNOSIS — I83.228 VARICOSE VEINS OF LEFT LOWER EXTREMITY WITH BOTH ULCER OF OTHER PART OF LOWER EXTREMITY AND INFLAMMATION: ICD-10-CM

## 2022-03-15 DIAGNOSIS — M19.90 UNSPECIFIED OSTEOARTHRITIS, UNSPECIFIED SITE: ICD-10-CM

## 2022-03-21 ENCOUNTER — APPOINTMENT (OUTPATIENT)
Dept: WOUND CARE | Facility: HOSPITAL | Age: 87
End: 2022-03-21
Payer: MEDICARE

## 2022-03-21 ENCOUNTER — OUTPATIENT (OUTPATIENT)
Dept: OUTPATIENT SERVICES | Facility: HOSPITAL | Age: 87
LOS: 1 days | Discharge: HOME | End: 2022-03-21
Payer: MEDICARE

## 2022-03-21 VITALS
RESPIRATION RATE: 20 BRPM | DIASTOLIC BLOOD PRESSURE: 80 MMHG | SYSTOLIC BLOOD PRESSURE: 139 MMHG | OXYGEN SATURATION: 100 % | TEMPERATURE: 97 F | HEART RATE: 58 BPM | WEIGHT: 194 LBS | HEIGHT: 78 IN | BODY MASS INDEX: 22.45 KG/M2

## 2022-03-21 DIAGNOSIS — I83.228 VARICOSE VEINS OF LEFT LOWER EXTREMITY WITH BOTH ULCER OF OTHER PART OF LOWER EXTREMITY AND INFLAMMATION: ICD-10-CM

## 2022-03-21 PROCEDURE — 15271 SKIN SUB GRAFT TRNK/ARM/LEG: CPT

## 2022-03-21 PROCEDURE — 87186 SC STD MICRODIL/AGAR DIL: CPT

## 2022-03-21 PROCEDURE — 87075 CULTR BACTERIA EXCEPT BLOOD: CPT

## 2022-03-21 PROCEDURE — 87077 CULTURE AEROBIC IDENTIFY: CPT

## 2022-03-21 PROCEDURE — 87070 CULTURE OTHR SPECIMN AEROBIC: CPT

## 2022-03-21 NOTE — ASSESSMENT
[Verbal] : Verbal [Demo] : Demo [Patient] : Patient [Good - alert, interested, motivated] : Good - alert, interested, motivated [Verbalizes knowledge/Understanding] : Verbalizes knowledge/understanding [Dressing changes] : dressing changes [Skin Care] : skin care [Pressure relief] : pressure relief [Signs and symptoms of infection] : sign and symptoms of infection [Venous Disease] : venous disease [How and When to Call] : how and when to call [Off-loading] : off-loading [Compression Therapy] : compression therapy [Patient responsibility to plan of care] : patient responsibility to plan of care [Stable] : stable [Home] : Home [Cane] : Cane [Not Applicable - Long Term Care/Home Health Agency] : Long Term Care/Home Health Agency: Not Applicable [] : Yes [FreeTextEntry2] : Infection prevention \par Wound care (dressing changes)\par Maintain optimal skin integrity to high pressure areas\par Compression therapy\par Elevation and low sodium compliance.\par Skin substitute therapy. [FreeTextEntry4] : Auth submitted for [facility] # 10\par Tissue culture obtained & sent to lab\par F/u in 1 week

## 2022-03-21 NOTE — PHYSICAL EXAM
[de-identified] : Pt expressed comfort post COBAN application. Circ/Neuromuscular functions WNL post application.\par Culture obtained\par \par  [FreeTextEntry2] : 2.0 [FreeTextEntry1] : Left Lateral Lower Leg [FreeTextEntry3] : 1.1 [FreeTextEntry4] : 0.2 [de-identified] : Small Serosanguineous [de-identified] : None [de-identified] : NONE [de-identified] : Intact [de-identified] : 90% [de-identified] : 10% [de-identified] : Bedside Sharps Debridement & Application of NuShield by Dr Sumner [FreeTextEntry5] : Post debridement measurements: 2.1 X 1.2  X 0.2 cm [de-identified] : Coban [de-identified] : Nu Shield/ Ani/ Adaptic Touch/ Dry Dressing & Kerlix applied by Dr Sumner [de-identified] : Mechanically cleansed with sterile gauze and normal saline 0.9%\par Dry Dressing\par \par NSC\par DD\par \par \par Nushield Application # 10 (1.6cm) unit applied to LEFT Lateral Leg\par Exp # 08/16/2026\par Donor #[id number]\par ID# [id number]\par Applied 100 %\par Discarded 0 %\par \par \par \par Reconstituted with NS\par Lot # [id number]\par Expiration 04/01/2024\par  [de-identified] : Current Tissue :\par Tissue Product:  BIO_TISSUE\par Tissue Type:  HUMAN\par Added On The Fly:  No\par Serial Number:  [id number]\par Lot Number:  [id number]\par Ext. Ref. Code:  [id number]\par Expiration Date:  08/16/2026\par Received Date:  03/18/2022\par Tissue Status:  Implanted\par Tissue Size:   [TWNoteComboBox4] : Small [TWNoteComboBox5] : No [de-identified] : Normal [TWNoteComboBox6] : Venous [de-identified] : None [de-identified] : None [de-identified] : 100% [de-identified] : No [TWNoteComboBox7] : Jorge [de-identified] : Application of skin substitute [de-identified] : Other [de-identified] : Weekly

## 2022-03-21 NOTE — PROCEDURE
[Sharp curette] : sharp curette [Other:___] : [unfilled] [Subcutaneous Tissue] : subcutaneous tissue [Hydrated with saline] : hydrated with saline [Other: ___] : [unfilled] [____ % was used] : and [unfilled] % was used [FreeTextEntry1] : yasemin,adaptic touch.vivian CORREA. coban lite [FreeTextEntry2] : left lateral lower leg [] : No [FreeTextEntry9] : 8501 [de-identified] : ulcer left lateral lower leg [de-identified] : jayme greer [de-identified] : none [FreeTextEntry6] : VSU left lateral lower leg [FreeTextEntry7] : same [de-identified] : none [de-identified] : 1ml [de-identified] : yes, not sent

## 2022-03-21 NOTE — REVIEW OF SYSTEMS
[Arthralgias] : arthralgias [As Noted in HPI] : as noted in HPI [Negative] : Heme/Lymph [FreeTextEntry1] : 4244 [Feeling Poorly] : not feeling poorly [Feeling Tired] : not feeling tired [FreeTextEntry9] : "arthritis..." [de-identified] : "tremor..."

## 2022-03-22 ENCOUNTER — NON-APPOINTMENT (OUTPATIENT)
Age: 87
End: 2022-03-22

## 2022-03-22 DIAGNOSIS — I83.893 VARICOSE VEINS OF BILATERAL LOWER EXTREMITIES WITH OTHER COMPLICATIONS: ICD-10-CM

## 2022-03-22 DIAGNOSIS — R01.1 CARDIAC MURMUR, UNSPECIFIED: ICD-10-CM

## 2022-03-22 DIAGNOSIS — M19.90 UNSPECIFIED OSTEOARTHRITIS, UNSPECIFIED SITE: ICD-10-CM

## 2022-03-22 DIAGNOSIS — I49.3 VENTRICULAR PREMATURE DEPOLARIZATION: ICD-10-CM

## 2022-03-22 DIAGNOSIS — L97.822 NON-PRESSURE CHRONIC ULCER OF OTHER PART OF LEFT LOWER LEG WITH FAT LAYER EXPOSED: ICD-10-CM

## 2022-03-22 DIAGNOSIS — G20 PARKINSON'S DISEASE: ICD-10-CM

## 2022-03-22 DIAGNOSIS — Z79.899 OTHER LONG TERM (CURRENT) DRUG THERAPY: ICD-10-CM

## 2022-03-22 DIAGNOSIS — I83.228 VARICOSE VEINS OF LEFT LOWER EXTREMITY WITH BOTH ULCER OF OTHER PART OF LOWER EXTREMITY AND INFLAMMATION: ICD-10-CM

## 2022-03-22 DIAGNOSIS — I34.0 NONRHEUMATIC MITRAL (VALVE) INSUFFICIENCY: ICD-10-CM

## 2022-03-22 DIAGNOSIS — Z87.891 PERSONAL HISTORY OF NICOTINE DEPENDENCE: ICD-10-CM

## 2022-03-22 LAB
GRAM STN FLD: SIGNIFICANT CHANGE UP
SPECIMEN SOURCE: SIGNIFICANT CHANGE UP

## 2022-03-23 LAB
-  AMIKACIN: SIGNIFICANT CHANGE UP
-  AMPICILLIN/SULBACTAM: SIGNIFICANT CHANGE UP
-  AZTREONAM: SIGNIFICANT CHANGE UP
-  CEFAZOLIN: SIGNIFICANT CHANGE UP
-  CEFEPIME: SIGNIFICANT CHANGE UP
-  CEFTAZIDIME: SIGNIFICANT CHANGE UP
-  CIPROFLOXACIN: SIGNIFICANT CHANGE UP
-  CLINDAMYCIN: SIGNIFICANT CHANGE UP
-  ERYTHROMYCIN: SIGNIFICANT CHANGE UP
-  GENTAMICIN: SIGNIFICANT CHANGE UP
-  GENTAMICIN: SIGNIFICANT CHANGE UP
-  IMIPENEM: SIGNIFICANT CHANGE UP
-  LEVOFLOXACIN: SIGNIFICANT CHANGE UP
-  MEROPENEM: SIGNIFICANT CHANGE UP
-  OXACILLIN: SIGNIFICANT CHANGE UP
-  PIPERACILLIN/TAZOBACTAM: SIGNIFICANT CHANGE UP
-  RIFAMPIN: SIGNIFICANT CHANGE UP
-  TETRACYCLINE: SIGNIFICANT CHANGE UP
-  TOBRAMYCIN: SIGNIFICANT CHANGE UP
-  TRIMETHOPRIM/SULFAMETHOXAZOLE: SIGNIFICANT CHANGE UP
-  VANCOMYCIN: SIGNIFICANT CHANGE UP
METHOD TYPE: SIGNIFICANT CHANGE UP
METHOD TYPE: SIGNIFICANT CHANGE UP

## 2022-03-26 LAB
CULTURE RESULTS: SIGNIFICANT CHANGE UP
ORGANISM # SPEC MICROSCOPIC CNT: SIGNIFICANT CHANGE UP
SPECIMEN SOURCE: SIGNIFICANT CHANGE UP

## 2022-03-28 ENCOUNTER — APPOINTMENT (OUTPATIENT)
Dept: WOUND CARE | Facility: HOSPITAL | Age: 87
End: 2022-03-28
Payer: MEDICARE

## 2022-03-28 ENCOUNTER — OUTPATIENT (OUTPATIENT)
Dept: OUTPATIENT SERVICES | Facility: HOSPITAL | Age: 87
LOS: 1 days | Discharge: HOME | End: 2022-03-28
Payer: MEDICARE

## 2022-03-28 VITALS
TEMPERATURE: 98.7 F | WEIGHT: 194 LBS | OXYGEN SATURATION: 100 % | RESPIRATION RATE: 18 BRPM | HEIGHT: 78 IN | DIASTOLIC BLOOD PRESSURE: 69 MMHG | SYSTOLIC BLOOD PRESSURE: 129 MMHG | BODY MASS INDEX: 22.45 KG/M2 | HEART RATE: 71 BPM

## 2022-03-28 DIAGNOSIS — I83.228 VARICOSE VEINS OF LEFT LOWER EXTREMITY WITH BOTH ULCER OF OTHER PART OF LOWER EXTREMITY AND INFLAMMATION: ICD-10-CM

## 2022-03-28 PROCEDURE — 99213 OFFICE O/P EST LOW 20 MIN: CPT

## 2022-03-28 PROCEDURE — 29581 APPL MULTLAYER CMPRN SYS LEG: CPT | Mod: LT

## 2022-03-28 NOTE — HISTORY OF PRESENT ILLNESS
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 Healing well. Using comp. stockings. Discussed importance of compression stockings/leg elevation.\par    Pt also with adherent eschar on both forearm.\par \par 3/28/22 left lower leg wound unchanged despite multiple grafts. wound clean with healthy granulation

## 2022-03-28 NOTE — ASSESSMENT
[Verbal] : Verbal [Written] : Written [Demo] : Demo [Patient] : Patient [Good - alert, interested, motivated] : Good - alert, interested, motivated [Demonstrates independently] : demonstrates independently [Dressing changes] : dressing changes [Skin Care] : skin care [Signs and symptoms of infection] : sign and symptoms of infection [Venous Disease] : venous disease [Nutrition] : nutrition [How and When to Call] : how and when to call [Off-loading] : off-loading [Compression Therapy] : compression therapy [Patient responsibility to plan of care] : patient responsibility to plan of care [Stable] : stable [Home] : Home [Ambulatory] : Ambulatory [Not Applicable - Long Term Care/Home Health Agency] : Long Term Care/Home Health Agency: Not Applicable [] : No [FreeTextEntry2] : Infection prevention \par Wound care (dressing changes)\par Maintain optimal skin integrity to high pressure areas\par Compression therapy\par Nutrition and wound healing\par Elevation and low sodium compliance.\par Offloading the stress on skin structures and decreasing potential pathologic biomechanical influences. [FreeTextEntry3] : No improvement in wound size. Nushield held, alginate ag 2 x weekly  [FreeTextEntry4] : Tissue culture from 3/22/22 reviewed at today's visit.\par [facility] held due to no improvement in wound size.\par Pt to return Friday, 4/1/22 for dressing change. Assessment in 1 week.

## 2022-03-28 NOTE — REVIEW OF SYSTEMS
[Arthralgias] : arthralgias [As Noted in HPI] : as noted in HPI [Negative] : Heme/Lymph [Feeling Poorly] : not feeling poorly [Feeling Tired] : not feeling tired [FreeTextEntry9] : "arthritis..." [de-identified] : "tremor..."

## 2022-03-28 NOTE — PHYSICAL EXAM
[Normal Thyroid] : the thyroid was normal [Normal Breath Sounds] : Normal breath sounds [Normal Heart Sounds] : normal heart sounds [Normal Rate and Rhythm] : normal rate and rhythm [Ankle Swelling (On Exam)] : present [Ankle Swelling On The Left] : moderate [] : of the left leg [Ankle Swelling On The Right] : mild [Alert] : alert [Oriented to Person] : oriented to person [Oriented to Place] : oriented to place [Oriented to Time] : oriented to time [Calm] : calm [4 x 4] : 4 x 4  [Abdominal Pad] : Abdominal Pad [JVD] : no jugular venous distention  [Abdomen Masses] : No abdominal massess [Abdomen Tenderness] : ~T ~M No abdominal tenderness [Tender] : nontender [Enlarged] : not enlarged [de-identified] : elderly adult WM, NAD, alert, Ox3. [FreeTextEntry1] :  Left Lateral Lower Leg  [FreeTextEntry2] : 2.3 [FreeTextEntry3] : 1.1 [FreeTextEntry4] : 0.1 - 0.2 [de-identified] : normal [de-identified] : serosanguineous [de-identified] : 100% [de-identified] : none [FreeTextEntry5] : none [de-identified] : Alginate Ag [de-identified] : Pt expressed comfort post COBAN application. Circ/Neuromuscular functions WNL post application. [de-identified] : Mechanically cleansed with sterile gauze and normal saline 0.9%\par Dry Dressing\par  [TWNoteComboBox4] : Small [TWNoteComboBox5] : No [TWNoteComboBox6] : Other [de-identified] : None [de-identified] : Multilayer other compression wrap [de-identified] : 2x Weekly [de-identified] : Primary Dressing

## 2022-03-28 NOTE — PLAN
[FreeTextEntry1] : AgAlginate with DD and Coban 2X/week\par stressed elevation\par return 1 week for assessment and 4 days for dressing change\par 20 minutes spent in review,evaluation and treatment planning

## 2022-03-29 DIAGNOSIS — I83.228 VARICOSE VEINS OF LEFT LOWER EXTREMITY WITH BOTH ULCER OF OTHER PART OF LOWER EXTREMITY AND INFLAMMATION: ICD-10-CM

## 2022-03-29 DIAGNOSIS — L97.822 NON-PRESSURE CHRONIC ULCER OF OTHER PART OF LEFT LOWER LEG WITH FAT LAYER EXPOSED: ICD-10-CM

## 2022-03-29 DIAGNOSIS — M19.90 UNSPECIFIED OSTEOARTHRITIS, UNSPECIFIED SITE: ICD-10-CM

## 2022-03-29 DIAGNOSIS — I83.893 VARICOSE VEINS OF BILATERAL LOWER EXTREMITIES WITH OTHER COMPLICATIONS: ICD-10-CM

## 2022-03-29 DIAGNOSIS — R01.1 CARDIAC MURMUR, UNSPECIFIED: ICD-10-CM

## 2022-03-29 DIAGNOSIS — I49.3 VENTRICULAR PREMATURE DEPOLARIZATION: ICD-10-CM

## 2022-03-29 DIAGNOSIS — Z87.891 PERSONAL HISTORY OF NICOTINE DEPENDENCE: ICD-10-CM

## 2022-03-29 DIAGNOSIS — I34.0 NONRHEUMATIC MITRAL (VALVE) INSUFFICIENCY: ICD-10-CM

## 2022-03-29 DIAGNOSIS — Z80.0 FAMILY HISTORY OF MALIGNANT NEOPLASM OF DIGESTIVE ORGANS: ICD-10-CM

## 2022-03-29 DIAGNOSIS — G20 PARKINSON'S DISEASE: ICD-10-CM

## 2022-03-29 DIAGNOSIS — Z79.899 OTHER LONG TERM (CURRENT) DRUG THERAPY: ICD-10-CM

## 2022-04-01 ENCOUNTER — APPOINTMENT (OUTPATIENT)
Dept: WOUND CARE | Facility: HOSPITAL | Age: 87
End: 2022-04-01
Payer: MEDICARE

## 2022-04-01 ENCOUNTER — OUTPATIENT (OUTPATIENT)
Dept: OUTPATIENT SERVICES | Facility: HOSPITAL | Age: 87
LOS: 1 days | Discharge: HOME | End: 2022-04-01
Payer: MEDICARE

## 2022-04-01 VITALS
RESPIRATION RATE: 20 BRPM | TEMPERATURE: 98.1 F | SYSTOLIC BLOOD PRESSURE: 106 MMHG | OXYGEN SATURATION: 99 % | BODY MASS INDEX: 22.45 KG/M2 | DIASTOLIC BLOOD PRESSURE: 74 MMHG | HEART RATE: 83 BPM | HEIGHT: 78 IN | WEIGHT: 194 LBS

## 2022-04-01 DIAGNOSIS — L97.822 NON-PRESSURE CHRONIC ULCER OF OTHER PART OF LEFT LOWER LEG WITH FAT LAYER EXPOSED: ICD-10-CM

## 2022-04-01 DIAGNOSIS — I83.228 VARICOSE VEINS OF LEFT LOWER EXTREMITY WITH BOTH ULCER OF OTHER PART OF LOWER EXTREMITY AND INFLAMMATION: ICD-10-CM

## 2022-04-01 PROCEDURE — ZZZZZ: CPT

## 2022-04-01 PROCEDURE — 29581 APPL MULTLAYER CMPRN SYS LEG: CPT | Mod: LT

## 2022-04-05 ENCOUNTER — OUTPATIENT (OUTPATIENT)
Dept: OUTPATIENT SERVICES | Facility: HOSPITAL | Age: 87
LOS: 1 days | Discharge: HOME | End: 2022-04-05
Payer: MEDICARE

## 2022-04-05 ENCOUNTER — APPOINTMENT (OUTPATIENT)
Dept: WOUND CARE | Facility: HOSPITAL | Age: 87
End: 2022-04-05
Payer: MEDICARE

## 2022-04-05 VITALS
BODY MASS INDEX: 22.56 KG/M2 | WEIGHT: 195 LBS | TEMPERATURE: 98.3 F | HEIGHT: 78 IN | OXYGEN SATURATION: 96 % | DIASTOLIC BLOOD PRESSURE: 74 MMHG | RESPIRATION RATE: 16 BRPM | HEART RATE: 65 BPM | SYSTOLIC BLOOD PRESSURE: 127 MMHG

## 2022-04-05 DIAGNOSIS — I83.228 VARICOSE VEINS OF LEFT LOWER EXTREMITY WITH BOTH ULCER OF OTHER PART OF LOWER EXTREMITY AND INFLAMMATION: ICD-10-CM

## 2022-04-05 PROCEDURE — 99213 OFFICE O/P EST LOW 20 MIN: CPT

## 2022-04-05 PROCEDURE — 29581 APPL MULTLAYER CMPRN SYS LEG: CPT | Mod: LT

## 2022-04-05 NOTE — PHYSICAL EXAM
[4 x 4] : 4 x 4  [Abdominal Pad] : Abdominal Pad [JVD] : no jugular venous distention  [Normal Thyroid] : the thyroid was normal [Normal Breath Sounds] : Normal breath sounds [Normal Heart Sounds] : normal heart sounds [Normal Rate and Rhythm] : normal rate and rhythm [Ankle Swelling (On Exam)] : present [Ankle Swelling On The Left] : moderate [] : of the left leg [Ankle Swelling On The Right] : mild [Abdomen Masses] : No abdominal massess [Abdomen Tenderness] : ~T ~M No abdominal tenderness [Tender] : nontender [Enlarged] : not enlarged [Alert] : alert [Oriented to Person] : oriented to person [Oriented to Place] : oriented to place [Oriented to Time] : oriented to time [Calm] : calm [de-identified] : elderly adult WM, NAD, alert, Ox3. [FreeTextEntry1] :  Left Lateral Lower Leg  [FreeTextEntry2] : 2.4 [FreeTextEntry3] : 0.8 [FreeTextEntry4] : 0.1 - 0.2 [de-identified] : serosanguineous [de-identified] : normal [de-identified] : 100% [de-identified] : none [FreeTextEntry5] : none [de-identified] : Pt expressed comfort post COBAN application. Circ/Neuromuscular functions WNL post application. [de-identified] : Alginate Ag [de-identified] : Mechanically cleansed with sterile gauze and normal saline 0.9%\par Dry Dressing\par  [TWNoteComboBox4] : Small [TWNoteComboBox5] : No [TWNoteComboBox6] : Other [de-identified] : None [de-identified] : Multilayer other compression wrap [de-identified] : 2x Weekly [de-identified] : Primary Dressing

## 2022-04-05 NOTE — HISTORY OF PRESENT ILLNESS
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 Healing well. Using comp. stockings. Discussed importance of compression stockings/leg elevation.\par    Pt also with adherent eschar on both forearm.\par \par 3/28/22 left lower leg wound unchanged despite multiple grafts. wound clean with healthy granulation\par 4/5/22 left lateral lower leg ulcer slightly smaller

## 2022-04-05 NOTE — REVIEW OF SYSTEMS
[Feeling Poorly] : not feeling poorly [Feeling Tired] : not feeling tired [Arthralgias] : arthralgias [As Noted in HPI] : as noted in HPI [Negative] : Heme/Lymph [FreeTextEntry9] : "arthritis..." [de-identified] : "tremor..."

## 2022-04-05 NOTE — PLAN
[FreeTextEntry1] : AgAlginate with DD and Coban 2X/week\par stressed elevation\par return 1 week for assessment and 4 days for dressing change\par 20 minutes spent in review,evaluation and treatment planning\par ulcer slightly smaller

## 2022-04-05 NOTE — ASSESSMENT
[Verbal] : Verbal [Written] : Written [Demo] : Demo [Patient] : Patient [Good - alert, interested, motivated] : Good - alert, interested, motivated [Demonstrates independently] : demonstrates independently [Dressing changes] : dressing changes [Skin Care] : skin care [Signs and symptoms of infection] : sign and symptoms of infection [Venous Disease] : venous disease [Nutrition] : nutrition [How and When to Call] : how and when to call [Off-loading] : off-loading [Compression Therapy] : compression therapy [Patient responsibility to plan of care] : patient responsibility to plan of care [Stable] : stable [Home] : Home [Ambulatory] : Ambulatory [Not Applicable - Long Term Care/Home Health Agency] : Long Term Care/Home Health Agency: Not Applicable [] : Yes [FreeTextEntry2] : Infection prevention \par Wound care (dressing changes)\par Maintain optimal skin integrity to high pressure areas\par Compression therapy\par Nutrition and wound healing\par Elevation and low sodium compliance.\par Offloading the stress on skin structures and decreasing potential pathologic biomechanical influences. [FreeTextEntry4] : Assessment in 1 week.

## 2022-04-06 DIAGNOSIS — I83.228 VARICOSE VEINS OF LEFT LOWER EXTREMITY WITH BOTH ULCER OF OTHER PART OF LOWER EXTREMITY AND INFLAMMATION: ICD-10-CM

## 2022-04-06 DIAGNOSIS — I34.0 NONRHEUMATIC MITRAL (VALVE) INSUFFICIENCY: ICD-10-CM

## 2022-04-06 DIAGNOSIS — I49.3 VENTRICULAR PREMATURE DEPOLARIZATION: ICD-10-CM

## 2022-04-06 DIAGNOSIS — M19.90 UNSPECIFIED OSTEOARTHRITIS, UNSPECIFIED SITE: ICD-10-CM

## 2022-04-06 DIAGNOSIS — Z87.891 PERSONAL HISTORY OF NICOTINE DEPENDENCE: ICD-10-CM

## 2022-04-06 DIAGNOSIS — G20 PARKINSON'S DISEASE: ICD-10-CM

## 2022-04-06 DIAGNOSIS — Z80.0 FAMILY HISTORY OF MALIGNANT NEOPLASM OF DIGESTIVE ORGANS: ICD-10-CM

## 2022-04-06 DIAGNOSIS — R01.1 CARDIAC MURMUR, UNSPECIFIED: ICD-10-CM

## 2022-04-06 DIAGNOSIS — Z79.899 OTHER LONG TERM (CURRENT) DRUG THERAPY: ICD-10-CM

## 2022-04-06 DIAGNOSIS — L97.822 NON-PRESSURE CHRONIC ULCER OF OTHER PART OF LEFT LOWER LEG WITH FAT LAYER EXPOSED: ICD-10-CM

## 2022-04-06 DIAGNOSIS — I83.893 VARICOSE VEINS OF BILATERAL LOWER EXTREMITIES WITH OTHER COMPLICATIONS: ICD-10-CM

## 2022-04-08 ENCOUNTER — OUTPATIENT (OUTPATIENT)
Dept: OUTPATIENT SERVICES | Facility: HOSPITAL | Age: 87
LOS: 1 days | Discharge: HOME | End: 2022-04-08
Payer: MEDICARE

## 2022-04-08 ENCOUNTER — APPOINTMENT (OUTPATIENT)
Dept: WOUND CARE | Facility: HOSPITAL | Age: 87
End: 2022-04-08
Payer: MEDICARE

## 2022-04-08 VITALS
HEART RATE: 73 BPM | SYSTOLIC BLOOD PRESSURE: 143 MMHG | OXYGEN SATURATION: 96 % | WEIGHT: 195 LBS | DIASTOLIC BLOOD PRESSURE: 73 MMHG | TEMPERATURE: 98 F | HEIGHT: 78 IN | RESPIRATION RATE: 20 BRPM | BODY MASS INDEX: 22.56 KG/M2

## 2022-04-08 DIAGNOSIS — I83.228 VARICOSE VEINS OF LEFT LOWER EXTREMITY WITH BOTH ULCER OF OTHER PART OF LOWER EXTREMITY AND INFLAMMATION: ICD-10-CM

## 2022-04-08 DIAGNOSIS — L97.822 NON-PRESSURE CHRONIC ULCER OF OTHER PART OF LEFT LOWER LEG WITH FAT LAYER EXPOSED: ICD-10-CM

## 2022-04-08 PROCEDURE — ZZZZZ: CPT

## 2022-04-08 PROCEDURE — 29581 APPL MULTLAYER CMPRN SYS LEG: CPT | Mod: LT

## 2022-04-11 ENCOUNTER — OUTPATIENT (OUTPATIENT)
Dept: OUTPATIENT SERVICES | Facility: HOSPITAL | Age: 87
LOS: 1 days | Discharge: HOME | End: 2022-04-11
Payer: MEDICARE

## 2022-04-11 ENCOUNTER — APPOINTMENT (OUTPATIENT)
Dept: WOUND CARE | Facility: HOSPITAL | Age: 87
End: 2022-04-11
Payer: MEDICARE

## 2022-04-11 VITALS
WEIGHT: 195 LBS | BODY MASS INDEX: 22.56 KG/M2 | HEIGHT: 78 IN | HEART RATE: 68 BPM | TEMPERATURE: 98.9 F | RESPIRATION RATE: 20 BRPM | DIASTOLIC BLOOD PRESSURE: 75 MMHG | SYSTOLIC BLOOD PRESSURE: 119 MMHG | OXYGEN SATURATION: 97 %

## 2022-04-11 DIAGNOSIS — I83.228 VARICOSE VEINS OF LEFT LOWER EXTREMITY WITH BOTH ULCER OF OTHER PART OF LOWER EXTREMITY AND INFLAMMATION: ICD-10-CM

## 2022-04-11 PROCEDURE — 29581 APPL MULTLAYER CMPRN SYS LEG: CPT | Mod: LT

## 2022-04-11 PROCEDURE — 99213 OFFICE O/P EST LOW 20 MIN: CPT

## 2022-04-11 NOTE — VITALS
[Pain related to present condition?] : The patient's  pain is not related to present condition. [] : No [de-identified] : 0/10

## 2022-04-11 NOTE — HISTORY OF PRESENT ILLNESS
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 Healing well. Using comp. stockings. Discussed importance of compression stockings/leg elevation.\par    Pt also with adherent eschar on both forearm.\par \par 3/28/22 left lower leg wound unchanged despite multiple grafts. wound clean with healthy granulation\par 4/5/22 left lateral lower leg ulcer slightly smaller\par 4/11/22 discussed patient with Dr. Ramos and will repeat venous studies and she will re evaluate because of non healing left lower leg ulcer

## 2022-04-11 NOTE — REVIEW OF SYSTEMS
[Arthralgias] : arthralgias [As Noted in HPI] : as noted in HPI [Negative] : Heme/Lymph [Feeling Poorly] : not feeling poorly [Feeling Tired] : not feeling tired [FreeTextEntry9] : "arthritis..." [de-identified] : "tremor..."

## 2022-04-11 NOTE — PHYSICAL EXAM
[4 x 4] : 4 x 4  [Abdominal Pad] : Abdominal Pad [Normal Thyroid] : the thyroid was normal [Normal Breath Sounds] : Normal breath sounds [Normal Heart Sounds] : normal heart sounds [Normal Rate and Rhythm] : normal rate and rhythm [Ankle Swelling (On Exam)] : present [Ankle Swelling On The Left] : moderate [] : of the left leg [Ankle Swelling On The Right] : mild [Alert] : alert [Oriented to Person] : oriented to person [Oriented to Place] : oriented to place [Oriented to Time] : oriented to time [Calm] : calm [JVD] : no jugular venous distention  [Abdomen Masses] : No abdominal massess [Abdomen Tenderness] : ~T ~M No abdominal tenderness [Tender] : nontender [Enlarged] : not enlarged [de-identified] : elderly adult WM, NAD, alert, Ox3. [FreeTextEntry1] :  Lateral Lower Leg  [FreeTextEntry2] : 2.2 [FreeTextEntry3] : 0.8 [FreeTextEntry4] : 0.1 - 0.2 [de-identified] : serosanguineous [de-identified] : normal [de-identified] : 100% [de-identified] : none [FreeTextEntry5] : none [de-identified] : Pt expressed comfort post COBAN application. Circ/Neuromuscular functions WNL post application. [de-identified] : Alginate Ag [de-identified] : Mechanically cleansed with sterile gauze and normal saline 0.9%\par \par  [TWNoteComboBox1] : Left [TWNoteComboBox4] : Small [TWNoteComboBox5] : No [TWNoteComboBox6] : Other [de-identified] : None [de-identified] : Multilayer other compression wrap [de-identified] : 2x Weekly [de-identified] : Primary Dressing

## 2022-04-11 NOTE — ASSESSMENT
[Verbal] : Verbal [Written] : Written [Demo] : Demo [Patient] : Patient [Good - alert, interested, motivated] : Good - alert, interested, motivated [Demonstrates independently] : demonstrates independently [Dressing changes] : dressing changes [Skin Care] : skin care [Signs and symptoms of infection] : sign and symptoms of infection [Venous Disease] : venous disease [Nutrition] : nutrition [How and When to Call] : how and when to call [Off-loading] : off-loading [Compression Therapy] : compression therapy [Patient responsibility to plan of care] : patient responsibility to plan of care [Stable] : stable [Home] : Home [Ambulatory] : Ambulatory [Not Applicable - Long Term Care/Home Health Agency] : Long Term Care/Home Health Agency: Not Applicable [] : No [FreeTextEntry2] : Infection prevention \par Wound care (dressing changes)\par Maintain optimal skin integrity to high pressure areas\par Compression therapy\par Nutrition and wound healing\par Elevation and low sodium compliance.\par Offloading the stress on skin structures and decreasing potential pathologic biomechanical influences.\par F/U 1 week \par Vascular studies  [FreeTextEntry4] : Auth submitted for Vascular studies. Pt aware to await phone call from radiology dept for completion of same.\par Vascular consult with Dr. Eaton submitted.\par F/u Friday, 4/15/22 for dressing change. Assessment in 1 week

## 2022-04-11 NOTE — PLAN
[FreeTextEntry1] : AgAlginate with DD and Coban 2X/week\par stressed elevation\par return 1 week for assessment and 4 days for dressing change\par 20 minutes spent in review,evaluation and treatment planning\par ulcer unchanged

## 2022-04-12 DIAGNOSIS — Z80.0 FAMILY HISTORY OF MALIGNANT NEOPLASM OF DIGESTIVE ORGANS: ICD-10-CM

## 2022-04-12 DIAGNOSIS — Z79.899 OTHER LONG TERM (CURRENT) DRUG THERAPY: ICD-10-CM

## 2022-04-12 DIAGNOSIS — L97.822 NON-PRESSURE CHRONIC ULCER OF OTHER PART OF LEFT LOWER LEG WITH FAT LAYER EXPOSED: ICD-10-CM

## 2022-04-12 DIAGNOSIS — I83.228 VARICOSE VEINS OF LEFT LOWER EXTREMITY WITH BOTH ULCER OF OTHER PART OF LOWER EXTREMITY AND INFLAMMATION: ICD-10-CM

## 2022-04-12 DIAGNOSIS — M19.90 UNSPECIFIED OSTEOARTHRITIS, UNSPECIFIED SITE: ICD-10-CM

## 2022-04-12 DIAGNOSIS — I83.893 VARICOSE VEINS OF BILATERAL LOWER EXTREMITIES WITH OTHER COMPLICATIONS: ICD-10-CM

## 2022-04-12 DIAGNOSIS — R01.1 CARDIAC MURMUR, UNSPECIFIED: ICD-10-CM

## 2022-04-12 DIAGNOSIS — I49.3 VENTRICULAR PREMATURE DEPOLARIZATION: ICD-10-CM

## 2022-04-12 DIAGNOSIS — Z87.891 PERSONAL HISTORY OF NICOTINE DEPENDENCE: ICD-10-CM

## 2022-04-12 DIAGNOSIS — I34.0 NONRHEUMATIC MITRAL (VALVE) INSUFFICIENCY: ICD-10-CM

## 2022-04-12 DIAGNOSIS — G20 PARKINSON'S DISEASE: ICD-10-CM

## 2022-04-15 ENCOUNTER — APPOINTMENT (OUTPATIENT)
Dept: WOUND CARE | Facility: HOSPITAL | Age: 87
End: 2022-04-15
Payer: MEDICARE

## 2022-04-15 ENCOUNTER — OUTPATIENT (OUTPATIENT)
Dept: OUTPATIENT SERVICES | Facility: HOSPITAL | Age: 87
LOS: 1 days | Discharge: HOME | End: 2022-04-15
Payer: MEDICARE

## 2022-04-15 VITALS
HEART RATE: 70 BPM | RESPIRATION RATE: 20 BRPM | TEMPERATURE: 97.9 F | BODY MASS INDEX: 22.56 KG/M2 | WEIGHT: 195 LBS | OXYGEN SATURATION: 98 % | HEIGHT: 78 IN | DIASTOLIC BLOOD PRESSURE: 76 MMHG | SYSTOLIC BLOOD PRESSURE: 131 MMHG

## 2022-04-15 DIAGNOSIS — I83.228 VARICOSE VEINS OF LEFT LOWER EXTREMITY WITH BOTH ULCER OF OTHER PART OF LOWER EXTREMITY AND INFLAMMATION: ICD-10-CM

## 2022-04-15 PROCEDURE — 29581 APPL MULTLAYER CMPRN SYS LEG: CPT | Mod: LT

## 2022-04-15 PROCEDURE — ZZZZZ: CPT

## 2022-04-16 DIAGNOSIS — I83.228 VARICOSE VEINS OF LEFT LOWER EXTREMITY WITH BOTH ULCER OF OTHER PART OF LOWER EXTREMITY AND INFLAMMATION: ICD-10-CM

## 2022-04-16 DIAGNOSIS — L97.822 NON-PRESSURE CHRONIC ULCER OF OTHER PART OF LEFT LOWER LEG WITH FAT LAYER EXPOSED: ICD-10-CM

## 2022-04-18 ENCOUNTER — APPOINTMENT (OUTPATIENT)
Dept: NEUROLOGY | Facility: CLINIC | Age: 87
End: 2022-04-18

## 2022-04-18 ENCOUNTER — APPOINTMENT (OUTPATIENT)
Dept: WOUND CARE | Facility: HOSPITAL | Age: 87
End: 2022-04-18

## 2022-04-25 ENCOUNTER — APPOINTMENT (OUTPATIENT)
Dept: WOUND CARE | Facility: HOSPITAL | Age: 87
End: 2022-04-25
Payer: MEDICARE

## 2022-04-25 ENCOUNTER — OUTPATIENT (OUTPATIENT)
Dept: OUTPATIENT SERVICES | Facility: HOSPITAL | Age: 87
LOS: 1 days | Discharge: HOME | End: 2022-04-25
Payer: MEDICARE

## 2022-04-25 VITALS
RESPIRATION RATE: 20 BRPM | BODY MASS INDEX: 22.56 KG/M2 | SYSTOLIC BLOOD PRESSURE: 114 MMHG | DIASTOLIC BLOOD PRESSURE: 70 MMHG | OXYGEN SATURATION: 96 % | WEIGHT: 195 LBS | HEART RATE: 61 BPM | HEIGHT: 78 IN | TEMPERATURE: 98.3 F

## 2022-04-25 DIAGNOSIS — L97.822 NON-PRESSURE CHRONIC ULCER OF OTHER PART OF LEFT LOWER LEG WITH FAT LAYER EXPOSED: ICD-10-CM

## 2022-04-25 DIAGNOSIS — Z80.0 FAMILY HISTORY OF MALIGNANT NEOPLASM OF DIGESTIVE ORGANS: ICD-10-CM

## 2022-04-25 DIAGNOSIS — Z87.891 PERSONAL HISTORY OF NICOTINE DEPENDENCE: ICD-10-CM

## 2022-04-25 DIAGNOSIS — Z79.2 LONG TERM (CURRENT) USE OF ANTIBIOTICS: ICD-10-CM

## 2022-04-25 DIAGNOSIS — I83.228 VARICOSE VEINS OF LEFT LOWER EXTREMITY WITH BOTH ULCER OF OTHER PART OF LOWER EXTREMITY AND INFLAMMATION: ICD-10-CM

## 2022-04-25 DIAGNOSIS — I83.893 VARICOSE VEINS OF BILATERAL LOWER EXTREMITIES WITH OTHER COMPLICATIONS: ICD-10-CM

## 2022-04-25 DIAGNOSIS — Z79.899 OTHER LONG TERM (CURRENT) DRUG THERAPY: ICD-10-CM

## 2022-04-25 DIAGNOSIS — M19.90 UNSPECIFIED OSTEOARTHRITIS, UNSPECIFIED SITE: ICD-10-CM

## 2022-04-25 DIAGNOSIS — G20 PARKINSON'S DISEASE: ICD-10-CM

## 2022-04-25 PROCEDURE — 99213 OFFICE O/P EST LOW 20 MIN: CPT

## 2022-04-25 PROCEDURE — 29581 APPL MULTLAYER CMPRN SYS LEG: CPT | Mod: LT

## 2022-04-25 NOTE — PHYSICAL EXAM
[2+] : left 2+ [Ankle Swelling (On Exam)] : present [Varicose Veins Of Lower Extremities] : bilaterally [Ankle Swelling On The Left] : moderate [] : bilaterally [Ankle Swelling Bilaterally] : severe [FreeTextEntry1] : palp pulses [de-identified] : l lateral shin wound is full thickness, flat edges

## 2022-04-25 NOTE — HISTORY OF PRESENT ILLNESS
[FreeTextEntry1] : Kelvin has known venous stasis disease. He underwent L GSV ablation last year. Unfortunately his wound has not healed. He is here for vascular eval.  [de-identified] : for vasc eval

## 2022-04-25 NOTE — ASSESSMENT
[FreeTextEntry1] : 87 yo M with hx of venous disease. Hx of L GSV ablation. Still open wound. No PVD

## 2022-04-27 ENCOUNTER — OUTPATIENT (OUTPATIENT)
Dept: OUTPATIENT SERVICES | Facility: HOSPITAL | Age: 87
LOS: 1 days | Discharge: HOME | End: 2022-04-27
Payer: MEDICARE

## 2022-04-27 ENCOUNTER — APPOINTMENT (OUTPATIENT)
Dept: WOUND CARE | Facility: HOSPITAL | Age: 87
End: 2022-04-27
Payer: MEDICARE

## 2022-04-27 ENCOUNTER — OUTPATIENT (OUTPATIENT)
Dept: OUTPATIENT SERVICES | Facility: HOSPITAL | Age: 87
LOS: 1 days | End: 2022-04-27
Payer: MEDICARE

## 2022-04-27 VITALS
SYSTOLIC BLOOD PRESSURE: 123 MMHG | BODY MASS INDEX: 22.56 KG/M2 | HEIGHT: 78 IN | TEMPERATURE: 98.5 F | DIASTOLIC BLOOD PRESSURE: 68 MMHG | RESPIRATION RATE: 20 BRPM | HEART RATE: 51 BPM | WEIGHT: 195 LBS | OXYGEN SATURATION: 94 %

## 2022-04-27 DIAGNOSIS — I83.893 VARICOSE VEINS OF BILATERAL LOWER EXTREMITIES WITH OTHER COMPLICATIONS: ICD-10-CM

## 2022-04-27 DIAGNOSIS — L97.822 NON-PRESSURE CHRONIC ULCER OF OTHER PART OF LEFT LOWER LEG WITH FAT LAYER EXPOSED: ICD-10-CM

## 2022-04-27 DIAGNOSIS — I83.228 VARICOSE VEINS OF LEFT LOWER EXTREMITY WITH BOTH ULCER OF OTHER PART OF LOWER EXTREMITY AND INFLAMMATION: ICD-10-CM

## 2022-04-27 PROCEDURE — 93970 EXTREMITY STUDY: CPT

## 2022-04-27 PROCEDURE — 93970 EXTREMITY STUDY: CPT | Mod: 26

## 2022-04-27 PROCEDURE — 29581 APPL MULTLAYER CMPRN SYS LEG: CPT | Mod: LT

## 2022-04-27 PROCEDURE — ZZZZZ: CPT

## 2022-05-09 ENCOUNTER — INPATIENT (INPATIENT)
Facility: HOSPITAL | Age: 87
LOS: 3 days | Discharge: HOME | DRG: 602 | End: 2022-05-13
Attending: INTERNAL MEDICINE | Admitting: INTERNAL MEDICINE
Payer: MEDICARE

## 2022-05-09 ENCOUNTER — APPOINTMENT (OUTPATIENT)
Dept: WOUND CARE | Facility: HOSPITAL | Age: 87
End: 2022-05-09
Payer: MEDICARE

## 2022-05-09 ENCOUNTER — OUTPATIENT (OUTPATIENT)
Dept: OUTPATIENT SERVICES | Facility: HOSPITAL | Age: 87
LOS: 1 days | Discharge: ANOTHER HOSPITAL | End: 2022-05-09
Payer: MEDICARE

## 2022-05-09 VITALS
DIASTOLIC BLOOD PRESSURE: 72 MMHG | TEMPERATURE: 99.6 F | HEIGHT: 78 IN | OXYGEN SATURATION: 94 % | BODY MASS INDEX: 22.56 KG/M2 | RESPIRATION RATE: 20 BRPM | SYSTOLIC BLOOD PRESSURE: 130 MMHG | HEART RATE: 59 BPM | WEIGHT: 195 LBS

## 2022-05-09 VITALS
WEIGHT: 184.97 LBS | TEMPERATURE: 100 F | HEIGHT: 75 IN | SYSTOLIC BLOOD PRESSURE: 121 MMHG | HEART RATE: 67 BPM | OXYGEN SATURATION: 98 % | DIASTOLIC BLOOD PRESSURE: 57 MMHG

## 2022-05-09 DIAGNOSIS — Z79.2 LONG TERM (CURRENT) USE OF ANTIBIOTICS: ICD-10-CM

## 2022-05-09 DIAGNOSIS — I83.893 VARICOSE VEINS OF BILATERAL LOWER EXTREMITIES WITH OTHER COMPLICATIONS: ICD-10-CM

## 2022-05-09 DIAGNOSIS — Z79.899 OTHER LONG TERM (CURRENT) DRUG THERAPY: ICD-10-CM

## 2022-05-09 DIAGNOSIS — L03.115 CELLULITIS OF RIGHT LOWER LIMB: ICD-10-CM

## 2022-05-09 DIAGNOSIS — Z87.891 PERSONAL HISTORY OF NICOTINE DEPENDENCE: ICD-10-CM

## 2022-05-09 DIAGNOSIS — Z80.0 FAMILY HISTORY OF MALIGNANT NEOPLASM OF DIGESTIVE ORGANS: ICD-10-CM

## 2022-05-09 DIAGNOSIS — G20 PARKINSON'S DISEASE: ICD-10-CM

## 2022-05-09 DIAGNOSIS — L97.822 NON-PRESSURE CHRONIC ULCER OF OTHER PART OF LEFT LOWER LEG WITH FAT LAYER EXPOSED: ICD-10-CM

## 2022-05-09 DIAGNOSIS — I83.228 VARICOSE VEINS OF LEFT LOWER EXTREMITY WITH BOTH ULCER OF OTHER PART OF LOWER EXTREMITY AND INFLAMMATION: ICD-10-CM

## 2022-05-09 DIAGNOSIS — M19.90 UNSPECIFIED OSTEOARTHRITIS, UNSPECIFIED SITE: ICD-10-CM

## 2022-05-09 LAB
ALBUMIN SERPL ELPH-MCNC: 3.2 G/DL — LOW (ref 3.3–5)
ALP SERPL-CCNC: 51 U/L — SIGNIFICANT CHANGE UP (ref 40–120)
ALT FLD-CCNC: 15 U/L — SIGNIFICANT CHANGE UP (ref 12–78)
ANION GAP SERPL CALC-SCNC: 6 MMOL/L — SIGNIFICANT CHANGE UP (ref 5–17)
APPEARANCE UR: CLEAR — SIGNIFICANT CHANGE UP
APTT BLD: 37.7 SEC — HIGH (ref 27.5–35.5)
AST SERPL-CCNC: 14 U/L — LOW (ref 15–37)
BASOPHILS # BLD AUTO: 0.05 K/UL — SIGNIFICANT CHANGE UP (ref 0–0.2)
BASOPHILS NFR BLD AUTO: 0.4 % — SIGNIFICANT CHANGE UP (ref 0–2)
BILIRUB SERPL-MCNC: 1.5 MG/DL — HIGH (ref 0.2–1.2)
BILIRUB UR-MCNC: NEGATIVE — SIGNIFICANT CHANGE UP
BUN SERPL-MCNC: 21 MG/DL — SIGNIFICANT CHANGE UP (ref 7–23)
CALCIUM SERPL-MCNC: 8.8 MG/DL — SIGNIFICANT CHANGE UP (ref 8.5–10.1)
CHLORIDE SERPL-SCNC: 106 MMOL/L — SIGNIFICANT CHANGE UP (ref 96–108)
CO2 SERPL-SCNC: 30 MMOL/L — SIGNIFICANT CHANGE UP (ref 22–31)
COLOR SPEC: YELLOW — SIGNIFICANT CHANGE UP
CREAT SERPL-MCNC: 1 MG/DL — SIGNIFICANT CHANGE UP (ref 0.5–1.3)
CRP SERPL-MCNC: 82 MG/L — HIGH
DIFF PNL FLD: ABNORMAL
EGFR: 74 ML/MIN/1.73M2 — SIGNIFICANT CHANGE UP
EOSINOPHIL # BLD AUTO: 0.04 K/UL — SIGNIFICANT CHANGE UP (ref 0–0.5)
EOSINOPHIL NFR BLD AUTO: 0.3 % — SIGNIFICANT CHANGE UP (ref 0–6)
ERYTHROCYTE [SEDIMENTATION RATE] IN BLOOD: 14 MM/HR — SIGNIFICANT CHANGE UP (ref 0–20)
GLUCOSE SERPL-MCNC: 89 MG/DL — SIGNIFICANT CHANGE UP (ref 70–99)
GLUCOSE UR QL: NEGATIVE — SIGNIFICANT CHANGE UP
HCT VFR BLD CALC: 39.7 % — SIGNIFICANT CHANGE UP (ref 39–50)
HGB BLD-MCNC: 12.8 G/DL — LOW (ref 13–17)
IMM GRANULOCYTES NFR BLD AUTO: 0.7 % — SIGNIFICANT CHANGE UP (ref 0–1.5)
INR BLD: 1.14 RATIO — SIGNIFICANT CHANGE UP (ref 0.88–1.16)
KETONES UR-MCNC: ABNORMAL
LACTATE SERPL-SCNC: 1 MMOL/L — SIGNIFICANT CHANGE UP (ref 0.7–2)
LEUKOCYTE ESTERASE UR-ACNC: ABNORMAL
LYMPHOCYTES # BLD AUTO: 1.08 K/UL — SIGNIFICANT CHANGE UP (ref 1–3.3)
LYMPHOCYTES # BLD AUTO: 8.8 % — LOW (ref 13–44)
MCHC RBC-ENTMCNC: 30.6 PG — SIGNIFICANT CHANGE UP (ref 27–34)
MCHC RBC-ENTMCNC: 32.2 GM/DL — SIGNIFICANT CHANGE UP (ref 32–36)
MCV RBC AUTO: 95 FL — SIGNIFICANT CHANGE UP (ref 80–100)
MONOCYTES # BLD AUTO: 1.34 K/UL — HIGH (ref 0–0.9)
MONOCYTES NFR BLD AUTO: 10.9 % — SIGNIFICANT CHANGE UP (ref 2–14)
NEUTROPHILS # BLD AUTO: 9.68 K/UL — HIGH (ref 1.8–7.4)
NEUTROPHILS NFR BLD AUTO: 78.9 % — HIGH (ref 43–77)
NITRITE UR-MCNC: NEGATIVE — SIGNIFICANT CHANGE UP
NRBC # BLD: 0 /100 WBCS — SIGNIFICANT CHANGE UP (ref 0–0)
PH UR: 7 — SIGNIFICANT CHANGE UP (ref 5–8)
PLATELET # BLD AUTO: 152 K/UL — SIGNIFICANT CHANGE UP (ref 150–400)
POTASSIUM SERPL-MCNC: 3.9 MMOL/L — SIGNIFICANT CHANGE UP (ref 3.5–5.3)
POTASSIUM SERPL-SCNC: 3.9 MMOL/L — SIGNIFICANT CHANGE UP (ref 3.5–5.3)
PROT SERPL-MCNC: 6.4 G/DL — SIGNIFICANT CHANGE UP (ref 6–8.3)
PROT UR-MCNC: 15
PROTHROM AB SERPL-ACNC: 13.4 SEC — SIGNIFICANT CHANGE UP (ref 10.5–13.4)
RAPID RVP RESULT: SIGNIFICANT CHANGE UP
RBC # BLD: 4.18 M/UL — LOW (ref 4.2–5.8)
RBC # FLD: 13.5 % — SIGNIFICANT CHANGE UP (ref 10.3–14.5)
SARS-COV-2 RNA SPEC QL NAA+PROBE: SIGNIFICANT CHANGE UP
SODIUM SERPL-SCNC: 142 MMOL/L — SIGNIFICANT CHANGE UP (ref 135–145)
SP GR SPEC: 1 — LOW (ref 1.01–1.02)
UROBILINOGEN FLD QL: NEGATIVE — SIGNIFICANT CHANGE UP
WBC # BLD: 12.28 K/UL — HIGH (ref 3.8–10.5)
WBC # FLD AUTO: 12.28 K/UL — HIGH (ref 3.8–10.5)

## 2022-05-09 PROCEDURE — G0463: CPT

## 2022-05-09 PROCEDURE — 99285 EMERGENCY DEPT VISIT HI MDM: CPT

## 2022-05-09 PROCEDURE — 73590 X-RAY EXAM OF LOWER LEG: CPT | Mod: 26,RT

## 2022-05-09 PROCEDURE — 71045 X-RAY EXAM CHEST 1 VIEW: CPT | Mod: 26

## 2022-05-09 PROCEDURE — 93010 ELECTROCARDIOGRAM REPORT: CPT

## 2022-05-09 PROCEDURE — 99213 OFFICE O/P EST LOW 20 MIN: CPT

## 2022-05-09 PROCEDURE — 93971 EXTREMITY STUDY: CPT | Mod: 26,RT

## 2022-05-09 RX ORDER — ENOXAPARIN SODIUM 100 MG/ML
40 INJECTION SUBCUTANEOUS EVERY 24 HOURS
Refills: 0 | Status: DISCONTINUED | OUTPATIENT
Start: 2022-05-09 | End: 2022-05-13

## 2022-05-09 RX ORDER — ACETAMINOPHEN 500 MG
650 TABLET ORAL EVERY 6 HOURS
Refills: 0 | Status: DISCONTINUED | OUTPATIENT
Start: 2022-05-09 | End: 2022-05-13

## 2022-05-09 RX ORDER — VANCOMYCIN HCL 1 G
750 VIAL (EA) INTRAVENOUS EVERY 12 HOURS
Refills: 0 | Status: DISCONTINUED | OUTPATIENT
Start: 2022-05-10 | End: 2022-05-10

## 2022-05-09 RX ORDER — ASPIRIN/CALCIUM CARB/MAGNESIUM 324 MG
1 TABLET ORAL
Qty: 0 | Refills: 0 | DISCHARGE

## 2022-05-09 RX ORDER — PIPERACILLIN AND TAZOBACTAM 4; .5 G/20ML; G/20ML
3.38 INJECTION, POWDER, LYOPHILIZED, FOR SOLUTION INTRAVENOUS ONCE
Refills: 0 | Status: COMPLETED | OUTPATIENT
Start: 2022-05-09 | End: 2022-05-09

## 2022-05-09 RX ORDER — PIPERACILLIN AND TAZOBACTAM 4; .5 G/20ML; G/20ML
3.38 INJECTION, POWDER, LYOPHILIZED, FOR SOLUTION INTRAVENOUS EVERY 8 HOURS
Refills: 0 | Status: DISCONTINUED | OUTPATIENT
Start: 2022-05-09 | End: 2022-05-13

## 2022-05-09 RX ORDER — OXYCODONE AND ACETAMINOPHEN 5; 325 MG/1; MG/1
1 TABLET ORAL EVERY 6 HOURS
Refills: 0 | Status: DISCONTINUED | OUTPATIENT
Start: 2022-05-09 | End: 2022-05-13

## 2022-05-09 RX ORDER — ASPIRIN/CALCIUM CARB/MAGNESIUM 324 MG
81 TABLET ORAL DAILY
Refills: 0 | Status: DISCONTINUED | OUTPATIENT
Start: 2022-05-09 | End: 2022-05-13

## 2022-05-09 RX ORDER — CARBIDOPA AND LEVODOPA 25; 100 MG/1; MG/1
1 TABLET ORAL
Qty: 0 | Refills: 0 | DISCHARGE

## 2022-05-09 RX ORDER — CARBIDOPA AND LEVODOPA 25; 100 MG/1; MG/1
1 TABLET ORAL THREE TIMES A DAY
Refills: 0 | Status: DISCONTINUED | OUTPATIENT
Start: 2022-05-09 | End: 2022-05-13

## 2022-05-09 RX ORDER — VANCOMYCIN HCL 1 G
1000 VIAL (EA) INTRAVENOUS ONCE
Refills: 0 | Status: COMPLETED | OUTPATIENT
Start: 2022-05-09 | End: 2022-05-09

## 2022-05-09 RX ADMIN — PIPERACILLIN AND TAZOBACTAM 200 GRAM(S): 4; .5 INJECTION, POWDER, LYOPHILIZED, FOR SOLUTION INTRAVENOUS at 14:10

## 2022-05-09 RX ADMIN — Medication 250 MILLIGRAM(S): at 16:42

## 2022-05-09 RX ADMIN — ENOXAPARIN SODIUM 40 MILLIGRAM(S): 100 INJECTION SUBCUTANEOUS at 21:39

## 2022-05-09 RX ADMIN — PIPERACILLIN AND TAZOBACTAM 25 GRAM(S): 4; .5 INJECTION, POWDER, LYOPHILIZED, FOR SOLUTION INTRAVENOUS at 21:43

## 2022-05-09 RX ADMIN — CARBIDOPA AND LEVODOPA 1 TABLET(S): 25; 100 TABLET ORAL at 21:39

## 2022-05-09 NOTE — H&P ADULT - ASSESSMENT
87 y/o M with hx of parkinsons, HTN, PVD, chronic wound LLE sent from wound care for evaluation of RLE cellulitis. Pt states that he has had itching with redness and swelling of his right lower leg for 3 weeks, noticed discharge from wound on right lower leg over this past weekend and worsening redness. Pt was seen in wound clinic today, seen by Dr. aEton for admission for cellulitis. Pt is currently not on any antibiotics. Daughter states that pt had chills and fever of 101 last night, last dose of tylenol was last night. Denies CP, SOB, trauma, numbness or other symptoms.     1 RLE cellulitis  - cw antibiotics  - ID and podiatry fu   - fu cultures  - will monitor     2 Parkinson's disease  - cw sinemet    3 HTN  - cw home meds    Lovenox for DVT prophylaxis   
no

## 2022-05-09 NOTE — PATIENT PROFILE ADULT - NSTRANSFERBELONGINGSDISPO_GEN_A_NUR

## 2022-05-09 NOTE — H&P ADULT - NSHPPHYSICALEXAM_GEN_ALL_CORE
General: WN/WD NAD  PERRLA  Neurology: A&Ox3, nonfocal, MAY x 4  Respiratory: CTA B/L  CV: RRR, S1S2, no murmurs, rubs or gallops  Abdominal: Soft, NT, ND +BS, Last BM  Extremities: RLE wound   Skin Normal

## 2022-05-09 NOTE — H&P ADULT - HISTORY OF PRESENT ILLNESS
87 y/o M with hx of parkinsons, HTN, PVD, chronic wound LLE sent from wound care for evaluation of RLE cellulitis. Pt states that he has had itching with redness and swelling of his right lower leg for 3 weeks, noticed discharge from wound on right lower leg over this past weekend and worsening redness. Pt was seen in wound clinic today, seen by Dr. Eaton for admission for cellulitis. Pt is currently not on any antibiotics. Daughter states that pt had chills and fever of 101 last night, last dose of tylenol was last night. Denies CP, SOB, trauma, numbness or other symptoms.

## 2022-05-09 NOTE — HISTORY OF PRESENT ILLNESS
[FreeTextEntry1] : Kelvin has known venous stasis disease. He underwent L GSV ablation last year. Unfortunately his wound has not healed. He is here for vascular eval.  [de-identified] : Kelvin is here to go over LEV. He was supposed to follow up at  for wound bx but he did not follow up. LLE is doing overall well. but unrelated to LLE he develooped severe RLE swelling and erythema of RLE few days ago. Denies fevers or chills.

## 2022-05-09 NOTE — PHYSICAL EXAM
[2+] : left 2+ [Ankle Swelling (On Exam)] : present [Ankle Swelling On The Right] : of the right ankle [Varicose Veins Of Lower Extremities] : bilaterally [Ankle Swelling On The Left] : moderate [] : bilaterally [Ankle Swelling Bilaterally] : severe [FreeTextEntry1] : palp pulses [de-identified] : LLE lateral full thickness wound is clean, no erythema, or odor, R shin is tense and erythematous, one leaking pore over anterior shin

## 2022-05-09 NOTE — H&P ADULT - NSHPLABSRESULTS_GEN_ALL_CORE
Lab Results:  CBC  CBC Full  -  ( 09 May 2022 13:23 )  WBC Count : 12.28 K/uL  RBC Count : 4.18 M/uL  Hemoglobin : 12.8 g/dL  Hematocrit : 39.7 %  Platelet Count - Automated : 152 K/uL  Mean Cell Volume : 95.0 fl  Mean Cell Hemoglobin : 30.6 pg  Mean Cell Hemoglobin Concentration : 32.2 gm/dL  Auto Neutrophil # : 9.68 K/uL  Auto Lymphocyte # : 1.08 K/uL  Auto Monocyte # : 1.34 K/uL  Auto Eosinophil # : 0.04 K/uL  Auto Basophil # : 0.05 K/uL  Auto Neutrophil % : 78.9 %  Auto Lymphocyte % : 8.8 %  Auto Monocyte % : 10.9 %  Auto Eosinophil % : 0.3 %  Auto Basophil % : 0.4 %    .		Differential:	[] Automated		[] Manual  Chemistry                        12.8   12.28 )-----------( 152      ( 09 May 2022 13:23 )             39.7     05-09    142  |  106  |  21  ----------------------------<  89  3.9   |  30  |  1.00    Ca    8.8      09 May 2022 13:23    TPro  6.4  /  Alb  3.2<L>  /  TBili  1.5<H>  /  DBili  x   /  AST  14<L>  /  ALT  15  /  AlkPhos  51  05-09    LIVER FUNCTIONS - ( 09 May 2022 13:23 )  Alb: 3.2 g/dL / Pro: 6.4 g/dL / ALK PHOS: 51 U/L / ALT: 15 U/L / AST: 14 U/L / GGT: x           PT/INR - ( 09 May 2022 13:23 )   PT: 13.4 sec;   INR: 1.14 ratio         PTT - ( 09 May 2022 13:23 )  PTT:37.7 sec          MICROBIOLOGY/CULTURES:      RADIOLOGY RESULTS: reviewed

## 2022-05-09 NOTE — PATIENT PROFILE ADULT - FALL HARM RISK - HARM RISK INTERVENTIONS

## 2022-05-09 NOTE — CONSULT NOTE ADULT - SUBJECTIVE AND OBJECTIVE BOX
[facility] Cardiology Consult - Araseli Lincoln Enrique [phone]    CHIEF COMPLAINT: Patient is a 86y old  Male who presents with a chief complaint of RLE wound (09 May 2022 16:16)      HPI:  85 y/o M with hx of parkinsons, HTN, PVD, chronic wound LLE sent from wound care for evaluation of RLE cellulitis. Pt states that he has had itching with redness and swelling of his right lower leg for 3 weeks, noticed discharge from wound on right lower leg over this past weekend and worsening redness. Pt was seen in wound clinic today, seen by Dr. Eaton for admission for cellulitis. Pt is currently not on any antibiotics. Daughter states that pt had chills and fever of 101 last night, last dose of tylenol was last night. Denies CP, SOB, trauma, numbness or other symptoms.  (09 May 2022 16:16)      PAST MEDICAL & SURGICAL HISTORY:  Parkinsons    HTN (hypertension)    No significant past surgical history        SOCIAL HISTORY: Alochol: Denied  Smoking: Nonsmoker  Drug Use: Denied  Marital Status:         FAMILY HISTORY: FAMILY HISTORY:  No pertinent family history in first degree relatives        MEDICATIONS  (STANDING):  aspirin  chewable 81 milliGRAM(s) Oral daily  carbidopa/levodopa  25/100 1 Tablet(s) Oral three times a day  enoxaparin Injectable 40 milliGRAM(s) SubCutaneous every 24 hours  piperacillin/tazobactam IVPB.. 3.375 Gram(s) IV Intermittent every 8 hours  vancomycin  IVPB 750 milliGRAM(s) IV Intermittent every 12 hours    MEDICATIONS  (PRN):  acetaminophen     Tablet .. 650 milliGRAM(s) Oral every 6 hours PRN Temp greater or equal to 38C (100.4F), Mild Pain (1 - 3)  oxycodone    5 mG/acetaminophen 325 mG 1 Tablet(s) Oral every 6 hours PRN Moderate Pain (4 - 6)      Allergies    No Known Allergies    Intolerances        REVIEW OF SYSTEMS:  CONSTITUTIONAL: No weakness, fevers or chills  EYES/ENT: No visual changes;  No vertigo or throat pain   NECK: No pain or stiffness  RESPIRATORY: No cough, wheezing, hemoptysis; No shortness of breath  CARDIOVASCULAR: No chest pain or palpitations  GASTROINTESTINAL: No abdominal pain. No nausea, vomiting, or hematemesis; No diarrhea or constipation. No melena or hematochezia.  GENITOURINARY: No dysuria, frequency or hematuria  NEUROLOGICAL: No numbness or weakness  SKIN: No itching or rash  All other review of systems is negative unless indicated above    VITAL SIGNS:   Vital Signs Last 24 Hrs  T(C): 37.2 (09 May 2022 17:01), Max: 37.6 (09 May 2022 11:37)  T(F): 98.9 (09 May 2022 17:01), Max: 99.6 (09 May 2022 11:37)  HR: 65 (09 May 2022 17:01) (65 - 67)  BP: 116/70 (09 May 2022 17:01) (116/70 - 121/57)  BP(mean): --  RR: 17 (09 May 2022 17:01) (17 - 17)  SpO2: 98% (09 May 2022 17:01) (98% - 98%)    I&O's Summary      PHYSICAL EXAM:  Constitutional: Awake in NAD  HEENT:  MARY, EOMI  Neck: No JVD  Pulmonary: CTA B/L No R/R/W  Cardiovascular: PMI not palpable non-displaced Regular S1 and S2, no murmurs  Gastrointestinal: Bowel Sounds present, soft, nontender.   Extremities: Trace peripheral edema.   Neuro: No gross focal deficits    LABS: All Labs Reviewed:                        12.8   12.28 )-----------( 152      ( 09 May 2022 13:23 )             39.7     09 May 2022 13:23    142    |  106    |  21     ----------------------------<  89     3.9     |  30     |  1.00     Ca    8.8        09 May 2022 13:23    TPro  6.4    /  Alb  3.2    /  TBili  1.5    /  DBili  x      /  AST  14     /  ALT  15     /  AlkPhos  51     09 May 2022 13:23    PT/INR - ( 09 May 2022 13:23 )   PT: 13.4 sec;   INR: 1.14 ratio         PTT - ( 09 May 2022 13:23 )  PTT:37.7 sec      Blood Culture:         RADIOLOGY/EKG:     [facility] Cardiology Consult - Araseli Lincoln Enrique [phone]    CHIEF COMPLAINT: Patient is a 86y old  Male who presents with a chief complaint of RLE wound (09 May 2022 16:16)      HPI:  85 y/o M with hx of parkinsons, HTN, PVD, chronic wound LLE sent from wound care for evaluation of RLE cellulitis. Pt states that he has had itching with redness and swelling of his right lower leg for 3 weeks, noticed discharge from wound on right lower leg over this past weekend and worsening redness. Pt was seen in wound clinic today, seen by Dr. Eaton for admission for cellulitis. Pt is currently not on any antibiotics. Daughter states that pt had chills and fever of 101 last night, last dose of tylenol was last night. Denies CP, SOB, trauma, numbness or other symptoms.  (09 May 2022 16:16).  The daughter states that he has a hx of mitral prolapse.  The patient also admits to noncompliance on lasix in the past.      PAST MEDICAL & SURGICAL HISTORY:  Parkinsons    HTN (hypertension)    No significant past surgical history        SOCIAL HISTORY: Alochol: Denied  Smoking: Nonsmoker  Drug Use: Denied  Marital Status:         FAMILY HISTORY: FAMILY HISTORY:  No pertinent family history in first degree relatives        MEDICATIONS  (STANDING):  aspirin  chewable 81 milliGRAM(s) Oral daily  carbidopa/levodopa  25/100 1 Tablet(s) Oral three times a day  enoxaparin Injectable 40 milliGRAM(s) SubCutaneous every 24 hours  piperacillin/tazobactam IVPB.. 3.375 Gram(s) IV Intermittent every 8 hours  vancomycin  IVPB 750 milliGRAM(s) IV Intermittent every 12 hours    MEDICATIONS  (PRN):  acetaminophen     Tablet .. 650 milliGRAM(s) Oral every 6 hours PRN Temp greater or equal to 38C (100.4F), Mild Pain (1 - 3)  oxycodone    5 mG/acetaminophen 325 mG 1 Tablet(s) Oral every 6 hours PRN Moderate Pain (4 - 6)      Allergies    No Known Allergies    Intolerances        REVIEW OF SYSTEMS:  CONSTITUTIONAL: No weakness, fevers or chills  EYES/ENT: No visual changes;  No vertigo or throat pain   NECK: No pain or stiffness  RESPIRATORY: No cough, wheezing, hemoptysis; No shortness of breath  CARDIOVASCULAR: No chest pain or palpitations  GASTROINTESTINAL: No abdominal pain. No nausea, vomiting, or hematemesis; No diarrhea or constipation. No melena or hematochezia.  GENITOURINARY: No dysuria, frequency or hematuria  NEUROLOGICAL: No numbness or weakness  SKIN: No itching or rash  All other review of systems is negative unless indicated above    VITAL SIGNS:   Vital Signs Last 24 Hrs  T(C): 37.2 (09 May 2022 17:01), Max: 37.6 (09 May 2022 11:37)  T(F): 98.9 (09 May 2022 17:01), Max: 99.6 (09 May 2022 11:37)  HR: 65 (09 May 2022 17:01) (65 - 67)  BP: 116/70 (09 May 2022 17:01) (116/70 - 121/57)  BP(mean): --  RR: 17 (09 May 2022 17:01) (17 - 17)  SpO2: 98% (09 May 2022 17:01) (98% - 98%)    I&O's Summary      PHYSICAL EXAM:  Constitutional: Awake in NAD  HEENT:  MARY, EOMI  Neck: No JVD  Pulmonary: CTA B/L No R/R/W  Cardiovascular: PMI not palpable non-displaced Regular S1 and S2, no murmurs  Gastrointestinal: Bowel Sounds present, soft, nontender.   Extremities: 2-3+ peripheral edema.   Neuro: No gross focal deficits    LABS: All Labs Reviewed:                        12.8   12.28 )-----------( 152      ( 09 May 2022 13:23 )             39.7     09 May 2022 13:23    142    |  106    |  21     ----------------------------<  89     3.9     |  30     |  1.00     Ca    8.8        09 May 2022 13:23    TPro  6.4    /  Alb  3.2    /  TBili  1.5    /  DBili  x      /  AST  14     /  ALT  15     /  AlkPhos  51     09 May 2022 13:23    PT/INR - ( 09 May 2022 13:23 )   PT: 13.4 sec;   INR: 1.14 ratio         PTT - ( 09 May 2022 13:23 )  PTT:37.7 sec      Blood Culture:         RADIOLOGY/EKG:    < from: US Duplex Venous Lower Ext Ltd, Right (05.09.22 @ 13:58) >    IMPRESSION:  No evidence of right lower extremity deep venous thrombosis.    < end of copied text >  < from: Xray Chest 1 View AP/PA (05.09.22 @ 15:27) >    IMPRESSION: No acute chest finding. Lower right leg and ankle edema.      < end of copied text >  < from: TTE Echo Complete w/o Contrast w/ Doppler (12.13.21 @ 08:00) >  Impression:  1. This a very technically limited study.  2. There is normal left ventricular size and left ventricular systolic   function with an ejection fraction of 60-65%.  3. There are no gross wall motion abnormalities.  4. There is diastolic dysfunction.  5. There is normal left ventricular wall thickness.  6. There is left atrial enlargement noted.  7. There is a trileaflet aortic valve with trivial aortic valve   insufficiency.  8. There is a thickened posterior mitral valve leaflet noted with mild to   moderate mitral regurgitation. There are limited views of this mitral   valve.  9. Thereis mild tricuspid regurgitation with a normal right ventricular   systolic pressure.  10. There is normal right atrial and right ventricular size and systolic   function.  11. There is no significant pericardial effusion.  12. Endocarditis is clinically suspected consider PACO or alternative   study.    < end of copied text >

## 2022-05-09 NOTE — ED PROVIDER NOTE - WET READ LAUNCH FT
Sunscreen Recommendations: Discussed improvement of continual sun protection with zinc based sunscreen and photo protective clothing.\\nRecommended daily 30-50 SPF Detail Level: Generalized Bleaching Agents Recommendations: Discussed that bleaching creams may be helpful but discoloration will recur with sun exposure Laser Recommendations: Discussed laser treatments can be helpful to lighten. Treatment is considered cosmetic and not covered by insurance Skin Checks Recommendations: Photo protective clothing, sunscreens containing zinc & titanium minimum of 30spf Detail Level: Zone There are no Wet Read(s) to document. There are 2 Wet Read(s) to document.

## 2022-05-09 NOTE — ED ADULT NURSE NOTE - NSIMPLEMENTINTERV_GEN_ALL_ED
Implemented All Fall Risk Interventions:  [place] to call system. Call bell, personal items and telephone within reach. Instruct patient to call for assistance. Room bathroom lighting operational. Non-slip footwear when patient is off stretcher. Physically safe environment: no spills, clutter or unnecessary equipment. Stretcher in lowest position, wheels locked, appropriate side rails in place. Provide visual cue, wrist band, yellow gown, etc. Monitor gait and stability. Monitor for mental status changes and reorient to person, place, and time. Review medications for side effects contributing to fall risk. Reinforce activity limits and safety measures with patient and family.

## 2022-05-09 NOTE — ED PROVIDER NOTE - NS ED ATTENDING STATEMENT MOD
This was a shared visit with the HU[facility]. I reviewed and verified the documentation and independently performed the documented:

## 2022-05-09 NOTE — ED ADULT TRIAGE NOTE - CHIEF COMPLAINT QUOTE
sent from wound clinical for RLE cellulitis. Temp 99.6 orally in triage. Patient c/o pain to leg with movement.

## 2022-05-09 NOTE — ED ADULT NURSE NOTE - OBJECTIVE STATEMENT
patient comes to ED for evaluation and admission for cellulitis right lower leg which has been red ans swollen for several weeks per daughter who also states pt had temp >101 last night was given tylenol with relief pt currently afebrile

## 2022-05-09 NOTE — ED PROVIDER NOTE - OBJECTIVE STATEMENT
87 y/o M with hx of parkinsons, chronic wound LLE sent from wound care for evaluation of RLE cellulitis. 85 y/o M with hx of parkinsons, HTN, PVD, chronic wound LLE sent from wound care for evaluation of RLE cellulitis. Pt states that he has had itching with redness and swelling of his right lower leg for 3 weeks, noticed discharge from wound on right lower leg over this past weekend and worsening redness. Pt was seen in wound clinic today, seen by Dr. Eaton for admission for cellulitis. Pt is currently not on any antibiotics. Daughter states that pt had chills and fever of 101 last night, last dose of tylenol was last night. Denies CP, SOB, trauma, numbness or other symptoms.

## 2022-05-09 NOTE — ED PROVIDER NOTE - MUSCULOSKELETAL, MLM
R leg: ++swelling to B/L lower legs with erythema and increased warmth noted to RLE extending from ankle midway to leg, no lymphangitis

## 2022-05-09 NOTE — ASSESSMENT
[FreeTextEntry1] : 87 yo M with hx of venous disease. Hx of L GSV ablation. Still open wound. No PVD. \par Venous US does not show LLE insuf. Shows L GSV closure. He is now complaining of severe RLE cellulitis. unrelated to LLE wound. No evidence of  RGSV insuf at thigh.

## 2022-05-09 NOTE — ED PROVIDER NOTE - ATTENDING APP SHARED VISIT CONTRIBUTION OF CARE
85 yo male parkionsons htn and pvd with chronic left leg wounds follows with wound care sent by wound care dr Ramos this am to er for admission for RLE cellulitis, fever last zoran and chills  no lymphangitis but there is pain to the area  pts sx began with an itch  on eval  wd wn male chronically ill with pill rolling tremor not toxic  heent nc at mmm perrla eomi  cor comfort chest cta   abd soft nt nd no hsm there is umbilical hernia present  ext pt has marked swelling to bl le with calor rubror dolor to rle from ankle midway toleg  no calf pain exam cw ccellulitis no lymphyagitis  plan treat pain infection wound care consult admit for the vasculitis pat with cellulitis vascualr medicine abx labs xray ekg

## 2022-05-10 LAB
ALBUMIN SERPL ELPH-MCNC: 3.1 G/DL — LOW (ref 3.3–5)
ALP SERPL-CCNC: 52 U/L — SIGNIFICANT CHANGE UP (ref 40–120)
ALT FLD-CCNC: <6 U/L — LOW (ref 12–78)
ANION GAP SERPL CALC-SCNC: 5 MMOL/L — SIGNIFICANT CHANGE UP (ref 5–17)
AST SERPL-CCNC: 16 U/L — SIGNIFICANT CHANGE UP (ref 15–37)
BILIRUB SERPL-MCNC: 1 MG/DL — SIGNIFICANT CHANGE UP (ref 0.2–1.2)
BUN SERPL-MCNC: 19 MG/DL — SIGNIFICANT CHANGE UP (ref 7–23)
CALCIUM SERPL-MCNC: 8.6 MG/DL — SIGNIFICANT CHANGE UP (ref 8.5–10.1)
CHLORIDE SERPL-SCNC: 106 MMOL/L — SIGNIFICANT CHANGE UP (ref 96–108)
CO2 SERPL-SCNC: 30 MMOL/L — SIGNIFICANT CHANGE UP (ref 22–31)
CREAT SERPL-MCNC: 1 MG/DL — SIGNIFICANT CHANGE UP (ref 0.5–1.3)
CULTURE RESULTS: NO GROWTH — SIGNIFICANT CHANGE UP
EGFR: 74 ML/MIN/1.73M2 — SIGNIFICANT CHANGE UP
GLUCOSE SERPL-MCNC: 97 MG/DL — SIGNIFICANT CHANGE UP (ref 70–99)
HCT VFR BLD CALC: 40 % — SIGNIFICANT CHANGE UP (ref 39–50)
HGB BLD-MCNC: 13.2 G/DL — SIGNIFICANT CHANGE UP (ref 13–17)
MCHC RBC-ENTMCNC: 31.3 PG — SIGNIFICANT CHANGE UP (ref 27–34)
MCHC RBC-ENTMCNC: 33 GM/DL — SIGNIFICANT CHANGE UP (ref 32–36)
MCV RBC AUTO: 94.8 FL — SIGNIFICANT CHANGE UP (ref 80–100)
NRBC # BLD: 0 /100 WBCS — SIGNIFICANT CHANGE UP (ref 0–0)
PLATELET # BLD AUTO: 154 K/UL — SIGNIFICANT CHANGE UP (ref 150–400)
POTASSIUM SERPL-MCNC: 3.8 MMOL/L — SIGNIFICANT CHANGE UP (ref 3.5–5.3)
POTASSIUM SERPL-SCNC: 3.8 MMOL/L — SIGNIFICANT CHANGE UP (ref 3.5–5.3)
PROT SERPL-MCNC: 6.6 G/DL — SIGNIFICANT CHANGE UP (ref 6–8.3)
RBC # BLD: 4.22 M/UL — SIGNIFICANT CHANGE UP (ref 4.2–5.8)
RBC # FLD: 13.3 % — SIGNIFICANT CHANGE UP (ref 10.3–14.5)
SODIUM SERPL-SCNC: 141 MMOL/L — SIGNIFICANT CHANGE UP (ref 135–145)
SPECIMEN SOURCE: SIGNIFICANT CHANGE UP
WBC # BLD: 9.91 K/UL — SIGNIFICANT CHANGE UP (ref 3.8–10.5)
WBC # FLD AUTO: 9.91 K/UL — SIGNIFICANT CHANGE UP (ref 3.8–10.5)

## 2022-05-10 PROCEDURE — 99232 SBSQ HOSP IP/OBS MODERATE 35: CPT

## 2022-05-10 RX ORDER — FUROSEMIDE 40 MG
40 TABLET ORAL
Refills: 0 | Status: DISCONTINUED | OUTPATIENT
Start: 2022-05-10 | End: 2022-05-11

## 2022-05-10 RX ORDER — SOD,AMMONIUM,POTASSIUM LACTATE
1 CREAM (GRAM) TOPICAL EVERY 12 HOURS
Refills: 0 | Status: DISCONTINUED | OUTPATIENT
Start: 2022-05-10 | End: 2022-05-13

## 2022-05-10 RX ADMIN — CARBIDOPA AND LEVODOPA 1 TABLET(S): 25; 100 TABLET ORAL at 05:04

## 2022-05-10 RX ADMIN — Medication 40 MILLIGRAM(S): at 07:13

## 2022-05-10 RX ADMIN — PIPERACILLIN AND TAZOBACTAM 25 GRAM(S): 4; .5 INJECTION, POWDER, LYOPHILIZED, FOR SOLUTION INTRAVENOUS at 13:15

## 2022-05-10 RX ADMIN — CARBIDOPA AND LEVODOPA 1 TABLET(S): 25; 100 TABLET ORAL at 21:34

## 2022-05-10 RX ADMIN — Medication 40 MILLIGRAM(S): at 18:25

## 2022-05-10 RX ADMIN — PIPERACILLIN AND TAZOBACTAM 25 GRAM(S): 4; .5 INJECTION, POWDER, LYOPHILIZED, FOR SOLUTION INTRAVENOUS at 21:34

## 2022-05-10 RX ADMIN — Medication 1 APPLICATION(S): at 18:25

## 2022-05-10 RX ADMIN — Medication 81 MILLIGRAM(S): at 13:15

## 2022-05-10 RX ADMIN — Medication 250 MILLIGRAM(S): at 04:42

## 2022-05-10 RX ADMIN — ENOXAPARIN SODIUM 40 MILLIGRAM(S): 100 INJECTION SUBCUTANEOUS at 21:34

## 2022-05-10 RX ADMIN — PIPERACILLIN AND TAZOBACTAM 25 GRAM(S): 4; .5 INJECTION, POWDER, LYOPHILIZED, FOR SOLUTION INTRAVENOUS at 05:04

## 2022-05-10 RX ADMIN — CARBIDOPA AND LEVODOPA 1 TABLET(S): 25; 100 TABLET ORAL at 13:15

## 2022-05-10 NOTE — PROGRESS NOTE ADULT - SUBJECTIVE AND OBJECTIVE BOX
Patient is a 86y old  Male who presents with a chief complaint of RLE wound (10 May 2022 12:10)    Date of servie : 05-10-22 @ 13:47  INTERVAL HPI/OVERNIGHT EVENTS:  T(C): 37 (05-10-22 @ 12:24), Max: 37.2 (22 @ 17:01)  HR: 63 (05-10-22 @ 12:24) (63 - 70)  BP: 134/79 (05-10-22 @ 12:24) (116/70 - 155/79)  RR: 17 (05-10-22 @ 12:24) (17 - 18)  SpO2: 95% (05-10-22 @ 12:24) (93% - 98%)  Wt(kg): --  I&O's Summary    10 May 2022 07:01  -  10 May 2022 13:47  --------------------------------------------------------  IN: 0 mL / OUT: 1630 mL / NET: -1630 mL        LABS:                        13.2   9.91  )-----------( 154      ( 10 May 2022 08:11 )             40.0     05-10    141  |  106  |  19  ----------------------------<  97  3.8   |  30  |  1.00    Ca    8.6      10 May 2022 08:11    TPro  6.6  /  Alb  3.1<L>  /  TBili  1.0  /  DBili  x   /  AST  16  /  ALT  <6<L>  /  AlkPhos  52  05-10    PT/INR - ( 09 May 2022 13:23 )   PT: 13.4 sec;   INR: 1.14 ratio         PTT - ( 09 May 2022 13:23 )  PTT:37.7 sec  Urinalysis Basic - ( 09 May 2022 19:51 )    Color: Yellow / Appearance: Clear / S.005 / pH: x  Gluc: x / Ketone: Trace  / Bili: Negative / Urobili: Negative   Blood: x / Protein: 15 / Nitrite: Negative   Leuk Esterase: Moderate / RBC: 3-5 /HPF / WBC 26-50   Sq Epi: x / Non Sq Epi: Few / Bacteria: Moderate      CAPILLARY BLOOD GLUCOSE            Urinalysis Basic - ( 09 May 2022 19:51 )    Color: Yellow / Appearance: Clear / S.005 / pH: x  Gluc: x / Ketone: Trace  / Bili: Negative / Urobili: Negative   Blood: x / Protein: 15 / Nitrite: Negative   Leuk Esterase: Moderate / RBC: 3-5 /HPF / WBC 26-50   Sq Epi: x / Non Sq Epi: Few / Bacteria: Moderate        MEDICATIONS  (STANDING):  aspirin  chewable 81 milliGRAM(s) Oral daily  carbidopa/levodopa  25/100 1 Tablet(s) Oral three times a day  enoxaparin Injectable 40 milliGRAM(s) SubCutaneous every 24 hours  furosemide   Injectable 40 milliGRAM(s) IV Push two times a day  piperacillin/tazobactam IVPB.. 3.375 Gram(s) IV Intermittent every 8 hours    MEDICATIONS  (PRN):  acetaminophen     Tablet .. 650 milliGRAM(s) Oral every 6 hours PRN Temp greater or equal to 38C (100.4F), Mild Pain (1 - 3)  oxycodone    5 mG/acetaminophen 325 mG 1 Tablet(s) Oral every 6 hours PRN Moderate Pain (4 - 6)          PHYSICAL EXAM:  GENERAL: NAD, well-groomed, well-developed  HEAD:  Atraumatic, Normocephalic  CHEST/LUNG: Clear to percussion bilaterally; No rales, rhonchi, wheezing, or rubs  HEART: Regular rate and rhythm; No murmurs, rubs, or gallops  ABDOMEN: Soft, Nontender, Nondistended; Bowel sounds present  EXTREMITIES:  2+ Peripheral Pulses, No clubbing, cyanosis, or edema  LYMPH: No lymphadenopathy noted  SKIN: No rashes or lesions    Care Discussed with Consultants/Other Providers [ ] YES  [ ] NO

## 2022-05-10 NOTE — CONSULT NOTE ADULT - ASSESSMENT
patient presents with:   1.  Left lower extremity lateral necrotic ulceration 2 x 1.5 dry, stable no warmth, no erythema leg is with hyperkeratotic skin.   Recommendation:   -cleanse and apply lac-hydrin BID  2. Right lower extremity edema, cellulitis, erythema, weeping  Recommendation:   -cleanse and apply lac-hydrin BID  -DSD for weeping edema  Patient is on a low air loss mattress  At risk for altered tissue perfusion /YES  Impaired perfusion of peripheral tissue /YES  Continue  Nutrition (as tolerated)  Continue  Offloading   Continue Pericare  Apply cair boots at all times while in bed.   Provide skin checks and foot placement q8h.  Care as per medicine will follow w/ you  Follow up as outpatient at Wound Center   Findings and recommendations discussed with RUFUS Simon  Thank you for this consult  Maile Morgan NP, [facility] [phone]

## 2022-05-10 NOTE — PROGRESS NOTE ADULT - SUBJECTIVE AND OBJECTIVE BOX
[facility] Cardiology Progress Note [phone] (Dr. Marx, Iva, Lincoln, Kendrick)    CHIEF COMPLAINT: Patient is a 86y old  Male who presents with a chief complaint of RLE wound (09 May 2022 19:00)      Follow Up Today: The patient denies any chest discomfort or shortness of breath.    HPI:  85 y/o M with hx of parkinsons, HTN, PVD, chronic wound LLE sent from wound care for evaluation of RLE cellulitis. Pt states that he has had itching with redness and swelling of his right lower leg for 3 weeks, noticed discharge from wound on right lower leg over this past weekend and worsening redness. Pt was seen in wound clinic today, seen by Dr. Eaton for admission for cellulitis. Pt is currently not on any antibiotics. Daughter states that pt had chills and fever of 101 last night, last dose of tylenol was last night. Denies CP, SOB, trauma, numbness or other symptoms.  (09 May 2022 16:16)      PAST MEDICAL & SURGICAL HISTORY:  Parkinsons    HTN (hypertension)    No significant past surgical history        MEDICATIONS  (STANDING):  aspirin  chewable 81 milliGRAM(s) Oral daily  carbidopa/levodopa  25/100 1 Tablet(s) Oral three times a day  enoxaparin Injectable 40 milliGRAM(s) SubCutaneous every 24 hours  piperacillin/tazobactam IVPB.. 3.375 Gram(s) IV Intermittent every 8 hours  vancomycin  IVPB 750 milliGRAM(s) IV Intermittent every 12 hours    MEDICATIONS  (PRN):  acetaminophen     Tablet .. 650 milliGRAM(s) Oral every 6 hours PRN Temp greater or equal to 38C (100.4F), Mild Pain (1 - 3)  oxycodone    5 mG/acetaminophen 325 mG 1 Tablet(s) Oral every 6 hours PRN Moderate Pain (4 - 6)      Allergies    No Known Allergies    Intolerances        REVIEW OF SYSTEMS:    All other review of systems is negative unless indicated above    Vital Signs Last 24 Hrs  T(C): 36.8 (10 May 2022 05:00), Max: 37.6 (09 May 2022 11:37)  T(F): 98.3 (10 May 2022 05:00), Max: 99.6 (09 May 2022 11:37)  HR: 70 (10 May 2022 05:00) (65 - 70)  BP: 155/79 (10 May 2022 05:00) (116/70 - 155/79)  BP(mean): --  RR: 18 (10 May 2022 05:00) (17 - 18)  SpO2: 93% (10 May 2022 05:00) (93% - 98%)    I&O's Summary      PHYSICAL EXAM:    Constitutional: NAD, awake and alert, well-developed  Eyes:  EOMI,  Pupils round, No oral cyanosis.  HEENT: No exudate or erythema  Pulmonary: Non-labored, breath sounds are clear bilaterally, No wheezing, rales or rhonchi  Cardiovascular: Regular, S1 and S2, No murmurs, rubs, gallops oir clicks  Gastrointestinal: Bowel Sounds present, soft, nontender.   Ext: No significant LE edema  Neurological: Alert, no gross focal motor deficits  Skin: No rashes.  Psych:  Mood & affect appropriate    LABS: All Labs Reviewed:                        12.8   12.28 )-----------( 152      ( 09 May 2022 13:23 )             39.7     09 May 2022 13:23    142    |  106    |  21     ----------------------------<  89     3.9     |  30     |  1.00     Ca    8.8        09 May 2022 13:23    TPro  6.4    /  Alb  3.2    /  TBili  1.5    /  DBili  x      /  AST  14     /  ALT  15     /  AlkPhos  51     09 May 2022 13:23    PT/INR - ( 09 May 2022 13:23 )   PT: 13.4 sec;   INR: 1.14 ratio         PTT - ( 09 May 2022 13:23 )  PTT:37.7 sec      Blood Culture:         RADIOLOGY/EKG:    Attending Attestation:   20 minutes spent on total encounter; more than 50% of the visit was spent counseling and/or coordinating care by the attending physician.     ASSESSMENT AND PLAN [facility] Cardiology Progress Note [phone] (Dr. Marx, Iva, Lincoln, Kendrick)    CHIEF COMPLAINT: Patient is a 86y old  Male who presents with a chief complaint of RLE wound (09 May 2022 19:00)      Follow Up Today: The patient denies any chest discomfort or shortness of breath. Patient only complains about his knees    HPI:  87 y/o M with hx of parkinsons, HTN, PVD, chronic wound LLE sent from wound care for evaluation of RLE cellulitis. Pt states that he has had itching with redness and swelling of his right lower leg for 3 weeks, noticed discharge from wound on right lower leg over this past weekend and worsening redness. Pt was seen in wound clinic today, seen by Dr. Eaton for admission for cellulitis. Pt is currently not on any antibiotics. Daughter states that pt had chills and fever of 101 last night, last dose of tylenol was last night. Denies CP, SOB, trauma, numbness or other symptoms.  (09 May 2022 16:16)      PAST MEDICAL & SURGICAL HISTORY:  Parkinsons    HTN (hypertension)    No significant past surgical history        MEDICATIONS  (STANDING):  aspirin  chewable 81 milliGRAM(s) Oral daily  carbidopa/levodopa  25/100 1 Tablet(s) Oral three times a day  enoxaparin Injectable 40 milliGRAM(s) SubCutaneous every 24 hours  piperacillin/tazobactam IVPB.. 3.375 Gram(s) IV Intermittent every 8 hours  vancomycin  IVPB 750 milliGRAM(s) IV Intermittent every 12 hours    MEDICATIONS  (PRN):  acetaminophen     Tablet .. 650 milliGRAM(s) Oral every 6 hours PRN Temp greater or equal to 38C (100.4F), Mild Pain (1 - 3)  oxycodone    5 mG/acetaminophen 325 mG 1 Tablet(s) Oral every 6 hours PRN Moderate Pain (4 - 6)      Allergies    No Known Allergies    Intolerances        REVIEW OF SYSTEMS:    All other review of systems is negative unless indicated above    Vital Signs Last 24 Hrs  T(C): 36.8 (10 May 2022 05:00), Max: 37.6 (09 May 2022 11:37)  T(F): 98.3 (10 May 2022 05:00), Max: 99.6 (09 May 2022 11:37)  HR: 70 (10 May 2022 05:00) (65 - 70)  BP: 155/79 (10 May 2022 05:00) (116/70 - 155/79)  BP(mean): --  RR: 18 (10 May 2022 05:00) (17 - 18)  SpO2: 93% (10 May 2022 05:00) (93% - 98%)    I&O's Summary      PHYSICAL EXAM:    Constitutional: NAD, awake and alert, well-developed  Eyes:  EOMI,  Pupils round, No oral cyanosis.  HEENT: No exudate or erythema  Pulmonary: Non-labored, breath sounds are clear bilaterally, No wheezing, rales or rhonchi  Cardiovascular: Regular, S1 and S2, sys murmur  Gastrointestinal: Bowel Sounds present, soft, nontender.   Ext: 2+ LE edema improving compared with yesterday  Neurological: Alert, no gross focal motor deficits  Skin: No rashes.  Psych:  Mood & affect appropriate    LABS: All Labs Reviewed:                        12.8   12.28 )-----------( 152      ( 09 May 2022 13:23 )             39.7     09 May 2022 13:23    142    |  106    |  21     ----------------------------<  89     3.9     |  30     |  1.00     Ca    8.8        09 May 2022 13:23    TPro  6.4    /  Alb  3.2    /  TBili  1.5    /  DBili  x      /  AST  14     /  ALT  15     /  AlkPhos  51     09 May 2022 13:23    PT/INR - ( 09 May 2022 13:23 )   PT: 13.4 sec;   INR: 1.14 ratio         PTT - ( 09 May 2022 13:23 )  PTT:37.7 sec      Blood Culture:         RADIOLOGY/EKG:    Attending Attestation:   20 minutes spent on total encounter; more than 50% of the visit was spent counseling and/or coordinating care by the attending physician.     ASSESSMENT AND PLAN

## 2022-05-10 NOTE — PROGRESS NOTE ADULT - ASSESSMENT
86M with HTN, PVD, Parkinson's disease presents from wound care with worsening RIGHT LE swelling and erythema consistent with cellulitis.  Patient with bilateral LE swelling likely consistent with acute diastolic HF    Suggest:  1. IV lasix 40mg twice daily  2. Repeat echocardiogram  3. Continue with daily oral diuretics after IV diuresis.  4. Will follow.   86M with HTN, PVD, Parkinson's disease presents from wound care with worsening RIGHT LE swelling and erythema consistent with cellulitis.  Patient with bilateral LE swelling likely consistent with acute diastolic HF    Suggest:  1. Continue with IV lasix 40mg twice daily x 24 hours further, then change to PO  2. Repeat echocardiogram  3. Continue with daily oral diuretics after IV diuresis.  4. Will follow.

## 2022-05-10 NOTE — CONSULT NOTE ADULT - ASSESSMENT
87 y/o M with hx of parkinsons, HTN, PVD, chronic wound LLE sent from wound care for evaluation of RLE cellulitis. Pt states that he has had itching with redness and swelling of his right lower leg for 3 weeks, noticed discharge from wound on right lower leg over this past weekend and worsening redness. Pt was seen in wound clinic today, seen by Dr. Eaton for admission for cellulitis. Pt is currently not on any antibiotics. Daughter states that pt had chills and fever of 101 last night, last dose of tylenol was last night. Denies CP, SOB, trauma, numbness or other symptoms.     RLE cellulitis  -infectious w/u pending  --BCx sent    --previous WCx w/ MSSA and pseudomonas  S/p vancomycin/Zosyn  Would c/w zosyn at this time  Infectious Diseases will continue to follow. Please call with any questions.   Erin Lynn M.D.  [facility], Division of Infectious Diseases [phone]     87 y/o M with hx of parkinsons, HTN, PVD, chronic wound LLE sent from wound care for evaluation of RLE cellulitis. Pt states that he has had itching with redness and swelling of his right lower leg for 3 weeks, noticed discharge from wound on right lower leg over this past weekend and worsening redness. Pt was seen in wound clinic today, seen by Dr. Eaton for admission for cellulitis. Pt is currently not on any antibiotics. Daughter states that pt had chills and fever of 101 last night, last dose of tylenol was last night. Denies CP, SOB, trauma, numbness or other symptoms.     RLE cellulitis  LE edema  Leukocytosis  -infectious w/u pending  --BCx sent  --UCx pending  --previous 3/22 WCx w/ MSSA and pseudomonas  -imaging reviewed  --DVT study negative  --XR w/ edema, no OM  S/p vancomycin/Zosyn  Plan:  Previous cx w/ MSSA, would hold additional vancomycin  Would c/w zosyn at this time  Leg elevation and supportive care  Appreciate podiatry recs    Infectious Diseases will continue to follow. Please call with any questions.   Erin Lynn M.D.  [facility], Division of Infectious Diseases [phone]     85 y/o M with hx of parkinsons, HTN, PVD, chronic wound LLE sent from wound care for evaluation of RLE cellulitis. Pt states that he has had itching with redness and swelling of his right lower leg for 3 weeks, noticed discharge from wound on right lower leg over this past weekend and worsening redness. Pt was seen in wound clinic today, seen by Dr. Eaton for admission for cellulitis. Pt is currently not on any antibiotics. Daughter states that pt had chills and fever of 101 last night, last dose of tylenol was last night. Denies CP, SOB, trauma, numbness or other symptoms.     RLE cellulitis  LE edema  Leukocytosis  -infectious w/u pending  --BCx sent  --UCx pending  --previous 3/22 WCx w/ MSSA and pseudomonas  -imaging reviewed  --DVT study negative  --XR w/ edema, no OM  S/p vancomycin/Zosyn  Plan:  Previous cx w/ MSSA, would hold additional vancomycin  Would c/w zosyn at this time  Leg elevation and supportive care  Appreciate wound care recs    Infectious Diseases will continue to follow. Please call with any questions.   Erin Lynn M.D.  [facility], Division of Infectious Diseases [phone]

## 2022-05-10 NOTE — CONSULT NOTE ADULT - SUBJECTIVE AND OBJECTIVE BOX
[facility], Division of Infectious Diseases  RASHID Rosas S. Shah, Y. Patel, G. [facility]  [phone]    DENG SIERRA  86y, Male  [phone]    HPI--  HPI:  85 y/o M with hx of parkinsons, HTN, PVD, chronic wound LLE sent from wound care for evaluation of RLE cellulitis. Pt states that he has had itching with redness and swelling of his right lower leg for 3 weeks, noticed discharge from wound on right lower leg over this past weekend and worsening redness. Pt was seen in wound clinic today, seen by Dr. Eaton for admission for cellulitis. Pt is currently not on any antibiotics. Daughter states that pt had chills and fever of 101 last night, last dose of tylenol was last night. Denies CP, SOB, trauma, numbness or other symptoms.  (09 May 2022 16:16)    Pt seen and examined at bedside  Hx as above    Active Medications--  acetaminophen     Tablet .. 650 milliGRAM(s) Oral every 6 hours PRN  aspirin  chewable 81 milliGRAM(s) Oral daily  carbidopa/levodopa  25/100 1 Tablet(s) Oral three times a day  enoxaparin Injectable 40 milliGRAM(s) SubCutaneous every 24 hours  furosemide   Injectable 40 milliGRAM(s) IV Push two times a day  oxycodone    5 mG/acetaminophen 325 mG 1 Tablet(s) Oral every 6 hours PRN  piperacillin/tazobactam IVPB.. 3.375 Gram(s) IV Intermittent every 8 hours  vancomycin  IVPB 750 milliGRAM(s) IV Intermittent every 12 hours    Antimicrobials:   piperacillin/tazobactam IVPB.. 3.375 Gram(s) IV Intermittent every 8 hours  vancomycin  IVPB 750 milliGRAM(s) IV Intermittent every 12 hours    Immunologic:     ROS:  CONSTITUTIONAL: No fevers or chills. No weakness or headache. No weight changes.  EYES/ENT: No visual or hearing changes. No sore throat or throat pain .  NECK: No pain or stiffness  RESPIRATORY: No cough, wheezing, or hemoptysis. No shortness of breath  CARDIOVASCULAR: No chest pain or palpitations  GASTROINTESTINAL: No abdominal pain. No nausea or vomiting. No diarrhea or constipation.  GENITOURINARY: No dysuria, frequency or hematuria  NEUROLOGICAL: No numbness or weakness  SKIN: No itching or rashes  PSYCHIATRIC: Pleasant. Appropriate affect    Allergies: No Known Allergies    PMH -- Parkinsons    HTN (hypertension)      PSH -- No significant past surgical history    No significant past surgical history      FH -- No pertinent family history in first degree relatives    No pertinent family history in first degree relatives      Social History --  EtOH: denies   Tobacco: denies   Drug Use: denies     Travel/Environmental/Occupational History:    Physical Exam--  Vital Signs Last 24 Hrs  T(F): 98.6 (10 May 2022 12:24), Max: 98.9 (09 May 2022 17:01)  HR: 63 (10 May 2022 12:24) (63 - 70)  BP: 134/79 (10 May 2022 12:24) (116/70 - 155/79)  RR: 17 (10 May 2022 12:24) (17 - 18)  SpO2: 95% (10 May 2022 12:24) (93% - 98%)  General: nontoxic-appearing, no acute distress  HEENT: NC/AT, EOMI  Lungs: Clear bilaterally without rales, wheezing or rhonchi  Heart: Regular rate and rhythm. No murmur, rub or gallop.  Abdomen: Soft. Nondistended. Nontender  Extremities: No cyanosis or clubbing. No edema.   Skin: LE w/ b/l chronic venous stasis changes    Laboratory & Imaging Data--  CBC:                       13.2   9.91  )-----------( 154      ( 10 May 2022 08:11 )             40.0     CMP: 05-10    141  |  106  |  19  ----------------------------<  97  3.8   |  30  |  1.00    Ca    8.6      10 May 2022 08:11    TPro  6.6  /  Alb  3.1<L>  /  TBili  1.0  /  DBili  x   /  AST  16  /  ALT  <6<L>  /  AlkPhos  52  05-10    LIVER FUNCTIONS - ( 10 May 2022 08:11 )  Alb: 3.1 g/dL / Pro: 6.6 g/dL / ALK PHOS: 52 U/L / ALT: <6 U/L / AST: 16 U/L / GGT: x           Urinalysis Basic - ( 09 May 2022 19:51 )    Color: Yellow / Appearance: Clear / S.005 / pH: x  Gluc: x / Ketone: Trace  / Bili: Negative / Urobili: Negative   Blood: x / Protein: 15 / Nitrite: Negative   Leuk Esterase: Moderate / RBC: 3-5 /HPF / WBC 26-50   Sq Epi: x / Non Sq Epi: Few / Bacteria: Moderate        Microbiology: reviewed      Radiology: reviewed     [facility], Division of Infectious Diseases  RASHID Rosas S. Shah, Y. Patel, G. [facility]  [phone]    DENG SIERRA  86y, Male  [phone]    HPI--  HPI:  85 y/o M with hx of parkinsons, HTN, PVD, chronic wound LLE sent from wound care for evaluation of RLE cellulitis. Pt states that he has had itching with redness and swelling of his right lower leg for 3 weeks, noticed discharge from wound on right lower leg over this past weekend and worsening redness. Pt was seen in wound clinic today, seen by Dr. Eaton for admission for cellulitis. Pt is currently not on any antibiotics. Daughter states that pt had chills and fever of 101 last night, last dose of tylenol was last night. Denies CP, SOB, trauma, numbness or other symptoms.  (09 May 2022 16:16)    Pt seen and examined at bedside  Hx as above    Active Medications--  acetaminophen     Tablet .. 650 milliGRAM(s) Oral every 6 hours PRN  aspirin  chewable 81 milliGRAM(s) Oral daily  carbidopa/levodopa  25/100 1 Tablet(s) Oral three times a day  enoxaparin Injectable 40 milliGRAM(s) SubCutaneous every 24 hours  furosemide   Injectable 40 milliGRAM(s) IV Push two times a day  oxycodone    5 mG/acetaminophen 325 mG 1 Tablet(s) Oral every 6 hours PRN  piperacillin/tazobactam IVPB.. 3.375 Gram(s) IV Intermittent every 8 hours  vancomycin  IVPB 750 milliGRAM(s) IV Intermittent every 12 hours    Antimicrobials:   piperacillin/tazobactam IVPB.. 3.375 Gram(s) IV Intermittent every 8 hours  vancomycin  IVPB 750 milliGRAM(s) IV Intermittent every 12 hours    Immunologic:     ROS:  CONSTITUTIONAL: No fevers or chills. No weakness or headache. No weight changes.  EYES/ENT: No visual or hearing changes. No sore throat or throat pain .  NECK: No pain or stiffness  RESPIRATORY: No cough, wheezing, or hemoptysis. No shortness of breath  CARDIOVASCULAR: No chest pain or palpitations  GASTROINTESTINAL: No abdominal pain. No nausea or vomiting. No diarrhea or constipation.  GENITOURINARY: No dysuria, frequency or hematuria  NEUROLOGICAL: No numbness or weakness  SKIN: No itching or rashes  PSYCHIATRIC: Pleasant. Appropriate affect    Allergies: No Known Allergies    PMH -- Parkinsons    HTN (hypertension)      PSH -- No significant past surgical history    No significant past surgical history      FH -- No pertinent family history in first degree relatives    No pertinent family history in first degree relatives      Social History --  EtOH: denies   Tobacco: denies   Drug Use: denies     Travel/Environmental/Occupational History:    Physical Exam--  Vital Signs Last 24 Hrs  T(F): 98.6 (10 May 2022 12:24), Max: 98.9 (09 May 2022 17:01)  HR: 63 (10 May 2022 12:24) (63 - 70)  BP: 134/79 (10 May 2022 12:24) (116/70 - 155/79)  RR: 17 (10 May 2022 12:24) (17 - 18)  SpO2: 95% (10 May 2022 12:24) (93% - 98%)  General: nontoxic-appearing, no acute distress  HEENT: NC/AT, EOMI  Lungs: Clear bilaterally without rales, wheezing or rhonchi  Heart: Regular rate and rhythm. No murmur, rub or gallop.  Abdomen: Soft. Nondistended. Nontender  Extremities: RLE cellulitis  Skin: LE w/ b/l chronic venous stasis changes    Laboratory & Imaging Data--  CBC:                       13.2   9.91  )-----------( 154      ( 10 May 2022 08:11 )             40.0     CMP: 05-10    141  |  106  |  19  ----------------------------<  97  3.8   |  30  |  1.00    Ca    8.6      10 May 2022 08:11    TPro  6.6  /  Alb  3.1<L>  /  TBili  1.0  /  DBili  x   /  AST  16  /  ALT  <6<L>  /  AlkPhos  52  05-10    LIVER FUNCTIONS - ( 10 May 2022 08:11 )  Alb: 3.1 g/dL / Pro: 6.6 g/dL / ALK PHOS: 52 U/L / ALT: <6 U/L / AST: 16 U/L / GGT: x           Urinalysis Basic - ( 09 May 2022 19:51 )    Color: Yellow / Appearance: Clear / S.005 / pH: x  Gluc: x / Ketone: Trace  / Bili: Negative / Urobili: Negative   Blood: x / Protein: 15 / Nitrite: Negative   Leuk Esterase: Moderate / RBC: 3-5 /HPF / WBC 26-50   Sq Epi: x / Non Sq Epi: Few / Bacteria: Moderate        Microbiology: reviewed      Radiology: reviewed    < from: Xray Chest 1 View AP/PA (22 @ 15:27) >    ACC: [id number] EXAM:  XR TIB FIB AP LAT 2 VIEWS RT                        ACC: [id number] EXAM:  XR CHEST AP OR PA 1V                          PROCEDURE DATE:  2022          INTERPRETATION:  Chest and right tib-fib. Patient has sepsis.    AP semierect chest on May 9, 2022 at 3:16 PM.    Heart magnified by technique. Old right rib fracture again noted.    No lung consolidation or layering effusion evident.    Chest is similar to 2021.    Right tib-fib. Patient has cellulitis. 2 views. 4 images.    There is mild to moderate knee degeneration with loss of joint space in   the medial compartment.    Ankle unremarkable. No bone destruction or fracture.    Lower leg and ankle edema.    IMPRESSION: No acute chest finding. Lower right leg and ankle edema.    --- End of Report ---            ZACARIAS THOMSON MD; Attending Radiologist  This document has been electronically signed. May  9 2022  4:28PM    < end of copied text >    < from: US Duplex Venous Lower Ext Ltd, Right (22 @ 13:58) >    ACC: [id number] EXAM:  US DPLX LWR EXT VEINS LTD RT                          PROCEDURE DATE:  2022          INTERPRETATION:  CLINICAL INFORMATION: Right lower extremity redness and   swelling.    COMPARISON: Bilateral lower extremity venous Doppler 2022    TECHNIQUE: Duplex sonography of the RIGHT LOWER extremity veins with   color and spectral Doppler, with and without compression.    FINDINGS:    There is normal compressibility of the right common femoral, femoral and   popliteal veins.  The contralateral common femoral vein is patent.  Doppler examination shows normal spontaneous and phasic flow.    No calf vein thrombosis is detected.    Subcutaneous edema in the right calf.    IMPRESSION:  No evidence of right lower extremity deep venous thrombosis.          --- End of Report ---            ANDREW RAINEY MD; Attending Radiologist  This document has been electronically signed. May  9 2022  2:06PM    < end of copied text >

## 2022-05-10 NOTE — PROGRESS NOTE ADULT - ASSESSMENT
87 y/o M with hx of parkinsons, HTN, PVD, chronic wound LLE sent from wound care for evaluation of RLE cellulitis. Pt states that he has had itching with redness and swelling of his right lower leg for 3 weeks, noticed discharge from wound on right lower leg over this past weekend and worsening redness. Pt was seen in wound clinic today, seen by Dr. Eaton for admission for cellulitis. Pt is currently not on any antibiotics. Daughter states that pt had chills and fever of 101 last night, last dose of tylenol was last night. Denies CP, SOB, trauma, numbness or other symptoms.     1 RLE cellulitis  - cw antibiotics  - ID and podiatry fu   - fu cultures  - will monitor     2 Parkinson's disease  - cw sinemet    3 HTN  - cw home meds    Lovenox for DVT prophylaxis

## 2022-05-10 NOTE — CONSULT NOTE ADULT - SUBJECTIVE AND OBJECTIVE BOX
HPI:  87 y/o M with hx of parkinsons, HTN, PVD, chronic wound LLE sent from wound care for evaluation of RLE cellulitis. Pt states that he has had itching with redness and swelling of his right lower leg for 3 weeks, noticed discharge from wound on right lower leg over this past weekend and worsening redness. Pt was seen in wound clinic by Dr. Eaton who sent patient over for admission for cellulitis.     PAST MEDICAL & SURGICAL HISTORY:  Parkinsons      HTN (hypertension)      No significant past surgical history      REVIEW OF SYSTEMS  CONSTITUTIONAL: +weakness, no fevers or chills  HEENT: denies blurred visions, sore throat  CARDIOVASCULAR: denies chest pain, palpitations  RESPIRATORY: denies shortness of breath, sputum production  GASTROINTESTINAL: denies nausea, vomiting, diarrhea  all other ROS is negative unless indicated above        MEDICATIONS  (STANDING):  ammonium lactate 12% Lotion 1 Application(s) Topical every 12 hours  aspirin  chewable 81 milliGRAM(s) Oral daily  carbidopa/levodopa  25/100 1 Tablet(s) Oral three times a day  enoxaparin Injectable 40 milliGRAM(s) SubCutaneous every 24 hours  furosemide   Injectable 40 milliGRAM(s) IV Push two times a day  piperacillin/tazobactam IVPB.. 3.375 Gram(s) IV Intermittent every 8 hours    MEDICATIONS  (PRN):  acetaminophen     Tablet .. 650 milliGRAM(s) Oral every 6 hours PRN Temp greater or equal to 38C (100.4F), Mild Pain (1 - 3)  oxycodone    5 mG/acetaminophen 325 mG 1 Tablet(s) Oral every 6 hours PRN Moderate Pain (4 - 6)      Allergies    No Known Allergies    Intolerances        SOCIAL HISTORY:  /     FAMILY HISTORY:  No pertinent family history in first degree relatives        Vital Signs Last 24 Hrs  T(C): 37 (10 May 2022 12:24), Max: 37.2 (09 May 2022 17:01)  T(F): 98.6 (10 May 2022 12:24), Max: 98.9 (09 May 2022 17:01)  HR: 63 (10 May 2022 12:24) (63 - 70)  BP: 134/79 (10 May 2022 12:24) (116/70 - 155/79)  BP(mean): --  RR: 17 (10 May 2022 12:24) (17 - 18)  SpO2: 95% (10 May 2022 12:24) (93% - 98%)                          13.2   9.91  )-----------( 154      ( 10 May 2022 08:11 )             40.0     05-10    141  |  106  |  19  ----------------------------<  97  3.8   |  30  |  1.00    Ca    8.6      10 May 2022 08:11    TPro  6.6  /  Alb  3.1<L>  /  TBili  1.0  /  DBili  x   /  AST  16  /  ALT  <6<L>  /  AlkPhos  52  05-10          PHYSICAL EXAM:    General: resting comfortably in bed; NAD  ENT: no nasal discharge; hearing intact  Extremities: no clubbing or cyanosis; +4 right Lower ext edema : no gross deformities, able to move all four extremities, erythema extends from dorsal portion of foot up towards anterior tibial region, Bilateral dopplerable + dorsalis pedis  Left lower extremity lateral necrotic ulceration 2 x 1.5  Neurologic: awake, alert, follows commands, weakened strength   Musculoskeletal/Vascular:  ROM limited  RLE edema, warm, no calf tenderness, no hair growth  + hemosiderin staining BLLE  no deformities/ contractures

## 2022-05-11 LAB — NT-PROBNP SERPL-SCNC: 475 PG/ML — HIGH (ref 0–450)

## 2022-05-11 RX ORDER — FUROSEMIDE 40 MG
40 TABLET ORAL DAILY
Refills: 0 | Status: DISCONTINUED | OUTPATIENT
Start: 2022-05-11 | End: 2022-05-13

## 2022-05-11 RX ADMIN — CARBIDOPA AND LEVODOPA 1 TABLET(S): 25; 100 TABLET ORAL at 15:38

## 2022-05-11 RX ADMIN — Medication 1 APPLICATION(S): at 19:24

## 2022-05-11 RX ADMIN — PIPERACILLIN AND TAZOBACTAM 25 GRAM(S): 4; .5 INJECTION, POWDER, LYOPHILIZED, FOR SOLUTION INTRAVENOUS at 15:38

## 2022-05-11 RX ADMIN — Medication 40 MILLIGRAM(S): at 05:42

## 2022-05-11 RX ADMIN — PIPERACILLIN AND TAZOBACTAM 25 GRAM(S): 4; .5 INJECTION, POWDER, LYOPHILIZED, FOR SOLUTION INTRAVENOUS at 22:08

## 2022-05-11 RX ADMIN — PIPERACILLIN AND TAZOBACTAM 25 GRAM(S): 4; .5 INJECTION, POWDER, LYOPHILIZED, FOR SOLUTION INTRAVENOUS at 05:42

## 2022-05-11 RX ADMIN — Medication 1 APPLICATION(S): at 05:41

## 2022-05-11 RX ADMIN — Medication 81 MILLIGRAM(S): at 11:37

## 2022-05-11 RX ADMIN — CARBIDOPA AND LEVODOPA 1 TABLET(S): 25; 100 TABLET ORAL at 05:41

## 2022-05-11 RX ADMIN — CARBIDOPA AND LEVODOPA 1 TABLET(S): 25; 100 TABLET ORAL at 22:08

## 2022-05-11 RX ADMIN — ENOXAPARIN SODIUM 40 MILLIGRAM(S): 100 INJECTION SUBCUTANEOUS at 22:08

## 2022-05-11 NOTE — PROGRESS NOTE ADULT - SUBJECTIVE AND OBJECTIVE BOX
Patient is a 86y Male with a known history of :    HPI:  87 y/o M with hx of parkinsons, HTN, PVD, chronic wound LLE sent from wound care for evaluation of RLE cellulitis. Pt states that he has had itching with redness and swelling of his right lower leg for 3 weeks, noticed discharge from wound on right lower leg over this past weekend and worsening redness. Pt was seen in wound clinic today, seen by Dr. Eaton for admission for cellulitis. Pt is currently not on any antibiotics. Daughter states that pt had chills and fever of 101 last night, last dose of tylenol was last night. Denies CP, SOB, trauma, numbness or other symptoms.  (09 May 2022 16:16)      REVIEW OF SYSTEMS:  CONSTITUTIONAL: No weakness, fevers or chills  EYES/ENT: No visual changes;  No vertigo or throat pain   NECK: No pain or stiffness  RESPIRATORY: No cough, wheezing, hemoptysis; No shortness of breath  CARDIOVASCULAR: No chest pain or palpitations  GASTROINTESTINAL: No abdominal pain. No nausea, vomiting, or hematemesis; No diarrhea or constipation. No melena or hematochezia.  GENITOURINARY: No dysuria, frequency or hematuria  NEUROLOGICAL: No numbness or weakness  SKIN: No itching or rash  All other review of systems is negative unless indicated above      MEDICATIONS  (STANDING):  ammonium lactate 12% Lotion 1 Application(s) Topical every 12 hours  aspirin  chewable 81 milliGRAM(s) Oral daily  carbidopa/levodopa  25/100 1 Tablet(s) Oral three times a day  enoxaparin Injectable 40 milliGRAM(s) SubCutaneous every 24 hours  furosemide   Injectable 40 milliGRAM(s) IV Push two times a day  piperacillin/tazobactam IVPB.. 3.375 Gram(s) IV Intermittent every 8 hours    MEDICATIONS  (PRN):  acetaminophen     Tablet .. 650 milliGRAM(s) Oral every 6 hours PRN Temp greater or equal to 38C (100.4F), Mild Pain (1 - 3)  oxycodone    5 mG/acetaminophen 325 mG 1 Tablet(s) Oral every 6 hours PRN Moderate Pain (4 - 6)      ALLERGIES: No Known Allergies      FAMILY HISTORY:  No pertinent family history in first degree relatives        PHYSICAL EXAMINATION:  -----------------------------  T(C): 36.7 (05-11-22 @ 11:53), Max: 37.1 (05-10-22 @ 17:04)  HR: 80 (05-11-22 @ 11:53) (62 - 84)  BP: 128/72 (05-11-22 @ 11:53) (112/64 - 134/79)  RR: 18 (05-11-22 @ 11:53) (17 - 18)  SpO2: 96% (05-11-22 @ 11:53) (93% - 96%)  Wt(kg): --    05-10 @ 07:01  -  05-11 @ 07:00  --------------------------------------------------------  IN:  Total IN: 0 mL    OUT:    Voided (mL): 2330 mL  Total OUT: 2330 mL    Total NET: -2330 mL      PHYSICAL EXAM:  Constitutional: Awake in NAD  HEENT:  MARY, EOMI  Neck: No JVD  Pulmonary: CTA B/L No R/R/W  Cardiovascular: PMI not palpable non-displaced Regular S1 and S2, no murmurs  Gastrointestinal: Bowel Sounds present, soft, nontender.   Extremities: 2-3+ peripheral edema.   Neuro: No gross focal deficits    LABS: All Labs Reviewed:                     LABS:   --------  05-10    141  |  106  |  19  ----------------------------<  97  3.8   |  30  |  1.00    Ca    8.6      10 May 2022 08:11    TPro  6.6  /  Alb  3.1<L>  /  TBili  1.0  /  DBili  x   /  AST  16  /  ALT  <6<L>  /  AlkPhos  52  05-10                         13.2   9.91  )-----------( 154      ( 10 May 2022 08:11 )             40.0     PT/INR - ( 09 May 2022 13:23 )   PT: 13.4 sec;   INR: 1.14 ratio         PTT - ( 09 May 2022 13:23 )  PTT:37.7 sec  05-11 @ 00:16 BNP: 475 pg/mL        Culture Results:   No growth (05-10 @ 00:15)  Culture Results:   No growth to date. (05-09 @ 18:39)  Culture Results:   No growth to date. (05-09 @ 18:39)

## 2022-05-11 NOTE — PROGRESS NOTE ADULT - SUBJECTIVE AND OBJECTIVE BOX
[facility] DIVISION of INFECTIOUS DISEASE  Raza Easton MD PhD, Faye Liriano MD, Erin Lynn MD, Ty Ritter MD, Thai Hanson MD  and providing coverage with Lizet Holden MD  Providing Infectious Disease Consultations at [facility], [facility], [facility]'s    Office# [phone] to schedule follow up appointments  Answering Service for urgent calls or New Consults [phone]  Cell# to text for urgent issues Raza Easton [phone]     infectious diseases progress note:    DENG SIERRA is a 86y y. o. Male patient    No concerning overnight events    Allergies    No Known Allergies    Intolerances        ANTIBIOTICS/RELEVANT:  antimicrobials  piperacillin/tazobactam IVPB.. 3.375 Gram(s) IV Intermittent every 8 hours    immunologic:    OTHER:  acetaminophen     Tablet .. 650 milliGRAM(s) Oral every 6 hours PRN  ammonium lactate 12% Lotion 1 Application(s) Topical every 12 hours  aspirin  chewable 81 milliGRAM(s) Oral daily  carbidopa/levodopa  25/100 1 Tablet(s) Oral three times a day  enoxaparin Injectable 40 milliGRAM(s) SubCutaneous every 24 hours  furosemide    Tablet 40 milliGRAM(s) Oral daily  oxycodone    5 mG/acetaminophen 325 mG 1 Tablet(s) Oral every 6 hours PRN      Objective:  Vital Signs Last 24 Hrs  T(C): 36.7 (11 May 2022 11:53), Max: 37.1 (10 May 2022 17:04)  T(F): 98 (11 May 2022 11:53), Max: 98.7 (10 May 2022 17:04)  HR: 80 (11 May 2022 11:53) (62 - 84)  BP: 128/72 (11 May 2022 11:53) (112/64 - 134/79)  BP(mean): --  RR: 18 (11 May 2022 11:53) (17 - 18)  SpO2: 96% (11 May 2022 11:53) (93% - 96%)    T(C): 36.7 (22 @ 11:53), Max: 37.2 (22 @ 17:01)  T(C): 36.7 (22 @ 11:53), Max: 37.6 (22 @ 11:37)  T(C): 36.7 (22 @ 11:53), Max: 37.6 (22 @ 11:37)    PHYSICAL EXAM:  HEENT: NC atraumatic  Neck: supple  Respiratory: no accessory muscle use, breathing comfortably, CTA  Cardiovascular: distant  Gastrointestinal: normal appearing, nondistended  Extremities: no clubbing, no cyanosis, right LE is wrapped with sig swelling, erythema, LLE with erythema, open area some increased warmth        LABS:                          13.2   9.91  )-----------( 154      ( 10 May 2022 08:11 )             40.0       WBC  9.91 05-10 @ 08:11  12.28  @ 13:23      05-10    141  |  106  |  19  ----------------------------<  97  3.8   |  30  |  1.00    Ca    8.6      10 May 2022 08:11    TPro  6.6  /  Alb  3.1<L>  /  TBili  1.0  /  DBili  x   /  AST  16  /  ALT  <6<L>  /  AlkPhos  52  05-10      Creatinine, Serum: 1.00 mg/dL (05-10-22 @ 08:11)  Creatinine, Serum: 1.00 mg/dL (22 @ 13:23)      PT/INR - ( 09 May 2022 13:23 )   PT: 13.4 sec;   INR: 1.14 ratio         PTT - ( 09 May 2022 13:23 )  PTT:37.7 sec  Urinalysis Basic - ( 09 May 2022 19:51 )    Color: Yellow / Appearance: Clear / S.005 / pH: x  Gluc: x / Ketone: Trace  / Bili: Negative / Urobili: Negative   Blood: x / Protein: 15 / Nitrite: Negative   Leuk Esterase: Moderate / RBC: 3-5 /HPF / WBC 26-50   Sq Epi: x / Non Sq Epi: Few / Bacteria: Moderate            INFLAMMATORY MARKERS  Auto Neutrophil #: 9.68 K/uL (22 @ 13:23)  Auto Lymphocyte #: 1.08 K/uL (22 @ 13:23)    Lactate, Blood: 1.0 mmol/L (22 @ 13:23)    Auto Eosinophil #: 0.04 K/uL (22 @ 13:23)      Sedimentation Rate, Erythrocyte: 14 mm/hr (22 @ 13:23)                    Activated Partial Thromboplastin Time: 37.7 sec (22 @ 13:23)  INR: 1.14 ratio (22 @ 13:23)          MICROBIOLOGY:              RADIOLOGY & ADDITIONAL STUDIES:

## 2022-05-11 NOTE — PROGRESS NOTE ADULT - SUBJECTIVE AND OBJECTIVE BOX
Patient is a 86y old  Male who presents with a chief complaint of RLE wound (10 May 2022 14:04)    Date of servie : 22 @ 11:59  INTERVAL HPI/OVERNIGHT EVENTS:  T(C): 36.7 (22 @ 11:53), Max: 37.1 (05-10-22 @ 17:04)  HR: 80 (22 @ 11:53) (62 - 84)  BP: 128/72 (22 @ 11:53) (112/64 - 134/79)  RR: 18 (22 @ 11:53) (17 - 18)  SpO2: 96% (22 @ 11:53) (93% - 96%)  Wt(kg): --  I&O's Summary    10 May 2022 07:01  -  11 May 2022 07:00  --------------------------------------------------------  IN: 0 mL / OUT: 2330 mL / NET: -2330 mL        LABS:                        13.2   9.91  )-----------( 154      ( 10 May 2022 08:11 )             40.0     05-10    141  |  106  |  19  ----------------------------<  97  3.8   |  30  |  1.00    Ca    8.6      10 May 2022 08:11    TPro  6.6  /  Alb  3.1<L>  /  TBili  1.0  /  DBili  x   /  AST  16  /  ALT  <6<L>  /  AlkPhos  52  05-10    PT/INR - ( 09 May 2022 13:23 )   PT: 13.4 sec;   INR: 1.14 ratio         PTT - ( 09 May 2022 13:23 )  PTT:37.7 sec  Urinalysis Basic - ( 09 May 2022 19:51 )    Color: Yellow / Appearance: Clear / S.005 / pH: x  Gluc: x / Ketone: Trace  / Bili: Negative / Urobili: Negative   Blood: x / Protein: 15 / Nitrite: Negative   Leuk Esterase: Moderate / RBC: 3-5 /HPF / WBC 26-50   Sq Epi: x / Non Sq Epi: Few / Bacteria: Moderate      CAPILLARY BLOOD GLUCOSE            Urinalysis Basic - ( 09 May 2022 19:51 )    Color: Yellow / Appearance: Clear / S.005 / pH: x  Gluc: x / Ketone: Trace  / Bili: Negative / Urobili: Negative   Blood: x / Protein: 15 / Nitrite: Negative   Leuk Esterase: Moderate / RBC: 3-5 /HPF / WBC 26-50   Sq Epi: x / Non Sq Epi: Few / Bacteria: Moderate        MEDICATIONS  (STANDING):  ammonium lactate 12% Lotion 1 Application(s) Topical every 12 hours  aspirin  chewable 81 milliGRAM(s) Oral daily  carbidopa/levodopa  25/100 1 Tablet(s) Oral three times a day  enoxaparin Injectable 40 milliGRAM(s) SubCutaneous every 24 hours  furosemide   Injectable 40 milliGRAM(s) IV Push two times a day  piperacillin/tazobactam IVPB.. 3.375 Gram(s) IV Intermittent every 8 hours    MEDICATIONS  (PRN):  acetaminophen     Tablet .. 650 milliGRAM(s) Oral every 6 hours PRN Temp greater or equal to 38C (100.4F), Mild Pain (1 - 3)  oxycodone    5 mG/acetaminophen 325 mG 1 Tablet(s) Oral every 6 hours PRN Moderate Pain (4 - 6)          PHYSICAL EXAM:  GENERAL: NAD, well-groomed, well-developed  HEAD:  Atraumatic, Normocephalic  CHEST/LUNG: Clear to percussion bilaterally; No rales, rhonchi, wheezing, or rubs  HEART: Regular rate and rhythm; No murmurs, rubs, or gallops  ABDOMEN: Soft, Nontender, Nondistended; Bowel sounds present  EXTREMITIES:  bl LE redness    Care Discussed with Consultants/Other Providers [x ] YES  [ ] NO

## 2022-05-11 NOTE — PROGRESS NOTE ADULT - ASSESSMENT
85 y/o M with hx of parkinsons, HTN, PVD, chronic wound LLE sent from wound care for evaluation of RLE cellulitis. Pt states that he has had itching with redness and swelling of his right lower leg for 3 weeks, noticed discharge from wound on right lower leg over this past weekend and worsening redness. Pt was seen in wound clinic today, seen by Dr. Eaton for admission for cellulitis. Pt is currently not on any antibiotics. Daughter states that pt had chills and fever of 101 last night, last dose of tylenol was last night. Denies CP, SOB, trauma, numbness or other symptoms.     RLE cellulitis/LE edema/Leukocytosis  --previous 3/22 WCx w/ MSSA and pseudomonas  -imaging reviewed  --XR w/ edema, no OM  Previous cx w/ MSSA, no further vanco or now    Continue zosyn (started 4/9)    Leg elevation and supportive care  Appreciate wound care recs    Thank you for consulting us and involving us in the management of this most interesting and challenging case.  We will follow along in the care of this patient. Please call us at [phone] or text me directly on my cell# at [phone] with any concerns.

## 2022-05-11 NOTE — PROGRESS NOTE ADULT - ASSESSMENT
86M with HTN, PVD, Parkinson's disease presents from wound care with worsening RIGHT LE swelling and erythema consistent with cellulitis.    Patient with 1 year h/o bilateral LE swelling but the right side had been less edematous to left side until now with cellulitis    Suggest:  1. S/p IV lasix 40mg twice daily x 24 hours.  ProBNP is now ~470, at borderline level.  Change to po lasix 40 mg daily  2. Repeat echocardiogram pending.  Echo 12/2021 with normal EF    Will follow

## 2022-05-11 NOTE — PROGRESS NOTE ADULT - ASSESSMENT
85 y/o M with hx of parkinsons, HTN, PVD, chronic wound LLE sent from wound care for evaluation of RLE cellulitis. Pt states that he has had itching with redness and swelling of his right lower leg for 3 weeks, noticed discharge from wound on right lower leg over this past weekend and worsening redness. Pt was seen in wound clinic today, seen by Dr. Eaton for admission for cellulitis. Pt is currently not on any antibiotics. Daughter states that pt had chills and fever of 101 last night, last dose of tylenol was last night. Denies CP, SOB, trauma, numbness or other symptoms.     1 RLE cellulitis  - cw antibiotics  - ID and podiatry fu   - fu cultures  - will monitor     2 Parkinson's disease  - cw sinemet    3 HTN  - cw home meds    Lovenox for DVT prophylaxis

## 2022-05-12 LAB
ALBUMIN SERPL ELPH-MCNC: 2.6 G/DL — LOW (ref 3.3–5)
ALP SERPL-CCNC: 38 U/L — LOW (ref 40–120)
ALT FLD-CCNC: 7 U/L — LOW (ref 12–78)
ANION GAP SERPL CALC-SCNC: 7 MMOL/L — SIGNIFICANT CHANGE UP (ref 5–17)
AST SERPL-CCNC: 15 U/L — SIGNIFICANT CHANGE UP (ref 15–37)
BILIRUB SERPL-MCNC: 0.8 MG/DL — SIGNIFICANT CHANGE UP (ref 0.2–1.2)
BUN SERPL-MCNC: 22 MG/DL — SIGNIFICANT CHANGE UP (ref 7–23)
CALCIUM SERPL-MCNC: 8.4 MG/DL — LOW (ref 8.5–10.1)
CHLORIDE SERPL-SCNC: 102 MMOL/L — SIGNIFICANT CHANGE UP (ref 96–108)
CO2 SERPL-SCNC: 32 MMOL/L — HIGH (ref 22–31)
CREAT SERPL-MCNC: 1.1 MG/DL — SIGNIFICANT CHANGE UP (ref 0.5–1.3)
EGFR: 66 ML/MIN/1.73M2 — SIGNIFICANT CHANGE UP
GLUCOSE SERPL-MCNC: 91 MG/DL — SIGNIFICANT CHANGE UP (ref 70–99)
HCT VFR BLD CALC: 36.9 % — LOW (ref 39–50)
HGB BLD-MCNC: 12 G/DL — LOW (ref 13–17)
MCHC RBC-ENTMCNC: 30.7 PG — SIGNIFICANT CHANGE UP (ref 27–34)
MCHC RBC-ENTMCNC: 32.5 GM/DL — SIGNIFICANT CHANGE UP (ref 32–36)
MCV RBC AUTO: 94.4 FL — SIGNIFICANT CHANGE UP (ref 80–100)
NRBC # BLD: 0 /100 WBCS — SIGNIFICANT CHANGE UP (ref 0–0)
PLATELET # BLD AUTO: 168 K/UL — SIGNIFICANT CHANGE UP (ref 150–400)
POTASSIUM SERPL-MCNC: 3.1 MMOL/L — LOW (ref 3.5–5.3)
POTASSIUM SERPL-SCNC: 3.1 MMOL/L — LOW (ref 3.5–5.3)
PROT SERPL-MCNC: 6 G/DL — SIGNIFICANT CHANGE UP (ref 6–8.3)
RBC # BLD: 3.91 M/UL — LOW (ref 4.2–5.8)
RBC # FLD: 13.2 % — SIGNIFICANT CHANGE UP (ref 10.3–14.5)
SODIUM SERPL-SCNC: 141 MMOL/L — SIGNIFICANT CHANGE UP (ref 135–145)
WBC # BLD: 6.06 K/UL — SIGNIFICANT CHANGE UP (ref 3.8–10.5)
WBC # FLD AUTO: 6.06 K/UL — SIGNIFICANT CHANGE UP (ref 3.8–10.5)

## 2022-05-12 RX ORDER — POTASSIUM CHLORIDE 20 MEQ
40 PACKET (EA) ORAL
Refills: 0 | Status: COMPLETED | OUTPATIENT
Start: 2022-05-12 | End: 2022-05-12

## 2022-05-12 RX ADMIN — CARBIDOPA AND LEVODOPA 1 TABLET(S): 25; 100 TABLET ORAL at 21:09

## 2022-05-12 RX ADMIN — ENOXAPARIN SODIUM 40 MILLIGRAM(S): 100 INJECTION SUBCUTANEOUS at 21:09

## 2022-05-12 RX ADMIN — Medication 81 MILLIGRAM(S): at 11:16

## 2022-05-12 RX ADMIN — CARBIDOPA AND LEVODOPA 1 TABLET(S): 25; 100 TABLET ORAL at 14:18

## 2022-05-12 RX ADMIN — PIPERACILLIN AND TAZOBACTAM 25 GRAM(S): 4; .5 INJECTION, POWDER, LYOPHILIZED, FOR SOLUTION INTRAVENOUS at 21:09

## 2022-05-12 RX ADMIN — Medication 40 MILLIEQUIVALENT(S): at 17:21

## 2022-05-12 RX ADMIN — Medication 1 APPLICATION(S): at 05:51

## 2022-05-12 RX ADMIN — CARBIDOPA AND LEVODOPA 1 TABLET(S): 25; 100 TABLET ORAL at 05:52

## 2022-05-12 RX ADMIN — Medication 40 MILLIEQUIVALENT(S): at 16:11

## 2022-05-12 RX ADMIN — PIPERACILLIN AND TAZOBACTAM 25 GRAM(S): 4; .5 INJECTION, POWDER, LYOPHILIZED, FOR SOLUTION INTRAVENOUS at 14:18

## 2022-05-12 RX ADMIN — Medication 1 APPLICATION(S): at 17:21

## 2022-05-12 RX ADMIN — Medication 40 MILLIGRAM(S): at 05:52

## 2022-05-12 RX ADMIN — PIPERACILLIN AND TAZOBACTAM 25 GRAM(S): 4; .5 INJECTION, POWDER, LYOPHILIZED, FOR SOLUTION INTRAVENOUS at 05:51

## 2022-05-12 NOTE — PROGRESS NOTE ADULT - SUBJECTIVE AND OBJECTIVE BOX
[facility] DIVISION of INFECTIOUS DISEASE  Raza Easton MD PhD, Faye Liriano MD, Erin Lynn MD, Ty Ritter MD, Thai Hanson MD  and providing coverage with Lizet Holden MD  Providing Infectious Disease Consultations at [facility], [facility], [facility], [facility]'s    Office# [phone] to schedule follow up appointments  Answering Service for urgent calls or New Consults [phone]  Cell# to text for urgent issues Raza Easton [phone]     infectious diseases progress note:    DENG SIERRA is a 86y y. o. Male patient    No concerning overnight events    Allergies    No Known Allergies    Intolerances        ANTIBIOTICS/RELEVANT:  antimicrobials  piperacillin/tazobactam IVPB.. 3.375 Gram(s) IV Intermittent every 8 hours    immunologic:    OTHER:  acetaminophen     Tablet .. 650 milliGRAM(s) Oral every 6 hours PRN  ammonium lactate 12% Lotion 1 Application(s) Topical every 12 hours  aspirin  chewable 81 milliGRAM(s) Oral daily  carbidopa/levodopa  25/100 1 Tablet(s) Oral three times a day  enoxaparin Injectable 40 milliGRAM(s) SubCutaneous every 24 hours  furosemide    Tablet 40 milliGRAM(s) Oral daily  oxycodone    5 mG/acetaminophen 325 mG 1 Tablet(s) Oral every 6 hours PRN      Objective:  Vital Signs Last 24 Hrs  T(C): 36.8 (12 May 2022 11:55), Max: 36.8 (12 May 2022 11:55)  T(F): 98.2 (12 May 2022 11:55), Max: 98.2 (12 May 2022 11:55)  HR: 62 (12 May 2022 11:55) (62 - 75)  BP: 121/67 (12 May 2022 11:55) (107/63 - 128/76)  BP(mean): --  RR: 17 (12 May 2022 11:55) (17 - 18)  SpO2: 97% (12 May 2022 11:55) (94% - 98%)    T(C): 36.8 (05-12-22 @ 11:55), Max: 37.1 (05-10-22 @ 17:04)  T(C): 36.8 (05-12-22 @ 11:55), Max: 37.2 (05-09-22 @ 17:01)  T(C): 36.8 (05-12-22 @ 11:55), Max: 37.6 (05-09-22 @ 11:37)    PHYSICAL EXAM:  HEENT: NC atraumatic  Neck: supple  Respiratory: no accessory muscle use, breathing comfortably  Cardiovascular: distant  Gastrointestinal: normal appearing, nondistended  Extremities: no clubbing, no cyanosis,        LABS:                          12.0   6.06  )-----------( 168      ( 12 May 2022 08:06 )             36.9       WBC  6.06 05-12 @ 08:06  9.91 05-10 @ 08:11  12.28 05-09 @ 13:23      05-12    141  |  102  |  22  ----------------------------<  91  3.1<L>   |  32<H>  |  1.10    Ca    8.4<L>      12 May 2022 08:06    TPro  6.0  /  Alb  2.6<L>  /  TBili  0.8  /  DBili  x   /  AST  15  /  ALT  7<L>  /  AlkPhos  38<L>  05-12      Creatinine, Serum: 1.10 mg/dL (05-12-22 @ 08:06)  Creatinine, Serum: 1.00 mg/dL (05-10-22 @ 08:11)  Creatinine, Serum: 1.00 mg/dL (05-09-22 @ 13:23)                INFLAMMATORY MARKERS  Auto Neutrophil #: 9.68 K/uL (05-09-22 @ 13:23)  Auto Lymphocyte #: 1.08 K/uL (05-09-22 @ 13:23)    Lactate, Blood: 1.0 mmol/L (05-09-22 @ 13:23)    Auto Eosinophil #: 0.04 K/uL (05-09-22 @ 13:23)      Sedimentation Rate, Erythrocyte: 14 mm/hr (05-09-22 @ 13:23)                    Activated Partial Thromboplastin Time: 37.7 sec (05-09-22 @ 13:23)  INR: 1.14 ratio (05-09-22 @ 13:23)          MICROBIOLOGY:    Culture - Tissue with Gram Stain (03.22.22 @ 00:36)    Gram Stain:   No polymorphonuclear cells seen per low power field  Moderate Gram Negative Rods per oil power field  Rare Gram Variable Cocci per oil power field    -  Amikacin: S <=16    -  Ampicillin/Sulbactam: S <=8/4    -  Aztreonam: S <=4    -  Cefazolin: S <=4    -  Cefepime: S <=2    -  Ceftazidime: S 4    -  Ciprofloxacin: S <=0.25    -  Clindamycin: S <=0.25    -  Erythromycin: S <=0.25    -  Gentamicin: S <=1 Should not be used as monotherapy    -  Gentamicin: S 4    -  Imipenem: S <=1    -  Levofloxacin: S <=0.5    -  Meropenem: S <=1    -  Oxacillin: S <=0.25    -  Piperacillin/Tazobactam: S <=8    -  Rifampin: S <=1 Should not be used as monotherapy    -  Tetra/Doxy: S <=1    -  Tobramycin: S <=2    -  Trimethoprim/Sulfamethoxazole: S <=0.5/9.5    -  Vancomycin: S 2    Specimen Source: .Tissue Other, Left Lateral Leg    Culture Results:   Moderate Pseudomonas aeruginosa  Few Staphylococcus aureus  Few Coryneform Gram Positive Rods "Susceptibilities not performed"    Organism Identification: Pseudomonas aeruginosa  Staphylococcus aureus    Organism: Pseudomonas aeruginosa    Organism: Staphylococcus aureus    Method Type: ZACK    Method Type: ZACK        RADIOLOGY & ADDITIONAL STUDIES:

## 2022-05-12 NOTE — PROGRESS NOTE ADULT - ASSESSMENT
87 y/o M with hx of parkinsons, HTN, PVD, chronic wound LLE sent from wound care for evaluation of RLE cellulitis. Pt states that he has had itching with redness and swelling of his right lower leg for 3 weeks, noticed discharge from wound on right lower leg over this past weekend and worsening redness. Pt was seen in wound clinic today, seen by Dr. Eaton for admission for cellulitis. Pt is currently not on any antibiotics. Daughter states that pt had chills and fever of 101 last night, last dose of tylenol was last night. Denies CP, SOB, trauma, numbness or other symptoms.     RLE cellulitis/LE edema/Leukocytosis  --previous 3/22 WCx w/ MSSA and pseudomonas  -imaging reviewed  --XR w/ edema, no OM  Previous cx w/ MSSA, no further vanco or now    Continue zosyn (started 4/9 noon) with 5 days anticipated duration last dose 5/14 but potential to finish with oral with  Cipro 500mg PO BID and Keflex 500mg PO QID w alst day 5/14    Leg elevation and supportive care  Appreciate wound care recs    Thank you for consulting us and involving us in the management of this most interesting and challenging case.  We will follow along in the care of this patient. Please call us at [phone] or text me directly on my cell# at [phone] with any concerns.

## 2022-05-12 NOTE — PROGRESS NOTE ADULT - SUBJECTIVE AND OBJECTIVE BOX
Patient is a 86y old  Male who presents with a chief complaint of RLE wound (12 May 2022 13:28)    Date of servie : 05-12-22 @ 16:58  INTERVAL HPI/OVERNIGHT EVENTS:  T(C): 36.8 (05-12-22 @ 11:55), Max: 36.8 (05-12-22 @ 11:55)  HR: 62 (05-12-22 @ 11:55) (62 - 75)  BP: 121/67 (05-12-22 @ 11:55) (107/63 - 128/76)  RR: 17 (05-12-22 @ 11:55) (17 - 18)  SpO2: 97% (05-12-22 @ 11:55) (94% - 98%)  Wt(kg): --  I&O's Summary    11 May 2022 07:01  -  12 May 2022 07:00  --------------------------------------------------------  IN: 300 mL / OUT: 500 mL / NET: -200 mL        LABS:                        12.0   6.06  )-----------( 168      ( 12 May 2022 08:06 )             36.9     05-12    141  |  102  |  22  ----------------------------<  91  3.1<L>   |  32<H>  |  1.10    Ca    8.4<L>      12 May 2022 08:06    TPro  6.0  /  Alb  2.6<L>  /  TBili  0.8  /  DBili  x   /  AST  15  /  ALT  7<L>  /  AlkPhos  38<L>  05-12        CAPILLARY BLOOD GLUCOSE                MEDICATIONS  (STANDING):  ammonium lactate 12% Lotion 1 Application(s) Topical every 12 hours  aspirin  chewable 81 milliGRAM(s) Oral daily  carbidopa/levodopa  25/100 1 Tablet(s) Oral three times a day  enoxaparin Injectable 40 milliGRAM(s) SubCutaneous every 24 hours  furosemide    Tablet 40 milliGRAM(s) Oral daily  piperacillin/tazobactam IVPB.. 3.375 Gram(s) IV Intermittent every 8 hours  potassium chloride    Tablet ER 40 milliEquivalent(s) Oral every 2 hours    MEDICATIONS  (PRN):  acetaminophen     Tablet .. 650 milliGRAM(s) Oral every 6 hours PRN Temp greater or equal to 38C (100.4F), Mild Pain (1 - 3)  oxycodone    5 mG/acetaminophen 325 mG 1 Tablet(s) Oral every 6 hours PRN Moderate Pain (4 - 6)          PHYSICAL EXAM:  GENERAL: NAD, well-groomed, well-developed  HEAD:  Atraumatic, Normocephalic  CHEST/LUNG: Clear to percussion bilaterally; No rales, rhonchi, wheezing, or rubs  HEART: Regular rate and rhythm; No murmurs, rubs, or gallops  ABDOMEN: Soft, Nontender, Nondistended; Bowel sounds present  EXTREMITIES:  2+ Peripheral Pulses, No clubbing, cyanosis, or edema  LYMPH: No lymphadenopathy noted  SKIN: No rashes or lesions    Care Discussed with Consultants/Other Providers [ ] YES  [ ] NO

## 2022-05-12 NOTE — PROGRESS NOTE ADULT - SUBJECTIVE AND OBJECTIVE BOX
[facility]    CHIEF COMPLAINT: Patient is a 86y old  Male who presents with a chief complaint of RLE wound (11 May 2022 12:16)      Follow Up: [ ] Chest Pain      [ ] Dyspnea     [ ] Palpitations    [ ] Atrial Fibrillation     [ ] Ventricular Dysrhythmia    [ ] Abnormal EKG                      [ ] Abnormal Cardiac Enzymes     [ ] Valvular Disease    HPI:  85 y/o M with hx of parkinsons, HTN, PVD, chronic wound LLE sent from wound care for evaluation of RLE cellulitis. Pt states that he has had itching with redness and swelling of his right lower leg for 3 weeks, noticed discharge from wound on right lower leg over this past weekend and worsening redness. Pt was seen in wound clinic today, seen by Dr. Eaton for admission for cellulitis. Pt is currently not on any antibiotics. Daughter states that pt had chills and fever of 101 last night, last dose of tylenol was last night. Denies CP, SOB, trauma, numbness or other symptoms.  (09 May 2022 16:16)    PAST MEDICAL & SURGICAL HISTORY:  Parkinsons      HTN (hypertension)      No significant past surgical history        MEDICATIONS  (STANDING):  ammonium lactate 12% Lotion 1 Application(s) Topical every 12 hours  aspirin  chewable 81 milliGRAM(s) Oral daily  carbidopa/levodopa  25/100 1 Tablet(s) Oral three times a day  enoxaparin Injectable 40 milliGRAM(s) SubCutaneous every 24 hours  furosemide    Tablet 40 milliGRAM(s) Oral daily  piperacillin/tazobactam IVPB.. 3.375 Gram(s) IV Intermittent every 8 hours    MEDICATIONS  (PRN):  acetaminophen     Tablet .. 650 milliGRAM(s) Oral every 6 hours PRN Temp greater or equal to 38C (100.4F), Mild Pain (1 - 3)  oxycodone    5 mG/acetaminophen 325 mG 1 Tablet(s) Oral every 6 hours PRN Moderate Pain (4 - 6)    Allergies    No Known Allergies    Intolerances        REVIEW OF SYSTEMS:    CONSTITUTIONAL: No weakness, fevers or chills.   EYES/ENT: No visual changes;    NECK: No pain or stiffness  RESPIRATORY: No cough, wheezing, No shortness of breath  CARDIOVASCULAR: No chest pain or palpitations  GASTROINTESTINAL: No abdominal pain, or hematochezia.  GENITOURINARY: No dysuria orhematuria  NEUROLOGICAL: No numbness or weakness  SKIN: No itching, burning, rashes  All other review of systems is negative unless indicated above    Vital Signs Last 24 Hrs  T(C): 36.6 (12 May 2022 05:04), Max: 36.7 (11 May 2022 11:53)  T(F): 97.9 (12 May 2022 05:04), Max: 98 (11 May 2022 11:53)  HR: 65 (12 May 2022 05:04) (65 - 80)  BP: 128/76 (12 May 2022 05:04) (107/63 - 128/76)  BP(mean): --  RR: 18 (12 May 2022 05:04) (18 - 18)  SpO2: 98% (12 May 2022 05:04) (94% - 98%)  I&O's Summary    11 May 2022 07:01  -  12 May 2022 07:00  --------------------------------------------------------  IN: 300 mL / OUT: 500 mL / NET: -200 mL        PHYSICAL EXAM:  Constitutional: NAD  Neurological: Alert, no focal deficits  HEENT: no JVD, EOMI  Cardiovascular: Regular, S1 and S2, no murmur  Pulmonary: breath sounds bilaterally  Gastrointestinal: Bowel Sounds present, soft, nontender  EXT:  trace peripheral edema  Skin: No rashes.  Psych:  Mood calm  LABS: All Labs Reviewed:                          12.0   6.06  )-----------( 168      ( 12 May 2022 08:06 )             36.9     05-12    141  |  102  |  22  ----------------------------<  91  3.1<L>   |  32<H>  |  1.10    Ca    8.4<L>      12 May 2022 08:06    TPro  6.0  /  Alb  2.6<L>  /  TBili  0.8  /  DBili  x   /  AST  15  /  ALT  7<L>  /  AlkPhos  38<L>  05-12          12 Lead ECG:   Ventricular Rate 68 BPM    Atrial Rate 68 BPM    P-R Interval 168 ms    QRS Duration 88 ms    Q-T Interval 392 ms    QTC Calculation(Bazett) 416 ms    P Axis 39 degrees    R Axis -1 degrees    T Axis 31 degrees    Diagnosis Line *** Poor data quality, interpretation may be adversely affected s  inus rhythm with pacs      Confirmed by Raza Walls MD ([id number]) on 5/10/2022 1:20:16 PM (05-09-22 @ 14:01)    · Assessment	  86M with HTN, PVD, Parkinson's disease presents from wound care with worsening RIGHT LE swelling and erythema consistent with cellulitis.    Patient with 1 year h/o bilateral LE swelling but the right side had been less edematous to left side until now with cellulitis    Suggest:  1. S/p IV lasix 40mg twice daily x 24 hours.  ProBNP is now ~470, at borderline level.  Change to po lasix 40 mg daily  2. Repeat echocardiogram pending.  Echo 12/2021 with normal EF. pt denies cp or sob    Will follow

## 2022-05-13 ENCOUNTER — TRANSCRIPTION ENCOUNTER (OUTPATIENT)
Age: 87
End: 2022-05-13

## 2022-05-13 VITALS
TEMPERATURE: 98 F | RESPIRATION RATE: 17 BRPM | HEART RATE: 61 BPM | DIASTOLIC BLOOD PRESSURE: 72 MMHG | SYSTOLIC BLOOD PRESSURE: 125 MMHG | OXYGEN SATURATION: 94 %

## 2022-05-13 LAB
ALBUMIN SERPL ELPH-MCNC: 2.9 G/DL — LOW (ref 3.3–5)
ALP SERPL-CCNC: 43 U/L — SIGNIFICANT CHANGE UP (ref 40–120)
ALT FLD-CCNC: <6 U/L — LOW (ref 12–78)
ANION GAP SERPL CALC-SCNC: 6 MMOL/L — SIGNIFICANT CHANGE UP (ref 5–17)
AST SERPL-CCNC: 19 U/L — SIGNIFICANT CHANGE UP (ref 15–37)
BILIRUB SERPL-MCNC: 0.6 MG/DL — SIGNIFICANT CHANGE UP (ref 0.2–1.2)
BUN SERPL-MCNC: 20 MG/DL — SIGNIFICANT CHANGE UP (ref 7–23)
CALCIUM SERPL-MCNC: 8.8 MG/DL — SIGNIFICANT CHANGE UP (ref 8.5–10.1)
CHLORIDE SERPL-SCNC: 104 MMOL/L — SIGNIFICANT CHANGE UP (ref 96–108)
CO2 SERPL-SCNC: 32 MMOL/L — HIGH (ref 22–31)
CREAT SERPL-MCNC: 1.2 MG/DL — SIGNIFICANT CHANGE UP (ref 0.5–1.3)
EGFR: 60 ML/MIN/1.73M2 — SIGNIFICANT CHANGE UP
GLUCOSE SERPL-MCNC: 92 MG/DL — SIGNIFICANT CHANGE UP (ref 70–99)
HCT VFR BLD CALC: 37.4 % — LOW (ref 39–50)
HGB BLD-MCNC: 12.5 G/DL — LOW (ref 13–17)
MCHC RBC-ENTMCNC: 31.1 PG — SIGNIFICANT CHANGE UP (ref 27–34)
MCHC RBC-ENTMCNC: 33.4 GM/DL — SIGNIFICANT CHANGE UP (ref 32–36)
MCV RBC AUTO: 93 FL — SIGNIFICANT CHANGE UP (ref 80–100)
NRBC # BLD: 0 /100 WBCS — SIGNIFICANT CHANGE UP (ref 0–0)
PLATELET # BLD AUTO: 176 K/UL — SIGNIFICANT CHANGE UP (ref 150–400)
POTASSIUM SERPL-MCNC: 3.6 MMOL/L — SIGNIFICANT CHANGE UP (ref 3.5–5.3)
POTASSIUM SERPL-SCNC: 3.6 MMOL/L — SIGNIFICANT CHANGE UP (ref 3.5–5.3)
PROT SERPL-MCNC: 6.6 G/DL — SIGNIFICANT CHANGE UP (ref 6–8.3)
RBC # BLD: 4.02 M/UL — LOW (ref 4.2–5.8)
RBC # FLD: 12.9 % — SIGNIFICANT CHANGE UP (ref 10.3–14.5)
SODIUM SERPL-SCNC: 142 MMOL/L — SIGNIFICANT CHANGE UP (ref 135–145)
WBC # BLD: 6.03 K/UL — SIGNIFICANT CHANGE UP (ref 3.8–10.5)
WBC # FLD AUTO: 6.03 K/UL — SIGNIFICANT CHANGE UP (ref 3.8–10.5)

## 2022-05-13 PROCEDURE — 96374 THER/PROPH/DIAG INJ IV PUSH: CPT

## 2022-05-13 PROCEDURE — 97162 PT EVAL MOD COMPLEX 30 MIN: CPT

## 2022-05-13 PROCEDURE — 87086 URINE CULTURE/COLONY COUNT: CPT

## 2022-05-13 PROCEDURE — 80053 COMPREHEN METABOLIC PANEL: CPT

## 2022-05-13 PROCEDURE — 81001 URINALYSIS AUTO W/SCOPE: CPT

## 2022-05-13 PROCEDURE — 36415 COLL VENOUS BLD VENIPUNCTURE: CPT

## 2022-05-13 PROCEDURE — 85027 COMPLETE CBC AUTOMATED: CPT

## 2022-05-13 PROCEDURE — 83605 ASSAY OF LACTIC ACID: CPT

## 2022-05-13 PROCEDURE — 99285 EMERGENCY DEPT VISIT HI MDM: CPT

## 2022-05-13 PROCEDURE — 93306 TTE W/DOPPLER COMPLETE: CPT

## 2022-05-13 PROCEDURE — 85652 RBC SED RATE AUTOMATED: CPT

## 2022-05-13 PROCEDURE — 87040 BLOOD CULTURE FOR BACTERIA: CPT

## 2022-05-13 PROCEDURE — 71045 X-RAY EXAM CHEST 1 VIEW: CPT

## 2022-05-13 PROCEDURE — 93971 EXTREMITY STUDY: CPT

## 2022-05-13 PROCEDURE — 86140 C-REACTIVE PROTEIN: CPT

## 2022-05-13 PROCEDURE — 83880 ASSAY OF NATRIURETIC PEPTIDE: CPT

## 2022-05-13 PROCEDURE — 85730 THROMBOPLASTIN TIME PARTIAL: CPT

## 2022-05-13 PROCEDURE — 73590 X-RAY EXAM OF LOWER LEG: CPT

## 2022-05-13 PROCEDURE — 85610 PROTHROMBIN TIME: CPT

## 2022-05-13 PROCEDURE — 0225U NFCT DS DNA&RNA 21 SARSCOV2: CPT

## 2022-05-13 PROCEDURE — 93005 ELECTROCARDIOGRAM TRACING: CPT

## 2022-05-13 PROCEDURE — 85025 COMPLETE CBC W/AUTO DIFF WBC: CPT

## 2022-05-13 RX ORDER — CIPROFLOXACIN LACTATE 400MG/40ML
500 VIAL (ML) INTRAVENOUS EVERY 12 HOURS
Refills: 0 | Status: DISCONTINUED | OUTPATIENT
Start: 2022-05-13 | End: 2022-05-13

## 2022-05-13 RX ORDER — CEPHALEXIN 500 MG
500 CAPSULE ORAL
Refills: 0 | Status: DISCONTINUED | OUTPATIENT
Start: 2022-05-13 | End: 2022-05-13

## 2022-05-13 RX ORDER — CIPROFLOXACIN LACTATE 400MG/40ML
1 VIAL (ML) INTRAVENOUS
Qty: 4 | Refills: 0
Start: 2022-05-13 | End: 2022-05-14

## 2022-05-13 RX ORDER — CEPHALEXIN 500 MG
1 CAPSULE ORAL
Qty: 8 | Refills: 0
Start: 2022-05-13 | End: 2022-05-14

## 2022-05-13 RX ADMIN — CARBIDOPA AND LEVODOPA 1 TABLET(S): 25; 100 TABLET ORAL at 05:15

## 2022-05-13 RX ADMIN — Medication 1 APPLICATION(S): at 05:15

## 2022-05-13 RX ADMIN — Medication 81 MILLIGRAM(S): at 11:16

## 2022-05-13 RX ADMIN — Medication 40 MILLIGRAM(S): at 05:15

## 2022-05-13 RX ADMIN — PIPERACILLIN AND TAZOBACTAM 25 GRAM(S): 4; .5 INJECTION, POWDER, LYOPHILIZED, FOR SOLUTION INTRAVENOUS at 05:15

## 2022-05-13 NOTE — PROGRESS NOTE ADULT - SUBJECTIVE AND OBJECTIVE BOX
[facility] Cardiology    CHIEF COMPLAINT: Patient is a 86y old  Male who presents with a chief complaint of RLE wound (12 May 2022 16:58)      Follow Up: [ ] Chest Pain      [ ] Dyspnea     [ ] Palpitations    [ ] Atrial Fibrillation     [ ] Ventricular Dysrhythmia    [ ] Abnormal EKG                      [ ] Abnormal Cardiac Enzymes     [ ] Valvular Disease    HPI:  87 y/o M with hx of parkinsons, HTN, PVD, chronic wound LLE sent from wound care for evaluation of RLE cellulitis. Pt states that he has had itching with redness and swelling of his right lower leg for 3 weeks, noticed discharge from wound on right lower leg over this past weekend and worsening redness. Pt was seen in wound clinic today, seen by Dr. Eaton for admission for cellulitis. Pt is currently not on any antibiotics. Daughter states that pt had chills and fever of 101 last night, last dose of tylenol was last night. Denies CP, SOB, trauma, numbness or other symptoms.  (09 May 2022 16:16)    PAST MEDICAL & SURGICAL HISTORY:  Parkinsons      HTN (hypertension)      No significant past surgical history        MEDICATIONS  (STANDING):  ammonium lactate 12% Lotion 1 Application(s) Topical every 12 hours  aspirin  chewable 81 milliGRAM(s) Oral daily  carbidopa/levodopa  25/100 1 Tablet(s) Oral three times a day  enoxaparin Injectable 40 milliGRAM(s) SubCutaneous every 24 hours  furosemide    Tablet 40 milliGRAM(s) Oral daily  piperacillin/tazobactam IVPB.. 3.375 Gram(s) IV Intermittent every 8 hours    MEDICATIONS  (PRN):  acetaminophen     Tablet .. 650 milliGRAM(s) Oral every 6 hours PRN Temp greater or equal to 38C (100.4F), Mild Pain (1 - 3)  oxycodone    5 mG/acetaminophen 325 mG 1 Tablet(s) Oral every 6 hours PRN Moderate Pain (4 - 6)    Allergies    No Known Allergies    Intolerances        REVIEW OF SYSTEMS:    CONSTITUTIONAL: No weakness, fevers or chills.   EYES/ENT: No visual changes;    NECK: No pain or stiffness  RESPIRATORY: No cough, wheezing, No shortness of breath  CARDIOVASCULAR: No chest pain or palpitations  GASTROINTESTINAL: No abdominal pain, or hematochezia.  GENITOURINARY: No dysuria orhematuria  NEUROLOGICAL: No numbness or weakness  SKIN: No itching, burning, rashes  All other review of systems is negative unless indicated above    Vital Signs Last 24 Hrs  T(C): 36.7 (13 May 2022 04:43), Max: 36.9 (12 May 2022 20:59)  T(F): 98 (13 May 2022 04:43), Max: 98.4 (12 May 2022 20:59)  HR: 61 (13 May 2022 04:43) (61 - 69)  BP: 125/72 (13 May 2022 04:43) (121/67 - 126/83)  BP(mean): --  RR: 17 (13 May 2022 04:43) (17 - 17)  SpO2: 94% (13 May 2022 04:43) (94% - 97%)  I&O's Summary      PHYSICAL EXAM:  Constitutional: NAD  Neurological: Alert, no focal deficits  HEENT: no JVD, EOMI  Cardiovascular: Regular, S1 and S2, no murmur  Pulmonary: breath sounds bilaterally  Gastrointestinal: Bowel Sounds present, soft, nontender  EXT:  peripheral edema  Skin: No rashes.  Psych:  Mood calm  LABS: All Labs Reviewed:                          12.5   6.03  )-----------( 176      ( 13 May 2022 08:20 )             37.4     05-13    142  |  104  |  20  ----------------------------<  92  3.6   |  32<H>  |  1.20    Ca    8.8      13 May 2022 08:20    TPro  6.6  /  Alb  2.9<L>  /  TBili  0.6  /  DBili  x   /  AST  19  /  ALT  <6<L>  /  AlkPhos  43  05-13          TTE Echo Complete w/o Contrast w/ Doppler:   ACC: [id number] EXAM:  ECHO TTE WO CON COMP W DOPP                          PROCEDURE DATE:  05/12/2022          INTERPRETATION:  Ordering Physician: SAYRA LE [id number]    Indication: Edema    Study Quality: Adequate, technically limited in some views, patient   scanned in sitting position  A complete echocardiographic study was performed utilizing standard   protocol including spectral and color Doppler in all echocardiographic   windows.    Height: 190 cm  Weight: 83 kg  BSA: 2.1  Blood Pressure: 128/72    MEASUREMENTS  IVS: 0.9cm  PWT: 1.1cm  LA: 3.3cm  AO: 3.7cm  AV Cusp Separation: cm  LVIDd: 4.2cm  LVIDs: 3.3cm  LVOT: cm    LVEF: 55%  RVSP: 31mmHg    FINDINGS  Left Ventricle: Normal LV systolic function  Aortic Valve: Aortic sclerosis  Mitral Valve: Mild mitral valve prolapse, mild MR  Tricuspid Valve: Mild TR  Pulmonic Valve: Mild PI  Left Atrium: Normal  Right Ventricle: Normal  Right Atrium: Normal  Diastolic Function: Indeterminate  Pericardium/Pleura: Normal pericardium      Impression:  Technically difficult study in some views, normal EF, mild MVP, mild MR,   mild TR    --- End of Report ---    · Assessment	  86M with HTN, PVD, Parkinson's disease presents from wound care with worsening RIGHT LE swelling and erythema consistent with cellulitis.    Patient with 1 year h/o bilateral LE swelling but the right side had been less edematous to left side until now with cellulitis    Suggest:  1. S/p IV lasix 40mg twice daily x 24 hours.  ProBNP is now ~470, at borderline level.  Cont po lasix 40 mg daily  2. Repeat echocardiogram looks fine, normal EF. pt denies cp or sob    Will follow prn  pt can see me outpt [phone]                LISA AZEVEDO MD; Attending Cardiologist  This document has been electronically signed. May 13 2022  8:58AM (05-12-22 @ 10:38)

## 2022-05-13 NOTE — DISCHARGE NOTE PROVIDER - NSDCMRMEDTOKEN_GEN_ALL_CORE_FT
carbidopa-levodopa 25 mg-100 mg oral tablet: Takes as follows:  2 tablet(s) orally at 8am  1 tablet(s) orally at 12p  1 tablet(s) orally at 6p  cephalexin 500 mg oral capsule: 1 cap(s) orally 4 times a day  ciprofloxacin 500 mg oral tablet: 1 tab(s) orally every 12 hours  hydroCHLOROthiazide 25 mg oral tablet: 1 tab(s) orally once a day

## 2022-05-13 NOTE — PROGRESS NOTE ADULT - PROVIDER SPECIALTY LIST ADULT
Cardiology
Infectious Disease
Cardiology
Cardiology
Infectious Disease
Hospitalist
Infectious Disease
Cardiology
Hospitalist
Hospitalist

## 2022-05-13 NOTE — DISCHARGE NOTE PROVIDER - CARE PROVIDER_API CALL
Raza Easton; PhD)  Infectious Disease; Internal Medicine  [address] [place] [address]  Phone: [phone]  Fax: [phone]  Follow Up Time:

## 2022-05-13 NOTE — DISCHARGE NOTE PROVIDER - NSDCCPCAREPLAN_GEN_ALL_CORE_FT
PRINCIPAL DISCHARGE DIAGNOSIS  Diagnosis: Cellulitis of right lower leg  Assessment and Plan of Treatment:

## 2022-05-13 NOTE — DISCHARGE NOTE PROVIDER - HOSPITAL COURSE
1 RLE cellulitis  - cw antibiotics  - ID and podiatry fu   - fu cultures  - will monitor     2 Parkinson's disease  - cw sinemet    3 HTN  - cw home meds

## 2022-05-13 NOTE — DISCHARGE NOTE NURSING/CASE MANAGEMENT/SOCIAL WORK - PATIENT PORTAL LINK FT
You can access the FollowMyHealth Patient Portal offered by [facility] by registering at the following website: [email or web address]. By joining [facility]’s FollowMyHealth portal, you will also be able to view your health information using other applications (apps) compatible with our system.

## 2022-05-13 NOTE — PROGRESS NOTE ADULT - SUBJECTIVE AND OBJECTIVE BOX
[facility] DIVISION of INFECTIOUS DISEASE  Raza Easton MD PhD, Faye Liriano MD, Erin Lynn MD, Ty Ritter MD, Thai Hanson MD  and providing coverage with Lizet Holden MD  Providing Infectious Disease Consultations at [facility], [facility], [facility], [facility]'s    Office# [phone] to schedule follow up appointments  Answering Service for urgent calls or New Consults [phone]  Cell# to text for urgent issues Raza Easton [phone]     infectious diseases progress note:    DENG SIERRA is a 86y y. o. Male patient    No concerning overnight events    Allergies    No Known Allergies    Intolerances        ANTIBIOTICS/RELEVANT:  antimicrobials  piperacillin/tazobactam IVPB.. 3.375 Gram(s) IV Intermittent every 8 hours    immunologic:    OTHER:  acetaminophen     Tablet .. 650 milliGRAM(s) Oral every 6 hours PRN  ammonium lactate 12% Lotion 1 Application(s) Topical every 12 hours  aspirin  chewable 81 milliGRAM(s) Oral daily  carbidopa/levodopa  25/100 1 Tablet(s) Oral three times a day  enoxaparin Injectable 40 milliGRAM(s) SubCutaneous every 24 hours  furosemide    Tablet 40 milliGRAM(s) Oral daily  oxycodone    5 mG/acetaminophen 325 mG 1 Tablet(s) Oral every 6 hours PRN      Objective:  Vital Signs Last 24 Hrs  T(C): 36.7 (13 May 2022 04:43), Max: 36.9 (12 May 2022 20:59)  T(F): 98 (13 May 2022 04:43), Max: 98.4 (12 May 2022 20:59)  HR: 61 (13 May 2022 04:43) (61 - 69)  BP: 125/72 (13 May 2022 04:43) (125/72 - 126/83)  BP(mean): --  RR: 17 (13 May 2022 04:43) (17 - 17)  SpO2: 94% (13 May 2022 04:43) (94% - 95%)    T(C): 36.7 (05-13-22 @ 04:43), Max: 36.9 (05-12-22 @ 20:59)  T(C): 36.7 (05-13-22 @ 04:43), Max: 37.1 (05-10-22 @ 17:04)  T(C): 36.7 (05-13-22 @ 04:43), Max: 37.2 (05-09-22 @ 17:01)    PHYSICAL EXAM:  HEENT: NC atraumatic  Neck: supple  Respiratory: no accessory muscle use, breathing comfortably  Cardiovascular: distant  Gastrointestinal: normal appearing, nondistended  Extremities: no clubbing, no cyanosis, legs removed        LABS:                          12.5   6.03  )-----------( 176      ( 13 May 2022 08:20 )             37.4       WBC  6.03 05-13 @ 08:20  6.06 05-12 @ 08:06  9.91 05-10 @ 08:11  12.28 05-09 @ 13:23      05-13    142  |  104  |  20  ----------------------------<  92  3.6   |  32<H>  |  1.20    Ca    8.8      13 May 2022 08:20    TPro  6.6  /  Alb  2.9<L>  /  TBili  0.6  /  DBili  x   /  AST  19  /  ALT  <6<L>  /  AlkPhos  43  05-13      Creatinine, Serum: 1.20 mg/dL (05-13-22 @ 08:20)  Creatinine, Serum: 1.10 mg/dL (05-12-22 @ 08:06)  Creatinine, Serum: 1.00 mg/dL (05-10-22 @ 08:11)  Creatinine, Serum: 1.00 mg/dL (05-09-22 @ 13:23)                INFLAMMATORY MARKERS  Auto Neutrophil #: 9.68 K/uL (05-09-22 @ 13:23)  Auto Lymphocyte #: 1.08 K/uL (05-09-22 @ 13:23)    Lactate, Blood: 1.0 mmol/L (05-09-22 @ 13:23)    Auto Eosinophil #: 0.04 K/uL (05-09-22 @ 13:23)      Sedimentation Rate, Erythrocyte: 14 mm/hr (05-09-22 @ 13:23)                    Activated Partial Thromboplastin Time: 37.7 sec (05-09-22 @ 13:23)  INR: 1.14 ratio (05-09-22 @ 13:23)          MICROBIOLOGY:              RADIOLOGY & ADDITIONAL STUDIES:

## 2022-05-13 NOTE — PROGRESS NOTE ADULT - ASSESSMENT
87 y/o M with hx of parkinsons, HTN, PVD, chronic wound LLE sent from wound care for evaluation of RLE cellulitis. Pt states that he has had itching with redness and swelling of his right lower leg for 3 weeks, noticed discharge from wound on right lower leg over this past weekend and worsening redness. Pt was seen in wound clinic today, seen by Dr. Eaton for admission for cellulitis. Pt is currently not on any antibiotics. Daughter states that pt had chills and fever of 101 last night, last dose of tylenol was last night. Denies CP, SOB, trauma, numbness or other symptoms.     RLE cellulitis/LE edema/Leukocytosis  --previous 3/22 WCx w/ MSSA and pseudomonas  -imaging reviewed  --XR w/ edema, no OM  Previous cx w/ MSSA, no further vanco or now    zosyn (started 4/9 noon) will dc   finish with oral with  Cipro 500mg PO BID and Keflex 500mg PO QID w last day 5/14    Leg elevation and supportive care  Appreciate wound care recs    From an ID standpoint no further requirement for inpatient status for the management of ID issues. Fine with discharge from ID standpoint when other medical issues no longer require inpatient care and social issues allow for a safe discharge plan. To schedule an outpatient ID follow up appointment please call our office at [phone]      Thank you for consulting us and involving us in the management of this most interesting and challenging case.  We will follow along in the care of this patient. Please call us at [phone] or text me directly on my cell# at [phone] with any concerns.

## 2022-05-13 NOTE — DISCHARGE NOTE NURSING/CASE MANAGEMENT/SOCIAL WORK - NSDCPEFALRISK_GEN_ALL_CORE
For information on Fall & Injury Prevention, visit: [email or web address] OR  [email or web address] OR  https://www.cdc.gov/steadi/patient.html

## 2022-05-22 ENCOUNTER — NON-APPOINTMENT (OUTPATIENT)
Age: 87
End: 2022-05-22

## 2022-05-23 ENCOUNTER — APPOINTMENT (OUTPATIENT)
Dept: WOUND CARE | Facility: HOSPITAL | Age: 87
End: 2022-05-23
Payer: MEDICARE

## 2022-05-23 ENCOUNTER — OUTPATIENT (OUTPATIENT)
Dept: OUTPATIENT SERVICES | Facility: HOSPITAL | Age: 87
LOS: 1 days | Discharge: HOME | End: 2022-05-23
Payer: MEDICARE

## 2022-05-23 VITALS
WEIGHT: 195 LBS | HEART RATE: 77 BPM | OXYGEN SATURATION: 96 % | HEIGHT: 78 IN | TEMPERATURE: 99.4 F | DIASTOLIC BLOOD PRESSURE: 83 MMHG | BODY MASS INDEX: 22.56 KG/M2 | SYSTOLIC BLOOD PRESSURE: 139 MMHG | RESPIRATION RATE: 20 BRPM

## 2022-05-23 DIAGNOSIS — I83.228 VARICOSE VEINS OF LEFT LOWER EXTREMITY WITH BOTH ULCER OF OTHER PART OF LOWER EXTREMITY AND INFLAMMATION: ICD-10-CM

## 2022-05-23 DIAGNOSIS — Z79.899 OTHER LONG TERM (CURRENT) DRUG THERAPY: ICD-10-CM

## 2022-05-23 DIAGNOSIS — Z87.891 PERSONAL HISTORY OF NICOTINE DEPENDENCE: ICD-10-CM

## 2022-05-23 DIAGNOSIS — G20 PARKINSON'S DISEASE: ICD-10-CM

## 2022-05-23 DIAGNOSIS — R01.1 CARDIAC MURMUR, UNSPECIFIED: ICD-10-CM

## 2022-05-23 DIAGNOSIS — L97.822 NON-PRESSURE CHRONIC ULCER OF OTHER PART OF LEFT LOWER LEG WITH FAT LAYER EXPOSED: ICD-10-CM

## 2022-05-23 DIAGNOSIS — I34.0 NONRHEUMATIC MITRAL (VALVE) INSUFFICIENCY: ICD-10-CM

## 2022-05-23 DIAGNOSIS — I49.3 VENTRICULAR PREMATURE DEPOLARIZATION: ICD-10-CM

## 2022-05-23 DIAGNOSIS — M19.90 UNSPECIFIED OSTEOARTHRITIS, UNSPECIFIED SITE: ICD-10-CM

## 2022-05-23 DIAGNOSIS — I83.893 VARICOSE VEINS OF BILATERAL LOWER EXTREMITIES WITH OTHER COMPLICATIONS: ICD-10-CM

## 2022-05-23 DIAGNOSIS — Z80.0 FAMILY HISTORY OF MALIGNANT NEOPLASM OF DIGESTIVE ORGANS: ICD-10-CM

## 2022-05-23 PROCEDURE — G0463: CPT

## 2022-05-23 PROCEDURE — 99213 OFFICE O/P EST LOW 20 MIN: CPT

## 2022-05-23 NOTE — HISTORY OF PRESENT ILLNESS
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 Healing well. Using comp. stockings. Discussed importance of compression stockings/leg elevation.\par    Pt also with adherent eschar on both forearm.\par \par 3/28/22 left lower leg wound unchanged despite multiple grafts. wound clean with healthy granulation\par 4/5/22 left lateral lower leg ulcer slightly smaller\par 4/11/22 discussed patient with Dr. Ramos and will repeat venous studies and she will re evaluate because of non healing left lower leg ulcer\par 5/23/22 moderate edema . stressed compression, aces placed today

## 2022-05-23 NOTE — ASSESSMENT
[Verbal] : Verbal [Written] : Written [Demo] : Demo [Patient] : Patient [Good - alert, interested, motivated] : Good - alert, interested, motivated [Demonstrates independently] : demonstrates independently [Dressing changes] : dressing changes [Skin Care] : skin care [Signs and symptoms of infection] : sign and symptoms of infection [Venous Disease] : venous disease [Nutrition] : nutrition [How and When to Call] : how and when to call [Off-loading] : off-loading [Compression Therapy] : compression therapy [Patient responsibility to plan of care] : patient responsibility to plan of care [Stable] : stable [Home] : Home [Ambulatory] : Ambulatory [Not Applicable - Long Term Care/Home Health Agency] : Long Term Care/Home Health Agency: Not Applicable [] : No [FreeTextEntry2] : Infection prevention \par Wound care (dressing changes)\par Maintain optimal skin integrity to high pressure areas\par Compression therapy\par Nutrition and wound healing\par Elevation and low sodium compliance.\par Offloading the stress on skin structures and decreasing potential pathologic biomechanical influences.\par F/U 2 weeks \par  [FreeTextEntry4] : F/U 2 weeks

## 2022-05-23 NOTE — PHYSICAL EXAM
[4 x 4] : 4 x 4  [Abdominal Pad] : Abdominal Pad [Normal Thyroid] : the thyroid was normal [Normal Breath Sounds] : Normal breath sounds [Normal Heart Sounds] : normal heart sounds [Normal Rate and Rhythm] : normal rate and rhythm [Ankle Swelling (On Exam)] : present [Ankle Swelling On The Left] : moderate [] : of the left leg [Ankle Swelling On The Right] : mild [Alert] : alert [Oriented to Person] : oriented to person [Oriented to Place] : oriented to place [Oriented to Time] : oriented to time [Calm] : calm [JVD] : no jugular venous distention  [Abdomen Masses] : No abdominal massess [Abdomen Tenderness] : ~T ~M No abdominal tenderness [Tender] : nontender [Enlarged] : not enlarged [de-identified] : elderly adult WM, NAD, alert, Ox3. [de-identified] : Dry eschar removed by MD [FreeTextEntry1] :  Lateral Lower Leg - Dry eschar  [FreeTextEntry2] : 1.0 [FreeTextEntry3] : 0.5 [FreeTextEntry4] : 0.1  [de-identified] : with dry flaky epithelium [de-identified] : none [de-identified] : Pt expressed comfort post COBAN application. Circ/Neuromuscular functions WNL post application. [de-identified] : Silver Alginate [de-identified] : Mechanically cleansed with sterile gauze and normal saline 0.9%\par \par  [TWNoteComboBox1] : Left [TWNoteComboBox4] : None [TWNoteComboBox5] : No [TWNoteComboBox6] : Other [de-identified] : Normal [de-identified] : None [de-identified] : 100% [de-identified] : Yes [de-identified] : False [de-identified] : Ace wraps [de-identified] : 3x Weekly [de-identified] : Primary Dressing

## 2022-05-23 NOTE — VITALS
[Pain related to present condition?] : The patient's  pain is not related to present condition. [] : No [de-identified] : 0/10

## 2022-05-23 NOTE — REVIEW OF SYSTEMS
[Arthralgias] : arthralgias [As Noted in HPI] : as noted in HPI [Negative] : Heme/Lymph [Feeling Poorly] : not feeling poorly [Feeling Tired] : not feeling tired [FreeTextEntry9] : "arthritis..." [de-identified] : "tremor..."

## 2022-05-23 NOTE — PLAN
[FreeTextEntry1] : AgAlginate with DD QOD left lower leg\par stressed elevation and compression\par return 2 weeks\par 20 minutes spent in review,evaluation and treatment planning\par ulcer unchanged

## 2022-06-05 ENCOUNTER — NON-APPOINTMENT (OUTPATIENT)
Age: 87
End: 2022-06-05

## 2022-06-06 ENCOUNTER — OUTPATIENT (OUTPATIENT)
Dept: OUTPATIENT SERVICES | Facility: HOSPITAL | Age: 87
LOS: 1 days | Discharge: HOME | End: 2022-06-06
Payer: MEDICARE

## 2022-06-06 ENCOUNTER — APPOINTMENT (OUTPATIENT)
Dept: WOUND CARE | Facility: HOSPITAL | Age: 87
End: 2022-06-06
Payer: MEDICARE

## 2022-06-06 VITALS
BODY MASS INDEX: 22.56 KG/M2 | DIASTOLIC BLOOD PRESSURE: 76 MMHG | HEART RATE: 78 BPM | RESPIRATION RATE: 20 BRPM | SYSTOLIC BLOOD PRESSURE: 158 MMHG | WEIGHT: 195 LBS | OXYGEN SATURATION: 96 % | TEMPERATURE: 99.2 F | HEIGHT: 78 IN

## 2022-06-06 DIAGNOSIS — I83.228 VARICOSE VEINS OF LEFT LOWER EXTREMITY WITH BOTH ULCER OF OTHER PART OF LOWER EXTREMITY AND INFLAMMATION: ICD-10-CM

## 2022-06-06 PROCEDURE — G0463: CPT

## 2022-06-06 PROCEDURE — 99213 OFFICE O/P EST LOW 20 MIN: CPT

## 2022-06-06 NOTE — PHYSICAL EXAM
[4 x 4] : 4 x 4  [Normal Thyroid] : the thyroid was normal [Normal Breath Sounds] : Normal breath sounds [Normal Heart Sounds] : normal heart sounds [Normal Rate and Rhythm] : normal rate and rhythm [Ankle Swelling (On Exam)] : present [Ankle Swelling On The Left] : moderate [] : of the left leg [Ankle Swelling On The Right] : mild [Alert] : alert [Oriented to Person] : oriented to person [Oriented to Place] : oriented to place [Oriented to Time] : oriented to time [Calm] : calm [JVD] : no jugular venous distention  [Abdomen Masses] : No abdominal massess [Abdomen Tenderness] : ~T ~M No abdominal tenderness [Tender] : nontender [Enlarged] : not enlarged [de-identified] : elderly adult WM, NAD, alert, Ox3. [FreeTextEntry1] : Left lateral lower leg [FreeTextEntry2] : 0.8 [FreeTextEntry3] : 0.4 [FreeTextEntry4] : 0.1 [de-identified] : small serosanguineous [de-identified] : intact [de-identified] : none [de-identified] : 50% [de-identified] : 50% [de-identified] : ace wrap [de-identified] : Medihoney [de-identified] : Mechanically cleansed with normal saline solution and sterile gauze\par \par Cloth tape\par  [de-identified] : Every other day

## 2022-06-06 NOTE — HISTORY OF PRESENT ILLNESS
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 Healing well. Using comp. stockings. Discussed importance of compression stockings/leg elevation.\par    Pt also with adherent eschar on both forearm.\par \par 3/28/22 left lower leg wound unchanged despite multiple grafts. wound clean with healthy granulation\par 4/5/22 left lateral lower leg ulcer slightly smaller\par 4/11/22 discussed patient with Dr. Ramos and will repeat venous studies and she will re evaluate because of non healing left lower leg ulcer\par 5/23/22 moderate edema . stressed compression, aces placed today\par 6/6/22 left lower leg ulcer with slough in base

## 2022-06-06 NOTE — ASSESSMENT
[Verbal] : Verbal [Patient] : Patient [Good - alert, interested, motivated] : Good - alert, interested, motivated [Verbalizes knowledge/Understanding] : Verbalizes knowledge/understanding [Dressing changes] : dressing changes [Skin Care] : skin care [Signs and symptoms of infection] : sign and symptoms of infection [Venous Disease] : venous disease [Nutrition] : nutrition [How and When to Call] : how and when to call [Compression Therapy] : compression therapy [Stable] : stable [Home] : Home [Ambulatory] : Ambulatory [] : No [FreeTextEntry2] : Promote optimal skin integrity, infection prevention, edema control, low sodium diet\par  [FreeTextEntry3] : Increased slough [FreeTextEntry4] : \par Medihoney escribed\par f/u 2 weeks

## 2022-06-06 NOTE — PLAN
[FreeTextEntry1] : medihoney with DD QOD left lower leg\par stressed elevation and compression\par return 2 weeks\par 20 minutes spent in review,evaluation and treatment planning\par ulcer 1mm smaller

## 2022-06-06 NOTE — VITALS
[] : No [de-identified] : Pain scale:  0/10 - Patient reports no c/o pains or discomforts at present.

## 2022-06-06 NOTE — REVIEW OF SYSTEMS
[Arthralgias] : arthralgias [As Noted in HPI] : as noted in HPI [Negative] : Heme/Lymph [Feeling Poorly] : not feeling poorly [Feeling Tired] : not feeling tired [FreeTextEntry9] : "arthritis..." [de-identified] : "tremor..."

## 2022-06-07 DIAGNOSIS — L97.822 NON-PRESSURE CHRONIC ULCER OF OTHER PART OF LEFT LOWER LEG WITH FAT LAYER EXPOSED: ICD-10-CM

## 2022-06-07 DIAGNOSIS — M19.90 UNSPECIFIED OSTEOARTHRITIS, UNSPECIFIED SITE: ICD-10-CM

## 2022-06-07 DIAGNOSIS — Z79.899 OTHER LONG TERM (CURRENT) DRUG THERAPY: ICD-10-CM

## 2022-06-07 DIAGNOSIS — R01.1 CARDIAC MURMUR, UNSPECIFIED: ICD-10-CM

## 2022-06-07 DIAGNOSIS — I83.893 VARICOSE VEINS OF BILATERAL LOWER EXTREMITIES WITH OTHER COMPLICATIONS: ICD-10-CM

## 2022-06-07 DIAGNOSIS — G20 PARKINSON'S DISEASE: ICD-10-CM

## 2022-06-07 DIAGNOSIS — Z80.0 FAMILY HISTORY OF MALIGNANT NEOPLASM OF DIGESTIVE ORGANS: ICD-10-CM

## 2022-06-07 DIAGNOSIS — Z87.891 PERSONAL HISTORY OF NICOTINE DEPENDENCE: ICD-10-CM

## 2022-06-07 DIAGNOSIS — I34.0 NONRHEUMATIC MITRAL (VALVE) INSUFFICIENCY: ICD-10-CM

## 2022-06-07 DIAGNOSIS — I49.3 VENTRICULAR PREMATURE DEPOLARIZATION: ICD-10-CM

## 2022-06-07 DIAGNOSIS — I83.228 VARICOSE VEINS OF LEFT LOWER EXTREMITY WITH BOTH ULCER OF OTHER PART OF LOWER EXTREMITY AND INFLAMMATION: ICD-10-CM

## 2022-06-20 ENCOUNTER — APPOINTMENT (OUTPATIENT)
Dept: WOUND CARE | Facility: HOSPITAL | Age: 87
End: 2022-06-20
Payer: MEDICARE

## 2022-06-20 ENCOUNTER — OUTPATIENT (OUTPATIENT)
Dept: OUTPATIENT SERVICES | Facility: HOSPITAL | Age: 87
LOS: 1 days | Discharge: HOME | End: 2022-06-20
Payer: MEDICARE

## 2022-06-20 VITALS
RESPIRATION RATE: 20 BRPM | WEIGHT: 185 LBS | SYSTOLIC BLOOD PRESSURE: 145 MMHG | BODY MASS INDEX: 22.53 KG/M2 | HEIGHT: 76 IN | OXYGEN SATURATION: 100 % | HEART RATE: 82 BPM | DIASTOLIC BLOOD PRESSURE: 79 MMHG | TEMPERATURE: 98.7 F

## 2022-06-20 DIAGNOSIS — I83.228 VARICOSE VEINS OF LEFT LOWER EXTREMITY WITH BOTH ULCER OF OTHER PART OF LOWER EXTREMITY AND INFLAMMATION: ICD-10-CM

## 2022-06-20 PROCEDURE — G0463: CPT

## 2022-06-20 PROCEDURE — 99213 OFFICE O/P EST LOW 20 MIN: CPT

## 2022-06-20 NOTE — PLAN
[FreeTextEntry1] : yasemin with DD QOD left lower leg\par stressed elevation and compression\par return 2 weeks\par 20 minutes spent in review,evaluation and treatment planning

## 2022-06-20 NOTE — PHYSICAL EXAM
[Normal Thyroid] : the thyroid was normal [Normal Breath Sounds] : Normal breath sounds [Normal Heart Sounds] : normal heart sounds [Normal Rate and Rhythm] : normal rate and rhythm [Ankle Swelling (On Exam)] : present [Ankle Swelling On The Left] : moderate [] : of the left leg [Ankle Swelling On The Right] : mild [Alert] : alert [Oriented to Person] : oriented to person [Oriented to Place] : oriented to place [Oriented to Time] : oriented to time [Calm] : calm [4 x 4] : 4 x 4  [JVD] : no jugular venous distention  [Abdomen Masses] : No abdominal massess [Abdomen Tenderness] : ~T ~M No abdominal tenderness [Tender] : nontender [Enlarged] : not enlarged [de-identified] : elderly adult WM, NAD, alert, Ox3. [FreeTextEntry1] : Left Leg (Lateral) [FreeTextEntry2] : 1.2 [FreeTextEntry3] : 1.2 [FreeTextEntry4] : 0.2 [de-identified] : serosanguineous [de-identified] : Mild maceration [de-identified] : None [de-identified] : Pt's own compression stockings. [de-identified] : Ani, Adaptic touch [de-identified] : Mechanically cleansed with sterile gauze and normal saline 0.9%\par Dry Dressing\par  [TWNoteComboBox4] : Moderate [TWNoteComboBox5] : No [TWNoteComboBox6] : Venous [de-identified] : No [de-identified] : other [de-identified] : None [de-identified] : None [de-identified] : 100% [de-identified] : No [de-identified] : 3x Weekly [de-identified] : Primary Dressing

## 2022-06-20 NOTE — HISTORY OF PRESENT ILLNESS
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 Healing well. Using comp. stockings. Discussed importance of compression stockings/leg elevation.\par    Pt also with adherent eschar on both forearm.\par \par 3/28/22 left lower leg wound unchanged despite multiple grafts. wound clean with healthy granulation\par 4/5/22 left lateral lower leg ulcer slightly smaller\par 4/11/22 discussed patient with Dr. Ramos and will repeat venous studies and she will re evaluate because of non healing left lower leg ulcer\par 5/23/22 moderate edema . stressed compression, aces placed today\par 6/6/22 left lower leg ulcer with slough in base\par 6/20/22 wound clean without slough

## 2022-06-20 NOTE — ASSESSMENT
[Verbal] : Verbal [Written] : Written [Demo] : Demo [Patient] : Patient [Family member] : Family member [Good - alert, interested, motivated] : Good - alert, interested, motivated [Demonstrates independently] : demonstrates independently [Dressing changes] : dressing changes [Skin Care] : skin care [Signs and symptoms of infection] : sign and symptoms of infection [Venous Disease] : venous disease [Nutrition] : nutrition [How and When to Call] : how and when to call [Off-loading] : off-loading [Compression Therapy] : compression therapy [Patient responsibility to plan of care] : patient responsibility to plan of care [Stable] : stable [Home] : Home [Cane] : Cane [Not Applicable - Long Term Care/Home Health Agency] : Long Term Care/Home Health Agency: Not Applicable [] : No [FreeTextEntry2] : Infection prevention \par Wound care (dressing changes)\par Maintain optimal skin integrity to high pressure areas\par Compression therapy\par Nutrition and wound healing\par Elevation and low sodium compliance.\par Offloading the stress on skin structures and decreasing potential pathologic biomechanical influences. [FreeTextEntry3] : Wounds larger [FreeTextEntry4] : Emphasized the importance of lower leg elevation during time of visit. Pt understanding of same.\par Supplies submitted.\par Pt to f/u in 2 weeks.

## 2022-06-20 NOTE — REVIEW OF SYSTEMS
[Arthralgias] : arthralgias [As Noted in HPI] : as noted in HPI [Negative] : Heme/Lymph [Feeling Poorly] : not feeling poorly [Feeling Tired] : not feeling tired [FreeTextEntry9] : "arthritis..." [de-identified] : "tremor..."

## 2022-06-21 DIAGNOSIS — L97.822 NON-PRESSURE CHRONIC ULCER OF OTHER PART OF LEFT LOWER LEG WITH FAT LAYER EXPOSED: ICD-10-CM

## 2022-06-21 DIAGNOSIS — M19.90 UNSPECIFIED OSTEOARTHRITIS, UNSPECIFIED SITE: ICD-10-CM

## 2022-06-21 DIAGNOSIS — Z80.0 FAMILY HISTORY OF MALIGNANT NEOPLASM OF DIGESTIVE ORGANS: ICD-10-CM

## 2022-06-21 DIAGNOSIS — Z87.891 PERSONAL HISTORY OF NICOTINE DEPENDENCE: ICD-10-CM

## 2022-06-21 DIAGNOSIS — I83.893 VARICOSE VEINS OF BILATERAL LOWER EXTREMITIES WITH OTHER COMPLICATIONS: ICD-10-CM

## 2022-06-21 DIAGNOSIS — I34.0 NONRHEUMATIC MITRAL (VALVE) INSUFFICIENCY: ICD-10-CM

## 2022-06-21 DIAGNOSIS — I83.228 VARICOSE VEINS OF LEFT LOWER EXTREMITY WITH BOTH ULCER OF OTHER PART OF LOWER EXTREMITY AND INFLAMMATION: ICD-10-CM

## 2022-06-21 DIAGNOSIS — I49.3 VENTRICULAR PREMATURE DEPOLARIZATION: ICD-10-CM

## 2022-06-21 DIAGNOSIS — R01.1 CARDIAC MURMUR, UNSPECIFIED: ICD-10-CM

## 2022-06-21 DIAGNOSIS — Z79.899 OTHER LONG TERM (CURRENT) DRUG THERAPY: ICD-10-CM

## 2022-06-21 DIAGNOSIS — G20 PARKINSON'S DISEASE: ICD-10-CM

## 2022-07-05 ENCOUNTER — NON-APPOINTMENT (OUTPATIENT)
Age: 87
End: 2022-07-05

## 2022-07-06 ENCOUNTER — OUTPATIENT (OUTPATIENT)
Dept: OUTPATIENT SERVICES | Facility: HOSPITAL | Age: 87
LOS: 1 days | Discharge: HOME | End: 2022-07-06
Payer: MEDICARE

## 2022-07-06 ENCOUNTER — APPOINTMENT (OUTPATIENT)
Dept: WOUND CARE | Facility: HOSPITAL | Age: 87
End: 2022-07-06

## 2022-07-06 VITALS
HEART RATE: 85 BPM | TEMPERATURE: 98.4 F | BODY MASS INDEX: 22.53 KG/M2 | DIASTOLIC BLOOD PRESSURE: 76 MMHG | RESPIRATION RATE: 20 BRPM | SYSTOLIC BLOOD PRESSURE: 135 MMHG | OXYGEN SATURATION: 99 % | WEIGHT: 185 LBS | HEIGHT: 76 IN

## 2022-07-06 DIAGNOSIS — L89.154 PRESSURE ULCER OF SACRAL REGION, STAGE 4: ICD-10-CM

## 2022-07-06 DIAGNOSIS — L03.116 CELLULITIS OF LEFT LOWER LIMB: ICD-10-CM

## 2022-07-06 DIAGNOSIS — I83.228 VARICOSE VEINS OF LEFT LOWER EXTREMITY WITH BOTH ULCER OF OTHER PART OF LOWER EXTREMITY AND INFLAMMATION: ICD-10-CM

## 2022-07-06 PROCEDURE — G0463: CPT | Mod: 25

## 2022-07-06 PROCEDURE — 99214 OFFICE O/P EST MOD 30 MIN: CPT

## 2022-07-06 PROCEDURE — 29581 APPL MULTLAYER CMPRN SYS LEG: CPT | Mod: LT

## 2022-07-06 RX ORDER — SULFAMETHOXAZOLE AND TRIMETHOPRIM 800; 160 MG/1; MG/1
800-160 TABLET ORAL TWICE DAILY
Qty: 14 | Refills: 0 | Status: COMPLETED | COMMUNITY
Start: 2022-07-06 | End: 2022-07-13

## 2022-07-06 NOTE — VITALS
[Pain related to present condition?] : The patient's  pain is not related to present condition. [] : No [de-identified] : 0/10

## 2022-07-06 NOTE — REVIEW OF SYSTEMS
[Arthralgias] : arthralgias [As Noted in HPI] : as noted in HPI [Negative] : Heme/Lymph [Feeling Poorly] : not feeling poorly [Feeling Tired] : not feeling tired [FreeTextEntry9] : "arthritis..." [de-identified] : "tremor..."

## 2022-07-06 NOTE — HISTORY OF PRESENT ILLNESS
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 Last seen here about 2 wks ago and his wife has been doing the wound dsg change according to the daughter. Pt refusing to have a visiting RN to do the dsg change.\par     Today, the VSU appears very wet and oozing fluid along with mild erythema developing which was marked with a skin pen. Both LE appear to be markedly swollen today with L>R.\par    Pt also had a venous sonogram done in 4/22 and seen by vascular surgeon, Dr. Eaton in 5/22 who recommended f/u with her as well as a punch bx.

## 2022-07-06 NOTE — ASSESSMENT
[Verbal] : Verbal [Written] : Written [Demo] : Demo [Patient] : Patient [Family member] : Family member [Fair - mild discomfort, physical impairment, low acceptance] : Fair - mild discomfort, physical impairment, low acceptance [Needs reinforcement] : needs reinforcement [Dressing changes] : dressing changes [Skin Care] : skin care [Signs and symptoms of infection] : sign and symptoms of infection [Venous Disease] : venous disease [Nutrition] : nutrition [How and When to Call] : how and when to call [Off-loading] : off-loading [Compression Therapy] : compression therapy [Patient responsibility to plan of care] : patient responsibility to plan of care [Stable] : stable [Home] : Home [Cane] : Cane [Not Applicable - Long Term Care/Home Health Agency] : Long Term Care/Home Health Agency: Not Applicable [FreeTextEntry2] : Infection prevention \par Wound care (dressing changes)\par Maintain optimal skin integrity to high pressure areas\par Compression therapy\par Nutrition and wound healing\par Elevation and low sodium compliance.\par Oral antibiotics\par F/U 1x/weekly for a dressing change (MD to see patient) and in 1 week for an assessment [] : No [FreeTextEntry3] : Wounds measures larger [FreeTextEntry4] : MD escribed oral antibiotics, patient's daughter is aware of need to  prescription\par Patient advised to start antibiotics tonight, he verbalized understanding.\par MD ordered vascular consult and a [facility] biopsy\par Request for punch biopsy and authorization for the punch biopsy submitted today\par F/U 1x/weekly for a dressing change (MD to see patient) and in 1 week for an assessment

## 2022-07-06 NOTE — PLAN
[FreeTextEntry1] : leg elevation\par RLE-comp stockings\par LLE-hydrofera blue, DD, abd pad, coban\par f/u 2 days\par bactrim DS x 7 days\par Vascular surg f/u\par get authorization for a punch bx.\par \par time spent 35 mins.

## 2022-07-06 NOTE — PHYSICAL EXAM
[4 x 4] : 4 x 4  [Normal Thyroid] : the thyroid was normal [Normal Breath Sounds] : Normal breath sounds [Normal Heart Sounds] : normal heart sounds [Normal Rate and Rhythm] : normal rate and rhythm [Ankle Swelling (On Exam)] : present [Ankle Swelling Bilaterally] : severe [] : of the left leg [Ankle Swelling On The Left] : moderate [Calm] : calm [JVD] : no jugular venous distention  [Abdomen Masses] : No abdominal massess [Abdomen Tenderness] : ~T ~M No abdominal tenderness [Tender] : nontender [Enlarged] : not enlarged [de-identified] : elderly WM, NAD, alert [de-identified] : Patient expressed comfort post compression application [FreeTextEntry1] : Leg (Lateral) [FreeTextEntry2] : 8.5 [FreeTextEntry3] : 10.7 [de-identified] : serosanguineous [FreeTextEntry4] : 0.1-0.2 [de-identified] : moderately  with mild erythema and warmth [de-identified] : 70% [de-identified] : 30% [de-identified] : None [de-identified] : Coban. No neurovascular deficits noted  [de-identified] : Hydrofera Blue [de-identified] : Mechanically cleansed with sterile gauze and normal saline 0.9%\par \par  [TWNoteComboBox1] : Left [TWNoteComboBox4] : Moderate [TWNoteComboBox5] : No [de-identified] : No [TWNoteComboBox6] : Venous [de-identified] : Macerated [de-identified] : None [de-identified] : None [de-identified] : 100% [de-identified] : Yes [de-identified] : Multilayer other compression wrap [de-identified] : 3x Weekly [de-identified] : Primary Dressing

## 2022-07-08 ENCOUNTER — APPOINTMENT (OUTPATIENT)
Dept: WOUND CARE | Facility: HOSPITAL | Age: 87
End: 2022-07-08

## 2022-07-08 ENCOUNTER — OUTPATIENT (OUTPATIENT)
Dept: OUTPATIENT SERVICES | Facility: HOSPITAL | Age: 87
LOS: 1 days | Discharge: HOME | End: 2022-07-08
Payer: MEDICARE

## 2022-07-08 VITALS
RESPIRATION RATE: 20 BRPM | SYSTOLIC BLOOD PRESSURE: 122 MMHG | HEART RATE: 88 BPM | OXYGEN SATURATION: 98 % | BODY MASS INDEX: 22.53 KG/M2 | HEIGHT: 76 IN | WEIGHT: 185 LBS | TEMPERATURE: 99.1 F | DIASTOLIC BLOOD PRESSURE: 70 MMHG

## 2022-07-08 DIAGNOSIS — M19.90 UNSPECIFIED OSTEOARTHRITIS, UNSPECIFIED SITE: ICD-10-CM

## 2022-07-08 DIAGNOSIS — Z79.899 OTHER LONG TERM (CURRENT) DRUG THERAPY: ICD-10-CM

## 2022-07-08 DIAGNOSIS — G20 PARKINSON'S DISEASE: ICD-10-CM

## 2022-07-08 DIAGNOSIS — Z87.891 PERSONAL HISTORY OF NICOTINE DEPENDENCE: ICD-10-CM

## 2022-07-08 DIAGNOSIS — L97.822 NON-PRESSURE CHRONIC ULCER OF OTHER PART OF LEFT LOWER LEG WITH FAT LAYER EXPOSED: ICD-10-CM

## 2022-07-08 DIAGNOSIS — Z80.0 FAMILY HISTORY OF MALIGNANT NEOPLASM OF DIGESTIVE ORGANS: ICD-10-CM

## 2022-07-08 DIAGNOSIS — L03.116 CELLULITIS OF LEFT LOWER LIMB: ICD-10-CM

## 2022-07-08 DIAGNOSIS — I83.228 VARICOSE VEINS OF LEFT LOWER EXTREMITY WITH BOTH ULCER OF OTHER PART OF LOWER EXTREMITY AND INFLAMMATION: ICD-10-CM

## 2022-07-08 DIAGNOSIS — I49.3 VENTRICULAR PREMATURE DEPOLARIZATION: ICD-10-CM

## 2022-07-08 DIAGNOSIS — I34.0 NONRHEUMATIC MITRAL (VALVE) INSUFFICIENCY: ICD-10-CM

## 2022-07-08 DIAGNOSIS — I83.893 VARICOSE VEINS OF BILATERAL LOWER EXTREMITIES WITH OTHER COMPLICATIONS: ICD-10-CM

## 2022-07-08 DIAGNOSIS — R01.1 CARDIAC MURMUR, UNSPECIFIED: ICD-10-CM

## 2022-07-08 PROCEDURE — 99213 OFFICE O/P EST LOW 20 MIN: CPT

## 2022-07-08 PROCEDURE — 29581 APPL MULTLAYER CMPRN SYS LEG: CPT | Mod: LT

## 2022-07-08 NOTE — ASSESSMENT
[Verbal] : Verbal [Written] : Written [Demo] : Demo [Patient] : Patient [Family member] : Family member [Fair - mild discomfort, physical impairment, low acceptance] : Fair - mild discomfort, physical impairment, low acceptance [Needs reinforcement] : needs reinforcement [Dressing changes] : dressing changes [Skin Care] : skin care [Signs and symptoms of infection] : sign and symptoms of infection [Venous Disease] : venous disease [Nutrition] : nutrition [How and When to Call] : how and when to call [Off-loading] : off-loading [Compression Therapy] : compression therapy [Patient responsibility to plan of care] : patient responsibility to plan of care [Stable] : stable [Home] : Home [Cane] : Cane [Not Applicable - Long Term Care/Home Health Agency] : Long Term Care/Home Health Agency: Not Applicable [] : No [FreeTextEntry2] : Infection prevention \par Wound care (dressing changes)\par Maintain optimal skin integrity to high pressure areas\par Compression therapy\par Nutrition and wound healing\par Elevation and low sodium compliance.\par Oral antibiotics\par F/U 1x/weekly for a dressing change (MD to see patient) and in 1 week for an assessment [FreeTextEntry4] : F/U 7/12/22 for assessment and 1x for dressing change\par Erythema has improved, bactrim in progress, no side effects to report\par vascular consult pending, MD to re assess need to proceed with punch biopsy at next assessment\par elevation of lower extremities frequently throughout the day, low sodium intake, and compression therapy discussed during visit, pt verbalized understanding and demonstrated understanding of lower extremity elevation.\par

## 2022-07-08 NOTE — REVIEW OF SYSTEMS
[Arthralgias] : arthralgias [As Noted in HPI] : as noted in HPI [Negative] : Heme/Lymph [Feeling Poorly] : not feeling poorly [Feeling Tired] : not feeling tired [FreeTextEntry9] : "arthritis..." [de-identified] : "tremor..."

## 2022-07-08 NOTE — PHYSICAL EXAM
[4 x 4] : 4 x 4  [Normal Thyroid] : the thyroid was normal [Normal Breath Sounds] : Normal breath sounds [Normal Heart Sounds] : normal heart sounds [Normal Rate and Rhythm] : normal rate and rhythm [Ankle Swelling (On Exam)] : present [] : of the left leg [Ankle Swelling On The Left] : moderate [Alert] : alert [Oriented to Person] : oriented to person [Oriented to Place] : oriented to place [Oriented to Time] : oriented to time [Calm] : calm [Abdominal Pad] : Abdominal Pad [JVD] : no jugular venous distention  [Abdomen Masses] : No abdominal massess [Abdomen Tenderness] : ~T ~M No abdominal tenderness [Tender] : nontender [Enlarged] : not enlarged [de-identified] : elderly WM, NAD, alert, Ox3. [de-identified] : Circ neuromuscular function WNL post coban compression application, pt expressed comfort.\par  [FreeTextEntry1] : Leg (Lateral) [FreeTextEntry2] : 8.5 [FreeTextEntry3] : 8.6 [FreeTextEntry4] : 0.1-0.2 [de-identified] : serosanguineous [de-identified] : moderately  with mild erythema and warmth [de-identified] : 70% [de-identified] : 30% [de-identified] : None [de-identified] : Coban [de-identified] : Hydrofera Blue [de-identified] : Mechanically cleansed with sterile gauze and normal saline 0.9%\par \par  [TWNoteComboBox1] : Left [TWNoteComboBox4] : Moderate [TWNoteComboBox5] : No [TWNoteComboBox6] : Venous [de-identified] : No [de-identified] : Macerated [de-identified] : None [de-identified] : None [de-identified] : 100% [de-identified] : Yes [de-identified] : Multilayer other compression wrap [de-identified] : 2x Weekly [de-identified] : Primary Dressing

## 2022-07-08 NOTE — PLAN
[FreeTextEntry1] : leg elevation\par hydrofera blue, DD, abd pad, coban 2xa wk\par f/u 4 days\par \par time spent 25 mins.

## 2022-07-08 NOTE — HISTORY OF PRESENT ILLNESS
[FreeTextEntry1] : 85 yo WM, here with his daughter for f/u of chronic LLE VSU which continues to be wet and dripping at times. Seen earlier in the wk and a developing cellulitis was seen. Pt placed immediately on po bactrim Ds and skin pen used to roosevelt margin of demarcation. The erythema has lessened, but continues to be wet. Encouraged leg elevation and to go back to coban and to be changed at least 2x a wk.

## 2022-07-11 DIAGNOSIS — Z79.891 LONG TERM (CURRENT) USE OF OPIATE ANALGESIC: ICD-10-CM

## 2022-07-11 DIAGNOSIS — I83.228 VARICOSE VEINS OF LEFT LOWER EXTREMITY WITH BOTH ULCER OF OTHER PART OF LOWER EXTREMITY AND INFLAMMATION: ICD-10-CM

## 2022-07-11 DIAGNOSIS — G20 PARKINSON'S DISEASE: ICD-10-CM

## 2022-07-11 DIAGNOSIS — L97.822 NON-PRESSURE CHRONIC ULCER OF OTHER PART OF LEFT LOWER LEG WITH FAT LAYER EXPOSED: ICD-10-CM

## 2022-07-11 DIAGNOSIS — I34.0 NONRHEUMATIC MITRAL (VALVE) INSUFFICIENCY: ICD-10-CM

## 2022-07-11 DIAGNOSIS — M19.90 UNSPECIFIED OSTEOARTHRITIS, UNSPECIFIED SITE: ICD-10-CM

## 2022-07-11 DIAGNOSIS — I83.893 VARICOSE VEINS OF BILATERAL LOWER EXTREMITIES WITH OTHER COMPLICATIONS: ICD-10-CM

## 2022-07-11 DIAGNOSIS — I49.3 VENTRICULAR PREMATURE DEPOLARIZATION: ICD-10-CM

## 2022-07-11 DIAGNOSIS — Z79.899 OTHER LONG TERM (CURRENT) DRUG THERAPY: ICD-10-CM

## 2022-07-11 DIAGNOSIS — Z80.0 FAMILY HISTORY OF MALIGNANT NEOPLASM OF DIGESTIVE ORGANS: ICD-10-CM

## 2022-07-11 DIAGNOSIS — R01.1 CARDIAC MURMUR, UNSPECIFIED: ICD-10-CM

## 2022-07-12 ENCOUNTER — OUTPATIENT (OUTPATIENT)
Dept: OUTPATIENT SERVICES | Facility: HOSPITAL | Age: 87
LOS: 1 days | Discharge: HOME | End: 2022-07-12
Payer: MEDICARE

## 2022-07-12 ENCOUNTER — APPOINTMENT (OUTPATIENT)
Dept: WOUND CARE | Facility: HOSPITAL | Age: 87
End: 2022-07-12

## 2022-07-12 VITALS
HEART RATE: 87 BPM | OXYGEN SATURATION: 93 % | DIASTOLIC BLOOD PRESSURE: 53 MMHG | RESPIRATION RATE: 20 BRPM | BODY MASS INDEX: 22.53 KG/M2 | SYSTOLIC BLOOD PRESSURE: 141 MMHG | WEIGHT: 185 LBS | HEIGHT: 76 IN | TEMPERATURE: 98.5 F

## 2022-07-12 DIAGNOSIS — I83.228 VARICOSE VEINS OF LEFT LOWER EXTREMITY WITH BOTH ULCER OF OTHER PART OF LOWER EXTREMITY AND INFLAMMATION: ICD-10-CM

## 2022-07-12 PROCEDURE — 99213 OFFICE O/P EST LOW 20 MIN: CPT

## 2022-07-12 PROCEDURE — 29581 APPL MULTLAYER CMPRN SYS LEG: CPT | Mod: LT

## 2022-07-12 NOTE — PHYSICAL EXAM
[4 x 4] : 4 x 4  [Abdominal Pad] : Abdominal Pad [Normal Thyroid] : the thyroid was normal [Normal Breath Sounds] : Normal breath sounds [Normal Heart Sounds] : normal heart sounds [Normal Rate and Rhythm] : normal rate and rhythm [Ankle Swelling (On Exam)] : present [] : of the left leg [Ankle Swelling On The Left] : moderate [Oriented to Person] : oriented to person [Oriented to Place] : oriented to place [Oriented to Time] : oriented to time [Calm] : calm [JVD] : no jugular venous distention  [Abdomen Masses] : No abdominal massess [Abdomen Tenderness] : ~T ~M No abdominal tenderness [Tender] : nontender [Enlarged] : not enlarged [de-identified] : elderly WM, NAD, alert, Ox3. [de-identified] : Pt expressed comfort post COBAN application. Circ/Neuromuscular functions WNL post application.\par  [FreeTextEntry1] : Leg (Lateral) [FreeTextEntry2] : 8.7 [FreeTextEntry3] : 6.0 [FreeTextEntry4] : 0.1-0.2 [de-identified] : serosanguineous [de-identified] : mild [de-identified] : 60% [de-identified] : 40% [de-identified] : None [de-identified] : Coban [de-identified] : Hydrofera Blue [de-identified] : Mechanically cleansed with sterile gauze and normal saline 0.9%\par \par Dry Dressing\par  [TWNoteComboBox1] : Left [TWNoteComboBox4] : Moderate [TWNoteComboBox5] : No [TWNoteComboBox6] : Venous [de-identified] : No [de-identified] : Macerated [de-identified] : None [de-identified] : None [de-identified] : 100% [de-identified] : Yes [de-identified] : Multilayer other compression wrap [de-identified] : Primary Dressing [de-identified] : 2x Weekly

## 2022-07-12 NOTE — PLAN
[FreeTextEntry1] : leg elevation\par hydrofera blue, DD, abd pad, coban 2xa wk\par f/u 1 wk\par Vascular surg. consult next wk\par \par time spent 25mins.

## 2022-07-12 NOTE — HISTORY OF PRESENT ILLNESS
[FreeTextEntry1] : 87 yo WM, here with his daughter for f/u of chronic LLE VSU which continues to be wet and dripping at times. Seen earlier in the wk and a developing cellulitis was seen. Pt placed immediately on po bactrim Ds and skin pen used to roosevelt margin of demarcation. The erythema has lessened, but continues to be wet. Encouraged leg elevation. Using hydrofera blue and coban  this past wk and less wet. To see vascular surg. Dr. Eaton next wk. Has coban changes 2xa wk.

## 2022-07-12 NOTE — REVIEW OF SYSTEMS
[Arthralgias] : arthralgias [As Noted in HPI] : as noted in HPI [Negative] : Heme/Lymph [Feeling Poorly] : not feeling poorly [Feeling Tired] : not feeling tired [FreeTextEntry9] : "arthritis..." [de-identified] : "tremor..."

## 2022-07-12 NOTE — ASSESSMENT
[Verbal] : Verbal [Written] : Written [Demo] : Demo [Patient] : Patient [Family member] : Family member [Fair - mild discomfort, physical impairment, low acceptance] : Fair - mild discomfort, physical impairment, low acceptance [Needs reinforcement] : needs reinforcement [Dressing changes] : dressing changes [Skin Care] : skin care [Signs and symptoms of infection] : sign and symptoms of infection [Venous Disease] : venous disease [Nutrition] : nutrition [How and When to Call] : how and when to call [Off-loading] : off-loading [Compression Therapy] : compression therapy [Patient responsibility to plan of care] : patient responsibility to plan of care [Stable] : stable [Home] : Home [Cane] : Cane [Not Applicable - Long Term Care/Home Health Agency] : Long Term Care/Home Health Agency: Not Applicable [] : No [FreeTextEntry2] : Infection prevention \par Wound care (dressing changes)\par Maintain optimal skin integrity to high pressure areas\par Compression therapy\par Nutrition and wound healing\par Elevation and low sodium compliance.\par Oral antibiotics [FreeTextEntry4] : Pt is scheduled to see Dr. Eaton on Monday, 7/18/22 @ 0800 AM\par Pt tolerating oral abt therapy well. Pt to finish oral abt therapy as prescribed\par Per Pt's daughter, Pt has been elevating legs more frequently.\par Pt to f/u Friday, 7/15/22 for dressing change. Assessment in 1 week.

## 2022-07-14 DIAGNOSIS — R01.1 CARDIAC MURMUR, UNSPECIFIED: ICD-10-CM

## 2022-07-14 DIAGNOSIS — L97.822 NON-PRESSURE CHRONIC ULCER OF OTHER PART OF LEFT LOWER LEG WITH FAT LAYER EXPOSED: ICD-10-CM

## 2022-07-14 DIAGNOSIS — G20 PARKINSON'S DISEASE: ICD-10-CM

## 2022-07-14 DIAGNOSIS — I83.228 VARICOSE VEINS OF LEFT LOWER EXTREMITY WITH BOTH ULCER OF OTHER PART OF LOWER EXTREMITY AND INFLAMMATION: ICD-10-CM

## 2022-07-14 DIAGNOSIS — I83.893 VARICOSE VEINS OF BILATERAL LOWER EXTREMITIES WITH OTHER COMPLICATIONS: ICD-10-CM

## 2022-07-14 DIAGNOSIS — Z87.891 PERSONAL HISTORY OF NICOTINE DEPENDENCE: ICD-10-CM

## 2022-07-14 DIAGNOSIS — I49.3 VENTRICULAR PREMATURE DEPOLARIZATION: ICD-10-CM

## 2022-07-14 DIAGNOSIS — I34.0 NONRHEUMATIC MITRAL (VALVE) INSUFFICIENCY: ICD-10-CM

## 2022-07-14 DIAGNOSIS — Z80.0 FAMILY HISTORY OF MALIGNANT NEOPLASM OF DIGESTIVE ORGANS: ICD-10-CM

## 2022-07-14 DIAGNOSIS — M19.90 UNSPECIFIED OSTEOARTHRITIS, UNSPECIFIED SITE: ICD-10-CM

## 2022-07-14 DIAGNOSIS — Z79.899 OTHER LONG TERM (CURRENT) DRUG THERAPY: ICD-10-CM

## 2022-07-15 ENCOUNTER — APPOINTMENT (OUTPATIENT)
Dept: WOUND CARE | Facility: HOSPITAL | Age: 87
End: 2022-07-15

## 2022-07-15 ENCOUNTER — OUTPATIENT (OUTPATIENT)
Dept: OUTPATIENT SERVICES | Facility: HOSPITAL | Age: 87
LOS: 1 days | Discharge: HOME | End: 2022-07-15
Payer: MEDICARE

## 2022-07-15 VITALS
TEMPERATURE: 98.2 F | DIASTOLIC BLOOD PRESSURE: 66 MMHG | OXYGEN SATURATION: 97 % | HEART RATE: 69 BPM | WEIGHT: 185 LBS | RESPIRATION RATE: 18 BRPM | HEIGHT: 76 IN | BODY MASS INDEX: 22.53 KG/M2 | SYSTOLIC BLOOD PRESSURE: 102 MMHG

## 2022-07-15 DIAGNOSIS — L97.822 NON-PRESSURE CHRONIC ULCER OF OTHER PART OF LEFT LOWER LEG WITH FAT LAYER EXPOSED: ICD-10-CM

## 2022-07-15 DIAGNOSIS — I83.228 VARICOSE VEINS OF LEFT LOWER EXTREMITY WITH BOTH ULCER OF OTHER PART OF LOWER EXTREMITY AND INFLAMMATION: ICD-10-CM

## 2022-07-15 PROCEDURE — ZZZZZ: CPT

## 2022-07-15 PROCEDURE — 29581 APPL MULTLAYER CMPRN SYS LEG: CPT | Mod: LT

## 2022-07-18 ENCOUNTER — APPOINTMENT (OUTPATIENT)
Dept: WOUND CARE | Facility: HOSPITAL | Age: 87
End: 2022-07-18

## 2022-07-18 ENCOUNTER — OUTPATIENT (OUTPATIENT)
Dept: OUTPATIENT SERVICES | Facility: HOSPITAL | Age: 87
LOS: 1 days | Discharge: HOME | End: 2022-07-18
Payer: MEDICARE

## 2022-07-18 VITALS
TEMPERATURE: 97.7 F | RESPIRATION RATE: 20 BRPM | HEIGHT: 76 IN | WEIGHT: 185 LBS | DIASTOLIC BLOOD PRESSURE: 75 MMHG | OXYGEN SATURATION: 94 % | HEART RATE: 57 BPM | SYSTOLIC BLOOD PRESSURE: 132 MMHG | BODY MASS INDEX: 22.53 KG/M2

## 2022-07-18 DIAGNOSIS — I83.213 VARICOSE VEINS OF RIGHT LOWER EXTREMITY WITH BOTH ULCER OF ANKLE AND INFLAMMATION: ICD-10-CM

## 2022-07-18 PROCEDURE — 29581 APPL MULTLAYER CMPRN SYS LEG: CPT | Mod: LT

## 2022-07-18 PROCEDURE — 99213 OFFICE O/P EST LOW 20 MIN: CPT

## 2022-07-18 NOTE — ASSESSMENT
[FreeTextEntry1] : 87 yo M with hx of venous disease. Hx of L GSV ablation. Still open wound. No PVD. \par Venous US does not show LLE insuf. Shows L GSV closure but he does have a large cluster of vv down his L shin.

## 2022-07-18 NOTE — HISTORY OF PRESENT ILLNESS
[FreeTextEntry1] : Kelvin has known venous stasis disease. He underwent L GSV ablation last year. Unfortunately his wound has not healed. He is here for vascular eval.  [de-identified] : Kelvin is here because L lateral shin - malleolar wound is still not making good progress. After last visit with me in May I recommended bx of wound and it hasn’t happened. Multiple grafts failed to fully heal the wound

## 2022-07-19 DIAGNOSIS — L97.822 NON-PRESSURE CHRONIC ULCER OF OTHER PART OF LEFT LOWER LEG WITH FAT LAYER EXPOSED: ICD-10-CM

## 2022-07-19 DIAGNOSIS — G20 PARKINSON'S DISEASE: ICD-10-CM

## 2022-07-19 DIAGNOSIS — I83.228 VARICOSE VEINS OF LEFT LOWER EXTREMITY WITH BOTH ULCER OF OTHER PART OF LOWER EXTREMITY AND INFLAMMATION: ICD-10-CM

## 2022-07-19 DIAGNOSIS — M19.90 UNSPECIFIED OSTEOARTHRITIS, UNSPECIFIED SITE: ICD-10-CM

## 2022-07-19 DIAGNOSIS — Z80.0 FAMILY HISTORY OF MALIGNANT NEOPLASM OF DIGESTIVE ORGANS: ICD-10-CM

## 2022-07-19 DIAGNOSIS — I83.893 VARICOSE VEINS OF BILATERAL LOWER EXTREMITIES WITH OTHER COMPLICATIONS: ICD-10-CM

## 2022-07-19 DIAGNOSIS — Z79.899 OTHER LONG TERM (CURRENT) DRUG THERAPY: ICD-10-CM

## 2022-07-19 DIAGNOSIS — Z87.891 PERSONAL HISTORY OF NICOTINE DEPENDENCE: ICD-10-CM

## 2022-07-21 ENCOUNTER — NON-APPOINTMENT (OUTPATIENT)
Age: 87
End: 2022-07-21

## 2022-07-22 ENCOUNTER — APPOINTMENT (OUTPATIENT)
Dept: WOUND CARE | Facility: HOSPITAL | Age: 87
End: 2022-07-22

## 2022-07-22 ENCOUNTER — OUTPATIENT (OUTPATIENT)
Dept: OUTPATIENT SERVICES | Facility: HOSPITAL | Age: 87
LOS: 1 days | Discharge: HOME | End: 2022-07-22
Payer: MEDICARE

## 2022-07-22 VITALS
DIASTOLIC BLOOD PRESSURE: 74 MMHG | OXYGEN SATURATION: 96 % | SYSTOLIC BLOOD PRESSURE: 126 MMHG | TEMPERATURE: 98.8 F | RESPIRATION RATE: 20 BRPM | HEART RATE: 85 BPM | WEIGHT: 185 LBS | BODY MASS INDEX: 22.53 KG/M2 | HEIGHT: 76 IN

## 2022-07-22 DIAGNOSIS — I83.228 VARICOSE VEINS OF LEFT LOWER EXTREMITY WITH BOTH ULCER OF OTHER PART OF LOWER EXTREMITY AND INFLAMMATION: ICD-10-CM

## 2022-07-22 PROCEDURE — 99213 OFFICE O/P EST LOW 20 MIN: CPT

## 2022-07-22 PROCEDURE — 29581 APPL MULTLAYER CMPRN SYS LEG: CPT | Mod: LT

## 2022-07-22 NOTE — PHYSICAL EXAM
[Normal Thyroid] : the thyroid was normal [Normal Breath Sounds] : Normal breath sounds [Normal Heart Sounds] : normal heart sounds [Normal Rate and Rhythm] : normal rate and rhythm [Ankle Swelling (On Exam)] : present [Ankle Swelling On The Left] : moderate [] : of the left leg [Ankle Swelling On The Right] : mild [Alert] : alert [Oriented to Person] : oriented to person [Oriented to Place] : oriented to place [Oriented to Time] : oriented to time [Calm] : calm [JVD] : no jugular venous distention  [Abdomen Masses] : No abdominal massess [Abdomen Tenderness] : ~T ~M No abdominal tenderness [Tender] : nontender [Enlarged] : not enlarged [de-identified] : elderly WM, NAD, alert, Ox3. [FreeTextEntry1] : Left Lateral Lower Leg [FreeTextEntry2] : 4.9 [FreeTextEntry3] : 2.4 [FreeTextEntry4] : 0.2 [de-identified] : Small Serosanguineous [de-identified] : Intact [de-identified] : % [de-identified] : 1-25% [de-identified] : Coban [de-identified] : Hydrofera Blue/ Dry Dressing & Kerlix [de-identified] : Mechanically cleansed with Sterile Gauze & Normal saline\par  [de-identified] : None [de-identified] : None [de-identified] : Yes

## 2022-07-22 NOTE — ASSESSMENT
[Verbal] : Verbal [Demo] : Demo [Patient] : Patient [Family member] : Family member [Good - alert, interested, motivated] : Good - alert, interested, motivated [Verbalizes knowledge/Understanding] : Verbalizes knowledge/understanding [Dressing changes] : dressing changes [Skin Care] : skin care [Pressure relief] : pressure relief [Signs and symptoms of infection] : sign and symptoms of infection [How and When to Call] : how and when to call [Off-loading] : off-loading [Compression Therapy] : compression therapy [Patient responsibility to plan of care] : patient responsibility to plan of care [] : Yes [Stable] : stable [Home] : Home [Cane] : Cane [FreeTextEntry2] : Alteration in skin integrity- promote optimal skin integrity\par  [FreeTextEntry4] : Dr Monk/ Photo taken\par Pt awaiting TC from Dr Richard Gamez's office for scheduling of Ultrasound as per Dr Richard Gamez- Dr Monk aware\par Antibiotics completed last week- Dr monk aware\par F/U to [facility] on Tuesday, 07/26/22 for dressing change & then 1 week for assessment

## 2022-07-22 NOTE — PLAN
[FreeTextEntry1] : leg elevation\par hydrofera blue, DD, coban 2x a wk\par f/u 1 wk\par \par time spent 25 mins.

## 2022-07-22 NOTE — REVIEW OF SYSTEMS
[Arthralgias] : arthralgias [As Noted in HPI] : as noted in HPI [Negative] : Heme/Lymph [Feeling Poorly] : not feeling poorly [Feeling Tired] : not feeling tired [FreeTextEntry9] : "arthritis..." [de-identified] : "tremor..."

## 2022-07-22 NOTE — HISTORY OF PRESENT ILLNESS
[FreeTextEntry1] : 87 yo WM, here for f/u of chronic LLE VSU. Recently tx for cellulitis of the LLE and has markedly improve with minimal erythema now and less drainage noted. Using coban

## 2022-07-23 DIAGNOSIS — I34.0 NONRHEUMATIC MITRAL (VALVE) INSUFFICIENCY: ICD-10-CM

## 2022-07-23 DIAGNOSIS — I83.228 VARICOSE VEINS OF LEFT LOWER EXTREMITY WITH BOTH ULCER OF OTHER PART OF LOWER EXTREMITY AND INFLAMMATION: ICD-10-CM

## 2022-07-23 DIAGNOSIS — R01.1 CARDIAC MURMUR, UNSPECIFIED: ICD-10-CM

## 2022-07-23 DIAGNOSIS — Z87.891 PERSONAL HISTORY OF NICOTINE DEPENDENCE: ICD-10-CM

## 2022-07-23 DIAGNOSIS — L97.822 NON-PRESSURE CHRONIC ULCER OF OTHER PART OF LEFT LOWER LEG WITH FAT LAYER EXPOSED: ICD-10-CM

## 2022-07-23 DIAGNOSIS — M19.90 UNSPECIFIED OSTEOARTHRITIS, UNSPECIFIED SITE: ICD-10-CM

## 2022-07-23 DIAGNOSIS — Z80.0 FAMILY HISTORY OF MALIGNANT NEOPLASM OF DIGESTIVE ORGANS: ICD-10-CM

## 2022-07-23 DIAGNOSIS — I49.3 VENTRICULAR PREMATURE DEPOLARIZATION: ICD-10-CM

## 2022-07-23 DIAGNOSIS — Z79.899 OTHER LONG TERM (CURRENT) DRUG THERAPY: ICD-10-CM

## 2022-07-23 DIAGNOSIS — I83.893 VARICOSE VEINS OF BILATERAL LOWER EXTREMITIES WITH OTHER COMPLICATIONS: ICD-10-CM

## 2022-07-23 DIAGNOSIS — G20 PARKINSON'S DISEASE: ICD-10-CM

## 2022-07-25 ENCOUNTER — OUTPATIENT (OUTPATIENT)
Dept: OUTPATIENT SERVICES | Facility: HOSPITAL | Age: 87
LOS: 1 days | Discharge: HOME | End: 2022-07-25
Payer: MEDICARE

## 2022-07-25 ENCOUNTER — APPOINTMENT (OUTPATIENT)
Dept: WOUND CARE | Facility: HOSPITAL | Age: 87
End: 2022-07-25

## 2022-07-25 VITALS
WEIGHT: 185 LBS | HEIGHT: 76 IN | HEART RATE: 68 BPM | RESPIRATION RATE: 20 BRPM | OXYGEN SATURATION: 96 % | TEMPERATURE: 98.9 F | BODY MASS INDEX: 22.53 KG/M2 | SYSTOLIC BLOOD PRESSURE: 100 MMHG | DIASTOLIC BLOOD PRESSURE: 62 MMHG

## 2022-07-25 DIAGNOSIS — I83.228 VARICOSE VEINS OF LEFT LOWER EXTREMITY WITH BOTH ULCER OF OTHER PART OF LOWER EXTREMITY AND INFLAMMATION: ICD-10-CM

## 2022-07-25 PROCEDURE — ZZZZZ: CPT

## 2022-07-25 PROCEDURE — 29581 APPL MULTLAYER CMPRN SYS LEG: CPT | Mod: LT

## 2022-07-26 DIAGNOSIS — L97.822 NON-PRESSURE CHRONIC ULCER OF OTHER PART OF LEFT LOWER LEG WITH FAT LAYER EXPOSED: ICD-10-CM

## 2022-07-26 DIAGNOSIS — I83.228 VARICOSE VEINS OF LEFT LOWER EXTREMITY WITH BOTH ULCER OF OTHER PART OF LOWER EXTREMITY AND INFLAMMATION: ICD-10-CM

## 2022-07-27 ENCOUNTER — APPOINTMENT (OUTPATIENT)
Dept: VASCULAR SURGERY | Facility: CLINIC | Age: 87
End: 2022-07-27

## 2022-07-27 PROCEDURE — 93971 EXTREMITY STUDY: CPT | Mod: LT

## 2022-07-28 ENCOUNTER — APPOINTMENT (OUTPATIENT)
Dept: WOUND CARE | Facility: HOSPITAL | Age: 87
End: 2022-07-28

## 2022-07-28 ENCOUNTER — OUTPATIENT (OUTPATIENT)
Dept: OUTPATIENT SERVICES | Facility: HOSPITAL | Age: 87
LOS: 1 days | Discharge: HOME | End: 2022-07-28
Payer: MEDICARE

## 2022-07-28 VITALS
SYSTOLIC BLOOD PRESSURE: 111 MMHG | TEMPERATURE: 98.7 F | DIASTOLIC BLOOD PRESSURE: 73 MMHG | WEIGHT: 185 LBS | OXYGEN SATURATION: 96 % | HEIGHT: 76 IN | BODY MASS INDEX: 22.53 KG/M2 | RESPIRATION RATE: 20 BRPM | HEART RATE: 80 BPM

## 2022-07-28 DIAGNOSIS — I83.228 VARICOSE VEINS OF LEFT LOWER EXTREMITY WITH BOTH ULCER OF OTHER PART OF LOWER EXTREMITY AND INFLAMMATION: ICD-10-CM

## 2022-07-28 PROCEDURE — 99213 OFFICE O/P EST LOW 20 MIN: CPT

## 2022-07-28 PROCEDURE — 29581 APPL MULTLAYER CMPRN SYS LEG: CPT | Mod: LT

## 2022-07-28 NOTE — REVIEW OF SYSTEMS
[Arthralgias] : arthralgias [As Noted in HPI] : as noted in HPI [Negative] : Heme/Lymph [Feeling Poorly] : not feeling poorly [Feeling Tired] : not feeling tired [FreeTextEntry9] : "arthritis..." [de-identified] : "tremor..."

## 2022-07-28 NOTE — PHYSICAL EXAM
[Normal Thyroid] : the thyroid was normal [Normal Breath Sounds] : Normal breath sounds [Normal Heart Sounds] : normal heart sounds [Normal Rate and Rhythm] : normal rate and rhythm [Ankle Swelling (On Exam)] : present [Ankle Swelling On The Left] : moderate [] : of the left leg [Ankle Swelling On The Right] : mild [Alert] : alert [Oriented to Person] : oriented to person [Oriented to Place] : oriented to place [Oriented to Time] : oriented to time [Calm] : calm [4 x 4] : 4 x 4  [JVD] : no jugular venous distention  [Abdomen Masses] : No abdominal massess [Abdomen Tenderness] : ~T ~M No abdominal tenderness [Tender] : nontender [Enlarged] : not enlarged [de-identified] : elderly WM, NAD, alert, Ox3. [FreeTextEntry1] : Left lateral lower leg  [FreeTextEntry2] : 4.5 [FreeTextEntry3] : 1.8 [FreeTextEntry4] : 0.2 [de-identified] : Serous/sanguinous [de-identified] : Expressed comfort post Coban application. No neurovascular deficits noted. [de-identified] : Hydrofera blue  [de-identified] : Mechanically cleansed with sterile gauze and normal saline and chlorhexidine.\par Kerlix  [TWNoteComboBox4] : Small [de-identified] : Normal [de-identified] : None [de-identified] : None [de-identified] : 100% [de-identified] : No [de-identified] : Multilayer other compression wrap [de-identified] : 2x Weekly [de-identified] : Primary Dressing

## 2022-07-28 NOTE — HISTORY OF PRESENT ILLNESS
[FreeTextEntry1] : 85 yo WM, here for f/u of chronic LLE VSU. Recently tx for cellulitis of the LLE and has markedly improve with minimal erythema now and less drainage noted. Using coban\par 7/28/22 wound has improved and is smaller and clean

## 2022-07-28 NOTE — ASSESSMENT
[Verbal] : Verbal [Written] : Written [Demo] : Demo [Patient] : Patient [Family member] : Family member [Good - alert, interested, motivated] : Good - alert, interested, motivated [Verbalizes knowledge/Understanding] : Verbalizes knowledge/understanding [Dressing changes] : dressing changes [Signs and symptoms of infection] : sign and symptoms of infection [Venous Disease] : venous disease [How and When to Call] : how and when to call [Pain Management] : pain management [Compression Therapy] : compression therapy [Patient responsibility to plan of care] : patient responsibility to plan of care [Stable] : stable [Home] : Home [Cane] : Cane [Not Applicable - Long Term Care/Home Health Agency] : Long Term Care/Home Health Agency: Not Applicable [] : No [FreeTextEntry2] : Infection prevention\par Localized wound care \par Goal remaining pain free regarding wounds\par Compression therapy  [FreeTextEntry4] : Pt states he had his venous studies completed but can't get into Dr. Ramos's office until September. vascular consult submitted. \par Follow up 8/1/22 for dressing change

## 2022-07-28 NOTE — PHYSICAL EXAM
[Normal Thyroid] : the thyroid was normal [Normal Breath Sounds] : Normal breath sounds [Normal Heart Sounds] : normal heart sounds [Normal Rate and Rhythm] : normal rate and rhythm [Ankle Swelling (On Exam)] : present [Ankle Swelling On The Left] : moderate [] : of the left leg [Ankle Swelling On The Right] : mild [Alert] : alert [Oriented to Person] : oriented to person [Oriented to Place] : oriented to place [Oriented to Time] : oriented to time [Calm] : calm [4 x 4] : 4 x 4  [JVD] : no jugular venous distention  [Abdomen Masses] : No abdominal massess [Abdomen Tenderness] : ~T ~M No abdominal tenderness [Tender] : nontender [Enlarged] : not enlarged [de-identified] : elderly WM, NAD, alert, Ox3. [FreeTextEntry1] : Left lateral lower leg  [FreeTextEntry2] : 4.5 [FreeTextEntry3] : 1.8 [FreeTextEntry4] : 0.2 [de-identified] : Serous/sanguinous [de-identified] : Expressed comfort post Coban application. No neurovascular deficits noted. [de-identified] : Hydrofera blue  [de-identified] : Mechanically cleansed with sterile gauze and normal saline and chlorhexidine.\par Kerlix  [TWNoteComboBox4] : Small [de-identified] : Normal [de-identified] : None [de-identified] : None [de-identified] : 100% [de-identified] : No [de-identified] : Multilayer other compression wrap [de-identified] : 2x Weekly [de-identified] : Primary Dressing

## 2022-07-28 NOTE — HISTORY OF PRESENT ILLNESS
[FreeTextEntry1] : 87 yo WM, here for f/u of chronic LLE VSU. Recently tx for cellulitis of the LLE and has markedly improve with minimal erythema now and less drainage noted. Using coban\par 7/28/22 wound has improved and is smaller and clean

## 2022-07-28 NOTE — REVIEW OF SYSTEMS
[Arthralgias] : arthralgias [As Noted in HPI] : as noted in HPI [Negative] : Heme/Lymph [Feeling Poorly] : not feeling poorly [Feeling Tired] : not feeling tired [FreeTextEntry9] : "arthritis..." [de-identified] : "tremor..."

## 2022-07-30 DIAGNOSIS — Z80.0 FAMILY HISTORY OF MALIGNANT NEOPLASM OF DIGESTIVE ORGANS: ICD-10-CM

## 2022-07-30 DIAGNOSIS — Z79.899 OTHER LONG TERM (CURRENT) DRUG THERAPY: ICD-10-CM

## 2022-07-30 DIAGNOSIS — G20 PARKINSON'S DISEASE: ICD-10-CM

## 2022-07-30 DIAGNOSIS — M19.90 UNSPECIFIED OSTEOARTHRITIS, UNSPECIFIED SITE: ICD-10-CM

## 2022-07-30 DIAGNOSIS — Z87.891 PERSONAL HISTORY OF NICOTINE DEPENDENCE: ICD-10-CM

## 2022-07-30 DIAGNOSIS — L97.822 NON-PRESSURE CHRONIC ULCER OF OTHER PART OF LEFT LOWER LEG WITH FAT LAYER EXPOSED: ICD-10-CM

## 2022-07-30 DIAGNOSIS — I34.0 NONRHEUMATIC MITRAL (VALVE) INSUFFICIENCY: ICD-10-CM

## 2022-07-30 DIAGNOSIS — R01.1 CARDIAC MURMUR, UNSPECIFIED: ICD-10-CM

## 2022-07-30 DIAGNOSIS — I83.893 VARICOSE VEINS OF BILATERAL LOWER EXTREMITIES WITH OTHER COMPLICATIONS: ICD-10-CM

## 2022-07-30 DIAGNOSIS — I49.3 VENTRICULAR PREMATURE DEPOLARIZATION: ICD-10-CM

## 2022-07-30 DIAGNOSIS — I83.228 VARICOSE VEINS OF LEFT LOWER EXTREMITY WITH BOTH ULCER OF OTHER PART OF LOWER EXTREMITY AND INFLAMMATION: ICD-10-CM

## 2022-08-01 ENCOUNTER — NON-APPOINTMENT (OUTPATIENT)
Age: 87
End: 2022-08-01

## 2022-08-01 ENCOUNTER — OUTPATIENT (OUTPATIENT)
Dept: OUTPATIENT SERVICES | Facility: HOSPITAL | Age: 87
LOS: 1 days | Discharge: HOME | End: 2022-08-01
Payer: MEDICARE

## 2022-08-01 ENCOUNTER — APPOINTMENT (OUTPATIENT)
Dept: WOUND CARE | Facility: HOSPITAL | Age: 87
End: 2022-08-01

## 2022-08-01 VITALS
OXYGEN SATURATION: 98 % | WEIGHT: 185 LBS | SYSTOLIC BLOOD PRESSURE: 139 MMHG | RESPIRATION RATE: 20 BRPM | HEART RATE: 56 BPM | DIASTOLIC BLOOD PRESSURE: 80 MMHG | BODY MASS INDEX: 22.53 KG/M2 | TEMPERATURE: 97.7 F | HEIGHT: 76 IN

## 2022-08-01 DIAGNOSIS — I83.228 VARICOSE VEINS OF LEFT LOWER EXTREMITY WITH BOTH ULCER OF OTHER PART OF LOWER EXTREMITY AND INFLAMMATION: ICD-10-CM

## 2022-08-01 DIAGNOSIS — T87.89 OTHER COMPLICATIONS OF AMPUTATION STUMP: ICD-10-CM

## 2022-08-01 DIAGNOSIS — L97.822 NON-PRESSURE CHRONIC ULCER OF OTHER PART OF LEFT LOWER LEG WITH FAT LAYER EXPOSED: ICD-10-CM

## 2022-08-01 PROCEDURE — 29581 APPL MULTLAYER CMPRN SYS LEG: CPT | Mod: LT

## 2022-08-01 PROCEDURE — ZZZZZ: CPT

## 2022-08-04 ENCOUNTER — OUTPATIENT (OUTPATIENT)
Dept: OUTPATIENT SERVICES | Facility: HOSPITAL | Age: 87
LOS: 1 days | Discharge: HOME | End: 2022-08-04
Payer: MEDICARE

## 2022-08-04 ENCOUNTER — APPOINTMENT (OUTPATIENT)
Dept: WOUND CARE | Facility: HOSPITAL | Age: 87
End: 2022-08-04

## 2022-08-04 VITALS
SYSTOLIC BLOOD PRESSURE: 144 MMHG | HEIGHT: 76 IN | HEART RATE: 87 BPM | DIASTOLIC BLOOD PRESSURE: 78 MMHG | RESPIRATION RATE: 20 BRPM | OXYGEN SATURATION: 98 % | BODY MASS INDEX: 22.53 KG/M2 | TEMPERATURE: 98.4 F | WEIGHT: 185 LBS

## 2022-08-04 DIAGNOSIS — I83.228 VARICOSE VEINS OF LEFT LOWER EXTREMITY WITH BOTH ULCER OF OTHER PART OF LOWER EXTREMITY AND INFLAMMATION: ICD-10-CM

## 2022-08-04 PROCEDURE — 29581 APPL MULTLAYER CMPRN SYS LEG: CPT | Mod: LT

## 2022-08-04 PROCEDURE — 99213 OFFICE O/P EST LOW 20 MIN: CPT

## 2022-08-04 NOTE — REVIEW OF SYSTEMS
[Arthralgias] : arthralgias [As Noted in HPI] : as noted in HPI [Negative] : Heme/Lymph [Feeling Poorly] : not feeling poorly [Feeling Tired] : not feeling tired [FreeTextEntry9] : "arthritis..." [de-identified] : "tremor..."

## 2022-08-04 NOTE — PHYSICAL EXAM
[Normal Thyroid] : the thyroid was normal [Normal Breath Sounds] : Normal breath sounds [Normal Heart Sounds] : normal heart sounds [Normal Rate and Rhythm] : normal rate and rhythm [Ankle Swelling (On Exam)] : present [] : present [Alert] : alert [Oriented to Person] : oriented to person [Oriented to Place] : oriented to place [Oriented to Time] : oriented to time [Calm] : calm [4 x 4] : 4 x 4  [Abdominal Pad] : Abdominal Pad [JVD] : no jugular venous distention  [Abdomen Masses] : No abdominal massess [Abdomen Tenderness] : ~T ~M No abdominal tenderness [Tender] : nontender [Enlarged] : not enlarged [de-identified] : elderly WM, NAD, alert, Ox3. [FreeTextEntry1] : Left Lower Leg (Lateral) [FreeTextEntry2] : 4.8 [FreeTextEntry3] : 2.0 [FreeTextEntry4] : 0.2 [de-identified] : Moderate serosanguineous [de-identified] : 100% [de-identified] : none [de-identified] : Pt expressed comfort post COBAN application. Circ/Neuromuscular functions WNL post application. [de-identified] : Ani [de-identified] : Mechanically cleansed with sterile gauze and normal saline 0.9%\par Dry Dressing\par  [TWNoteComboBox5] : No [TWNoteComboBox6] : Venous [de-identified] : No [de-identified] : Normal [de-identified] : None [de-identified] : Multilayer other compression wrap [de-identified] : 2x Weekly [de-identified] : Primary Dressing

## 2022-08-04 NOTE — HISTORY OF PRESENT ILLNESS
[FreeTextEntry1] : 87 yo WM, here for f/u of chronic LLE VSU. Recently tx for cellulitis of the LLE  and less drainage noted. Using coban. Has had applications of apligraf, epifix ,and nushield in the past.

## 2022-08-04 NOTE — ASSESSMENT
[Verbal] : Verbal [Written] : Written [Demo] : Demo [Patient] : Patient [Family member] : Family member [Good - alert, interested, motivated] : Good - alert, interested, motivated [Demonstrates independently] : demonstrates independently [Dressing changes] : dressing changes [Skin Care] : skin care [Signs and symptoms of infection] : sign and symptoms of infection [Venous Disease] : venous disease [Nutrition] : nutrition [How and When to Call] : how and when to call [Labs and Tests] : labs and tests [Off-loading] : off-loading [Compression Therapy] : compression therapy [Patient responsibility to plan of care] : patient responsibility to plan of care [] : Yes [Stable] : stable [Home] : Home [Walker] : Walker [Not Applicable - Long Term Care/Home Health Agency] : Long Term Care/Home Health Agency: Not Applicable [FreeTextEntry2] : Infection prevention \par Wound care (dressing changes)\par Maintain optimal skin integrity to high pressure areas\par Compression therapy\par Nutrition and wound healing\par Elevation and low sodium compliance.\par Offloading the stress on skin structures and decreasing potential pathologic biomechanical influences. [FreeTextEntry4] : Pt to f/u Monday, 8/7/22 for dressing change.\par Assessment in 1 week.

## 2022-08-05 DIAGNOSIS — R01.1 CARDIAC MURMUR, UNSPECIFIED: ICD-10-CM

## 2022-08-05 DIAGNOSIS — I49.3 VENTRICULAR PREMATURE DEPOLARIZATION: ICD-10-CM

## 2022-08-05 DIAGNOSIS — L97.822 NON-PRESSURE CHRONIC ULCER OF OTHER PART OF LEFT LOWER LEG WITH FAT LAYER EXPOSED: ICD-10-CM

## 2022-08-05 DIAGNOSIS — I34.0 NONRHEUMATIC MITRAL (VALVE) INSUFFICIENCY: ICD-10-CM

## 2022-08-05 DIAGNOSIS — I83.228 VARICOSE VEINS OF LEFT LOWER EXTREMITY WITH BOTH ULCER OF OTHER PART OF LOWER EXTREMITY AND INFLAMMATION: ICD-10-CM

## 2022-08-05 DIAGNOSIS — Z79.899 OTHER LONG TERM (CURRENT) DRUG THERAPY: ICD-10-CM

## 2022-08-05 DIAGNOSIS — Z80.0 FAMILY HISTORY OF MALIGNANT NEOPLASM OF DIGESTIVE ORGANS: ICD-10-CM

## 2022-08-05 DIAGNOSIS — I83.893 VARICOSE VEINS OF BILATERAL LOWER EXTREMITIES WITH OTHER COMPLICATIONS: ICD-10-CM

## 2022-08-05 DIAGNOSIS — M19.90 UNSPECIFIED OSTEOARTHRITIS, UNSPECIFIED SITE: ICD-10-CM

## 2022-08-05 DIAGNOSIS — Z87.891 PERSONAL HISTORY OF NICOTINE DEPENDENCE: ICD-10-CM

## 2022-08-05 DIAGNOSIS — G20 PARKINSON'S DISEASE: ICD-10-CM

## 2022-08-08 ENCOUNTER — APPOINTMENT (OUTPATIENT)
Dept: WOUND CARE | Facility: HOSPITAL | Age: 87
End: 2022-08-08

## 2022-08-08 ENCOUNTER — OUTPATIENT (OUTPATIENT)
Dept: OUTPATIENT SERVICES | Facility: HOSPITAL | Age: 87
LOS: 1 days | Discharge: HOME | End: 2022-08-08
Payer: MEDICARE

## 2022-08-08 VITALS
HEART RATE: 62 BPM | RESPIRATION RATE: 20 BRPM | TEMPERATURE: 98.1 F | HEIGHT: 76 IN | WEIGHT: 185 LBS | DIASTOLIC BLOOD PRESSURE: 78 MMHG | OXYGEN SATURATION: 96 % | SYSTOLIC BLOOD PRESSURE: 118 MMHG | BODY MASS INDEX: 22.53 KG/M2

## 2022-08-08 DIAGNOSIS — I83.228 VARICOSE VEINS OF LEFT LOWER EXTREMITY WITH BOTH ULCER OF OTHER PART OF LOWER EXTREMITY AND INFLAMMATION: ICD-10-CM

## 2022-08-08 DIAGNOSIS — L97.822 NON-PRESSURE CHRONIC ULCER OF OTHER PART OF LEFT LOWER LEG WITH FAT LAYER EXPOSED: ICD-10-CM

## 2022-08-08 PROCEDURE — ZZZZZ: CPT

## 2022-08-08 PROCEDURE — 29581 APPL MULTLAYER CMPRN SYS LEG: CPT | Mod: LT

## 2022-08-11 ENCOUNTER — OUTPATIENT (OUTPATIENT)
Dept: OUTPATIENT SERVICES | Facility: HOSPITAL | Age: 87
LOS: 1 days | Discharge: HOME | End: 2022-08-11
Payer: MEDICARE

## 2022-08-11 ENCOUNTER — APPOINTMENT (OUTPATIENT)
Dept: WOUND CARE | Facility: HOSPITAL | Age: 87
End: 2022-08-11

## 2022-08-11 DIAGNOSIS — L97.919 VARICOSE VEINS OF RIGHT LOWER EXTREMITY WITH BOTH ULCER OF OTHER PART OF LOWER EXTREMITY AND INFLAMMATION: ICD-10-CM

## 2022-08-11 DIAGNOSIS — I83.228 VARICOSE VEINS OF LEFT LOWER EXTREMITY WITH BOTH ULCER OF OTHER PART OF LOWER EXTREMITY AND INFLAMMATION: ICD-10-CM

## 2022-08-11 DIAGNOSIS — I83.218 VARICOSE VEINS OF RIGHT LOWER EXTREMITY WITH BOTH ULCER OF OTHER PART OF LOWER EXTREMITY AND INFLAMMATION: ICD-10-CM

## 2022-08-11 DIAGNOSIS — L97.812 NON-PRESSURE CHRONIC ULCER OF OTHER PART OF RIGHT LOWER LEG WITH FAT LAYER EXPOSED: ICD-10-CM

## 2022-08-11 PROCEDURE — 29581 APPL MULTLAYER CMPRN SYS LEG: CPT | Mod: LT

## 2022-08-11 PROCEDURE — 99213 OFFICE O/P EST LOW 20 MIN: CPT

## 2022-08-11 NOTE — HISTORY OF PRESENT ILLNESS
[FreeTextEntry1] : 87 yo WM, here with his daughter for f/u of chronic LLE VSU. Some contraction in length, but not width this wk. Still with mod drainage. Reminded of importance of leg elevation.

## 2022-08-11 NOTE — ASSESSMENT
[Verbal] : Verbal [Written] : Written [Demo] : Demo [Patient] : Patient [Family member] : Family member [Good - alert, interested, motivated] : Good - alert, interested, motivated [Demonstrates independently] : demonstrates independently [Dressing changes] : dressing changes [Skin Care] : skin care [Signs and symptoms of infection] : sign and symptoms of infection [Venous Disease] : venous disease [Nutrition] : nutrition [How and When to Call] : how and when to call [Labs and Tests] : labs and tests [Off-loading] : off-loading [Compression Therapy] : compression therapy [Patient responsibility to plan of care] : patient responsibility to plan of care [] : Yes [Stable] : stable [Home] : Home [Walker] : Walker [Not Applicable - Long Term Care/Home Health Agency] : Long Term Care/Home Health Agency: Not Applicable [FreeTextEntry2] : Infection prevention \par Wound care (dressing changes)\par Maintain optimal skin integrity to high pressure areas\par Compression therapy\par Nutrition and wound healing\par Elevation and low sodium compliance.\par Offloading the stress on skin structures and decreasing potential pathologic biomechanical influences.\par F/U 1x weekly for a dressing change and in 1 week for an assessment [FreeTextEntry4] : F/U 1x weekly for a dressing change and in 1 week for an assessment\par

## 2022-08-11 NOTE — PHYSICAL EXAM
[4 x 4] : 4 x 4  [Abdominal Pad] : Abdominal Pad [Normal Thyroid] : the thyroid was normal [Normal Breath Sounds] : Normal breath sounds [Normal Heart Sounds] : normal heart sounds [Normal Rate and Rhythm] : normal rate and rhythm [Ankle Swelling (On Exam)] : present [] : of the left leg [Ankle Swelling On The Left] : moderate [Alert] : alert [Oriented to Person] : oriented to person [Oriented to Place] : oriented to place [Calm] : calm [JVD] : no jugular venous distention  [Abdomen Masses] : No abdominal massess [Abdomen Tenderness] : ~T ~M No abdominal tenderness [Tender] : nontender [Enlarged] : not enlarged [de-identified] : elderly WM, NAD, alert,Ox3. [de-identified] : No neurovascular deficits noted [FreeTextEntry1] :  Lower Leg (Lateral) [FreeTextEntry2] : 4.5 [FreeTextEntry3] : 2.4 [FreeTextEntry4] : 0.2 [de-identified] :  serosanguineous [de-identified] : Pt expressed comfort post COBAN application. Circ/Neuromuscular functions WNL post application. [de-identified] : Ani [de-identified] : Mechanically cleansed with sterile gauze and normal saline 0.9%\par \par  [TWNoteComboBox1] : Left [TWNoteComboBox4] : Moderate [TWNoteComboBox5] : No [TWNoteComboBox6] : Venous [de-identified] : No [de-identified] : Normal [de-identified] : None [de-identified] : 100% [de-identified] : No [de-identified] : Multilayer other compression wrap [de-identified] : 2x Weekly [de-identified] : Primary Dressing

## 2022-08-11 NOTE — VITALS
[Pain related to present condition?] : The patient's  pain is not related to present condition. [de-identified] : 0/10 [] : No

## 2022-08-11 NOTE — PLAN
[FreeTextEntry1] : leg elevation\par yasemin, alginate, DD, coban 2xa wk\par f/u 1 wk\par \par time spent 25 mins.

## 2022-08-11 NOTE — REVIEW OF SYSTEMS
[Arthralgias] : arthralgias [As Noted in HPI] : as noted in HPI [Negative] : Heme/Lymph [Feeling Poorly] : not feeling poorly [Feeling Tired] : not feeling tired [FreeTextEntry9] : "arthritis..." [de-identified] : "tremor..."

## 2022-08-13 DIAGNOSIS — R01.1 CARDIAC MURMUR, UNSPECIFIED: ICD-10-CM

## 2022-08-13 DIAGNOSIS — I83.893 VARICOSE VEINS OF BILATERAL LOWER EXTREMITIES WITH OTHER COMPLICATIONS: ICD-10-CM

## 2022-08-13 DIAGNOSIS — Z79.899 OTHER LONG TERM (CURRENT) DRUG THERAPY: ICD-10-CM

## 2022-08-13 DIAGNOSIS — I49.3 VENTRICULAR PREMATURE DEPOLARIZATION: ICD-10-CM

## 2022-08-13 DIAGNOSIS — Z80.0 FAMILY HISTORY OF MALIGNANT NEOPLASM OF DIGESTIVE ORGANS: ICD-10-CM

## 2022-08-13 DIAGNOSIS — M19.90 UNSPECIFIED OSTEOARTHRITIS, UNSPECIFIED SITE: ICD-10-CM

## 2022-08-13 DIAGNOSIS — Z87.891 PERSONAL HISTORY OF NICOTINE DEPENDENCE: ICD-10-CM

## 2022-08-13 DIAGNOSIS — G20 PARKINSON'S DISEASE: ICD-10-CM

## 2022-08-13 DIAGNOSIS — I34.0 NONRHEUMATIC MITRAL (VALVE) INSUFFICIENCY: ICD-10-CM

## 2022-08-13 DIAGNOSIS — L97.822 NON-PRESSURE CHRONIC ULCER OF OTHER PART OF LEFT LOWER LEG WITH FAT LAYER EXPOSED: ICD-10-CM

## 2022-08-13 DIAGNOSIS — I83.228 VARICOSE VEINS OF LEFT LOWER EXTREMITY WITH BOTH ULCER OF OTHER PART OF LOWER EXTREMITY AND INFLAMMATION: ICD-10-CM

## 2022-08-15 ENCOUNTER — APPOINTMENT (OUTPATIENT)
Dept: WOUND CARE | Facility: HOSPITAL | Age: 87
End: 2022-08-15

## 2022-08-15 ENCOUNTER — OUTPATIENT (OUTPATIENT)
Dept: OUTPATIENT SERVICES | Facility: HOSPITAL | Age: 87
LOS: 1 days | Discharge: HOME | End: 2022-08-15
Payer: MEDICARE

## 2022-08-15 VITALS
TEMPERATURE: 98 F | OXYGEN SATURATION: 95 % | DIASTOLIC BLOOD PRESSURE: 73 MMHG | HEIGHT: 76 IN | BODY MASS INDEX: 22.53 KG/M2 | SYSTOLIC BLOOD PRESSURE: 152 MMHG | RESPIRATION RATE: 20 BRPM | WEIGHT: 185 LBS | HEART RATE: 71 BPM

## 2022-08-15 DIAGNOSIS — I83.228 VARICOSE VEINS OF LEFT LOWER EXTREMITY WITH BOTH ULCER OF OTHER PART OF LOWER EXTREMITY AND INFLAMMATION: ICD-10-CM

## 2022-08-15 PROCEDURE — 29581 APPL MULTLAYER CMPRN SYS LEG: CPT | Mod: LT

## 2022-08-15 PROCEDURE — ZZZZZ: CPT

## 2022-08-17 ENCOUNTER — NON-APPOINTMENT (OUTPATIENT)
Age: 87
End: 2022-08-17

## 2022-08-17 DIAGNOSIS — L97.822 NON-PRESSURE CHRONIC ULCER OF OTHER PART OF LEFT LOWER LEG WITH FAT LAYER EXPOSED: ICD-10-CM

## 2022-08-17 DIAGNOSIS — I83.228 VARICOSE VEINS OF LEFT LOWER EXTREMITY WITH BOTH ULCER OF OTHER PART OF LOWER EXTREMITY AND INFLAMMATION: ICD-10-CM

## 2022-08-18 ENCOUNTER — APPOINTMENT (OUTPATIENT)
Dept: WOUND CARE | Facility: HOSPITAL | Age: 87
End: 2022-08-18

## 2022-08-18 ENCOUNTER — OUTPATIENT (OUTPATIENT)
Dept: OUTPATIENT SERVICES | Facility: HOSPITAL | Age: 87
LOS: 1 days | Discharge: HOME | End: 2022-08-18
Payer: MEDICARE

## 2022-08-18 VITALS
HEART RATE: 80 BPM | RESPIRATION RATE: 17 BRPM | BODY MASS INDEX: 22.53 KG/M2 | SYSTOLIC BLOOD PRESSURE: 97 MMHG | WEIGHT: 185 LBS | OXYGEN SATURATION: 97 % | DIASTOLIC BLOOD PRESSURE: 55 MMHG | TEMPERATURE: 97.4 F | HEIGHT: 76 IN

## 2022-08-18 VITALS — DIASTOLIC BLOOD PRESSURE: 60 MMHG | SYSTOLIC BLOOD PRESSURE: 110 MMHG

## 2022-08-18 DIAGNOSIS — I83.228 VARICOSE VEINS OF LEFT LOWER EXTREMITY WITH BOTH ULCER OF OTHER PART OF LOWER EXTREMITY AND INFLAMMATION: ICD-10-CM

## 2022-08-18 PROCEDURE — 29581 APPL MULTLAYER CMPRN SYS LEG: CPT | Mod: LT

## 2022-08-18 PROCEDURE — 99213 OFFICE O/P EST LOW 20 MIN: CPT

## 2022-08-18 NOTE — ASSESSMENT
[Verbal] : Verbal [Demo] : Demo [Patient] : Patient [Family member] : Family member [Good - alert, interested, motivated] : Good - alert, interested, motivated [Demonstrates independently] : demonstrates independently [Dressing changes] : dressing changes [Skin Care] : skin care [Signs and symptoms of infection] : sign and symptoms of infection [Venous Disease] : venous disease [Nutrition] : nutrition [How and When to Call] : how and when to call [Labs and Tests] : labs and tests [Off-loading] : off-loading [Compression Therapy] : compression therapy [Patient responsibility to plan of care] : patient responsibility to plan of care [] : Yes [Stable] : stable [Home] : Home [Walker] : Walker [Not Applicable - Long Term Care/Home Health Agency] : Long Term Care/Home Health Agency: Not Applicable [FreeTextEntry2] : Infection Prevention\par Promote Skin Integrity\par Offloading\par Elevation\par Compression Compliance\par Low Na+ Diet\par  [FreeTextEntry4] : F/U 1x weekly for a dressing change and in 1 week for an assessment\par \par continued elevation of lower legs frequently throughout the day, low sodium diet, and compression therapy compliance reinforced to alleviate lower extremity edema due to venous disease, Pt verbalizes understanding.\par \par Pt to follow up with Dr. Ramos on 9/26/22 for another venous US as stated by pt daughter. Pt daughter states the appointment is at her [place] location.\par \par Pt b/p was 97/55 mmHg, pt provided with 8oz of water, b/p rechecked approximately 10 min later and b/p was 110/60 mmHg. Pt was asymptomatic, pt stated he did not have any fluids since the early AM hours, all daily medications taken. MD aware.

## 2022-08-18 NOTE — PHYSICAL EXAM
[4 x 4] : 4 x 4  [Abdominal Pad] : Abdominal Pad [de-identified] : Circ neuromuscular function WNL post coban compression application, pt expressed comfort.\par  [FreeTextEntry1] :  Lower Leg (Lateral) [FreeTextEntry2] : 4.5 [FreeTextEntry3] : 2.0 [FreeTextEntry4] : 0.2 [de-identified] :  serosanguineous [de-identified] : 1-20% [de-identified] : Ani [de-identified] : Mechanically cleansed with sterile gauze and normal saline 0.9%\par \par  [TWNoteComboBox1] : Left [TWNoteComboBox4] : Moderate [TWNoteComboBox5] : No [TWNoteComboBox6] : Venous [de-identified] : No [de-identified] : Normal [de-identified] : None [de-identified] : >75% [de-identified] : Yes [de-identified] : Multilayer other compression wrap [de-identified] : 2x Weekly [de-identified] : Primary Dressing

## 2022-08-18 NOTE — DATA REVIEWED
[No studies available for review at this time.] : No studies available for review at this time.
180.34

## 2022-08-18 NOTE — HISTORY OF PRESENT ILLNESS
[FreeTextEntry1] : Patient arrives with family to [facility] without new question/ concern regarding longstanding/ chronic venous stasis ulcer of lateral aspect of left lower extremity...

## 2022-08-18 NOTE — PLAN
[FreeTextEntry1] : As in the above notation.  Mr. Lua personally seen and examined.\par No new events since last visit with ongoing compliance reported regarding elevation and local care.\par Vitals non-suggestive.\par Wounds clean and as described above.\par No new labs required; follow-up venous vascular study pending.\par Clinically, improving overall with regimen of Ani/ DSD/ Coban twice weekly.\par Surgically, stable at present with no collection to drain or tissue for debridement.\par To continue present local and supportive care with additional outpatient follow-up through Vascular Surgery office.\par RTWC ongoing.\par Please note that more than 50% of time was spent in counseling and coordination of care.

## 2022-08-18 NOTE — REVIEW OF SYSTEMS
[As Noted in HPI] : as noted in HPI [Negative] : Heme/Lymph [Feeling Poorly] : not feeling poorly [Feeling Tired] : not feeling tired [Anxiety] : no anxiety [Depression] : no depression

## 2022-08-20 DIAGNOSIS — Z87.891 PERSONAL HISTORY OF NICOTINE DEPENDENCE: ICD-10-CM

## 2022-08-20 DIAGNOSIS — M19.90 UNSPECIFIED OSTEOARTHRITIS, UNSPECIFIED SITE: ICD-10-CM

## 2022-08-20 DIAGNOSIS — Z80.0 FAMILY HISTORY OF MALIGNANT NEOPLASM OF DIGESTIVE ORGANS: ICD-10-CM

## 2022-08-20 DIAGNOSIS — R01.1 CARDIAC MURMUR, UNSPECIFIED: ICD-10-CM

## 2022-08-20 DIAGNOSIS — I83.893 VARICOSE VEINS OF BILATERAL LOWER EXTREMITIES WITH OTHER COMPLICATIONS: ICD-10-CM

## 2022-08-20 DIAGNOSIS — L97.822 NON-PRESSURE CHRONIC ULCER OF OTHER PART OF LEFT LOWER LEG WITH FAT LAYER EXPOSED: ICD-10-CM

## 2022-08-20 DIAGNOSIS — Z79.899 OTHER LONG TERM (CURRENT) DRUG THERAPY: ICD-10-CM

## 2022-08-20 DIAGNOSIS — I34.0 NONRHEUMATIC MITRAL (VALVE) INSUFFICIENCY: ICD-10-CM

## 2022-08-20 DIAGNOSIS — I83.228 VARICOSE VEINS OF LEFT LOWER EXTREMITY WITH BOTH ULCER OF OTHER PART OF LOWER EXTREMITY AND INFLAMMATION: ICD-10-CM

## 2022-08-20 DIAGNOSIS — I49.3 VENTRICULAR PREMATURE DEPOLARIZATION: ICD-10-CM

## 2022-08-20 DIAGNOSIS — G20 PARKINSON'S DISEASE: ICD-10-CM

## 2022-08-23 ENCOUNTER — OUTPATIENT (OUTPATIENT)
Dept: OUTPATIENT SERVICES | Facility: HOSPITAL | Age: 87
LOS: 1 days | Discharge: HOME | End: 2022-08-23
Payer: MEDICARE

## 2022-08-23 ENCOUNTER — APPOINTMENT (OUTPATIENT)
Dept: WOUND CARE | Facility: HOSPITAL | Age: 87
End: 2022-08-23

## 2022-08-23 VITALS
BODY MASS INDEX: 22.53 KG/M2 | SYSTOLIC BLOOD PRESSURE: 112 MMHG | RESPIRATION RATE: 20 BRPM | TEMPERATURE: 97.9 F | DIASTOLIC BLOOD PRESSURE: 76 MMHG | HEIGHT: 76 IN | HEART RATE: 78 BPM | OXYGEN SATURATION: 100 % | WEIGHT: 185 LBS

## 2022-08-23 DIAGNOSIS — I83.228 VARICOSE VEINS OF LEFT LOWER EXTREMITY WITH BOTH ULCER OF OTHER PART OF LOWER EXTREMITY AND INFLAMMATION: ICD-10-CM

## 2022-08-23 PROCEDURE — ZZZZZ: CPT

## 2022-08-23 PROCEDURE — 29581 APPL MULTLAYER CMPRN SYS LEG: CPT | Mod: LT

## 2022-08-24 DIAGNOSIS — I83.228 VARICOSE VEINS OF LEFT LOWER EXTREMITY WITH BOTH ULCER OF OTHER PART OF LOWER EXTREMITY AND INFLAMMATION: ICD-10-CM

## 2022-08-24 DIAGNOSIS — L97.822 NON-PRESSURE CHRONIC ULCER OF OTHER PART OF LEFT LOWER LEG WITH FAT LAYER EXPOSED: ICD-10-CM

## 2022-08-26 ENCOUNTER — APPOINTMENT (OUTPATIENT)
Dept: WOUND CARE | Facility: HOSPITAL | Age: 87
End: 2022-08-26

## 2022-08-26 ENCOUNTER — OUTPATIENT (OUTPATIENT)
Dept: OUTPATIENT SERVICES | Facility: HOSPITAL | Age: 87
LOS: 1 days | Discharge: HOME | End: 2022-08-26
Payer: MEDICARE

## 2022-08-26 VITALS
HEIGHT: 76 IN | DIASTOLIC BLOOD PRESSURE: 60 MMHG | RESPIRATION RATE: 20 BRPM | SYSTOLIC BLOOD PRESSURE: 103 MMHG | WEIGHT: 180 LBS | OXYGEN SATURATION: 96 % | HEART RATE: 80 BPM | BODY MASS INDEX: 21.92 KG/M2 | TEMPERATURE: 99 F

## 2022-08-26 DIAGNOSIS — I83.223 VARICOSE VEINS OF LEFT LOWER EXTREMITY WITH BOTH ULCER OF ANKLE AND INFLAMMATION: ICD-10-CM

## 2022-08-26 DIAGNOSIS — I83.228 VARICOSE VEINS OF LEFT LOWER EXTREMITY WITH BOTH ULCER OF OTHER PART OF LOWER EXTREMITY AND INFLAMMATION: ICD-10-CM

## 2022-08-26 DIAGNOSIS — L97.329 VARICOSE VEINS OF LEFT LOWER EXTREMITY WITH BOTH ULCER OF ANKLE AND INFLAMMATION: ICD-10-CM

## 2022-08-26 PROCEDURE — 99213 OFFICE O/P EST LOW 20 MIN: CPT

## 2022-08-26 PROCEDURE — 29581 APPL MULTLAYER CMPRN SYS LEG: CPT | Mod: LT

## 2022-08-26 NOTE — HISTORY OF PRESENT ILLNESS
[FreeTextEntry1] : pt seen for left lateral lower leg venous stasis ulceration down to skin and subcutaneous tissue and fat with stasis dermatitis

## 2022-08-26 NOTE — REVIEW OF SYSTEMS
[Fever] : no fever [Eye Pain] : no eye pain [Earache] : no earache [Chest Pain] : no chest pain [Shortness Of Breath] : no shortness of breath [Abdominal Pain] : no abdominal pain [Skin Wound] : skin wound [FreeTextEntry9] : phill [de-identified] : left lateral lower leg venous stasis ulceration down to skin and subcutaneous tissue with stasis dermatitis

## 2022-08-26 NOTE — ASSESSMENT
[Verbal] : Verbal [Demo] : Demo [Patient] : Patient [Family member] : Family member [Good - alert, interested, motivated] : Good - alert, interested, motivated [Demonstrates independently] : demonstrates independently [Dressing changes] : dressing changes [Skin Care] : skin care [Signs and symptoms of infection] : sign and symptoms of infection [Venous Disease] : venous disease [Nutrition] : nutrition [How and When to Call] : how and when to call [Labs and Tests] : labs and tests [Off-loading] : off-loading [Compression Therapy] : compression therapy [Patient responsibility to plan of care] : patient responsibility to plan of care [Stable] : stable [Home] : Home [Walker] : Walker [Not Applicable - Long Term Care/Home Health Agency] : Long Term Care/Home Health Agency: Not Applicable [] : No [FreeTextEntry2] : Infection Prevention\par Promote Skin Integrity\par Offloading\par Elevation\par Compression Compliance\par Low Na+ Diet\par  [FreeTextEntry3] : wound remains the same [FreeTextEntry4] : F/U 1x weekly for a dressing change and in 1 week for an assessment\par \par Preauth for Punch biopsy submitted for next week. Pt daughter stated during visit that approximately 20 years ago the pt had a non healing wound at either hip, daughter and pt don’t recall exact hip that was affected. Pt daughter stated they went to [facility] and had a biopsy of the wound and their physician told them that "It could be cancerous". Pt daughter stated that the physician said they could have radiation therapy or continue with wound dressing and just monitor the site. The pt decided on the latter, wound is resolved, DPM aware of above stated.\par continued elevation of lower legs frequently throughout the day, low sodium diet, and compression therapy compliance reinforced to alleviate lower extremity edema due to venous disease, Pt verbalizes understanding.\par \par Pt to follow up with Dr. Ramos on 9/21/22 for another venous US as stated by pt daughter. Pt daughter states the appointment is at her [place] location.\par

## 2022-08-26 NOTE — PHYSICAL EXAM
[4 x 4] : 4 x 4  [Abdominal Pad] : Abdominal Pad [JVD] : no jugular venous distention  [2+] : left 2+ [Ankle Swelling (On Exam)] : present [Varicose Veins Of Lower Extremities] : present [Varicose Veins Of The Left Leg] : of the left leg [] : of the left leg [Ankle Swelling On The Left] : moderate [Skin Ulcer] : ulcer [de-identified] : calm [de-identified] : 4/5 strength in all quadrants [de-identified] : left lateral lower leg venous stasis ulceration down to skin and subcutaneous tissue with stasis dermatitis [de-identified] : Circ neuromuscular function WNL post coban compression application, pt expressed comfort.\par  [FreeTextEntry1] :  Lower Leg (Lateral) [FreeTextEntry2] : 4.5 [FreeTextEntry3] : 2.0 [FreeTextEntry4] : 0.2 [de-identified] :  serosanguineous [de-identified] : 1-20% [de-identified] : Ani [de-identified] : Mechanically cleansed with sterile gauze and normal saline 0.9%\par \par  [TWNoteComboBox1] : Left [TWNoteComboBox4] : Moderate [TWNoteComboBox5] : No [TWNoteComboBox6] : Venous [de-identified] : No [de-identified] : Normal [de-identified] : None [de-identified] : None [de-identified] : >75% [de-identified] : Yes [de-identified] : Multilayer other compression wrap [de-identified] : 2x Weekly [de-identified] : Primary Dressing

## 2022-08-26 NOTE — PLAN
[FreeTextEntry1] : Patient examined and evaluated at this time.\par Continue local wound care and offloading.\par Will auth for biopsy.\par Spent 20 minutes for patient care and medical decision making.\par Patient to follow up in 1 week.\par

## 2022-08-27 DIAGNOSIS — L97.822 NON-PRESSURE CHRONIC ULCER OF OTHER PART OF LEFT LOWER LEG WITH FAT LAYER EXPOSED: ICD-10-CM

## 2022-08-27 DIAGNOSIS — I34.0 NONRHEUMATIC MITRAL (VALVE) INSUFFICIENCY: ICD-10-CM

## 2022-08-27 DIAGNOSIS — R01.1 CARDIAC MURMUR, UNSPECIFIED: ICD-10-CM

## 2022-08-27 DIAGNOSIS — Z87.891 PERSONAL HISTORY OF NICOTINE DEPENDENCE: ICD-10-CM

## 2022-08-27 DIAGNOSIS — I83.228 VARICOSE VEINS OF LEFT LOWER EXTREMITY WITH BOTH ULCER OF OTHER PART OF LOWER EXTREMITY AND INFLAMMATION: ICD-10-CM

## 2022-08-27 DIAGNOSIS — I83.893 VARICOSE VEINS OF BILATERAL LOWER EXTREMITIES WITH OTHER COMPLICATIONS: ICD-10-CM

## 2022-08-27 DIAGNOSIS — I49.3 VENTRICULAR PREMATURE DEPOLARIZATION: ICD-10-CM

## 2022-08-27 DIAGNOSIS — G20 PARKINSON'S DISEASE: ICD-10-CM

## 2022-08-27 DIAGNOSIS — M19.90 UNSPECIFIED OSTEOARTHRITIS, UNSPECIFIED SITE: ICD-10-CM

## 2022-08-27 DIAGNOSIS — Z79.899 OTHER LONG TERM (CURRENT) DRUG THERAPY: ICD-10-CM

## 2022-08-27 DIAGNOSIS — Z80.0 FAMILY HISTORY OF MALIGNANT NEOPLASM OF DIGESTIVE ORGANS: ICD-10-CM

## 2022-08-30 ENCOUNTER — APPOINTMENT (OUTPATIENT)
Dept: WOUND CARE | Facility: HOSPITAL | Age: 87
End: 2022-08-30

## 2022-08-30 ENCOUNTER — OUTPATIENT (OUTPATIENT)
Dept: OUTPATIENT SERVICES | Facility: HOSPITAL | Age: 87
LOS: 1 days | Discharge: HOME | End: 2022-08-30
Payer: MEDICARE

## 2022-08-30 VITALS
TEMPERATURE: 99 F | HEIGHT: 76 IN | DIASTOLIC BLOOD PRESSURE: 66 MMHG | SYSTOLIC BLOOD PRESSURE: 119 MMHG | BODY MASS INDEX: 21.92 KG/M2 | OXYGEN SATURATION: 98 % | RESPIRATION RATE: 18 BRPM | WEIGHT: 180 LBS | HEART RATE: 70 BPM

## 2022-08-30 DIAGNOSIS — I83.228 VARICOSE VEINS OF LEFT LOWER EXTREMITY WITH BOTH ULCER OF OTHER PART OF LOWER EXTREMITY AND INFLAMMATION: ICD-10-CM

## 2022-08-30 DIAGNOSIS — L97.822 NON-PRESSURE CHRONIC ULCER OF OTHER PART OF LEFT LOWER LEG WITH FAT LAYER EXPOSED: ICD-10-CM

## 2022-08-30 PROCEDURE — 29581 APPL MULTLAYER CMPRN SYS LEG: CPT | Mod: LT

## 2022-08-30 PROCEDURE — ZZZZZ: CPT

## 2022-08-31 NOTE — H&P ADULT - NSICDXNOFAMILYHX_GEN_ALL_CORE
<-- Click to add NO pertinent Family History Birth Control Pills Pregnancy And Lactation Text: This medication should be avoided if pregnant and for the first 30 days post-partum.

## 2022-09-02 ENCOUNTER — RESULT REVIEW (OUTPATIENT)
Age: 87
End: 2022-09-02

## 2022-09-02 ENCOUNTER — OUTPATIENT (OUTPATIENT)
Dept: OUTPATIENT SERVICES | Facility: HOSPITAL | Age: 87
LOS: 1 days | Discharge: HOME | End: 2022-09-02
Payer: MEDICARE

## 2022-09-02 ENCOUNTER — APPOINTMENT (OUTPATIENT)
Dept: WOUND CARE | Facility: HOSPITAL | Age: 87
End: 2022-09-02

## 2022-09-02 VITALS
HEIGHT: 76 IN | TEMPERATURE: 97.9 F | BODY MASS INDEX: 21.92 KG/M2 | DIASTOLIC BLOOD PRESSURE: 67 MMHG | SYSTOLIC BLOOD PRESSURE: 104 MMHG | HEART RATE: 72 BPM | RESPIRATION RATE: 20 BRPM | OXYGEN SATURATION: 97 % | WEIGHT: 180 LBS

## 2022-09-02 DIAGNOSIS — L97.322 NON-PRESSURE CHRONIC ULCER OF LEFT ANKLE WITH FAT LAYER EXPOSED: ICD-10-CM

## 2022-09-02 DIAGNOSIS — I83.228 VARICOSE VEINS OF LEFT LOWER EXTREMITY WITH BOTH ULCER OF OTHER PART OF LOWER EXTREMITY AND INFLAMMATION: ICD-10-CM

## 2022-09-02 PROCEDURE — 88305 TISSUE EXAM BY PATHOLOGIST: CPT | Mod: 26

## 2022-09-02 PROCEDURE — 11104 PUNCH BX SKIN SINGLE LESION: CPT

## 2022-09-02 PROCEDURE — 88305 TISSUE EXAM BY PATHOLOGIST: CPT

## 2022-09-07 ENCOUNTER — APPOINTMENT (OUTPATIENT)
Dept: VASCULAR SURGERY | Facility: CLINIC | Age: 87
End: 2022-09-07

## 2022-09-07 VITALS
DIASTOLIC BLOOD PRESSURE: 62 MMHG | BODY MASS INDEX: 21.92 KG/M2 | OXYGEN SATURATION: 96 % | WEIGHT: 179.99 LBS | SYSTOLIC BLOOD PRESSURE: 105 MMHG | HEART RATE: 64 BPM | HEIGHT: 76 IN

## 2022-09-07 PROCEDURE — 99212 OFFICE O/P EST SF 10 MIN: CPT

## 2022-09-07 NOTE — HISTORY OF PRESENT ILLNESS
[FreeTextEntry1] : Kelvin has known venous stasis disease. He underwent L GSV ablation last year. Unfortunately his wound has not healed.\par Kelvin is here because L lateral shin - malleolar wound is still not making good progress. After last visit with me in May I recommended bx of wound and it hasn’t happened. Multiple grafts failed to fully heal the wound  [de-identified] : He continues to follow at wound care for dressing changes twice weekly and reports that there is slight improvement in L lateral shin - malleolar wound. He recently underwent bx of wound and has follow up for that on Friday at [facility]. Multiple grafts failed to fully heal the wound in the past

## 2022-09-07 NOTE — DATA REVIEWED
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

## 2022-09-07 NOTE — ASSESSMENT
[Arterial/Venous Disease] : arterial/venous disease [FreeTextEntry1] : 85 yo M with hx of venous disease. Hx of L GSV ablation. Still open wound. No PVD. \par Venous US reveals venous insuficiency and large triutaries down his L shin.

## 2022-09-07 NOTE — PHYSICAL EXAM
[Normal Breath Sounds] : Normal breath sounds [Normal Heart Sounds] : normal heart sounds [Ankle Swelling (On Exam)] : present [Ankle Swelling Bilaterally] : severe [JVD] : no jugular venous distention  [de-identified] : Elderly M in NAD

## 2022-09-09 ENCOUNTER — APPOINTMENT (OUTPATIENT)
Dept: WOUND CARE | Facility: HOSPITAL | Age: 87
End: 2022-09-09

## 2022-09-09 ENCOUNTER — OUTPATIENT (OUTPATIENT)
Dept: OUTPATIENT SERVICES | Facility: HOSPITAL | Age: 87
LOS: 1 days | Discharge: HOME | End: 2022-09-09
Payer: MEDICARE

## 2022-09-09 VITALS
SYSTOLIC BLOOD PRESSURE: 111 MMHG | DIASTOLIC BLOOD PRESSURE: 64 MMHG | TEMPERATURE: 97.5 F | HEIGHT: 76 IN | WEIGHT: 179 LBS | HEART RATE: 68 BPM | OXYGEN SATURATION: 96 % | BODY MASS INDEX: 21.8 KG/M2 | RESPIRATION RATE: 20 BRPM

## 2022-09-09 DIAGNOSIS — I83.228 VARICOSE VEINS OF LEFT LOWER EXTREMITY WITH BOTH ULCER OF OTHER PART OF LOWER EXTREMITY AND INFLAMMATION: ICD-10-CM

## 2022-09-09 DIAGNOSIS — I49.3 VENTRICULAR PREMATURE DEPOLARIZATION: ICD-10-CM

## 2022-09-09 DIAGNOSIS — G20 PARKINSON'S DISEASE: ICD-10-CM

## 2022-09-09 DIAGNOSIS — R01.1 CARDIAC MURMUR, UNSPECIFIED: ICD-10-CM

## 2022-09-09 DIAGNOSIS — I83.893 VARICOSE VEINS OF BILATERAL LOWER EXTREMITIES WITH OTHER COMPLICATIONS: ICD-10-CM

## 2022-09-09 DIAGNOSIS — I34.0 NONRHEUMATIC MITRAL (VALVE) INSUFFICIENCY: ICD-10-CM

## 2022-09-09 DIAGNOSIS — M17.0 BILATERAL PRIMARY OSTEOARTHRITIS OF KNEE: ICD-10-CM

## 2022-09-09 DIAGNOSIS — L97.822 NON-PRESSURE CHRONIC ULCER OF OTHER PART OF LEFT LOWER LEG WITH FAT LAYER EXPOSED: ICD-10-CM

## 2022-09-09 DIAGNOSIS — Z79.899 OTHER LONG TERM (CURRENT) DRUG THERAPY: ICD-10-CM

## 2022-09-09 DIAGNOSIS — Z80.0 FAMILY HISTORY OF MALIGNANT NEOPLASM OF DIGESTIVE ORGANS: ICD-10-CM

## 2022-09-09 DIAGNOSIS — Z87.891 PERSONAL HISTORY OF NICOTINE DEPENDENCE: ICD-10-CM

## 2022-09-09 DIAGNOSIS — F09 UNSPECIFIED MENTAL DISORDER DUE TO KNOWN PHYSIOLOGICAL CONDITION: ICD-10-CM

## 2022-09-09 DIAGNOSIS — M19.90 UNSPECIFIED OSTEOARTHRITIS, UNSPECIFIED SITE: ICD-10-CM

## 2022-09-09 PROBLEM — L97.322: Status: ACTIVE | Noted: 2021-01-21

## 2022-09-09 PROCEDURE — 99213 OFFICE O/P EST LOW 20 MIN: CPT

## 2022-09-09 PROCEDURE — 29581 APPL MULTLAYER CMPRN SYS LEG: CPT | Mod: LT

## 2022-09-09 NOTE — REVIEW OF SYSTEMS
[Fever] : no fever [Eye Pain] : no eye pain [Earache] : no earache [Chest Pain] : no chest pain [Shortness Of Breath] : no shortness of breath [Abdominal Pain] : no abdominal pain [Skin Wound] : skin wound [FreeTextEntry9] : phill [de-identified] : left lateral lower leg venous stasis ulceration down to skin and subcutaneous tissue with stasis dermatitis

## 2022-09-09 NOTE — PHYSICAL EXAM
[4 x 4] : 4 x 4  [JVD] : no jugular venous distention  [2+] : left 2+ [Ankle Swelling (On Exam)] : present [Varicose Veins Of Lower Extremities] : present [Varicose Veins Of The Left Leg] : of the left leg [] : of the left leg [Ankle Swelling On The Left] : moderate [Skin Ulcer] : ulcer [de-identified] : calm [de-identified] : 4/5 strength in all quadrants [de-identified] : left lateral lower leg venous stasis ulceration down to skin and subcutaneous tissue with stasis dermatitis [de-identified] : \par  [FreeTextEntry1] :  Lower Leg (Lateral) [FreeTextEntry2] : 3.5 [FreeTextEntry3] : 1.3 [FreeTextEntry4] : 0.2 [de-identified] : Serosanguineous [de-identified] : 90% [de-identified] : 1-10% [de-identified] : Circulatory and neuromuscular function WNL post Coban Application  [de-identified] : Ani [de-identified] : Mechanically Cleansed with Sterile Gauze & Normal Saline  [TWNoteComboBox1] : Left [TWNoteComboBox4] : Moderate [TWNoteComboBox5] : No [TWNoteComboBox6] : Venous [de-identified] : No [de-identified] : Normal [de-identified] : None [de-identified] : None [de-identified] : >75% [de-identified] : Yes [de-identified] : False [de-identified] : False [de-identified] : Multilayer other compression wrap [de-identified] : 2x Weekly

## 2022-09-09 NOTE — PHYSICAL EXAM
[4 x 4] : 4 x 4  [2+] : left 2+ [Ankle Swelling (On Exam)] : present [Varicose Veins Of Lower Extremities] : present [Varicose Veins Of The Left Leg] : of the left leg [] : of the left leg [Ankle Swelling On The Left] : moderate [Skin Ulcer] : ulcer [JVD] : no jugular venous distention  [de-identified] : calm [de-identified] : 4/5 strength in all quadrants [de-identified] : left lateral lower leg venous stasis ulceration down to skin and subcutaneous tissue with stasis dermatitis [de-identified] : \par  [FreeTextEntry1] :  Lower Leg (Lateral) [FreeTextEntry2] : 3.8 [FreeTextEntry3] : 2.0 [FreeTextEntry4] : 0.2 [de-identified] : Serosanguineous [de-identified] : 1-20% [de-identified] : Topical Lidocaine  [de-identified] : Punch Biopsy [de-identified] : Circulatory and neuromuscular function WNL post Coban Application  [de-identified] : Ani [de-identified] : Mechanically Cleansed with Sterile Gauze & Normal Saline  [TWNoteComboBox1] : Left [TWNoteComboBox4] : Moderate [TWNoteComboBox5] : No [TWNoteComboBox6] : Venous [de-identified] : No [de-identified] : Normal [de-identified] : None [de-identified] : None [de-identified] : >75% [de-identified] : Yes [de-identified] : Lidocaine 1%, Epinephrine 1:100,000, 8.4% Na Bicarb [de-identified] : Biopsy taken and sent for pathology [de-identified] : Multilayer other compression wrap [de-identified] : 2x Weekly [de-identified] : False

## 2022-09-09 NOTE — REVIEW OF SYSTEMS
[Skin Wound] : skin wound [FreeTextEntry1] : 915 [Fever] : no fever [Eye Pain] : no eye pain [Earache] : no earache [Chest Pain] : no chest pain [Shortness Of Breath] : no shortness of breath [Abdominal Pain] : no abdominal pain [FreeTextEntry9] : phill [de-identified] : left lateral lower leg venous stasis ulceration down to skin and subcutaneous tissue with stasis dermatitis

## 2022-09-09 NOTE — PROCEDURE
[FreeTextEntry9] : 8969 [de-identified] : left lateral lower leg venous stasis ulceration down to skin and subcutaneous tissue with stasis dermatitis [de-identified] : loretta [de-identified] : taylor [FreeTextEntry6] : left lateral lower leg venous stasis ulceration down to skin and subcutaneous tissue with stasis dermatitis [FreeTextEntry7] : left lateral lower leg venous stasis ulceration down to skin and subcutaneous tissue with stasis dermatitis [de-identified] : skin and subcutaneous tissue

## 2022-09-09 NOTE — ASSESSMENT
[Verbal] : Verbal [Demo] : Demo [Patient] : Patient [Family member] : Family member [Good - alert, interested, motivated] : Good - alert, interested, motivated [Demonstrates independently] : demonstrates independently [Dressing changes] : dressing changes [Skin Care] : skin care [Signs and symptoms of infection] : sign and symptoms of infection [Venous Disease] : venous disease [Nutrition] : nutrition [How and When to Call] : how and when to call [Labs and Tests] : labs and tests [Off-loading] : off-loading [Compression Therapy] : compression therapy [Patient responsibility to plan of care] : patient responsibility to plan of care [Stable] : stable [Home] : Home [Walker] : Walker [] : Yes [FreeTextEntry2] : Infection Prevention\par Restore Optimal Skin Integrity \par Localized Wound Care \par Compression Therapy \par  [FreeTextEntry4] : Pt to F/U to [facility] in 1 Week for an Assessment\par DPM performed a Punch biopsy. Pt Tolerated well. Biopsy sent to hospital for Pathology.\par Pt to follow up with Dr. Ramos on 9/6/22 for another venous US as stated by pt daughter. Pt daughter states the appointment is at her [place] location.\par

## 2022-09-09 NOTE — ASSESSMENT
[Verbal] : Verbal [Demo] : Demo [Patient] : Patient [Family member] : Family member [Good - alert, interested, motivated] : Good - alert, interested, motivated [Demonstrates independently] : demonstrates independently [Dressing changes] : dressing changes [Skin Care] : skin care [Signs and symptoms of infection] : sign and symptoms of infection [Venous Disease] : venous disease [Nutrition] : nutrition [How and When to Call] : how and when to call [Labs and Tests] : labs and tests [Off-loading] : off-loading [Compression Therapy] : compression therapy [Patient responsibility to plan of care] : patient responsibility to plan of care [] : Yes [Stable] : stable [Home] : Home [Walker] : Walker [FreeTextEntry2] : Collagen Matrix Therapy \par Infection Prevention\par Restore Optimal Skin Integrity \par Localized Wound Care \par Compression Therapy \par  [FreeTextEntry4] : Pt to F/U to [facility] in 1 Week for an Assessment\par DPM reviewed Punch biopsy results with Pt. Pt Verbalized Understanding \par Pt had a Sonogram at Dr. Ramos office on 9/7/22.\par

## 2022-09-15 ENCOUNTER — NON-APPOINTMENT (OUTPATIENT)
Age: 87
End: 2022-09-15

## 2022-09-16 ENCOUNTER — APPOINTMENT (OUTPATIENT)
Dept: WOUND CARE | Facility: HOSPITAL | Age: 87
End: 2022-09-16

## 2022-09-16 ENCOUNTER — OUTPATIENT (OUTPATIENT)
Dept: OUTPATIENT SERVICES | Facility: HOSPITAL | Age: 87
LOS: 1 days | Discharge: HOME | End: 2022-09-16
Payer: MEDICARE

## 2022-09-16 VITALS
WEIGHT: 179 LBS | DIASTOLIC BLOOD PRESSURE: 85 MMHG | TEMPERATURE: 97.7 F | OXYGEN SATURATION: 97 % | HEIGHT: 76 IN | SYSTOLIC BLOOD PRESSURE: 142 MMHG | BODY MASS INDEX: 21.8 KG/M2 | HEART RATE: 63 BPM | RESPIRATION RATE: 20 BRPM

## 2022-09-16 DIAGNOSIS — I83.223 VARICOSE VEINS OF LEFT LOWER EXTREMITY WITH BOTH ULCER OF ANKLE AND INFLAMMATION: ICD-10-CM

## 2022-09-16 PROCEDURE — 99213 OFFICE O/P EST LOW 20 MIN: CPT

## 2022-09-16 PROCEDURE — 29581 APPL MULTLAYER CMPRN SYS LEG: CPT | Mod: LT

## 2022-09-18 DIAGNOSIS — I83.228 VARICOSE VEINS OF LEFT LOWER EXTREMITY WITH BOTH ULCER OF OTHER PART OF LOWER EXTREMITY AND INFLAMMATION: ICD-10-CM

## 2022-09-18 DIAGNOSIS — Z87.891 PERSONAL HISTORY OF NICOTINE DEPENDENCE: ICD-10-CM

## 2022-09-18 DIAGNOSIS — Z79.899 OTHER LONG TERM (CURRENT) DRUG THERAPY: ICD-10-CM

## 2022-09-18 DIAGNOSIS — R01.1 CARDIAC MURMUR, UNSPECIFIED: ICD-10-CM

## 2022-09-18 DIAGNOSIS — I83.893 VARICOSE VEINS OF BILATERAL LOWER EXTREMITIES WITH OTHER COMPLICATIONS: ICD-10-CM

## 2022-09-18 DIAGNOSIS — I34.0 NONRHEUMATIC MITRAL (VALVE) INSUFFICIENCY: ICD-10-CM

## 2022-09-18 DIAGNOSIS — L97.822 NON-PRESSURE CHRONIC ULCER OF OTHER PART OF LEFT LOWER LEG WITH FAT LAYER EXPOSED: ICD-10-CM

## 2022-09-18 DIAGNOSIS — Z80.0 FAMILY HISTORY OF MALIGNANT NEOPLASM OF DIGESTIVE ORGANS: ICD-10-CM

## 2022-09-18 DIAGNOSIS — I49.3 VENTRICULAR PREMATURE DEPOLARIZATION: ICD-10-CM

## 2022-09-18 DIAGNOSIS — F09 UNSPECIFIED MENTAL DISORDER DUE TO KNOWN PHYSIOLOGICAL CONDITION: ICD-10-CM

## 2022-09-18 DIAGNOSIS — M17.0 BILATERAL PRIMARY OSTEOARTHRITIS OF KNEE: ICD-10-CM

## 2022-09-18 DIAGNOSIS — G20 PARKINSON'S DISEASE: ICD-10-CM

## 2022-09-19 NOTE — PHYSICAL EXAM
[4 x 4] : 4 x 4  [Abdominal Pad] : Abdominal Pad [2+] : left 2+ [Ankle Swelling (On Exam)] : present [Varicose Veins Of Lower Extremities] : present [Varicose Veins Of The Left Leg] : of the left leg [] : of the left leg [Ankle Swelling On The Left] : moderate [Skin Ulcer] : ulcer [JVD] : no jugular venous distention  [de-identified] : calm [de-identified] : 4/5 strength in all quadrants [de-identified] : left lateral lower leg venous stasis ulceration down to skin and subcutaneous tissue with stasis dermatitis [de-identified] : Circ neuromuscular function WNL post coban compression application, pt expressed comfort.\par  [FreeTextEntry1] : Left Lateral Lower Leg [FreeTextEntry2] : 3.5 [FreeTextEntry3] : 0.8 [FreeTextEntry4] : 0.1-0.2 [de-identified] : serosanguineous  [de-identified] : 1-25% [de-identified] : COBAN [de-identified] : yasemin [de-identified] : Mechanically cleansed with Sterile gauze and 0.9% Normal Saline\par  [TWNoteComboBox4] : Small [TWNoteComboBox5] : No [TWNoteComboBox6] : Venous [de-identified] : No [de-identified] : Normal [de-identified] : None [de-identified] : None [de-identified] : >75% [de-identified] : Yes [de-identified] : Multilayer other compression wrap [de-identified] : Weekly [de-identified] : Primary Dressing

## 2022-09-19 NOTE — ASSESSMENT
[Verbal] : Verbal [Demo] : Demo [Patient] : Patient [Good - alert, interested, motivated] : Good - alert, interested, motivated [Verbalizes knowledge/Understanding] : Verbalizes knowledge/understanding [Dressing changes] : dressing changes [Skin Care] : skin care [Signs and symptoms of infection] : sign and symptoms of infection [Venous Disease] : venous disease [Nutrition] : nutrition [How and When to Call] : how and when to call [Off-loading] : off-loading [Compression Therapy] : compression therapy [Home Health] : home health [Patient responsibility to plan of care] : patient responsibility to plan of care [] : Yes [Stable] : stable [Home] : Home [Walker] : Walker [Not Applicable - Long Term Care/Home Health Agency] : Long Term Care/Home Health Agency: Not Applicable [FreeTextEntry2] : Infection Prevention\par Promote Skin Integrity\par Offloading\par Elevation\par Compression Compliance\par Low Na+ Diet\par  [FreeTextEntry4] : F/U 1 week

## 2022-09-19 NOTE — REVIEW OF SYSTEMS
[Skin Wound] : skin wound [Fever] : no fever [Eye Pain] : no eye pain [Earache] : no earache [Chest Pain] : no chest pain [Shortness Of Breath] : no shortness of breath [Abdominal Pain] : no abdominal pain [FreeTextEntry9] : phill [de-identified] : left lateral lower leg venous stasis ulceration down to skin and subcutaneous tissue with stasis dermatitis

## 2022-09-23 ENCOUNTER — OUTPATIENT (OUTPATIENT)
Dept: OUTPATIENT SERVICES | Facility: HOSPITAL | Age: 87
LOS: 1 days | Discharge: HOME | End: 2022-09-23
Payer: MEDICARE

## 2022-09-23 ENCOUNTER — APPOINTMENT (OUTPATIENT)
Dept: WOUND CARE | Facility: HOSPITAL | Age: 87
End: 2022-09-23

## 2022-09-23 VITALS
DIASTOLIC BLOOD PRESSURE: 76 MMHG | RESPIRATION RATE: 20 BRPM | SYSTOLIC BLOOD PRESSURE: 138 MMHG | OXYGEN SATURATION: 99 % | BODY MASS INDEX: 21.8 KG/M2 | HEART RATE: 61 BPM | HEIGHT: 76 IN | TEMPERATURE: 97.8 F | WEIGHT: 179 LBS

## 2022-09-23 DIAGNOSIS — I83.223 VARICOSE VEINS OF LEFT LOWER EXTREMITY WITH BOTH ULCER OF ANKLE AND INFLAMMATION: ICD-10-CM

## 2022-09-23 PROCEDURE — 99213 OFFICE O/P EST LOW 20 MIN: CPT

## 2022-09-23 PROCEDURE — 29581 APPL MULTLAYER CMPRN SYS LEG: CPT | Mod: LT

## 2022-09-23 NOTE — PHYSICAL EXAM
[4 x 4] : 4 x 4  [JVD] : no jugular venous distention  [2+] : left 2+ [Ankle Swelling (On Exam)] : present [Varicose Veins Of Lower Extremities] : present [Varicose Veins Of The Left Leg] : of the left leg [] : of the left leg [Ankle Swelling On The Left] : moderate [Skin Ulcer] : ulcer [de-identified] : calm [de-identified] : 4/5 strength in all quadrants [de-identified] : left lateral lower leg venous stasis ulceration down to skin and subcutaneous tissue with stasis dermatitis [de-identified] : Circ neuromuscular function WNL post coban compression application, pt expressed comfort.\par  [FreeTextEntry1] : Left Lateral Lower Leg [FreeTextEntry2] : 2.6 [FreeTextEntry3] : 0.7 [FreeTextEntry4] : 0.1-0.2 [de-identified] : serosanguineous  [de-identified] : 90% [de-identified] : 10% [de-identified] : COBAN [de-identified] : Ani  [de-identified] : Mechanically Cleansed with Sterile Gauze & Normal Saline  [TWNoteComboBox4] : Small [TWNoteComboBox5] : No [TWNoteComboBox6] : Venous [de-identified] : No [de-identified] : Normal [de-identified] : None [de-identified] : None [de-identified] : >75% [de-identified] : Yes [de-identified] : Multilayer other compression wrap [de-identified] : Weekly [de-identified] : False

## 2022-09-23 NOTE — REVIEW OF SYSTEMS
[Fever] : no fever [Eye Pain] : no eye pain [Earache] : no earache [Chest Pain] : no chest pain [Shortness Of Breath] : no shortness of breath [Abdominal Pain] : no abdominal pain [Skin Wound] : skin wound [FreeTextEntry9] : phill [de-identified] : left lateral lower leg venous stasis ulceration down to skin and subcutaneous tissue with stasis dermatitis

## 2022-09-23 NOTE — ASSESSMENT
[Verbal] : Verbal [Demo] : Demo [Patient] : Patient [Good - alert, interested, motivated] : Good - alert, interested, motivated [Verbalizes knowledge/Understanding] : Verbalizes knowledge/understanding [Dressing changes] : dressing changes [Skin Care] : skin care [Signs and symptoms of infection] : sign and symptoms of infection [Venous Disease] : venous disease [Nutrition] : nutrition [How and When to Call] : how and when to call [Off-loading] : off-loading [Compression Therapy] : compression therapy [Home Health] : home health [Patient responsibility to plan of care] : patient responsibility to plan of care [] : Yes [Stable] : stable [Home] : Home [Walker] : Walker [Not Applicable - Long Term Care/Home Health Agency] : Long Term Care/Home Health Agency: Not Applicable [FreeTextEntry2] : Infection Prevention\par Compression Therapy \par Offloading\par Elevation \par Low Na+ Diet\par  [FreeTextEntry4] : Pt to F/U to [facility] in 1 Week

## 2022-09-25 DIAGNOSIS — F09 UNSPECIFIED MENTAL DISORDER DUE TO KNOWN PHYSIOLOGICAL CONDITION: ICD-10-CM

## 2022-09-25 DIAGNOSIS — Z80.0 FAMILY HISTORY OF MALIGNANT NEOPLASM OF DIGESTIVE ORGANS: ICD-10-CM

## 2022-09-25 DIAGNOSIS — Z87.891 PERSONAL HISTORY OF NICOTINE DEPENDENCE: ICD-10-CM

## 2022-09-25 DIAGNOSIS — I49.3 VENTRICULAR PREMATURE DEPOLARIZATION: ICD-10-CM

## 2022-09-25 DIAGNOSIS — G20 PARKINSON'S DISEASE: ICD-10-CM

## 2022-09-25 DIAGNOSIS — L97.822 NON-PRESSURE CHRONIC ULCER OF OTHER PART OF LEFT LOWER LEG WITH FAT LAYER EXPOSED: ICD-10-CM

## 2022-09-25 DIAGNOSIS — Z79.899 OTHER LONG TERM (CURRENT) DRUG THERAPY: ICD-10-CM

## 2022-09-25 DIAGNOSIS — I83.228 VARICOSE VEINS OF LEFT LOWER EXTREMITY WITH BOTH ULCER OF OTHER PART OF LOWER EXTREMITY AND INFLAMMATION: ICD-10-CM

## 2022-09-25 DIAGNOSIS — I34.0 NONRHEUMATIC MITRAL (VALVE) INSUFFICIENCY: ICD-10-CM

## 2022-09-25 DIAGNOSIS — M17.0 BILATERAL PRIMARY OSTEOARTHRITIS OF KNEE: ICD-10-CM

## 2022-09-25 DIAGNOSIS — R01.1 CARDIAC MURMUR, UNSPECIFIED: ICD-10-CM

## 2022-09-25 DIAGNOSIS — I83.893 VARICOSE VEINS OF BILATERAL LOWER EXTREMITIES WITH OTHER COMPLICATIONS: ICD-10-CM

## 2022-09-30 ENCOUNTER — OUTPATIENT (OUTPATIENT)
Dept: OUTPATIENT SERVICES | Facility: HOSPITAL | Age: 87
LOS: 1 days | Discharge: HOME | End: 2022-09-30
Payer: MEDICARE

## 2022-09-30 ENCOUNTER — APPOINTMENT (OUTPATIENT)
Dept: WOUND CARE | Facility: HOSPITAL | Age: 87
End: 2022-09-30

## 2022-09-30 VITALS
HEIGHT: 76 IN | HEART RATE: 79 BPM | WEIGHT: 179 LBS | TEMPERATURE: 98.4 F | DIASTOLIC BLOOD PRESSURE: 65 MMHG | RESPIRATION RATE: 20 BRPM | OXYGEN SATURATION: 99 % | SYSTOLIC BLOOD PRESSURE: 110 MMHG | BODY MASS INDEX: 21.8 KG/M2

## 2022-09-30 DIAGNOSIS — I83.223 VARICOSE VEINS OF LEFT LOWER EXTREMITY WITH BOTH ULCER OF ANKLE AND INFLAMMATION: ICD-10-CM

## 2022-09-30 PROCEDURE — 99213 OFFICE O/P EST LOW 20 MIN: CPT

## 2022-09-30 PROCEDURE — 29581 APPL MULTLAYER CMPRN SYS LEG: CPT | Mod: LT

## 2022-10-01 DIAGNOSIS — Z87.891 PERSONAL HISTORY OF NICOTINE DEPENDENCE: ICD-10-CM

## 2022-10-01 DIAGNOSIS — I83.228 VARICOSE VEINS OF LEFT LOWER EXTREMITY WITH BOTH ULCER OF OTHER PART OF LOWER EXTREMITY AND INFLAMMATION: ICD-10-CM

## 2022-10-01 DIAGNOSIS — Z80.0 FAMILY HISTORY OF MALIGNANT NEOPLASM OF DIGESTIVE ORGANS: ICD-10-CM

## 2022-10-01 DIAGNOSIS — I83.893 VARICOSE VEINS OF BILATERAL LOWER EXTREMITIES WITH OTHER COMPLICATIONS: ICD-10-CM

## 2022-10-01 DIAGNOSIS — F09 UNSPECIFIED MENTAL DISORDER DUE TO KNOWN PHYSIOLOGICAL CONDITION: ICD-10-CM

## 2022-10-01 DIAGNOSIS — L97.822 NON-PRESSURE CHRONIC ULCER OF OTHER PART OF LEFT LOWER LEG WITH FAT LAYER EXPOSED: ICD-10-CM

## 2022-10-01 DIAGNOSIS — I49.3 VENTRICULAR PREMATURE DEPOLARIZATION: ICD-10-CM

## 2022-10-01 DIAGNOSIS — M17.0 BILATERAL PRIMARY OSTEOARTHRITIS OF KNEE: ICD-10-CM

## 2022-10-01 DIAGNOSIS — G20 PARKINSON'S DISEASE: ICD-10-CM

## 2022-10-01 DIAGNOSIS — Z79.899 OTHER LONG TERM (CURRENT) DRUG THERAPY: ICD-10-CM

## 2022-10-01 DIAGNOSIS — R01.1 CARDIAC MURMUR, UNSPECIFIED: ICD-10-CM

## 2022-10-01 DIAGNOSIS — I34.0 NONRHEUMATIC MITRAL (VALVE) INSUFFICIENCY: ICD-10-CM

## 2022-10-01 NOTE — PHYSICAL EXAM
[4 x 4] : 4 x 4  [JVD] : no jugular venous distention  [2+] : left 2+ [Ankle Swelling (On Exam)] : present [Varicose Veins Of Lower Extremities] : present [Varicose Veins Of The Left Leg] : of the left leg [] : of the left leg [Ankle Swelling On The Left] : moderate [Skin Ulcer] : ulcer [de-identified] : calm [de-identified] : 4/5 strength in all quadrants [de-identified] : left lateral lower leg venous stasis ulceration down to skin and subcutaneous tissue with stasis dermatitis [de-identified] : Circ neuromuscular function WNL post coban compression application, pt expressed comfort.\par  [FreeTextEntry1] : Left Lateral Lower Leg [FreeTextEntry2] : 2.0 [FreeTextEntry3] : 0.5 [FreeTextEntry4] : 0.2 [de-identified] : serosanguineous  [de-identified] : 90% [de-identified] : 10% [de-identified] : COBAN [de-identified] : Ani  [de-identified] : Mechanically Cleansed with Sterile Gauze & Normal Saline  [TWNoteComboBox4] : Small [TWNoteComboBox5] : No [TWNoteComboBox6] : Venous [de-identified] : No [de-identified] : Normal [de-identified] : None [de-identified] : None [de-identified] : >75% [de-identified] : Yes [de-identified] : Multilayer other compression wrap [de-identified] : Weekly

## 2022-10-01 NOTE — REVIEW OF SYSTEMS
[Fever] : no fever [Eye Pain] : no eye pain [Earache] : no earache [Chest Pain] : no chest pain [Shortness Of Breath] : no shortness of breath [Abdominal Pain] : no abdominal pain [Skin Wound] : skin wound [FreeTextEntry9] : phill [de-identified] : left lateral lower leg venous stasis ulceration down to skin and subcutaneous tissue with stasis dermatitis

## 2022-10-06 ENCOUNTER — NON-APPOINTMENT (OUTPATIENT)
Age: 87
End: 2022-10-06

## 2022-10-07 ENCOUNTER — APPOINTMENT (OUTPATIENT)
Dept: WOUND CARE | Facility: HOSPITAL | Age: 87
End: 2022-10-07

## 2022-10-07 ENCOUNTER — OUTPATIENT (OUTPATIENT)
Dept: OUTPATIENT SERVICES | Facility: HOSPITAL | Age: 87
LOS: 1 days | Discharge: HOME | End: 2022-10-07
Payer: MEDICARE

## 2022-10-07 VITALS
BODY MASS INDEX: 21.8 KG/M2 | OXYGEN SATURATION: 98 % | WEIGHT: 179 LBS | SYSTOLIC BLOOD PRESSURE: 122 MMHG | HEART RATE: 77 BPM | RESPIRATION RATE: 20 BRPM | HEIGHT: 76 IN | DIASTOLIC BLOOD PRESSURE: 75 MMHG | TEMPERATURE: 97.6 F

## 2022-10-07 DIAGNOSIS — Z80.0 FAMILY HISTORY OF MALIGNANT NEOPLASM OF DIGESTIVE ORGANS: ICD-10-CM

## 2022-10-07 DIAGNOSIS — L97.822 NON-PRESSURE CHRONIC ULCER OF OTHER PART OF LEFT LOWER LEG WITH FAT LAYER EXPOSED: ICD-10-CM

## 2022-10-07 DIAGNOSIS — I49.3 VENTRICULAR PREMATURE DEPOLARIZATION: ICD-10-CM

## 2022-10-07 DIAGNOSIS — Z87.891 PERSONAL HISTORY OF NICOTINE DEPENDENCE: ICD-10-CM

## 2022-10-07 DIAGNOSIS — I34.0 NONRHEUMATIC MITRAL (VALVE) INSUFFICIENCY: ICD-10-CM

## 2022-10-07 DIAGNOSIS — M17.0 BILATERAL PRIMARY OSTEOARTHRITIS OF KNEE: ICD-10-CM

## 2022-10-07 DIAGNOSIS — I83.893 VARICOSE VEINS OF BILATERAL LOWER EXTREMITIES WITH OTHER COMPLICATIONS: ICD-10-CM

## 2022-10-07 DIAGNOSIS — I83.228 VARICOSE VEINS OF LEFT LOWER EXTREMITY WITH BOTH ULCER OF OTHER PART OF LOWER EXTREMITY AND INFLAMMATION: ICD-10-CM

## 2022-10-07 DIAGNOSIS — G20 PARKINSON'S DISEASE: ICD-10-CM

## 2022-10-07 DIAGNOSIS — Z79.899 OTHER LONG TERM (CURRENT) DRUG THERAPY: ICD-10-CM

## 2022-10-07 DIAGNOSIS — R01.1 CARDIAC MURMUR, UNSPECIFIED: ICD-10-CM

## 2022-10-07 DIAGNOSIS — F09 UNSPECIFIED MENTAL DISORDER DUE TO KNOWN PHYSIOLOGICAL CONDITION: ICD-10-CM

## 2022-10-07 DIAGNOSIS — I83.223 VARICOSE VEINS OF LEFT LOWER EXTREMITY WITH BOTH ULCER OF ANKLE AND INFLAMMATION: ICD-10-CM

## 2022-10-07 PROCEDURE — 99213 OFFICE O/P EST LOW 20 MIN: CPT

## 2022-10-07 PROCEDURE — 29581 APPL MULTLAYER CMPRN SYS LEG: CPT | Mod: LT

## 2022-10-07 NOTE — PHYSICAL EXAM
[4 x 4] : 4 x 4  [JVD] : no jugular venous distention  [2+] : left 2+ [Ankle Swelling (On Exam)] : present [Varicose Veins Of Lower Extremities] : present [Varicose Veins Of The Left Leg] : of the left leg [] : of the left leg [Ankle Swelling On The Left] : moderate [Skin Ulcer] : ulcer [de-identified] : calm [de-identified] : 4/5 strength in all quadrants [de-identified] : left lateral lower leg venous stasis ulceration down to skin and subcutaneous tissue with stasis dermatitis [de-identified] : Circ neuromuscular function WNL post coban compression application, pt expressed comfort.\par  [FreeTextEntry1] : left lateral leg [FreeTextEntry2] : 1.4 [FreeTextEntry3] : 0.4 [FreeTextEntry4] : 0.2 [de-identified] : serous [de-identified] : coban [de-identified] : yasemin [de-identified] : Mechanically cleansed with Sterile gauze and 0.9% Normal Saline\par \par chlorhexidine scrub 4% [TWNoteComboBox4] : Small [TWNoteComboBox5] : No [TWNoteComboBox6] : Venous [de-identified] : No [de-identified] : Normal [de-identified] : None [de-identified] : None [de-identified] : 100% [de-identified] : No [de-identified] : Multilayer other compression wrap [de-identified] : Weekly [de-identified] : Primary Dressing

## 2022-10-07 NOTE — ASSESSMENT
[Verbal] : Verbal [Written] : Written [Demo] : Demo [Patient] : Patient [Family member] : Family member [Good - alert, interested, motivated] : Good - alert, interested, motivated [Verbalizes knowledge/Understanding] : Verbalizes knowledge/understanding [Dressing changes] : dressing changes [Skin Care] : skin care [Signs and symptoms of infection] : sign and symptoms of infection [Venous Disease] : venous disease [Nutrition] : nutrition [How and When to Call] : how and when to call [Pain Management] : pain management [Compression Therapy] : compression therapy [Home Health] : home health [Patient responsibility to plan of care] : patient responsibility to plan of care [] : Yes [Stable] : stable [Home] : Home [Walker] : Walker [Not Applicable - Long Term Care/Home Health Agency] : Long Term Care/Home Health Agency: Not Applicable [FreeTextEntry2] : Infection Prevention\par Promote Skin Integrity\par Offloading\par Elevation\par Compression Compliance\par Low Na+ Diet\par Maintain acceptable levels of pain\par Demonstrates use of both pharmacological and nonpharmacological pain management interventions\par  [FreeTextEntry4] : F/U 1 week

## 2022-10-07 NOTE — PLAN
[FreeTextEntry1] : Patient examined and evaluated at this time.\par Continue local wound care and offloading.\par yasemin,DD,Coban to left lateral ankle weekly\par Spent 20 minutes for patient care and medical decision making.\par Patient to follow up in 1 week.\par

## 2022-10-07 NOTE — HISTORY OF PRESENT ILLNESS
[FreeTextEntry1] : pt seen for left lateral lower leg venous stasis ulceration down to skin and subcutaneous tissue and fat with stasis dermatitis\par \par 10/7/22 left lateral ankle wound smaller and

## 2022-10-07 NOTE — REVIEW OF SYSTEMS
[Fever] : no fever [Eye Pain] : no eye pain [Earache] : no earache [Chest Pain] : no chest pain [Shortness Of Breath] : no shortness of breath [Abdominal Pain] : no abdominal pain [Skin Wound] : skin wound [FreeTextEntry9] : phill [de-identified] : left lateral lower leg venous stasis ulceration down to skin and subcutaneous tissue with stasis dermatitis

## 2022-10-14 ENCOUNTER — APPOINTMENT (OUTPATIENT)
Dept: WOUND CARE | Facility: HOSPITAL | Age: 87
End: 2022-10-14

## 2022-10-14 ENCOUNTER — OUTPATIENT (OUTPATIENT)
Dept: OUTPATIENT SERVICES | Facility: HOSPITAL | Age: 87
LOS: 1 days | Discharge: HOME | End: 2022-10-14
Payer: MEDICARE

## 2022-10-14 VITALS
SYSTOLIC BLOOD PRESSURE: 107 MMHG | BODY MASS INDEX: 21.8 KG/M2 | HEIGHT: 76 IN | HEART RATE: 80 BPM | OXYGEN SATURATION: 95 % | WEIGHT: 179 LBS | DIASTOLIC BLOOD PRESSURE: 66 MMHG | TEMPERATURE: 98.2 F | RESPIRATION RATE: 20 BRPM

## 2022-10-14 DIAGNOSIS — R01.1 CARDIAC MURMUR, UNSPECIFIED: ICD-10-CM

## 2022-10-14 DIAGNOSIS — F09 UNSPECIFIED MENTAL DISORDER DUE TO KNOWN PHYSIOLOGICAL CONDITION: ICD-10-CM

## 2022-10-14 DIAGNOSIS — I83.228 VARICOSE VEINS OF LEFT LOWER EXTREMITY WITH BOTH ULCER OF OTHER PART OF LOWER EXTREMITY AND INFLAMMATION: ICD-10-CM

## 2022-10-14 DIAGNOSIS — G20 PARKINSON'S DISEASE: ICD-10-CM

## 2022-10-14 DIAGNOSIS — L97.822 NON-PRESSURE CHRONIC ULCER OF OTHER PART OF LEFT LOWER LEG WITH FAT LAYER EXPOSED: ICD-10-CM

## 2022-10-14 DIAGNOSIS — I83.893 VARICOSE VEINS OF BILATERAL LOWER EXTREMITIES WITH OTHER COMPLICATIONS: ICD-10-CM

## 2022-10-14 DIAGNOSIS — M17.0 BILATERAL PRIMARY OSTEOARTHRITIS OF KNEE: ICD-10-CM

## 2022-10-14 DIAGNOSIS — I34.0 NONRHEUMATIC MITRAL (VALVE) INSUFFICIENCY: ICD-10-CM

## 2022-10-14 DIAGNOSIS — I49.3 VENTRICULAR PREMATURE DEPOLARIZATION: ICD-10-CM

## 2022-10-14 DIAGNOSIS — Z80.0 FAMILY HISTORY OF MALIGNANT NEOPLASM OF DIGESTIVE ORGANS: ICD-10-CM

## 2022-10-14 DIAGNOSIS — Z87.891 PERSONAL HISTORY OF NICOTINE DEPENDENCE: ICD-10-CM

## 2022-10-14 DIAGNOSIS — Z79.899 OTHER LONG TERM (CURRENT) DRUG THERAPY: ICD-10-CM

## 2022-10-14 DIAGNOSIS — I83.223 VARICOSE VEINS OF LEFT LOWER EXTREMITY WITH BOTH ULCER OF ANKLE AND INFLAMMATION: ICD-10-CM

## 2022-10-14 PROCEDURE — 99213 OFFICE O/P EST LOW 20 MIN: CPT

## 2022-10-14 PROCEDURE — 29581 APPL MULTLAYER CMPRN SYS LEG: CPT | Mod: LT

## 2022-10-14 NOTE — REVIEW OF SYSTEMS
[Skin Wound] : skin wound [Fever] : no fever [Eye Pain] : no eye pain [Earache] : no earache [Chest Pain] : no chest pain [Shortness Of Breath] : no shortness of breath [Abdominal Pain] : no abdominal pain [FreeTextEntry9] : phill [de-identified] : left lateral lower leg venous stasis ulceration down to skin and subcutaneous tissue with stasis dermatitis

## 2022-10-14 NOTE — ASSESSMENT
[Verbal] : Verbal [Written] : Written [Demo] : Demo [Patient] : Patient [Family member] : Family member [Good - alert, interested, motivated] : Good - alert, interested, motivated [Verbalizes knowledge/Understanding] : Verbalizes knowledge/understanding [Dressing changes] : dressing changes [Skin Care] : skin care [Signs and symptoms of infection] : sign and symptoms of infection [Venous Disease] : venous disease [Nutrition] : nutrition [How and When to Call] : how and when to call [Pain Management] : pain management [Compression Therapy] : compression therapy [Stable] : stable [Home] : Home [Walker] : Walker [Not Applicable - Long Term Care/Home Health Agency] : Long Term Care/Home Health Agency: Not Applicable [] : No [FreeTextEntry2] : Infection prevention\par Localized wound care \par Goal remaining pain free regarding wounds\par Compression therapy  [FreeTextEntry4] : Pt told to being in his at home compression stockings \par Follow up in 1 week

## 2022-10-14 NOTE — HISTORY OF PRESENT ILLNESS
[FreeTextEntry1] : 87 yo WM, here for f/u of a chronic LLE VSU. Healing and bailey well with coban/yasemin. Advised pt to bring in his compression stockings.

## 2022-10-14 NOTE — PLAN
[FreeTextEntry1] : leg elevation\par yasemin, DD, coban\par f/u 1 wk\par bring in compr. stockings.\par \par time spent- 25 mins.

## 2022-10-14 NOTE — PHYSICAL EXAM
[4 x 4] : 4 x 4  [Normal Thyroid] : the thyroid was normal [Normal Breath Sounds] : Normal breath sounds [Normal Heart Sounds] : normal heart sounds [Normal Rate and Rhythm] : normal rate and rhythm [Ankle Swelling (On Exam)] : present [Ankle Swelling Bilaterally] : bilaterally  [Ankle Swelling On The Left] : moderate [] : of the left leg [Ankle Swelling On The Right] : mild [Alert] : alert [Oriented to Person] : oriented to person [Oriented to Place] : oriented to place [Oriented to Time] : oriented to time [Calm] : calm [JVD] : no jugular venous distention  [Abdomen Masses] : No abdominal massess [Abdomen Tenderness] : ~T ~M No abdominal tenderness [Tender] : nontender [Enlarged] : not enlarged [de-identified] : elderly WM, NAD, alert, Ox3. [FreeTextEntry1] : Left lateral leg [FreeTextEntry2] : 0.9 [FreeTextEntry3] : 0.3 [FreeTextEntry4] : 0.2 [de-identified] : Serous/sanguinous [de-identified] : No neurovascular deficits noted. Expressed comfort post Coban application. [de-identified] : Ani  [de-identified] : Mechanically cleansed with sterile gauze and normal saline.\par Kerlix  [TWNoteComboBox4] : Small [de-identified] : Normal [de-identified] : None [de-identified] : None [de-identified] : 100% [de-identified] : No [de-identified] : Multilayer other compression wrap [de-identified] : Weekly [de-identified] : Primary Dressing

## 2022-10-21 ENCOUNTER — OUTPATIENT (OUTPATIENT)
Dept: OUTPATIENT SERVICES | Facility: HOSPITAL | Age: 87
LOS: 1 days | Discharge: HOME | End: 2022-10-21
Payer: MEDICARE

## 2022-10-21 ENCOUNTER — APPOINTMENT (OUTPATIENT)
Dept: WOUND CARE | Facility: HOSPITAL | Age: 87
End: 2022-10-21

## 2022-10-21 VITALS
HEART RATE: 73 BPM | HEIGHT: 76 IN | DIASTOLIC BLOOD PRESSURE: 71 MMHG | TEMPERATURE: 97.8 F | SYSTOLIC BLOOD PRESSURE: 109 MMHG | BODY MASS INDEX: 21.8 KG/M2 | OXYGEN SATURATION: 99 % | WEIGHT: 179 LBS | RESPIRATION RATE: 20 BRPM

## 2022-10-21 DIAGNOSIS — I83.223 VARICOSE VEINS OF LEFT LOWER EXTREMITY WITH BOTH ULCER OF ANKLE AND INFLAMMATION: ICD-10-CM

## 2022-10-21 PROCEDURE — 99213 OFFICE O/P EST LOW 20 MIN: CPT

## 2022-10-21 PROCEDURE — 29581 APPL MULTLAYER CMPRN SYS LEG: CPT | Mod: LT

## 2022-10-21 NOTE — ASSESSMENT
[Verbal] : Verbal [Written] : Written [Demo] : Demo [Patient] : Patient [Family member] : Family member [Good - alert, interested, motivated] : Good - alert, interested, motivated [Verbalizes knowledge/Understanding] : Verbalizes knowledge/understanding [Dressing changes] : dressing changes [Skin Care] : skin care [Signs and symptoms of infection] : sign and symptoms of infection [Venous Disease] : venous disease [Nutrition] : nutrition [How and When to Call] : how and when to call [Pain Management] : pain management [Compression Therapy] : compression therapy [Stable] : stable [Home] : Home [Walker] : Walker [Not Applicable - Long Term Care/Home Health Agency] : Long Term Care/Home Health Agency: Not Applicable [] : No [FreeTextEntry2] : Infection prevention\par Localized wound care \par Goal remaining pain free regarding wounds\par Compression therapy \par F/U 1 week  [FreeTextEntry4] : No s/s of infection noted\par F/U 1 week

## 2022-10-21 NOTE — PHYSICAL EXAM
[4 x 4] : 4 x 4  [Normal Thyroid] : the thyroid was normal [Normal Breath Sounds] : Normal breath sounds [Normal Heart Sounds] : normal heart sounds [Normal Rate and Rhythm] : normal rate and rhythm [Ankle Swelling (On Exam)] : present [Ankle Swelling Bilaterally] : bilaterally  [Ankle Swelling On The Left] : moderate [] : of the left leg [Ankle Swelling On The Right] : mild [Alert] : alert [Oriented to Person] : oriented to person [Oriented to Place] : oriented to place [Oriented to Time] : oriented to time [Calm] : calm [JVD] : no jugular venous distention  [Abdomen Masses] : No abdominal massess [Abdomen Tenderness] : ~T ~M No abdominal tenderness [Tender] : nontender [Enlarged] : not enlarged [de-identified] : elderly WM, NAD, alert, Ox3. [de-identified] : Patient expressed comfort post compression application  [FreeTextEntry1] : Lateral Leg [FreeTextEntry2] : 0.7 [FreeTextEntry3] : 0.3 [FreeTextEntry4] : 0.2 [de-identified] : Serous/sanguinous [de-identified] : No neurovascular deficits noted. Expressed comfort post Coban application. [de-identified] : Mechanically cleansed with sterile gauze and normal saline.\par   [de-identified] : Ani  [TWNoteComboBox1] : Left [de-identified] : Normal [TWNoteComboBox4] : Small [de-identified] : None [de-identified] : None [de-identified] : 100% [de-identified] : No [de-identified] : Multilayer other compression wrap [de-identified] : Weekly [de-identified] : Primary Dressing

## 2022-10-21 NOTE — VITALS
[Pain related to present condition?] : The patient's  pain is not related to present condition. [] : No [de-identified] : 0/10

## 2022-10-21 NOTE — REVIEW OF SYSTEMS
[Skin Wound] : skin wound [Fever] : no fever [Eye Pain] : no eye pain [Earache] : no earache [Chest Pain] : no chest pain [Shortness Of Breath] : no shortness of breath [Abdominal Pain] : no abdominal pain [FreeTextEntry9] : phill [de-identified] : left lateral lower leg venous stasis ulceration down to skin and subcutaneous tissue with stasis dermatitis

## 2022-10-21 NOTE — HISTORY OF PRESENT ILLNESS
[FreeTextEntry1] : 87 yo WM, here for f/u of a chronic LLE VSU. Healing and bailey well with coban/yasemin. Advised pt to bring in his compression stockings.\par \par 10/21/22 left lower leg wound smaller, almost closed

## 2022-10-21 NOTE — PLAN
[FreeTextEntry1] : leg elevation\par yasemin, DD, coban, Qweek, left lower leg\par f/u 1 wk\par \par \par time spent- 25 mins.

## 2022-10-22 DIAGNOSIS — I49.3 VENTRICULAR PREMATURE DEPOLARIZATION: ICD-10-CM

## 2022-10-22 DIAGNOSIS — F09 UNSPECIFIED MENTAL DISORDER DUE TO KNOWN PHYSIOLOGICAL CONDITION: ICD-10-CM

## 2022-10-22 DIAGNOSIS — I83.228 VARICOSE VEINS OF LEFT LOWER EXTREMITY WITH BOTH ULCER OF OTHER PART OF LOWER EXTREMITY AND INFLAMMATION: ICD-10-CM

## 2022-10-22 DIAGNOSIS — R01.1 CARDIAC MURMUR, UNSPECIFIED: ICD-10-CM

## 2022-10-22 DIAGNOSIS — G20 PARKINSON'S DISEASE: ICD-10-CM

## 2022-10-22 DIAGNOSIS — Z87.891 PERSONAL HISTORY OF NICOTINE DEPENDENCE: ICD-10-CM

## 2022-10-22 DIAGNOSIS — L97.822 NON-PRESSURE CHRONIC ULCER OF OTHER PART OF LEFT LOWER LEG WITH FAT LAYER EXPOSED: ICD-10-CM

## 2022-10-22 DIAGNOSIS — Z79.899 OTHER LONG TERM (CURRENT) DRUG THERAPY: ICD-10-CM

## 2022-10-22 DIAGNOSIS — M17.0 BILATERAL PRIMARY OSTEOARTHRITIS OF KNEE: ICD-10-CM

## 2022-10-22 DIAGNOSIS — I34.0 NONRHEUMATIC MITRAL (VALVE) INSUFFICIENCY: ICD-10-CM

## 2022-10-22 DIAGNOSIS — I83.893 VARICOSE VEINS OF BILATERAL LOWER EXTREMITIES WITH OTHER COMPLICATIONS: ICD-10-CM

## 2022-10-22 DIAGNOSIS — Z80.0 FAMILY HISTORY OF MALIGNANT NEOPLASM OF DIGESTIVE ORGANS: ICD-10-CM

## 2022-10-28 ENCOUNTER — APPOINTMENT (OUTPATIENT)
Dept: WOUND CARE | Facility: HOSPITAL | Age: 87
End: 2022-10-28

## 2022-10-28 ENCOUNTER — OUTPATIENT (OUTPATIENT)
Dept: OUTPATIENT SERVICES | Facility: HOSPITAL | Age: 87
LOS: 1 days | Discharge: HOME | End: 2022-10-28
Payer: MEDICARE

## 2022-10-28 VITALS
OXYGEN SATURATION: 96 % | HEIGHT: 76 IN | SYSTOLIC BLOOD PRESSURE: 127 MMHG | TEMPERATURE: 97.7 F | WEIGHT: 179 LBS | HEART RATE: 64 BPM | BODY MASS INDEX: 21.8 KG/M2 | RESPIRATION RATE: 20 BRPM | DIASTOLIC BLOOD PRESSURE: 68 MMHG

## 2022-10-28 DIAGNOSIS — I83.223 VARICOSE VEINS OF LEFT LOWER EXTREMITY WITH BOTH ULCER OF ANKLE AND INFLAMMATION: ICD-10-CM

## 2022-10-28 DIAGNOSIS — I83.228 VARICOSE VEINS OF LEFT LOWER EXTREMITY WITH BOTH ULCER OF OTHER PART OF LOWER EXTREMITY AND INFLAMMATION: ICD-10-CM

## 2022-10-28 DIAGNOSIS — I83.893 VARICOSE VEINS OF BILATERAL LOWER EXTREMITIES WITH OTHER COMPLICATIONS: ICD-10-CM

## 2022-10-28 DIAGNOSIS — Z79.899 OTHER LONG TERM (CURRENT) DRUG THERAPY: ICD-10-CM

## 2022-10-28 DIAGNOSIS — R01.1 CARDIAC MURMUR, UNSPECIFIED: ICD-10-CM

## 2022-10-28 DIAGNOSIS — I34.0 NONRHEUMATIC MITRAL (VALVE) INSUFFICIENCY: ICD-10-CM

## 2022-10-28 DIAGNOSIS — Z80.0 FAMILY HISTORY OF MALIGNANT NEOPLASM OF DIGESTIVE ORGANS: ICD-10-CM

## 2022-10-28 DIAGNOSIS — Z87.891 PERSONAL HISTORY OF NICOTINE DEPENDENCE: ICD-10-CM

## 2022-10-28 DIAGNOSIS — M17.0 BILATERAL PRIMARY OSTEOARTHRITIS OF KNEE: ICD-10-CM

## 2022-10-28 DIAGNOSIS — I49.3 VENTRICULAR PREMATURE DEPOLARIZATION: ICD-10-CM

## 2022-10-28 DIAGNOSIS — L97.822 NON-PRESSURE CHRONIC ULCER OF OTHER PART OF LEFT LOWER LEG WITH FAT LAYER EXPOSED: ICD-10-CM

## 2022-10-28 DIAGNOSIS — F09 UNSPECIFIED MENTAL DISORDER DUE TO KNOWN PHYSIOLOGICAL CONDITION: ICD-10-CM

## 2022-10-28 DIAGNOSIS — G20 PARKINSON'S DISEASE: ICD-10-CM

## 2022-10-28 PROCEDURE — 99213 OFFICE O/P EST LOW 20 MIN: CPT

## 2022-10-28 PROCEDURE — 29581 APPL MULTLAYER CMPRN SYS LEG: CPT | Mod: LT

## 2022-11-01 NOTE — ASSESSMENT
[Verbal] : Verbal [Demo] : Demo [Patient] : Patient [Family member] : Family member [Good - alert, interested, motivated] : Good - alert, interested, motivated [Verbalizes knowledge/Understanding] : Verbalizes knowledge/understanding [Dressing changes] : dressing changes [Skin Care] : skin care [Signs and symptoms of infection] : sign and symptoms of infection [Venous Disease] : venous disease [Nutrition] : nutrition [How and When to Call] : how and when to call [Pain Management] : pain management [Off-loading] : off-loading [Compression Therapy] : compression therapy [Patient responsibility to plan of care] : patient responsibility to plan of care [Stable] : stable [Home] : Home [Walker] : Walker [Not Applicable - Long Term Care/Home Health Agency] : Long Term Care/Home Health Agency: Not Applicable [] : No [FreeTextEntry2] : Infection Prevention\par Promote Skin Integrity\par Offloading\par Elevation\par Compression Compliance\par Low Na+ Diet\par Maintain acceptable levels of pain\par Demonstrates use of both pharmacological and nonpharmacological pain management interventions\par  [FreeTextEntry3] : wound remains the same [FreeTextEntry4] : F/U 1 week\par Pt purchased new zipper compression stockings, will try out stockings during week to assess feasibility of application. If unable to apply at home will consider CircAids.

## 2022-11-01 NOTE — PHYSICAL EXAM
[4 x 4] : 4 x 4  [Normal Thyroid] : the thyroid was normal [Normal Breath Sounds] : Normal breath sounds [Normal Heart Sounds] : normal heart sounds [Normal Rate and Rhythm] : normal rate and rhythm [Ankle Swelling (On Exam)] : present [Ankle Swelling On The Left] : moderate [] : of the left leg [Ankle Swelling On The Right] : mild [Alert] : alert [Oriented to Person] : oriented to person [Oriented to Place] : oriented to place [Oriented to Time] : oriented to time [Calm] : calm [JVD] : no jugular venous distention  [Abdomen Masses] : No abdominal massess [Abdomen Tenderness] : ~T ~M No abdominal tenderness [Tender] : nontender [Enlarged] : not enlarged [de-identified] : elderly WM, NAD, alert, Ox3. [de-identified] : Circ neuromuscular function WNL post coban compression application, pt expressed comfort.\par  [FreeTextEntry1] : left lateral leg [FreeTextEntry2] : 0.7 [FreeTextEntry3] : 0.3 [FreeTextEntry4] : 0.2 [de-identified] : serosanguineous  [de-identified] : intact [de-identified] : coban [de-identified] : yasemin [de-identified] : Mechanically cleansed with Sterile gauze and 0.9% Normal Saline\par \par chlorhexidine 4%  [TWNoteComboBox4] : Small [TWNoteComboBox5] : No [de-identified] : No [de-identified] : other [de-identified] : None [de-identified] : None [de-identified] : 100% [de-identified] : No [de-identified] : Multilayer other compression wrap [de-identified] : Weekly [de-identified] : Primary Dressing

## 2022-11-01 NOTE — HISTORY OF PRESENT ILLNESS
[FreeTextEntry1] : 87 yo WM, here for f/u of a chronic LLE VSU. Healing and bailey well with coban/yasemin. Pt wearing compression stockings to his RLE and also bought zippered compression stockings from [facility]. Does have some trouble putting on the stockings and no one at home to help him. His wife is presently in rehab.\par    Discussed other option of circaid. Advised him to try using the zippered compression stockings on his RLE this wk to see if he is able to handle it.\par

## 2022-11-01 NOTE — PLAN
[FreeTextEntry1] : SUNNY hazel, coban to LLE\par trial with zippered comp stockings with his RLE.\par leg elevation\par f/u 1 wk\par \par time spent 25 mins.

## 2022-11-01 NOTE — REVIEW OF SYSTEMS
[Skin Wound] : skin wound [Fever] : no fever [Eye Pain] : no eye pain [Earache] : no earache [Chest Pain] : no chest pain [Shortness Of Breath] : no shortness of breath [Abdominal Pain] : no abdominal pain [FreeTextEntry9] : phill [de-identified] : left lateral lower leg venous stasis ulceration down to skin and subcutaneous tissue with stasis dermatitis

## 2022-11-03 ENCOUNTER — NON-APPOINTMENT (OUTPATIENT)
Age: 87
End: 2022-11-03

## 2022-11-04 ENCOUNTER — APPOINTMENT (OUTPATIENT)
Dept: WOUND CARE | Facility: HOSPITAL | Age: 87
End: 2022-11-04

## 2022-11-04 ENCOUNTER — OUTPATIENT (OUTPATIENT)
Dept: OUTPATIENT SERVICES | Facility: HOSPITAL | Age: 87
LOS: 1 days | Discharge: HOME | End: 2022-11-04
Payer: MEDICARE

## 2022-11-04 VITALS
SYSTOLIC BLOOD PRESSURE: 94 MMHG | TEMPERATURE: 97.5 F | DIASTOLIC BLOOD PRESSURE: 58 MMHG | WEIGHT: 179 LBS | BODY MASS INDEX: 21.8 KG/M2 | HEART RATE: 79 BPM | HEIGHT: 76 IN | RESPIRATION RATE: 20 BRPM | OXYGEN SATURATION: 96 %

## 2022-11-04 DIAGNOSIS — I83.223 VARICOSE VEINS OF LEFT LOWER EXTREMITY WITH BOTH ULCER OF ANKLE AND INFLAMMATION: ICD-10-CM

## 2022-11-04 PROCEDURE — 99213 OFFICE O/P EST LOW 20 MIN: CPT

## 2022-11-04 PROCEDURE — 29581 APPL MULTLAYER CMPRN SYS LEG: CPT | Mod: LT

## 2022-11-05 DIAGNOSIS — I83.228 VARICOSE VEINS OF LEFT LOWER EXTREMITY WITH BOTH ULCER OF OTHER PART OF LOWER EXTREMITY AND INFLAMMATION: ICD-10-CM

## 2022-11-05 DIAGNOSIS — Z87.891 PERSONAL HISTORY OF NICOTINE DEPENDENCE: ICD-10-CM

## 2022-11-05 DIAGNOSIS — L97.822 NON-PRESSURE CHRONIC ULCER OF OTHER PART OF LEFT LOWER LEG WITH FAT LAYER EXPOSED: ICD-10-CM

## 2022-11-05 DIAGNOSIS — I83.893 VARICOSE VEINS OF BILATERAL LOWER EXTREMITIES WITH OTHER COMPLICATIONS: ICD-10-CM

## 2022-11-05 DIAGNOSIS — G20 PARKINSON'S DISEASE: ICD-10-CM

## 2022-11-05 DIAGNOSIS — Z80.0 FAMILY HISTORY OF MALIGNANT NEOPLASM OF DIGESTIVE ORGANS: ICD-10-CM

## 2022-11-05 DIAGNOSIS — Z79.899 OTHER LONG TERM (CURRENT) DRUG THERAPY: ICD-10-CM

## 2022-11-05 DIAGNOSIS — F09 UNSPECIFIED MENTAL DISORDER DUE TO KNOWN PHYSIOLOGICAL CONDITION: ICD-10-CM

## 2022-11-05 DIAGNOSIS — M17.0 BILATERAL PRIMARY OSTEOARTHRITIS OF KNEE: ICD-10-CM

## 2022-11-05 DIAGNOSIS — R01.1 CARDIAC MURMUR, UNSPECIFIED: ICD-10-CM

## 2022-11-05 DIAGNOSIS — I49.3 VENTRICULAR PREMATURE DEPOLARIZATION: ICD-10-CM

## 2022-11-07 NOTE — PHYSICAL EXAM
[4 x 4] : 4 x 4  [Normal Thyroid] : the thyroid was normal [Normal Breath Sounds] : Normal breath sounds [Normal Heart Sounds] : normal heart sounds [Normal Rate and Rhythm] : normal rate and rhythm [Ankle Swelling (On Exam)] : present [Ankle Swelling On The Left] : moderate [] : of the left leg [Ankle Swelling On The Right] : mild [Alert] : alert [Oriented to Person] : oriented to person [Oriented to Place] : oriented to place [Oriented to Time] : oriented to time [Calm] : calm [JVD] : no jugular venous distention  [Abdomen Masses] : No abdominal massess [Abdomen Tenderness] : ~T ~M No abdominal tenderness [Tender] : nontender [Enlarged] : not enlarged [de-identified] : elderly WM, NAD, alert, Ox3. [de-identified] : Circ neuromuscular function WNL post coban compression application, pt expressed comfort.\par  [FreeTextEntry1] : left lateral leg [FreeTextEntry2] : 0.7 [FreeTextEntry3] : 0.3 [FreeTextEntry4] : 0.2 [de-identified] : serous [de-identified] : intact [de-identified] : coban [de-identified] : yasemin [de-identified] : Mechanically cleansed with Sterile gauze and 0.9% Normal Saline\par \par  [TWNoteComboBox4] : Small [TWNoteComboBox5] : No [de-identified] : No [de-identified] : other [de-identified] : None [de-identified] : None [de-identified] : 100% [de-identified] : No [de-identified] : Multilayer other compression wrap [de-identified] : Weekly [de-identified] : Primary Dressing

## 2022-11-07 NOTE — ASSESSMENT
[Verbal] : Verbal [Demo] : Demo [Patient] : Patient [Family member] : Family member [Good - alert, interested, motivated] : Good - alert, interested, motivated [Verbalizes knowledge/Understanding] : Verbalizes knowledge/understanding [Dressing changes] : dressing changes [Skin Care] : skin care [Signs and symptoms of infection] : sign and symptoms of infection [Venous Disease] : venous disease [Nutrition] : nutrition [How and When to Call] : how and when to call [Pain Management] : pain management [Off-loading] : off-loading [Compression Therapy] : compression therapy [Patient responsibility to plan of care] : patient responsibility to plan of care [Stable] : stable [Home] : Home [Walker] : Walker [Not Applicable - Long Term Care/Home Health Agency] : Long Term Care/Home Health Agency: Not Applicable [] : No [FreeTextEntry2] : Infection Prevention\par Promote Skin Integrity\par Offloading\par Elevation\par Compression Compliance\par Low Na+ Diet\par Maintain acceptable levels of pain\par Demonstrates use of both pharmacological and nonpharmacological pain management interventions\par  [FreeTextEntry3] : wound remains the same [FreeTextEntry4] : F/U 1 week\par Pt cannot apply zippered compression wraps, Circaids shown to pt and demonstration of proper application performed. Pt feels comfortable with applying  this form of compression and verbalized understanding of application. Pt measured for CircAids today and pt to bring in wraps next visit.

## 2022-11-07 NOTE — HISTORY OF PRESENT ILLNESS
[FreeTextEntry1] : 85 yo WM, here for f/u of a chronic LLE VSU. Healing and bailey well with coban/yasemin. Pt wearing compression stockings to his RLE and also bought zippered compression stockings from [employer]. Does have some trouble putting on the stockings and no one at home to help him. His wife is presently in rehab.\par    Discussed other option of circaid. Advised him to try using the zippered compression stockings on his RLE this wk to see if he is able to handle it.\par \par 11/4/22 left lower leg wound almost closed but has staled for past few weeks. Stressed elevation. Patient was up on his feet more this week by history

## 2022-11-07 NOTE — PLAN
[FreeTextEntry1] : SUNNY hazel, coban to LLE Qweek\par will measure for circaids\par right lower leg edematous and present compression inadequate will start  ace wraps\par leg elevation\par f/u 1 wk\par \par time spent 25 mins.

## 2022-11-07 NOTE — REVIEW OF SYSTEMS
[Skin Wound] : skin wound [Fever] : no fever [Eye Pain] : no eye pain [Earache] : no earache [Chest Pain] : no chest pain [Shortness Of Breath] : no shortness of breath [Abdominal Pain] : no abdominal pain [FreeTextEntry9] : phill [de-identified] : left lateral lower leg venous stasis ulceration down to skin and subcutaneous tissue with stasis dermatitis

## 2022-11-11 ENCOUNTER — APPOINTMENT (OUTPATIENT)
Dept: WOUND CARE | Facility: HOSPITAL | Age: 87
End: 2022-11-11

## 2022-11-11 ENCOUNTER — OUTPATIENT (OUTPATIENT)
Dept: OUTPATIENT SERVICES | Facility: HOSPITAL | Age: 87
LOS: 1 days | Discharge: HOME | End: 2022-11-11
Payer: MEDICARE

## 2022-11-11 VITALS
DIASTOLIC BLOOD PRESSURE: 69 MMHG | TEMPERATURE: 97.5 F | HEIGHT: 76 IN | HEART RATE: 65 BPM | BODY MASS INDEX: 21.8 KG/M2 | OXYGEN SATURATION: 96 % | RESPIRATION RATE: 20 BRPM | WEIGHT: 179 LBS | SYSTOLIC BLOOD PRESSURE: 102 MMHG

## 2022-11-11 DIAGNOSIS — I83.223 VARICOSE VEINS OF LEFT LOWER EXTREMITY WITH BOTH ULCER OF ANKLE AND INFLAMMATION: ICD-10-CM

## 2022-11-11 PROCEDURE — 99214 OFFICE O/P EST MOD 30 MIN: CPT

## 2022-11-11 PROCEDURE — 29581 APPL MULTLAYER CMPRN SYS LEG: CPT | Mod: 50

## 2022-11-14 DIAGNOSIS — R01.1 CARDIAC MURMUR, UNSPECIFIED: ICD-10-CM

## 2022-11-14 DIAGNOSIS — Z79.899 OTHER LONG TERM (CURRENT) DRUG THERAPY: ICD-10-CM

## 2022-11-14 DIAGNOSIS — I34.0 NONRHEUMATIC MITRAL (VALVE) INSUFFICIENCY: ICD-10-CM

## 2022-11-14 DIAGNOSIS — I83.228 VARICOSE VEINS OF LEFT LOWER EXTREMITY WITH BOTH ULCER OF OTHER PART OF LOWER EXTREMITY AND INFLAMMATION: ICD-10-CM

## 2022-11-14 DIAGNOSIS — M17.0 BILATERAL PRIMARY OSTEOARTHRITIS OF KNEE: ICD-10-CM

## 2022-11-14 DIAGNOSIS — G20 PARKINSON'S DISEASE: ICD-10-CM

## 2022-11-14 DIAGNOSIS — L97.822 NON-PRESSURE CHRONIC ULCER OF OTHER PART OF LEFT LOWER LEG WITH FAT LAYER EXPOSED: ICD-10-CM

## 2022-11-14 DIAGNOSIS — Z87.891 PERSONAL HISTORY OF NICOTINE DEPENDENCE: ICD-10-CM

## 2022-11-14 DIAGNOSIS — Z80.0 FAMILY HISTORY OF MALIGNANT NEOPLASM OF DIGESTIVE ORGANS: ICD-10-CM

## 2022-11-14 DIAGNOSIS — I83.893 VARICOSE VEINS OF BILATERAL LOWER EXTREMITIES WITH OTHER COMPLICATIONS: ICD-10-CM

## 2022-11-14 DIAGNOSIS — I49.3 VENTRICULAR PREMATURE DEPOLARIZATION: ICD-10-CM

## 2022-11-14 DIAGNOSIS — F09 UNSPECIFIED MENTAL DISORDER DUE TO KNOWN PHYSIOLOGICAL CONDITION: ICD-10-CM

## 2022-11-14 NOTE — REVIEW OF SYSTEMS
[Feeling Poorly] : not feeling poorly [Feeling Tired] : not feeling tired [As Noted in HPI] : as noted in HPI [Negative] : Endocrine

## 2022-11-14 NOTE — HISTORY OF PRESENT ILLNESS
[FreeTextEntry1] : Very pleasant [facility].\par Per his admission and his wife's report, he has not been compliant with leg elevation.\par Since last visit and the application of ACE wrap, he notes increase in swelling of the RLE.\par Fortunately, he reports that the small wound of the lateral left leg is stable to improved.\par He is in no pain today...

## 2022-11-14 NOTE — PHYSICAL EXAM
[4 x 4] : 4 x 4  [de-identified] : Circ neuromuscular function WNL post Coban compression application, pt expressed comfort. [FreeTextEntry1] : left lateral leg [FreeTextEntry2] : 0.7 [FreeTextEntry3] : 0.2 [FreeTextEntry4] : 0.2 [de-identified] : serous [de-identified] : intact [de-identified] : Coban [de-identified] : yasemin [de-identified] : Mechanically cleansed with Sterile gauze and 0.9% Normal Saline\par \par  [de-identified] : Circ neuromuscular function WNL post Coban compression application, pt expressed comfort. [FreeTextEntry7] : right leg- no open wounds [de-identified] : Coban [de-identified] : Coban [TWNoteComboBox4] : Small [TWNoteComboBox5] : No [de-identified] : No [de-identified] : other [de-identified] : None [de-identified] : None [de-identified] : 100% [de-identified] : No [de-identified] : Multilayer other compression wrap [de-identified] : Weekly [de-identified] : Primary Dressing [de-identified] : Multilayer other compression wrap [de-identified] : Weekly [de-identified] : Compression

## 2022-11-14 NOTE — PLAN
[FreeTextEntry1] : As above in full notation.\par Ms. Lua personally seen and examined during visit.\par No new events since last visit, though issues of compliance with leg elevation emphasized in detail.\par Vitals non-suggestive.\par Wound clean and as described above.\par No new labs required and no imaging pending.\par Clinically, slowly improving overall.\par Surgically, stable for present without collection to drain or tissue for debridement.\par To continue present local and supportive care with change from ACE to Coban wrap and Ani to LLE.\par RTWC in 1 week.  Reviewed with patient, spouse and Wound Care Nurse in detail.\par Please note that more than 50% of time was spent in counseling and coordination of care.

## 2022-11-14 NOTE — ASSESSMENT
[Verbal] : Verbal [Demo] : Demo [Patient] : Patient [Family member] : Family member [Good - alert, interested, motivated] : Good - alert, interested, motivated [Verbalizes knowledge/Understanding] : Verbalizes knowledge/understanding [Dressing changes] : dressing changes [Skin Care] : skin care [Signs and symptoms of infection] : sign and symptoms of infection [Venous Disease] : venous disease [Nutrition] : nutrition [How and When to Call] : how and when to call [Pain Management] : pain management [Off-loading] : off-loading [Compression Therapy] : compression therapy [Patient responsibility to plan of care] : patient responsibility to plan of care [Stable] : stable [Home] : Home [Walker] : Walker [Not Applicable - Long Term Care/Home Health Agency] : Long Term Care/Home Health Agency: Not Applicable [] : No [FreeTextEntry2] : Infection Prevention\par Promote Skin Integrity\par Offloading\par Elevation\par Compression Compliance\par Low Na+ Diet\par Maintain acceptable levels of pain\par Demonstrates use of both pharmacological and nonpharmacological pain management interventions\par  [FreeTextEntry4] : F/U 1 week\par LLE Circaid wrap pending arrival to pt home, RLE not measured yet due to increased swelling, now with pitting edema +1, MD aware and Coban applied to both lower legs as per orders. Continued elevation of lower legs, low sodium diet, and compression therapy stressed during visit. Pt does not elevate legs during day, pt states this is due to arthritis and is being seen by Dr. Cortes (orthopedist) who suggested bilateral knee gel injections. MD aware of all findings.

## 2022-11-15 ENCOUNTER — RX RENEWAL (OUTPATIENT)
Age: 87
End: 2022-11-15

## 2022-11-18 ENCOUNTER — OUTPATIENT (OUTPATIENT)
Dept: OUTPATIENT SERVICES | Facility: HOSPITAL | Age: 87
LOS: 1 days | Discharge: HOME | End: 2022-11-18
Payer: MEDICARE

## 2022-11-18 ENCOUNTER — APPOINTMENT (OUTPATIENT)
Dept: WOUND CARE | Facility: HOSPITAL | Age: 87
End: 2022-11-18

## 2022-11-18 VITALS
TEMPERATURE: 98.5 F | HEART RATE: 62 BPM | RESPIRATION RATE: 20 BRPM | SYSTOLIC BLOOD PRESSURE: 117 MMHG | WEIGHT: 179 LBS | DIASTOLIC BLOOD PRESSURE: 73 MMHG | OXYGEN SATURATION: 94 % | HEIGHT: 76 IN | BODY MASS INDEX: 21.8 KG/M2

## 2022-11-18 DIAGNOSIS — I83.223 VARICOSE VEINS OF LEFT LOWER EXTREMITY WITH BOTH ULCER OF ANKLE AND INFLAMMATION: ICD-10-CM

## 2022-11-18 PROCEDURE — 99213 OFFICE O/P EST LOW 20 MIN: CPT

## 2022-11-18 PROCEDURE — G0463: CPT

## 2022-11-18 NOTE — HISTORY OF PRESENT ILLNESS
[FreeTextEntry1] : 85 yo WM, here for f/u of a chronic LLE VSU. Healing and bailey well with coban/yasemin. Pt wearing compression stockings to his RLE and also bought zippered compression stockings from [facility]. Does have some trouble putting on the stockings and no one at home to help him. His wife is presently in rehab.\par    Discussed other option of circaid. Advised him to try using the zippered compression stockings on his RLE this wk to see if he is able to handle it.\par \par 11/4/22 left lower leg wound almost closed but has staled for past few weeks. Stressed elevation. Patient was up on his feet more this week by history\par 11/18/22 left leg ulcer almost closed

## 2022-11-18 NOTE — REVIEW OF SYSTEMS
[Skin Wound] : skin wound [Fever] : no fever [Eye Pain] : no eye pain [Earache] : no earache [Chest Pain] : no chest pain [Shortness Of Breath] : no shortness of breath [Abdominal Pain] : no abdominal pain [FreeTextEntry9] : phill [de-identified] : left lateral lower leg venous stasis ulceration down to skin and subcutaneous tissue with stasis dermatitis

## 2022-11-18 NOTE — ASSESSMENT
[Verbal] : Verbal [Demo] : Demo [Patient] : Patient [Family member] : Family member [Good - alert, interested, motivated] : Good - alert, interested, motivated [Verbalizes knowledge/Understanding] : Verbalizes knowledge/understanding [Dressing changes] : dressing changes [Skin Care] : skin care [Signs and symptoms of infection] : sign and symptoms of infection [Venous Disease] : venous disease [Nutrition] : nutrition [How and When to Call] : how and when to call [Pain Management] : pain management [Off-loading] : off-loading [Compression Therapy] : compression therapy [Patient responsibility to plan of care] : patient responsibility to plan of care [] : Yes [Stable] : stable [Home] : Home [Walker] : Walker [Not Applicable - Long Term Care/Home Health Agency] : Long Term Care/Home Health Agency: Not Applicable [FreeTextEntry2] : Infection prevention \par Wound care (dressing changes)\par Maintain optimal skin integrity to high pressure areas\par Compression therapy\par Nutrition and wound healing\par Elevation and low sodium compliance.\par Offloading the stress on skin structures and decreasing potential pathologic biomechanical influences. [FreeTextEntry4] : Since last visit, Pt had Gel injections to B/L Knees \par Since last visit, Pt secured Circaids (Left Leg)\par Educated Pt's daughter on Circaid use and application. Pt's daughter demonstrated proper teach back\par F/u in 2 weeks.

## 2022-11-18 NOTE — PLAN
[FreeTextEntry1] : SUNNY hazel, circaid to LLE 3X/week\par right lower leg edematous and new  present compression stocking appears adequate will re evaluate in 2 weeks\par leg elevation\par f/u 2 wk\par \par time spent 25 mins.

## 2022-11-18 NOTE — PHYSICAL EXAM
[4 x 4] : 4 x 4  [Normal Thyroid] : the thyroid was normal [Normal Breath Sounds] : Normal breath sounds [Normal Heart Sounds] : normal heart sounds [Normal Rate and Rhythm] : normal rate and rhythm [Ankle Swelling (On Exam)] : present [Ankle Swelling On The Left] : moderate [] : of the left leg [Ankle Swelling On The Right] : mild [Alert] : alert [Oriented to Person] : oriented to person [Oriented to Place] : oriented to place [Oriented to Time] : oriented to time [Calm] : calm [JVD] : no jugular venous distention  [Abdomen Masses] : No abdominal massess [Abdomen Tenderness] : ~T ~M No abdominal tenderness [Tender] : nontender [Enlarged] : not enlarged [de-identified] : elderly WM, NAD, alert, Ox3. [de-identified] : Pt expressed application post Circaid compression. Circ/Neuromuscular functions WNL post application.\par  [FreeTextEntry1] : Left Lateral Leg [FreeTextEntry2] : 0.3 [FreeTextEntry3] : 0.2 [de-identified] : serous [FreeTextEntry4] : 0.2 [de-identified] : intact [de-identified] : Pt expressed application post Circaid compression. Circ/Neuromuscular functions WNL post application. [de-identified] : yasemin [de-identified] : Mechanically cleansed with sterile gauze and normal saline 0.9%\par Dry Dressing\par  [FreeTextEntry7] : right leg- no open wounds [de-identified] : Pt's own compression stocking [de-identified] : pt's own compression stockings [TWNoteComboBox4] : Small [TWNoteComboBox5] : No [de-identified] : No [de-identified] : other [de-identified] : None [de-identified] : None [de-identified] : 100% [de-identified] : No [de-identified] : Circaid wrap [de-identified] : 3x Weekly [de-identified] : Primary Dressing [de-identified] : Other [de-identified] : Daily [de-identified] : Compression

## 2022-11-19 DIAGNOSIS — F09 UNSPECIFIED MENTAL DISORDER DUE TO KNOWN PHYSIOLOGICAL CONDITION: ICD-10-CM

## 2022-11-19 DIAGNOSIS — I83.228 VARICOSE VEINS OF LEFT LOWER EXTREMITY WITH BOTH ULCER OF OTHER PART OF LOWER EXTREMITY AND INFLAMMATION: ICD-10-CM

## 2022-11-19 DIAGNOSIS — Z80.0 FAMILY HISTORY OF MALIGNANT NEOPLASM OF DIGESTIVE ORGANS: ICD-10-CM

## 2022-11-19 DIAGNOSIS — L97.822 NON-PRESSURE CHRONIC ULCER OF OTHER PART OF LEFT LOWER LEG WITH FAT LAYER EXPOSED: ICD-10-CM

## 2022-11-19 DIAGNOSIS — M17.0 BILATERAL PRIMARY OSTEOARTHRITIS OF KNEE: ICD-10-CM

## 2022-11-19 DIAGNOSIS — I83.893 VARICOSE VEINS OF BILATERAL LOWER EXTREMITIES WITH OTHER COMPLICATIONS: ICD-10-CM

## 2022-11-19 DIAGNOSIS — R01.1 CARDIAC MURMUR, UNSPECIFIED: ICD-10-CM

## 2022-11-19 DIAGNOSIS — I34.0 NONRHEUMATIC MITRAL (VALVE) INSUFFICIENCY: ICD-10-CM

## 2022-11-19 DIAGNOSIS — Z87.891 PERSONAL HISTORY OF NICOTINE DEPENDENCE: ICD-10-CM

## 2022-11-19 DIAGNOSIS — I49.3 VENTRICULAR PREMATURE DEPOLARIZATION: ICD-10-CM

## 2022-11-19 DIAGNOSIS — Z79.899 OTHER LONG TERM (CURRENT) DRUG THERAPY: ICD-10-CM

## 2022-11-19 DIAGNOSIS — G20 PARKINSON'S DISEASE: ICD-10-CM

## 2022-11-21 NOTE — PATIENT PROFILE ADULT - HISTORY OF COVID-19 VACCINATION
Problem: Discharge Planning  Goal: Discharge to home or other facility with appropriate resources  11/20/2022 2347 by Shi Harding RN  Outcome: Progressing     Problem: Pain  Goal: Verbalizes/displays adequate comfort level or baseline comfort level  11/20/2022 2347 by Shi Harding RN  Outcome: Progressing     Problem: Neurosensory - Adult  Goal: Achieves stable or improved neurological status  11/20/2022 2347 by Shi Harding RN  Outcome: Progressing     Problem: Skin/Tissue Integrity - Adult  Goal: Skin integrity remains intact  11/20/2022 2347 by Shi Harding RN  Outcome: Progressing     Problem: Skin/Tissue Integrity - Adult  Goal: Oral mucous membranes remain intact  11/20/2022 2347 by Shi Harding RN  Outcome: Progressing     Problem: Musculoskeletal - Adult  Goal: Return mobility to safest level of function  11/20/2022 2347 by Shi Harding RN  Outcome: Progressing     Problem: Musculoskeletal - Adult  Goal: Return ADL status to a safe level of function  11/20/2022 2347 by Shi Harding RN  Outcome: Progressing     Problem: Gastrointestinal - Adult  Goal: Maintains adequate nutritional intake  11/20/2022 2347 by Shi Harding RN  Outcome: Progressing     Problem: Gastrointestinal - Adult  Goal: Maintains or returns to baseline bowel function  11/20/2022 2347 by Shi Harding RN  Outcome: Progressing     Problem: Genitourinary - Adult  Goal: Absence of urinary retention  11/20/2022 2347 by Shi Harding RN  Outcome: Progressing     Problem: Infection - Adult  Goal: Absence of infection at discharge  11/20/2022 2347 by Shi Harding RN  Outcome: Progressing     Problem: Metabolic/Fluid and Electrolytes - Adult  Goal: Electrolytes maintained within normal limits  11/20/2022 2347 by Shi Harding RN  Outcome: Progressing     Problem: Skin/Tissue Integrity  Goal: Absence of new skin breakdown  Description: 1. Monitor for areas of redness and/or skin breakdown  2.   Assess vascular access sites hourly  3. Every 4-6 hours minimum:  Change oxygen saturation probe site  4. Every 4-6 hours:  If on nasal continuous positive airway pressure, respiratory therapy assess nares and determine need for appliance change or resting period.   11/20/2022 2347 by Diandra Campbell RN  Outcome: Progressing     Problem: Safety - Adult  Goal: Free from fall injury  11/20/2022 2347 by Diandra Campbell RN  Outcome: Progressing     Problem: ABCDS Injury Assessment  Goal: Absence of physical injury  11/20/2022 2347 by Diandra Campbell RN  Outcome: Progressing Yes

## 2022-11-22 NOTE — PLAN
Detail Level: Zone Continue Regimen: Ketoconazole shampoo TIW\\nPromiseb cream BID Render In Strict Bullet Format?: No Samples Given: Plexion 9.8 %-4.8 % topical cleanser [FreeTextEntry1] : AgAlginate, DD, Ace Wraps QOD\par leg elevation\par Vascular studies pending\par Will need also to consider bx of ulcer site\par wound has gotten worse with increased edema and warmth of left lower leg with extension of ulcer and impending anterior left ankle breakdown, patient sent to ER for IV therapy\par f/u 1 wk\par \par time spent 25 mins.

## 2022-12-02 ENCOUNTER — APPOINTMENT (OUTPATIENT)
Dept: WOUND CARE | Facility: HOSPITAL | Age: 87
End: 2022-12-02
Payer: MEDICARE

## 2022-12-02 ENCOUNTER — OUTPATIENT (OUTPATIENT)
Dept: OUTPATIENT SERVICES | Facility: HOSPITAL | Age: 87
LOS: 1 days | Discharge: HOME | End: 2022-12-02
Payer: MEDICARE

## 2022-12-02 VITALS
OXYGEN SATURATION: 95 % | HEART RATE: 72 BPM | WEIGHT: 179 LBS | TEMPERATURE: 98.6 F | BODY MASS INDEX: 21.8 KG/M2 | DIASTOLIC BLOOD PRESSURE: 82 MMHG | RESPIRATION RATE: 20 BRPM | HEIGHT: 76 IN | SYSTOLIC BLOOD PRESSURE: 149 MMHG

## 2022-12-02 DIAGNOSIS — L97.822 NON-PRESSURE CHRONIC ULCER OF OTHER PART OF LEFT LOWER LEG WITH FAT LAYER EXPOSED: ICD-10-CM

## 2022-12-02 DIAGNOSIS — I83.228 VARICOSE VEINS OF LEFT LOWER EXTREMITY WITH BOTH ULCER OF OTHER PART OF LOWER EXTREMITY AND INFLAMMATION: ICD-10-CM

## 2022-12-02 DIAGNOSIS — L97.829 VARICOSE VEINS OF LEFT LOWER EXTREMITY WITH BOTH ULCER OF OTHER PART OF LOWER EXTREMITY AND INFLAMMATION: ICD-10-CM

## 2022-12-02 PROCEDURE — 29581 APPL MULTLAYER CMPRN SYS LEG: CPT | Mod: RT

## 2022-12-02 PROCEDURE — 99214 OFFICE O/P EST MOD 30 MIN: CPT | Mod: 25

## 2022-12-02 PROCEDURE — G0463: CPT | Mod: 25

## 2022-12-03 NOTE — ASSESSMENT
[Verbal] : Verbal [Demo] : Demo [Patient] : Patient [Family member] : Family member [Good - alert, interested, motivated] : Good - alert, interested, motivated [Verbalizes knowledge/Understanding] : Verbalizes knowledge/understanding [Dressing changes] : dressing changes [Skin Care] : skin care [Signs and symptoms of infection] : sign and symptoms of infection [Venous Disease] : venous disease [Nutrition] : nutrition [How and When to Call] : how and when to call [Compression Therapy] : compression therapy [Home Health] : home health [Patient responsibility to plan of care] : patient responsibility to plan of care [Stable] : stable [Home] : Home [Walker] : Walker [Not Applicable - Long Term Care/Home Health Agency] : Long Term Care/Home Health Agency: Not Applicable [] : No [FreeTextEntry2] : Infection Prevention\par Promote Skin Integrity\par Offloading\par Elevation\par Compression Compliance\par Low Na+ Diet\par  [FreeTextEntry4] : F/U 1 week\par Pt to be fitted for Circaid wrap on RLE next visit, pt current compression stocking rated at 15-20 mmHg is very worn out. Pt daughter stated they tried to use compression stockings rated 30-40 mmHg but application was not feasible for the pt. Pt tolerating LLE circaid well at this point, DPM aware.\par Pt advised to follow up with PCP

## 2022-12-03 NOTE — PLAN
[FreeTextEntry1] : Patient examined and evaluated at this time.\par Continue local wound care and offloading.\par Coban multilayer compression to the right lower extremity.\par Will plan to measure patient for CircAid compression on next visit if edema has resolved.\par Spent 30 minutes for patient care and medical decision making.\par Patient to follow up in 1 week.\par

## 2022-12-03 NOTE — HISTORY OF PRESENT ILLNESS
[FreeTextEntry1] : Pt seen for left lateral lower leg venous stasis ulceration down to skin and subcutaneous tissue and fat, healed at this time with stasis dermatitis.  Patient presents with new edema of his right lower leg.

## 2022-12-03 NOTE — PHYSICAL EXAM
[2 x 2] : 2 x 2  [4 x 4] : 4 x 4  [2+] : left 2+ [Ankle Swelling (On Exam)] : present [Varicose Veins Of Lower Extremities] : present [Varicose Veins Of The Left Leg] : of the left leg [] : of the left leg [Ankle Swelling On The Left] : moderate [Skin Ulcer] : ulcer [JVD] : no jugular venous distention  [Ankle Swelling On The Right] : of the right ankle [de-identified] : calm [de-identified] : 4/5 strength in all quadrants [de-identified] : left lateral lower leg venous stasis ulceration down to skin and subcutaneous tissue with stasis dermatitis [de-identified] : Circ neuromuscular function WNL post  circaid compression application, pt expressed comfort.\par  [FreeTextEntry1] : LEFT LATERAL LEG- scab [FreeTextEntry2] : 0.3 [FreeTextEntry3] : 0.1 [de-identified] : scant serous [de-identified] : dry dressing [de-identified] : Mechanically cleansed with Sterile gauze and 0.9% Normal Saline\par  [de-identified] : Circ neuromuscular function WNL post coban compression application, pt expressed comfort.\par  [FreeTextEntry7] : right leg- +2 pitting edema- no open wounds [de-identified] : coban [de-identified] : Mechanically cleansed with Sterile gauze and 0.9% Normal Saline\par  [TWNoteComboBox5] : No [de-identified] : No [de-identified] : Normal [de-identified] : None [de-identified] : None [de-identified] : None [de-identified] : No [de-identified] : Circaid wrap [de-identified] : 3x Weekly [de-identified] : Primary Dressing [de-identified] : None [de-identified] : No [de-identified] : No [de-identified] : Normal [de-identified] : None [de-identified] : None [de-identified] : None [de-identified] : No [de-identified] : Multilayer other compression wrap [de-identified] : Weekly [de-identified] : Primary Dressing

## 2022-12-03 NOTE — REVIEW OF SYSTEMS
[Skin Wound] : skin wound [Fever] : no fever [Eye Pain] : no eye pain [Earache] : no earache [Chest Pain] : no chest pain [Shortness Of Breath] : no shortness of breath [Abdominal Pain] : no abdominal pain [FreeTextEntry9] : phill [de-identified] : left lateral lower leg venous stasis ulceration down to skin and subcutaneous tissue with stasis dermatitis

## 2022-12-04 DIAGNOSIS — I83.893 VARICOSE VEINS OF BILATERAL LOWER EXTREMITIES WITH OTHER COMPLICATIONS: ICD-10-CM

## 2022-12-04 DIAGNOSIS — M17.0 BILATERAL PRIMARY OSTEOARTHRITIS OF KNEE: ICD-10-CM

## 2022-12-04 DIAGNOSIS — R01.1 CARDIAC MURMUR, UNSPECIFIED: ICD-10-CM

## 2022-12-04 DIAGNOSIS — G20 PARKINSON'S DISEASE: ICD-10-CM

## 2022-12-04 DIAGNOSIS — I49.3 VENTRICULAR PREMATURE DEPOLARIZATION: ICD-10-CM

## 2022-12-04 DIAGNOSIS — Z87.891 PERSONAL HISTORY OF NICOTINE DEPENDENCE: ICD-10-CM

## 2022-12-04 DIAGNOSIS — F09 UNSPECIFIED MENTAL DISORDER DUE TO KNOWN PHYSIOLOGICAL CONDITION: ICD-10-CM

## 2022-12-04 DIAGNOSIS — I34.0 NONRHEUMATIC MITRAL (VALVE) INSUFFICIENCY: ICD-10-CM

## 2022-12-04 DIAGNOSIS — Z79.899 OTHER LONG TERM (CURRENT) DRUG THERAPY: ICD-10-CM

## 2022-12-04 DIAGNOSIS — Z80.0 FAMILY HISTORY OF MALIGNANT NEOPLASM OF DIGESTIVE ORGANS: ICD-10-CM

## 2022-12-08 ENCOUNTER — NON-APPOINTMENT (OUTPATIENT)
Age: 87
End: 2022-12-08

## 2022-12-09 ENCOUNTER — OUTPATIENT (OUTPATIENT)
Dept: OUTPATIENT SERVICES | Facility: HOSPITAL | Age: 87
LOS: 1 days | Discharge: HOME | End: 2022-12-09
Payer: MEDICARE

## 2022-12-09 ENCOUNTER — APPOINTMENT (OUTPATIENT)
Dept: WOUND CARE | Facility: HOSPITAL | Age: 87
End: 2022-12-09

## 2022-12-09 VITALS
HEIGHT: 76 IN | TEMPERATURE: 98.1 F | WEIGHT: 179 LBS | HEART RATE: 61 BPM | DIASTOLIC BLOOD PRESSURE: 80 MMHG | BODY MASS INDEX: 21.8 KG/M2 | RESPIRATION RATE: 20 BRPM | SYSTOLIC BLOOD PRESSURE: 129 MMHG | OXYGEN SATURATION: 98 %

## 2022-12-09 DIAGNOSIS — I83.228 VARICOSE VEINS OF LEFT LOWER EXTREMITY WITH BOTH ULCER OF OTHER PART OF LOWER EXTREMITY AND INFLAMMATION: ICD-10-CM

## 2022-12-09 PROCEDURE — 29581 APPL MULTLAYER CMPRN SYS LEG: CPT | Mod: RT

## 2022-12-09 PROCEDURE — 99213 OFFICE O/P EST LOW 20 MIN: CPT

## 2022-12-09 NOTE — HISTORY OF PRESENT ILLNESS
[FreeTextEntry1] : Pt seen for left lateral lower leg venous stasis ulceration down to skin and subcutaneous tissue and fat, healed at this time with stasis dermatitis.  Patient also seen for edema of his right lower leg, resolved at this time.

## 2022-12-09 NOTE — ASSESSMENT
[Verbal] : Verbal [Demo] : Demo [Patient] : Patient [Family member] : Family member [Good - alert, interested, motivated] : Good - alert, interested, motivated [Verbalizes knowledge/Understanding] : Verbalizes knowledge/understanding [Dressing changes] : dressing changes [Skin Care] : skin care [Signs and symptoms of infection] : sign and symptoms of infection [Venous Disease] : venous disease [Nutrition] : nutrition [How and When to Call] : how and when to call [Compression Therapy] : compression therapy [Home Health] : home health [Patient responsibility to plan of care] : patient responsibility to plan of care [Stable] : stable [Home] : Home [Walker] : Walker [Not Applicable - Long Term Care/Home Health Agency] : Long Term Care/Home Health Agency: Not Applicable [] : Yes [FreeTextEntry2] : Infection Prevention\par Promote Skin Integrity\par Offloading\par Elevation\par Compression Compliance\par Low Na+ Diet\par  [FreeTextEntry4] : F/U 1 week\par Pt measured for Circaids for right leg, pt aware to bring both wraps next visit.

## 2022-12-09 NOTE — REVIEW OF SYSTEMS
[Fever] : no fever [Eye Pain] : no eye pain [Earache] : no earache [Chest Pain] : no chest pain [Shortness Of Breath] : no shortness of breath [Abdominal Pain] : no abdominal pain [Skin Wound] : skin wound [FreeTextEntry9] : phill [de-identified] : left lateral lower leg venous stasis ulceration down to skin and subcutaneous tissue with stasis dermatitis

## 2022-12-09 NOTE — PLAN
[FreeTextEntry1] : Patient examined and evaluated at this time.\par Continue local wound care and offloading.\par Coban multilayer compression to the right lower extremity.\par Will measure patient for CircAid compression as edema has resolved.\par Spent 20 minutes for patient care and medical decision making.\par Patient to follow up in 1 week.\par

## 2022-12-09 NOTE — PHYSICAL EXAM
[2 x 2] : 2 x 2  [4 x 4] : 4 x 4  [JVD] : no jugular venous distention  [2+] : left 2+ [Ankle Swelling (On Exam)] : present [Ankle Swelling On The Right] : of the right ankle [Varicose Veins Of Lower Extremities] : present [Varicose Veins Of The Left Leg] : of the left leg [] : of the left leg [Ankle Swelling On The Left] : moderate [Skin Ulcer] : ulcer [de-identified] : calm [de-identified] : 4/5 strength in all quadrants [de-identified] : left lateral lower leg venous stasis ulceration down to skin and subcutaneous tissue, epithelialized with stasis dermatitis [de-identified] : \par  [FreeTextEntry1] : Left Lateral Leg- scab [FreeTextEntry2] : 0.3 [FreeTextEntry3] : 0.1 [FreeTextEntry4] : <0.1 [de-identified] : none [de-identified] : pt daughter stated they would apply circaid wrap after visit, will go directly home to do so, DPM aware. Ace wraps declined. [de-identified] : dry dressing [de-identified] : Mechanically cleansed with Sterile gauze and 0.9% Normal Saline\par  [de-identified] : Circ neuromuscular function WNL post coban compression application, pt expressed comfort.\par  [FreeTextEntry7] : right leg- no wounds [de-identified] : coban [de-identified] : Mechanically cleansed with Sterile gauze and 0.9% Normal Saline\par  [TWNoteComboBox5] : No [de-identified] : No [de-identified] : Normal [de-identified] : None [de-identified] : None [de-identified] : None [de-identified] : No [de-identified] : Circaid wrap [de-identified] : 3x Weekly [de-identified] : Primary Dressing [de-identified] : None [de-identified] : No [de-identified] : No [de-identified] : Normal [de-identified] : None [de-identified] : None [de-identified] : None [de-identified] : No [de-identified] : Multilayer other compression wrap [de-identified] : Weekly [de-identified] : Primary Dressing

## 2022-12-10 DIAGNOSIS — I34.0 NONRHEUMATIC MITRAL (VALVE) INSUFFICIENCY: ICD-10-CM

## 2022-12-10 DIAGNOSIS — Z87.891 PERSONAL HISTORY OF NICOTINE DEPENDENCE: ICD-10-CM

## 2022-12-10 DIAGNOSIS — Z79.899 OTHER LONG TERM (CURRENT) DRUG THERAPY: ICD-10-CM

## 2022-12-10 DIAGNOSIS — F09 UNSPECIFIED MENTAL DISORDER DUE TO KNOWN PHYSIOLOGICAL CONDITION: ICD-10-CM

## 2022-12-10 DIAGNOSIS — R01.1 CARDIAC MURMUR, UNSPECIFIED: ICD-10-CM

## 2022-12-10 DIAGNOSIS — I49.3 VENTRICULAR PREMATURE DEPOLARIZATION: ICD-10-CM

## 2022-12-10 DIAGNOSIS — M17.0 BILATERAL PRIMARY OSTEOARTHRITIS OF KNEE: ICD-10-CM

## 2022-12-10 DIAGNOSIS — I83.12 VARICOSE VEINS OF LEFT LOWER EXTREMITY WITH INFLAMMATION: ICD-10-CM

## 2022-12-10 DIAGNOSIS — Z80.0 FAMILY HISTORY OF MALIGNANT NEOPLASM OF DIGESTIVE ORGANS: ICD-10-CM

## 2022-12-10 DIAGNOSIS — I83.893 VARICOSE VEINS OF BILATERAL LOWER EXTREMITIES WITH OTHER COMPLICATIONS: ICD-10-CM

## 2022-12-15 ENCOUNTER — NON-APPOINTMENT (OUTPATIENT)
Age: 87
End: 2022-12-15

## 2022-12-16 ENCOUNTER — APPOINTMENT (OUTPATIENT)
Dept: WOUND CARE | Facility: HOSPITAL | Age: 87
End: 2022-12-16

## 2022-12-16 ENCOUNTER — OUTPATIENT (OUTPATIENT)
Dept: OUTPATIENT SERVICES | Facility: HOSPITAL | Age: 87
LOS: 1 days | Discharge: HOME | End: 2022-12-16
Payer: MEDICARE

## 2022-12-16 VITALS
BODY MASS INDEX: 21.8 KG/M2 | RESPIRATION RATE: 20 BRPM | HEART RATE: 71 BPM | OXYGEN SATURATION: 98 % | DIASTOLIC BLOOD PRESSURE: 74 MMHG | WEIGHT: 179 LBS | TEMPERATURE: 98.1 F | HEIGHT: 76 IN | SYSTOLIC BLOOD PRESSURE: 148 MMHG

## 2022-12-16 DIAGNOSIS — I83.228 VARICOSE VEINS OF LEFT LOWER EXTREMITY WITH BOTH ULCER OF OTHER PART OF LOWER EXTREMITY AND INFLAMMATION: ICD-10-CM

## 2022-12-16 PROCEDURE — 99213 OFFICE O/P EST LOW 20 MIN: CPT

## 2022-12-16 PROCEDURE — 29581 APPL MULTLAYER CMPRN SYS LEG: CPT | Mod: RT

## 2022-12-17 NOTE — PHYSICAL EXAM
[2 x 2] : 2 x 2  [4 x 4] : 4 x 4  [JVD] : no jugular venous distention  [2+] : left 2+ [Ankle Swelling (On Exam)] : present [Ankle Swelling On The Right] : of the right ankle [Varicose Veins Of Lower Extremities] : present [Varicose Veins Of The Left Leg] : of the left leg [] : of the left leg [Ankle Swelling On The Left] : moderate [Skin Ulcer] : ulcer [de-identified] : calm [de-identified] : left lateral lower leg venous stasis ulceration down to skin and subcutaneous tissue, epithelialized with stasis dermatitis [de-identified] : 4/5 strength in all quadrants [de-identified] : \par  [FreeTextEntry1] :  Lateral Leg- scab [FreeTextEntry2] : 0.3 [FreeTextEntry3] : 0.1 [FreeTextEntry4] : <0.1 [de-identified] : none [de-identified] : pt daughter stated they would apply circaid wrap after visit, will go directly home to do so, DPM aware. Ace wraps declined. [de-identified] : No treatment required [de-identified] : Mechanically cleansed with Sterile gauze and 0.9% Normal Saline\par  [de-identified] : Circ neuromuscular function WNL post coban compression application, pt expressed comfort.\par  [FreeTextEntry7] : Leg- no wounds [de-identified] : Coban [de-identified] : Mechanically cleansed with Sterile gauze and 0.9% Normal Saline\par  [de-identified] : No treatment required  [TWNoteComboBox1] : Left [TWNoteComboBox5] : No [de-identified] : No [de-identified] : None [de-identified] : Normal [de-identified] : None [de-identified] : None [de-identified] : No [de-identified] : Circaid wrap [de-identified] : 3x Weekly [de-identified] : Primary Dressing [de-identified] : None [TWNoteComboBox9] : Right [de-identified] : No [de-identified] : No [de-identified] : None [de-identified] : Normal [de-identified] : None [de-identified] : None [de-identified] : No [de-identified] : Weekly [de-identified] : Multilayer other compression wrap [de-identified] : Compression

## 2022-12-17 NOTE — ASSESSMENT
[Verbal] : Verbal [Demo] : Demo [Patient] : Patient [Family member] : Family member [Good - alert, interested, motivated] : Good - alert, interested, motivated [Verbalizes knowledge/Understanding] : Verbalizes knowledge/understanding [Dressing changes] : dressing changes [Skin Care] : skin care [Signs and symptoms of infection] : sign and symptoms of infection [Venous Disease] : venous disease [Nutrition] : nutrition [How and When to Call] : how and when to call [Compression Therapy] : compression therapy [Home Health] : home health [Patient responsibility to plan of care] : patient responsibility to plan of care [] : Yes [Stable] : stable [Home] : Home [Walker] : Walker [Not Applicable - Long Term Care/Home Health Agency] : Long Term Care/Home Health Agency: Not Applicable [FreeTextEntry2] : Infection Prevention\par Promote Skin Integrity\par Offloading\par Elevation\par Compression Compliance\par Low Na+ Diet\par F/U 3 times weekly for a dressing change and in 2 weeks for an assessment\par  [FreeTextEntry4] : No s/s of infection noted\par F/U 3 times weekly for a dressing change and in 2 weeks for an assessment

## 2022-12-17 NOTE — VITALS
[Pain related to present condition?] : The patient's  pain is not related to present condition. [] : No [de-identified] : 0/10

## 2022-12-17 NOTE — PLAN
[FreeTextEntry1] : Patient examined and evaluated at this time.\par Continue local wound care and offloading.\par Coban multilayer compression to the right lower extremity.\par Patient awaiting Circaid for the right lower extremity at this time.\par Spent 20 minutes for patient care and medical decision making.\par Patient to follow up in 1 week.\par

## 2022-12-17 NOTE — REVIEW OF SYSTEMS
[Fever] : no fever [Eye Pain] : no eye pain [Earache] : no earache [Chest Pain] : no chest pain [Shortness Of Breath] : no shortness of breath [Abdominal Pain] : no abdominal pain [Skin Wound] : skin wound [FreeTextEntry9] : phill [de-identified] : left lateral lower leg venous stasis ulceration down to skin and subcutaneous tissue with stasis dermatitis

## 2022-12-18 DIAGNOSIS — M17.0 BILATERAL PRIMARY OSTEOARTHRITIS OF KNEE: ICD-10-CM

## 2022-12-18 DIAGNOSIS — Z79.899 OTHER LONG TERM (CURRENT) DRUG THERAPY: ICD-10-CM

## 2022-12-18 DIAGNOSIS — Z80.0 FAMILY HISTORY OF MALIGNANT NEOPLASM OF DIGESTIVE ORGANS: ICD-10-CM

## 2022-12-18 DIAGNOSIS — I83.893 VARICOSE VEINS OF BILATERAL LOWER EXTREMITIES WITH OTHER COMPLICATIONS: ICD-10-CM

## 2022-12-18 DIAGNOSIS — F09 UNSPECIFIED MENTAL DISORDER DUE TO KNOWN PHYSIOLOGICAL CONDITION: ICD-10-CM

## 2022-12-18 DIAGNOSIS — G20 PARKINSON'S DISEASE: ICD-10-CM

## 2022-12-18 DIAGNOSIS — I49.3 VENTRICULAR PREMATURE DEPOLARIZATION: ICD-10-CM

## 2022-12-18 DIAGNOSIS — Z87.891 PERSONAL HISTORY OF NICOTINE DEPENDENCE: ICD-10-CM

## 2022-12-18 DIAGNOSIS — I83.12 VARICOSE VEINS OF LEFT LOWER EXTREMITY WITH INFLAMMATION: ICD-10-CM

## 2022-12-18 DIAGNOSIS — I34.0 NONRHEUMATIC MITRAL (VALVE) INSUFFICIENCY: ICD-10-CM

## 2022-12-18 DIAGNOSIS — R01.1 CARDIAC MURMUR, UNSPECIFIED: ICD-10-CM

## 2022-12-23 ENCOUNTER — APPOINTMENT (OUTPATIENT)
Dept: WOUND CARE | Facility: HOSPITAL | Age: 87
End: 2022-12-23

## 2022-12-30 ENCOUNTER — APPOINTMENT (OUTPATIENT)
Dept: WOUND CARE | Facility: HOSPITAL | Age: 87
End: 2022-12-30
Payer: MEDICARE

## 2022-12-30 ENCOUNTER — OUTPATIENT (OUTPATIENT)
Dept: OUTPATIENT SERVICES | Facility: HOSPITAL | Age: 87
LOS: 1 days | Discharge: HOME | End: 2022-12-30
Payer: MEDICARE

## 2022-12-30 VITALS
HEART RATE: 80 BPM | HEIGHT: 76 IN | DIASTOLIC BLOOD PRESSURE: 63 MMHG | BODY MASS INDEX: 21.8 KG/M2 | OXYGEN SATURATION: 97 % | SYSTOLIC BLOOD PRESSURE: 108 MMHG | RESPIRATION RATE: 20 BRPM | WEIGHT: 179 LBS | TEMPERATURE: 98.3 F

## 2022-12-30 DIAGNOSIS — Z79.899 OTHER LONG TERM (CURRENT) DRUG THERAPY: ICD-10-CM

## 2022-12-30 DIAGNOSIS — G20 PARKINSON'S DISEASE: ICD-10-CM

## 2022-12-30 DIAGNOSIS — I83.12 VARICOSE VEINS OF LEFT LOWER EXTREMITY WITH INFLAMMATION: ICD-10-CM

## 2022-12-30 DIAGNOSIS — I83.893 VARICOSE VEINS OF BILATERAL LOWER EXTREMITIES WITH OTHER COMPLICATIONS: ICD-10-CM

## 2022-12-30 DIAGNOSIS — Z80.0 FAMILY HISTORY OF MALIGNANT NEOPLASM OF DIGESTIVE ORGANS: ICD-10-CM

## 2022-12-30 DIAGNOSIS — Z87.891 PERSONAL HISTORY OF NICOTINE DEPENDENCE: ICD-10-CM

## 2022-12-30 DIAGNOSIS — R01.1 CARDIAC MURMUR, UNSPECIFIED: ICD-10-CM

## 2022-12-30 DIAGNOSIS — I34.0 NONRHEUMATIC MITRAL (VALVE) INSUFFICIENCY: ICD-10-CM

## 2022-12-30 DIAGNOSIS — F09 UNSPECIFIED MENTAL DISORDER DUE TO KNOWN PHYSIOLOGICAL CONDITION: ICD-10-CM

## 2022-12-30 DIAGNOSIS — I49.3 VENTRICULAR PREMATURE DEPOLARIZATION: ICD-10-CM

## 2022-12-30 DIAGNOSIS — I83.228 VARICOSE VEINS OF LEFT LOWER EXTREMITY WITH BOTH ULCER OF OTHER PART OF LOWER EXTREMITY AND INFLAMMATION: ICD-10-CM

## 2022-12-30 DIAGNOSIS — M17.0 BILATERAL PRIMARY OSTEOARTHRITIS OF KNEE: ICD-10-CM

## 2022-12-30 PROCEDURE — 99213 OFFICE O/P EST LOW 20 MIN: CPT

## 2022-12-30 PROCEDURE — 29581 APPL MULTLAYER CMPRN SYS LEG: CPT | Mod: RT

## 2022-12-30 NOTE — PLAN
[FreeTextEntry1] : Patient examined and evaluated at this time.\par Continue local wound care and off load\par  Circaid for the bilateral lower extremites at this time.\par Spent 20 minutes for patient care and medical decision making.\par Patient to follow up in 2 weeks.\par

## 2022-12-30 NOTE — ASSESSMENT
[Verbal] : Verbal [Demo] : Demo [Patient] : Patient [Family member] : Family member [Good - alert, interested, motivated] : Good - alert, interested, motivated [Verbalizes knowledge/Understanding] : Verbalizes knowledge/understanding [Dressing changes] : dressing changes [Skin Care] : skin care [Signs and symptoms of infection] : sign and symptoms of infection [Venous Disease] : venous disease [Nutrition] : nutrition [How and When to Call] : how and when to call [Compression Therapy] : compression therapy [Home Health] : home health [Patient responsibility to plan of care] : patient responsibility to plan of care [] : Yes [Stable] : stable [Home] : Home [Walker] : Walker [Not Applicable - Long Term Care/Home Health Agency] : Long Term Care/Home Health Agency: Not Applicable [FreeTextEntry2] : Infection Prevention\par Offloading\par Elevation\par Compression Compliance\par Low Na+ Diet [FreeTextEntry4] : Emphasized the importance of lower leg compression, elevation, and low sodium diet. Pt's diet is poor. Pt eats a lot of processed foods and prepared food with Sodium by his daughters due to Pt's Hx of Parkinsons and unable to cook for himself. Offered nutritional consult to which Pt's daughter declined. \par Discussed importance of need for Pt to see primary care provider for possible diuretic adjustment. Pt and Pt's daughter understanding of same.\par Pt discharged in B/L Circaids. Expressed comfort post application.\par F/u in 2 weeks.

## 2022-12-30 NOTE — PHYSICAL EXAM
[2+] : left 2+ [Ankle Swelling (On Exam)] : present [Ankle Swelling On The Right] : of the right ankle [Varicose Veins Of Lower Extremities] : present [Varicose Veins Of The Left Leg] : of the left leg [] : of the left leg [Ankle Swelling On The Left] : moderate [Skin Ulcer] : ulcer [JVD] : no jugular venous distention  [de-identified] : calm [de-identified] : 4/5 strength in all quadrants [de-identified] : left lateral lower leg venous stasis ulceration down to skin and subcutaneous tissue, epithelialized with stasis dermatitis [FreeTextEntry1] : Left Lower Leg, Lateral - Closed [de-identified] : none [de-identified] : none [de-identified] : Pt expressed application post Circaid compression. Circ/Neuromuscular functions WNL post application. [de-identified] : CircAids [FreeTextEntry7] : Right Leg - Closed - Compression therapy needed [de-identified] : Pt expressed application post Circaid compression. Circ/Neuromuscular functions WNL post application. [de-identified] : CircAid [TWNoteComboBox6] : Venous [de-identified] : No [de-identified] : Normal [de-identified] : None [de-identified] : Circaid wrap [de-identified] : Compression [de-identified] : Circaid wrap [de-identified] : Compression

## 2022-12-30 NOTE — HISTORY OF PRESENT ILLNESS
[FreeTextEntry1] : Pt seen for left lateral lower leg venous stasis ulceration down to skin and subcutaneous tissue and fat, healed at this time with stasis dermatitis.  Patient also seen for edema of his right lower leg, resolved at this time.\par 12/30/22 bilateral lower legs closed with edema improved

## 2022-12-30 NOTE — REVIEW OF SYSTEMS
[Skin Wound] : skin wound [Fever] : no fever [Eye Pain] : no eye pain [Earache] : no earache [Chest Pain] : no chest pain [Shortness Of Breath] : no shortness of breath [Abdominal Pain] : no abdominal pain [FreeTextEntry9] : phill [de-identified] : left lateral lower leg venous stasis ulceration down to skin and subcutaneous tissue with stasis dermatitis

## 2023-01-04 ENCOUNTER — APPOINTMENT (OUTPATIENT)
Dept: VASCULAR SURGERY | Facility: CLINIC | Age: 88
End: 2023-01-04

## 2023-01-12 ENCOUNTER — OUTPATIENT (OUTPATIENT)
Dept: OUTPATIENT SERVICES | Facility: HOSPITAL | Age: 88
LOS: 1 days | Discharge: HOME | End: 2023-01-12
Payer: MEDICARE

## 2023-01-12 ENCOUNTER — APPOINTMENT (OUTPATIENT)
Dept: WOUND CARE | Facility: HOSPITAL | Age: 88
End: 2023-01-12
Payer: MEDICARE

## 2023-01-12 VITALS
RESPIRATION RATE: 20 BRPM | TEMPERATURE: 97.8 F | HEART RATE: 78 BPM | OXYGEN SATURATION: 95 % | SYSTOLIC BLOOD PRESSURE: 109 MMHG | WEIGHT: 179 LBS | HEIGHT: 76 IN | BODY MASS INDEX: 21.8 KG/M2 | DIASTOLIC BLOOD PRESSURE: 69 MMHG

## 2023-01-12 DIAGNOSIS — Z87.891 PERSONAL HISTORY OF NICOTINE DEPENDENCE: ICD-10-CM

## 2023-01-12 DIAGNOSIS — R01.1 CARDIAC MURMUR, UNSPECIFIED: ICD-10-CM

## 2023-01-12 DIAGNOSIS — Z79.899 OTHER LONG TERM (CURRENT) DRUG THERAPY: ICD-10-CM

## 2023-01-12 DIAGNOSIS — I34.0 NONRHEUMATIC MITRAL (VALVE) INSUFFICIENCY: ICD-10-CM

## 2023-01-12 DIAGNOSIS — F09 UNSPECIFIED MENTAL DISORDER DUE TO KNOWN PHYSIOLOGICAL CONDITION: ICD-10-CM

## 2023-01-12 DIAGNOSIS — I83.893 VARICOSE VEINS OF BILATERAL LOWER EXTREMITIES WITH OTHER COMPLICATIONS: ICD-10-CM

## 2023-01-12 DIAGNOSIS — Z80.0 FAMILY HISTORY OF MALIGNANT NEOPLASM OF DIGESTIVE ORGANS: ICD-10-CM

## 2023-01-12 DIAGNOSIS — M17.0 BILATERAL PRIMARY OSTEOARTHRITIS OF KNEE: ICD-10-CM

## 2023-01-12 DIAGNOSIS — I49.3 VENTRICULAR PREMATURE DEPOLARIZATION: ICD-10-CM

## 2023-01-12 DIAGNOSIS — I83.228 VARICOSE VEINS OF LEFT LOWER EXTREMITY WITH BOTH ULCER OF OTHER PART OF LOWER EXTREMITY AND INFLAMMATION: ICD-10-CM

## 2023-01-12 DIAGNOSIS — G20 PARKINSON'S DISEASE: ICD-10-CM

## 2023-01-12 DIAGNOSIS — I83.12 VARICOSE VEINS OF LEFT LOWER EXTREMITY WITH INFLAMMATION: ICD-10-CM

## 2023-01-12 PROCEDURE — 99213 OFFICE O/P EST LOW 20 MIN: CPT

## 2023-01-12 PROCEDURE — G0463: CPT

## 2023-01-12 NOTE — PHYSICAL EXAM
[JVD] : no jugular venous distention  [Normal Thyroid] : the thyroid was normal [Normal Breath Sounds] : Normal breath sounds [Normal Heart Sounds] : normal heart sounds [Normal Rate and Rhythm] : normal rate and rhythm [Ankle Swelling (On Exam)] : present [Ankle Swelling Bilaterally] : bilaterally  [Ankle Swelling On The Left] : moderate [] : bilaterally [Abdomen Masses] : No abdominal massess [Abdomen Tenderness] : ~T ~M No abdominal tenderness [Tender] : nontender [Enlarged] : not enlarged [Alert] : alert [Oriented to Person] : oriented to person [Oriented to Place] : oriented to place [Oriented to Time] : oriented to time [Calm] : calm [de-identified] : elderly WM, NAD, alert, Ox3. [FreeTextEntry1] : Left Lower Leg, Lateral - Closed [de-identified] : none [de-identified] : none [de-identified] : Pt expressed application post Circaid compression. Circ/Neuromuscular functions WNL post application. [de-identified] : CircAids [FreeTextEntry7] : Right Leg - Closed - Compression therapy needed [de-identified] : Pt expressed application post Circaid compression. Circ/Neuromuscular functions WNL post application. [de-identified] : CircAid [TWNoteComboBox6] : Venous [de-identified] : No [de-identified] : Normal [de-identified] : None [de-identified] : Circaid wrap [de-identified] : Compression [de-identified] : Circaid wrap [de-identified] : Compression

## 2023-01-12 NOTE — ASSESSMENT
[Verbal] : Verbal [Demo] : Demo [Patient] : Patient [Family member] : Family member [Good - alert, interested, motivated] : Good - alert, interested, motivated [Verbalizes knowledge/Understanding] : Verbalizes knowledge/understanding [Dressing changes] : dressing changes [Skin Care] : skin care [Signs and symptoms of infection] : sign and symptoms of infection [Venous Disease] : venous disease [Nutrition] : nutrition [How and When to Call] : how and when to call [Compression Therapy] : compression therapy [Home Health] : home health [Patient responsibility to plan of care] : patient responsibility to plan of care [] : Yes [Stable] : stable [Home] : Home [Walker] : Walker [Not Applicable - Long Term Care/Home Health Agency] : Long Term Care/Home Health Agency: Not Applicable [FreeTextEntry2] : Infection Prevention\par Offloading\par Elevation\par Compression Compliance\par Low Na+ Diet

## 2023-01-12 NOTE — HISTORY OF PRESENT ILLNESS
[FreeTextEntry1] : 88 yo WM, here with his daughter for f/u of chronic LLE VSU. Presently using circaids and doing well. All ulcers have healed and less edema.

## 2023-01-12 NOTE — REVIEW OF SYSTEMS
[Fever] : no fever [Eye Pain] : no eye pain [Earache] : no earache [Chest Pain] : no chest pain [Shortness Of Breath] : no shortness of breath [Abdominal Pain] : no abdominal pain [Skin Wound] : skin wound [FreeTextEntry9] : phill [de-identified] : left lateral lower leg venous stasis ulceration down to skin and subcutaneous tissue with stasis dermatitis

## 2023-02-01 ENCOUNTER — APPOINTMENT (OUTPATIENT)
Dept: WOUND CARE | Facility: HOSPITAL | Age: 88
End: 2023-02-01
Payer: MEDICARE

## 2023-02-01 ENCOUNTER — OUTPATIENT (OUTPATIENT)
Dept: OUTPATIENT SERVICES | Facility: HOSPITAL | Age: 88
LOS: 1 days | Discharge: HOME | End: 2023-02-01
Payer: MEDICARE

## 2023-02-01 VITALS
SYSTOLIC BLOOD PRESSURE: 156 MMHG | HEIGHT: 76 IN | DIASTOLIC BLOOD PRESSURE: 81 MMHG | BODY MASS INDEX: 21.31 KG/M2 | TEMPERATURE: 99.1 F | RESPIRATION RATE: 20 BRPM | HEART RATE: 87 BPM | WEIGHT: 175 LBS | OXYGEN SATURATION: 92 %

## 2023-02-01 DIAGNOSIS — I83.12 VARICOSE VEINS OF LEFT LOWER EXTREMITY WITH INFLAMMATION: ICD-10-CM

## 2023-02-01 DIAGNOSIS — Z80.0 FAMILY HISTORY OF MALIGNANT NEOPLASM OF DIGESTIVE ORGANS: ICD-10-CM

## 2023-02-01 DIAGNOSIS — Z87.891 PERSONAL HISTORY OF NICOTINE DEPENDENCE: ICD-10-CM

## 2023-02-01 DIAGNOSIS — G20 PARKINSON'S DISEASE: ICD-10-CM

## 2023-02-01 DIAGNOSIS — Z79.899 OTHER LONG TERM (CURRENT) DRUG THERAPY: ICD-10-CM

## 2023-02-01 DIAGNOSIS — F09 UNSPECIFIED MENTAL DISORDER DUE TO KNOWN PHYSIOLOGICAL CONDITION: ICD-10-CM

## 2023-02-01 DIAGNOSIS — I49.3 VENTRICULAR PREMATURE DEPOLARIZATION: ICD-10-CM

## 2023-02-01 DIAGNOSIS — I83.893 VARICOSE VEINS OF BILATERAL LOWER EXTREMITIES WITH OTHER COMPLICATIONS: ICD-10-CM

## 2023-02-01 DIAGNOSIS — M17.0 BILATERAL PRIMARY OSTEOARTHRITIS OF KNEE: ICD-10-CM

## 2023-02-01 DIAGNOSIS — R01.1 CARDIAC MURMUR, UNSPECIFIED: ICD-10-CM

## 2023-02-01 DIAGNOSIS — I34.0 NONRHEUMATIC MITRAL (VALVE) INSUFFICIENCY: ICD-10-CM

## 2023-02-01 PROCEDURE — 99213 OFFICE O/P EST LOW 20 MIN: CPT

## 2023-02-01 PROCEDURE — G0463: CPT

## 2023-02-01 NOTE — PHYSICAL EXAM
[Normal Thyroid] : the thyroid was normal [Normal Breath Sounds] : Normal breath sounds [Normal Heart Sounds] : normal heart sounds [Normal Rate and Rhythm] : normal rate and rhythm [Ankle Swelling (On Exam)] : present [Ankle Swelling Bilaterally] : bilaterally  [Ankle Swelling On The Left] : moderate [Alert] : alert [Oriented to Person] : oriented to person [Oriented to Place] : oriented to place [Oriented to Time] : oriented to time [Calm] : calm [2 x 2] : 2 x 2  [4 x 4] : 4 x 4  [JVD] : no jugular venous distention  [] : not present [Abdomen Masses] : No abdominal massess [Abdomen Tenderness] : ~T ~M No abdominal tenderness [Tender] : nontender [Enlarged] : not enlarged [de-identified] : elderly WM, NAD, alert, Ox3. [FreeTextEntry1] : Left Lower Leg, Lateral (scab lifted 02/01/23) [FreeTextEntry2] : 0.3 [FreeTextEntry3] : 0.4 [FreeTextEntry4] : .02 [de-identified] : none [de-identified] : applied. Pt expressed comfort after the application. Circ./Neuromuscular functions checked and WNL [de-identified] : Silver Alginate [de-identified] : Mechanically cleansed with 0.9% normal saline and sterile gauze applied.\par dry dressing\par  [FreeTextEntry7] : Right Leg - Closed - Compression therapy needed [de-identified] : applied. Pt expressed comfort after the application. Circ./Neuromuscular functions checked and WNL [de-identified] : no product [de-identified] : Mechanically cleansed with 0.9% normal saline and sterile gauze applied.\par  [TWNoteComboBox4] : None [TWNoteComboBox5] : No [TWNoteComboBox6] : Venous [de-identified] : No [de-identified] : Normal [de-identified] : None [de-identified] : 100% [de-identified] : No [de-identified] : Circaid wrap [de-identified] : 3x Weekly [de-identified] : Circaid wrap [de-identified] : Primary Dressing

## 2023-02-01 NOTE — PLAN
[FreeTextEntry1] : leg elevation stressed\par AgAlginate ,DD,Circaids,3X/week\par circaids daily\par f/u 3 wks\par \par time spent-20 mins.

## 2023-02-01 NOTE — HISTORY OF PRESENT ILLNESS
[FreeTextEntry1] : 88 yo WM, here with his daughter for f/u of chronic LLE VSU. Presently using circaids and doing well. All ulcers have healed and less edema.\par 2/1/23 patient has not been elevating lower legs and has some localized edema left upper lateral calf and edema right lower part of lower leg. scab on left lower leg removed with small open area noted. No signs of infection

## 2023-02-01 NOTE — ASSESSMENT
[Verbal] : Verbal [Demo] : Demo [Patient] : Patient [Family member] : Family member [Good - alert, interested, motivated] : Good - alert, interested, motivated [Verbalizes knowledge/Understanding] : Verbalizes knowledge/understanding [Dressing changes] : dressing changes [Skin Care] : skin care [Signs and symptoms of infection] : sign and symptoms of infection [Venous Disease] : venous disease [Nutrition] : nutrition [How and When to Call] : how and when to call [Compression Therapy] : compression therapy [Home Health] : home health [Patient responsibility to plan of care] : patient responsibility to plan of care [] : Yes [Stable] : stable [Home] : Home [Walker] : Walker [Not Applicable - Long Term Care/Home Health Agency] : Long Term Care/Home Health Agency: Not Applicable [FreeTextEntry2] : Infection Prevention\par Offloading\par Elevation\par Compression Compliance\par Low Na+ Diet [FreeTextEntry4] : Seen for \par Assessment\par Dressing Change- Silver Alginate, DD, Medipore tape\par \par Discussed today dressing change, elevation and compression of bilateral extremities. \par \par Follow up in 3 weeks

## 2023-02-01 NOTE — REVIEW OF SYSTEMS
[Skin Wound] : skin wound [Fever] : no fever [Eye Pain] : no eye pain [Earache] : no earache [Chest Pain] : no chest pain [Shortness Of Breath] : no shortness of breath [Abdominal Pain] : no abdominal pain [FreeTextEntry9] : phill [de-identified] : left lateral lower leg venous stasis ulceration down to skin and subcutaneous tissue with stasis dermatitis

## 2023-02-22 ENCOUNTER — APPOINTMENT (OUTPATIENT)
Dept: WOUND CARE | Facility: HOSPITAL | Age: 88
End: 2023-02-22
Payer: MEDICARE

## 2023-02-22 ENCOUNTER — OUTPATIENT (OUTPATIENT)
Dept: OUTPATIENT SERVICES | Facility: HOSPITAL | Age: 88
LOS: 1 days | Discharge: HOME | End: 2023-02-22
Payer: MEDICARE

## 2023-02-22 VITALS
HEART RATE: 66 BPM | HEIGHT: 76 IN | RESPIRATION RATE: 19 BRPM | TEMPERATURE: 98.3 F | SYSTOLIC BLOOD PRESSURE: 149 MMHG | DIASTOLIC BLOOD PRESSURE: 78 MMHG | OXYGEN SATURATION: 96 % | WEIGHT: 175 LBS | BODY MASS INDEX: 21.31 KG/M2

## 2023-02-22 DIAGNOSIS — I83.12 VARICOSE VEINS OF LEFT LOWER EXTREMITY WITH INFLAMMATION: ICD-10-CM

## 2023-02-22 PROCEDURE — 99213 OFFICE O/P EST LOW 20 MIN: CPT

## 2023-02-22 PROCEDURE — G0463: CPT

## 2023-02-22 NOTE — HISTORY OF PRESENT ILLNESS
[FreeTextEntry1] : 86 yo WM, here with his daughter for f/u of chronic LLE VSU. Presently using circaids and doing well. All ulcers have healed and less edema.\par 2/1/23 patient has not been elevating lower legs and has some localized edema left upper lateral calf and edema right lower part of lower leg. scab on left lower leg removed with small open area noted. No signs of infection\par 2/22/23 left lower leg ulcer smaller. No signs of infection

## 2023-02-22 NOTE — REVIEW OF SYSTEMS
[Fever] : no fever [Eye Pain] : no eye pain [Earache] : no earache [Chest Pain] : no chest pain [Shortness Of Breath] : no shortness of breath [Abdominal Pain] : no abdominal pain [Skin Wound] : skin wound [FreeTextEntry9] : phill [de-identified] : left lateral lower leg venous stasis ulceration down to skin and subcutaneous tissue with stasis dermatitis

## 2023-02-22 NOTE — PHYSICAL EXAM
[2 x 2] : 2 x 2  [4 x 4] : 4 x 4  [JVD] : no jugular venous distention  [Normal Thyroid] : the thyroid was normal [Normal Breath Sounds] : Normal breath sounds [Normal Heart Sounds] : normal heart sounds [Normal Rate and Rhythm] : normal rate and rhythm [Ankle Swelling (On Exam)] : present [Ankle Swelling Bilaterally] : bilaterally  [Ankle Swelling On The Left] : moderate [] : bilaterally [Abdomen Masses] : No abdominal massess [Abdomen Tenderness] : ~T ~M No abdominal tenderness [Tender] : nontender [Enlarged] : not enlarged [Alert] : alert [Oriented to Person] : oriented to person [Oriented to Place] : oriented to place [Oriented to Time] : oriented to time [Calm] : calm [de-identified] : elderly WM, NAD, alert, Ox3. [FreeTextEntry1] : Left Lower Leg, Lateral (scab lifted 02/01/23) [FreeTextEntry2] : 0.3 [FreeTextEntry3] : 0.4 [FreeTextEntry4] : .02 [de-identified] : none [de-identified] : applied. Pt expressed comfort after the application. Circ./Neuromuscular functions checked and WNL [de-identified] : Silver Alginate [de-identified] : Mechanically cleansed with 0.9% normal saline and sterile gauze applied.\par dry dressing\par  [FreeTextEntry7] : Right Leg - Closed - Compression therapy needed [de-identified] : applied. Pt expressed comfort after the application. Circ./Neuromuscular functions checked and WNL [de-identified] : no product [de-identified] : Mechanically cleansed with 0.9% normal saline and sterile gauze applied.\par  [TWNoteComboBox4] : None [TWNoteComboBox5] : No [TWNoteComboBox6] : Venous [de-identified] : No [de-identified] : Normal [de-identified] : None [de-identified] : 100% [de-identified] : No [de-identified] : Circaid wrap [de-identified] : 3x Weekly [de-identified] : Primary Dressing [de-identified] : Circaid wrap

## 2023-02-23 DIAGNOSIS — R01.1 CARDIAC MURMUR, UNSPECIFIED: ICD-10-CM

## 2023-02-23 DIAGNOSIS — I49.3 VENTRICULAR PREMATURE DEPOLARIZATION: ICD-10-CM

## 2023-02-23 DIAGNOSIS — I83.893 VARICOSE VEINS OF BILATERAL LOWER EXTREMITIES WITH OTHER COMPLICATIONS: ICD-10-CM

## 2023-02-23 DIAGNOSIS — G20 PARKINSON'S DISEASE: ICD-10-CM

## 2023-02-23 DIAGNOSIS — I34.0 NONRHEUMATIC MITRAL (VALVE) INSUFFICIENCY: ICD-10-CM

## 2023-02-23 DIAGNOSIS — I83.12 VARICOSE VEINS OF LEFT LOWER EXTREMITY WITH INFLAMMATION: ICD-10-CM

## 2023-02-23 DIAGNOSIS — F09 UNSPECIFIED MENTAL DISORDER DUE TO KNOWN PHYSIOLOGICAL CONDITION: ICD-10-CM

## 2023-02-23 DIAGNOSIS — Z87.891 PERSONAL HISTORY OF NICOTINE DEPENDENCE: ICD-10-CM

## 2023-02-23 DIAGNOSIS — M17.0 BILATERAL PRIMARY OSTEOARTHRITIS OF KNEE: ICD-10-CM

## 2023-02-23 DIAGNOSIS — Z80.0 FAMILY HISTORY OF MALIGNANT NEOPLASM OF DIGESTIVE ORGANS: ICD-10-CM

## 2023-02-23 DIAGNOSIS — Z79.899 OTHER LONG TERM (CURRENT) DRUG THERAPY: ICD-10-CM

## 2023-03-13 ENCOUNTER — NON-APPOINTMENT (OUTPATIENT)
Age: 88
End: 2023-03-13

## 2023-03-14 ENCOUNTER — OUTPATIENT (OUTPATIENT)
Dept: OUTPATIENT SERVICES | Facility: HOSPITAL | Age: 88
LOS: 1 days | Discharge: HOME | End: 2023-03-14
Payer: MEDICARE

## 2023-03-14 ENCOUNTER — APPOINTMENT (OUTPATIENT)
Dept: WOUND CARE | Facility: HOSPITAL | Age: 88
End: 2023-03-14
Payer: MEDICARE

## 2023-03-14 VITALS
BODY MASS INDEX: 21.31 KG/M2 | SYSTOLIC BLOOD PRESSURE: 135 MMHG | WEIGHT: 175 LBS | HEART RATE: 75 BPM | HEIGHT: 76 IN | DIASTOLIC BLOOD PRESSURE: 76 MMHG | OXYGEN SATURATION: 94 % | TEMPERATURE: 98.5 F | RESPIRATION RATE: 19 BRPM

## 2023-03-14 DIAGNOSIS — I83.12 VARICOSE VEINS OF LEFT LOWER EXTREMITY WITH INFLAMMATION: ICD-10-CM

## 2023-03-14 PROCEDURE — G0463: CPT

## 2023-03-14 PROCEDURE — 99213 OFFICE O/P EST LOW 20 MIN: CPT

## 2023-03-14 NOTE — PHYSICAL EXAM
[Normal Thyroid] : the thyroid was normal [Normal Breath Sounds] : Normal breath sounds [Normal Heart Sounds] : normal heart sounds [Normal Rate and Rhythm] : normal rate and rhythm [Ankle Swelling (On Exam)] : present [Ankle Swelling Bilaterally] : bilaterally  [Ankle Swelling On The Left] : moderate [Alert] : alert [Oriented to Person] : oriented to person [Oriented to Place] : oriented to place [Oriented to Time] : oriented to time [Calm] : calm [4 x 4] : 4 x 4  [JVD] : no jugular venous distention  [] : not present [Abdomen Masses] : No abdominal massess [Abdomen Tenderness] : ~T ~M No abdominal tenderness [Tender] : nontender [Enlarged] : not enlarged [de-identified] : elderly WM, NAD, alert, Ox3. [FreeTextEntry1] : Left Lower Leg Lateral-Scab- Closed [de-identified] : Pt expressed comfort s/p CircAid wrap application. Circulatory and Neuromuscular function within normal limits [de-identified] : dry dressing [de-identified] : Cleansed with normal saline, sterile gauze\par \par DD, Medipore tape [FreeTextEntry7] : Right Leg-Closed [de-identified] : Pt expressed comfort s/p ace wrap application. Circulatory and Neuromuscular function within normal limits [de-identified] : none [TWNoteComboBox4] : None [TWNoteComboBox5] : No [TWNoteComboBox6] : Venous [de-identified] : No [de-identified] : Normal [de-identified] : None [de-identified] : None [de-identified] : None [de-identified] : No [de-identified] : Circaid wrap [de-identified] : Ace wraps

## 2023-03-14 NOTE — PLAN
[FreeTextEntry1] : leg elevation stressed\par left lower leg,DD,Circaids,3X/week\par right lower leg Ace wrap QD\par stressed elevation\par recommended patient and daughter contact his Cardiologist to see if diuretic could be increased\par significant increase in right lower leg edema because patient is not elevating lower legs\par \par f/u 3 wks\par \par time spent-20 mins.

## 2023-03-14 NOTE — HISTORY OF PRESENT ILLNESS
[FreeTextEntry1] : 88 yo WM, here with his daughter for f/u of chronic LLE VSU. Presently using circaids and doing well. All ulcers have healed and less edema.\par 2/1/23 patient has not been elevating lower legs and has some localized edema left upper lateral calf and edema right lower part of lower leg. scab on left lower leg removed with small open area noted. No signs of infection\par 2/22/23 left lower leg ulcer smaller. No signs of infection\par 3/14/23 right lower leg increased edema. No increased warmth or erythema just edema. stressed elevation

## 2023-03-14 NOTE — ASSESSMENT
[Verbal] : Verbal [Demo] : Demo [Patient] : Patient [Family member] : Family member [Good - alert, interested, motivated] : Good - alert, interested, motivated [Demonstrates independently] : demonstrates independently [Dressing changes] : dressing changes [Foot Care] : foot care [Skin Care] : skin care [Pressure relief] : pressure relief [Signs and symptoms of infection] : sign and symptoms of infection [Nutrition] : nutrition [How and When to Call] : how and when to call [Compression Therapy] : compression therapy [Patient responsibility to plan of care] : patient responsibility to plan of care [] : Yes [Stable] : stable [Home] : Home [Walker] : Walker [Faxed - Long Term Care/Home Health Agency] : Long Term Care/Home Health Agency: Faxed [FreeTextEntry2] : Infection Prevention\par Maintain skin integrity\par Protect/ Guard wound site\par Off loading/ Low sodium diet and Lower Legs Elevation\par Compression therapy\par Proper Diet and Nutriton for wound healing\par Use of pharmacological and nonpharmacological techniques for pain prevention\par  [FreeTextEntry4] : F/U in 3 weeks

## 2023-03-14 NOTE — REVIEW OF SYSTEMS
[Skin Wound] : skin wound [Fever] : no fever [Eye Pain] : no eye pain [Earache] : no earache [Chest Pain] : no chest pain [Shortness Of Breath] : no shortness of breath [Abdominal Pain] : no abdominal pain [FreeTextEntry9] : phill [de-identified] : left lateral lower leg venous stasis ulceration down to skin and subcutaneous tissue with stasis dermatitis

## 2023-03-16 DIAGNOSIS — G20 PARKINSON'S DISEASE: ICD-10-CM

## 2023-03-16 DIAGNOSIS — I83.893 VARICOSE VEINS OF BILATERAL LOWER EXTREMITIES WITH OTHER COMPLICATIONS: ICD-10-CM

## 2023-03-16 DIAGNOSIS — I49.3 VENTRICULAR PREMATURE DEPOLARIZATION: ICD-10-CM

## 2023-03-16 DIAGNOSIS — R01.1 CARDIAC MURMUR, UNSPECIFIED: ICD-10-CM

## 2023-03-16 DIAGNOSIS — Z87.891 PERSONAL HISTORY OF NICOTINE DEPENDENCE: ICD-10-CM

## 2023-03-16 DIAGNOSIS — I83.12 VARICOSE VEINS OF LEFT LOWER EXTREMITY WITH INFLAMMATION: ICD-10-CM

## 2023-03-16 DIAGNOSIS — I34.0 NONRHEUMATIC MITRAL (VALVE) INSUFFICIENCY: ICD-10-CM

## 2023-03-16 DIAGNOSIS — Z80.0 FAMILY HISTORY OF MALIGNANT NEOPLASM OF DIGESTIVE ORGANS: ICD-10-CM

## 2023-03-16 DIAGNOSIS — F09 UNSPECIFIED MENTAL DISORDER DUE TO KNOWN PHYSIOLOGICAL CONDITION: ICD-10-CM

## 2023-03-16 DIAGNOSIS — M17.0 BILATERAL PRIMARY OSTEOARTHRITIS OF KNEE: ICD-10-CM

## 2023-03-16 DIAGNOSIS — Z79.899 OTHER LONG TERM (CURRENT) DRUG THERAPY: ICD-10-CM

## 2023-04-11 ENCOUNTER — APPOINTMENT (OUTPATIENT)
Dept: WOUND CARE | Facility: HOSPITAL | Age: 88
End: 2023-04-11
Payer: MEDICARE

## 2023-04-11 ENCOUNTER — OUTPATIENT (OUTPATIENT)
Dept: OUTPATIENT SERVICES | Facility: HOSPITAL | Age: 88
LOS: 1 days | Discharge: HOME | End: 2023-04-11
Payer: MEDICARE

## 2023-04-11 DIAGNOSIS — I87.2 VENOUS INSUFFICIENCY (CHRONIC) (PERIPHERAL): ICD-10-CM

## 2023-04-11 DIAGNOSIS — I83.12 VARICOSE VEINS OF LEFT LOWER EXTREMITY WITH INFLAMMATION: ICD-10-CM

## 2023-04-11 PROCEDURE — G0463: CPT

## 2023-04-11 PROCEDURE — 99213 OFFICE O/P EST LOW 20 MIN: CPT

## 2023-04-11 NOTE — ASSESSMENT
[Verbal] : Verbal [Written] : Written [Demo] : Demo [Patient] : Patient [Family member] : Family member [Good - alert, interested, motivated] : Good - alert, interested, motivated [Verbalizes knowledge/Understanding] : Verbalizes knowledge/understanding [Skin Care] : skin care [Signs and symptoms of infection] : sign and symptoms of infection [Venous Disease] : venous disease [Nutrition] : nutrition [How and When to Call] : how and when to call [Pain Management] : pain management [Compression Therapy] : compression therapy [Patient responsibility to plan of care] : patient responsibility to plan of care [] : Yes [Stable] : stable [Home] : Home [Walker] : Walker [Not Applicable - Long Term Care/Home Health Agency] : Long Term Care/Home Health Agency: Not Applicable [FreeTextEntry2] : Infection prevention\par Goal remaining pain free regarding wounds\par compression therapy \par Low sodium diet  [FreeTextEntry4] : New script for Circaids submitted \par Follow up in 4 weeks

## 2023-04-11 NOTE — PLAN
[FreeTextEntry1] : leg elevation stressed\par left lower leg,DD,Circaids,3X/week\par bilateral circaids re ordered\par stressed elevation\par less edema and very small open area left lower leg\par \par f/u 4 wks\par \par time spent-20 mins.

## 2023-04-11 NOTE — REVIEW OF SYSTEMS
[Skin Wound] : skin wound [Fever] : no fever [Eye Pain] : no eye pain [Earache] : no earache [Chest Pain] : no chest pain [Shortness Of Breath] : no shortness of breath [Abdominal Pain] : no abdominal pain [FreeTextEntry9] : phill [de-identified] : left lateral lower leg venous stasis ulceration down to skin and subcutaneous tissue with stasis dermatitis

## 2023-04-11 NOTE — PHYSICAL EXAM
[Normal Thyroid] : the thyroid was normal [Normal Breath Sounds] : Normal breath sounds [Normal Heart Sounds] : normal heart sounds [Normal Rate and Rhythm] : normal rate and rhythm [Ankle Swelling (On Exam)] : present [Ankle Swelling Bilaterally] : bilaterally  [Ankle Swelling On The Left] : moderate [Alert] : alert [Oriented to Person] : oriented to person [Oriented to Place] : oriented to place [Oriented to Time] : oriented to time [Calm] : calm [JVD] : no jugular venous distention  [] : not present [Abdomen Masses] : No abdominal massess [Abdomen Tenderness] : ~T ~M No abdominal tenderness [Tender] : nontender [Enlarged] : not enlarged [de-identified] : elderly WM, NAD, alert, Ox3. [FreeTextEntry1] : left lower leg ,  lateral - scab - no open wounds  [de-identified] : No neurovascular deficits noted. [de-identified] : Circaid  [de-identified] : Mechanically cleansed with sterile gauze and normal saline.\par No other treatment  [FreeTextEntry7] : Right leg - No open wounds  [de-identified] : No neurovascular deficits noted. [de-identified] : Circaid  [TWNoteComboBox4] : None [de-identified] : Normal [de-identified] : None [de-identified] : None [de-identified] : No [de-identified] : Circaid wrap [de-identified] : Daily [de-identified] : Compression [de-identified] : None [de-identified] : Normal [de-identified] : None [de-identified] : None [de-identified] : No [de-identified] : Circaid wrap [de-identified] : Daily [de-identified] : Compression

## 2023-04-12 ENCOUNTER — APPOINTMENT (OUTPATIENT)
Dept: NEUROLOGY | Facility: CLINIC | Age: 88
End: 2023-04-12

## 2023-04-13 DIAGNOSIS — I49.3 VENTRICULAR PREMATURE DEPOLARIZATION: ICD-10-CM

## 2023-04-13 DIAGNOSIS — G20 PARKINSON'S DISEASE: ICD-10-CM

## 2023-04-13 DIAGNOSIS — Z79.899 OTHER LONG TERM (CURRENT) DRUG THERAPY: ICD-10-CM

## 2023-04-13 DIAGNOSIS — I83.893 VARICOSE VEINS OF BILATERAL LOWER EXTREMITIES WITH OTHER COMPLICATIONS: ICD-10-CM

## 2023-04-13 DIAGNOSIS — R01.1 CARDIAC MURMUR, UNSPECIFIED: ICD-10-CM

## 2023-04-13 DIAGNOSIS — Z87.891 PERSONAL HISTORY OF NICOTINE DEPENDENCE: ICD-10-CM

## 2023-04-13 DIAGNOSIS — I83.12 VARICOSE VEINS OF LEFT LOWER EXTREMITY WITH INFLAMMATION: ICD-10-CM

## 2023-04-13 DIAGNOSIS — I34.0 NONRHEUMATIC MITRAL (VALVE) INSUFFICIENCY: ICD-10-CM

## 2023-04-13 DIAGNOSIS — M17.0 BILATERAL PRIMARY OSTEOARTHRITIS OF KNEE: ICD-10-CM

## 2023-04-13 DIAGNOSIS — F09 UNSPECIFIED MENTAL DISORDER DUE TO KNOWN PHYSIOLOGICAL CONDITION: ICD-10-CM

## 2023-04-13 DIAGNOSIS — Z80.0 FAMILY HISTORY OF MALIGNANT NEOPLASM OF DIGESTIVE ORGANS: ICD-10-CM

## 2023-04-19 ENCOUNTER — RX RENEWAL (OUTPATIENT)
Age: 88
End: 2023-04-19

## 2023-05-09 ENCOUNTER — APPOINTMENT (OUTPATIENT)
Dept: WOUND CARE | Facility: HOSPITAL | Age: 88
End: 2023-05-09
Payer: MEDICARE

## 2023-05-09 ENCOUNTER — OUTPATIENT (OUTPATIENT)
Dept: OUTPATIENT SERVICES | Facility: HOSPITAL | Age: 88
LOS: 1 days | Discharge: HOME | End: 2023-05-09
Payer: MEDICARE

## 2023-05-09 VITALS
HEIGHT: 76 IN | WEIGHT: 175 LBS | BODY MASS INDEX: 21.31 KG/M2 | SYSTOLIC BLOOD PRESSURE: 130 MMHG | HEART RATE: 72 BPM | RESPIRATION RATE: 18 BRPM | DIASTOLIC BLOOD PRESSURE: 73 MMHG | TEMPERATURE: 98.2 F | OXYGEN SATURATION: 96 %

## 2023-05-09 DIAGNOSIS — Z79.899 OTHER LONG TERM (CURRENT) DRUG THERAPY: ICD-10-CM

## 2023-05-09 DIAGNOSIS — F09 UNSPECIFIED MENTAL DISORDER DUE TO KNOWN PHYSIOLOGICAL CONDITION: ICD-10-CM

## 2023-05-09 DIAGNOSIS — I83.12 VARICOSE VEINS OF LEFT LOWER EXTREMITY WITH INFLAMMATION: ICD-10-CM

## 2023-05-09 DIAGNOSIS — Z80.0 FAMILY HISTORY OF MALIGNANT NEOPLASM OF DIGESTIVE ORGANS: ICD-10-CM

## 2023-05-09 DIAGNOSIS — I83.893 VARICOSE VEINS OF BILATERAL LOWER EXTREMITIES WITH OTHER COMPLICATIONS: ICD-10-CM

## 2023-05-09 DIAGNOSIS — M17.0 BILATERAL PRIMARY OSTEOARTHRITIS OF KNEE: ICD-10-CM

## 2023-05-09 DIAGNOSIS — Z87.891 PERSONAL HISTORY OF NICOTINE DEPENDENCE: ICD-10-CM

## 2023-05-09 DIAGNOSIS — R01.1 CARDIAC MURMUR, UNSPECIFIED: ICD-10-CM

## 2023-05-09 DIAGNOSIS — G20 PARKINSON'S DISEASE: ICD-10-CM

## 2023-05-09 DIAGNOSIS — I34.0 NONRHEUMATIC MITRAL (VALVE) INSUFFICIENCY: ICD-10-CM

## 2023-05-09 DIAGNOSIS — I49.3 VENTRICULAR PREMATURE DEPOLARIZATION: ICD-10-CM

## 2023-05-09 PROCEDURE — 99213 OFFICE O/P EST LOW 20 MIN: CPT

## 2023-05-09 PROCEDURE — G0463: CPT

## 2023-05-09 NOTE — HISTORY OF PRESENT ILLNESS
[FreeTextEntry1] : 86 yo WM, here with his daughter for f/u of chronic LLE VSU. Presently using circaids and doing well. All ulcers have healed and less edema.\par 2/1/23 patient has not been elevating lower legs and has some localized edema left upper lateral calf and edema right lower part of lower leg. scab on left lower leg removed with small open area noted. No signs of infection\par 2/22/23 left lower leg ulcer smaller. No signs of infection\par 3/14/23 right lower leg increased edema. No increased warmth or erythema just edema. stressed elevation

## 2023-05-09 NOTE — PLAN
[FreeTextEntry1] : leg elevation stressed\par left lower leg,DD,Circaids,3X/week\par bilateral circaids re ordered and awaiting new circaids\par stressed elevation\par less edema and  left lower leg closed\par \par f/u 4 wks\par \par time spent-20 mins.

## 2023-05-09 NOTE — ASSESSMENT
[Verbal] : Verbal [Demo] : Demo [Patient] : Patient [Good - alert, interested, motivated] : Good - alert, interested, motivated [Demonstrates independently] : demonstrates independently [Skin Care] : skin care [Signs and symptoms of infection] : sign and symptoms of infection [Venous Disease] : venous disease [Nutrition] : nutrition [How and When to Call] : how and when to call [Compression Therapy] : compression therapy [Patient responsibility to plan of care] : patient responsibility to plan of care [Stable] : stable [Home] : Home [Walker] : Walker [Not Applicable - Long Term Care/Home Health Agency] : Long Term Care/Home Health Agency: Not Applicable [] : No [FreeTextEntry2] : Infection prevention \par Wound care (dressing changes)\par Maintain optimal skin integrity to high pressure areas\par Compression therapy\par Nutrition and wound healing\par Elevation and low sodium compliance.\par Offloading the stress on skin structures and decreasing potential pathologic biomechanical influences. [FreeTextEntry3] : wounds remain closed. [FreeTextEntry4] : Pt's daughter provided with contact information for the health care store re: circaid status\par F/U 1 Month

## 2023-05-09 NOTE — PHYSICAL EXAM
[Normal Thyroid] : the thyroid was normal [Normal Breath Sounds] : Normal breath sounds [Normal Heart Sounds] : normal heart sounds [Normal Rate and Rhythm] : normal rate and rhythm [Ankle Swelling (On Exam)] : present [Ankle Swelling Bilaterally] : bilaterally  [Ankle Swelling On The Left] : moderate [Alert] : alert [Oriented to Person] : oriented to person [Oriented to Place] : oriented to place [Oriented to Time] : oriented to time [Calm] : calm [JVD] : no jugular venous distention  [] : not present [Abdomen Masses] : No abdominal massess [Abdomen Tenderness] : ~T ~M No abdominal tenderness [Tender] : nontender [Enlarged] : not enlarged [de-identified] : elderly WM, NAD, alert, Ox3. [FreeTextEntry1] : Left Lateral Lower Leg ; Closed [de-identified] : none [de-identified] : none [de-identified] : Pt expressed application post Circaid compression. Circ/Neuromuscular functions WNL post application. [de-identified] : Circaids [TWNoteComboBox5] : No [TWNoteComboBox6] : Venous [de-identified] : No [de-identified] : Normal [de-identified] : None [de-identified] : Circaid wrap [de-identified] : Compression

## 2023-05-09 NOTE — REVIEW OF SYSTEMS
[Skin Wound] : skin wound [Fever] : no fever [Eye Pain] : no eye pain [Earache] : no earache [Chest Pain] : no chest pain [Shortness Of Breath] : no shortness of breath [Abdominal Pain] : no abdominal pain [FreeTextEntry9] : hpill [de-identified] : left lateral lower leg venous stasis ulceration down to skin and subcutaneous tissue with stasis dermatitis

## 2023-06-13 ENCOUNTER — APPOINTMENT (OUTPATIENT)
Dept: WOUND CARE | Facility: HOSPITAL | Age: 88
End: 2023-06-13
Payer: MEDICARE

## 2023-06-13 ENCOUNTER — OUTPATIENT (OUTPATIENT)
Dept: OUTPATIENT SERVICES | Facility: HOSPITAL | Age: 88
LOS: 1 days | Discharge: HOME | End: 2023-06-13
Payer: MEDICARE

## 2023-06-13 VITALS
BODY MASS INDEX: 21.31 KG/M2 | TEMPERATURE: 98.5 F | WEIGHT: 175 LBS | RESPIRATION RATE: 20 BRPM | SYSTOLIC BLOOD PRESSURE: 98 MMHG | DIASTOLIC BLOOD PRESSURE: 67 MMHG | HEIGHT: 76 IN | OXYGEN SATURATION: 96 % | HEART RATE: 68 BPM

## 2023-06-13 DIAGNOSIS — I83.12 VARICOSE VEINS OF LEFT LOWER EXTREMITY WITH INFLAMMATION: ICD-10-CM

## 2023-06-13 PROCEDURE — 99213 OFFICE O/P EST LOW 20 MIN: CPT

## 2023-06-13 PROCEDURE — G0463: CPT

## 2023-06-13 NOTE — HISTORY OF PRESENT ILLNESS
[FreeTextEntry1] : 86 yo WM, here with his daughter for f/u of chronic LLE VSU. Presently using circaids and doing well. All ulcers have healed and less edema.\par 2/1/23 patient has not been elevating lower legs and has some localized edema left upper lateral calf and edema right lower part of lower leg. scab on left lower leg removed with small open area noted. No signs of infection\par 2/22/23 left lower leg ulcer smaller. No signs of infection\par 3/14/23 right lower leg increased edema. No increased warmth or erythema just edema. stressed elevation\par 6/13/23 patient does not want to wear circaids because he does not like how his toes feel. Discussed use of monkey butt powder and need for compression

## 2023-06-13 NOTE — REVIEW OF SYSTEMS
[Skin Wound] : skin wound [Fever] : no fever [Eye Pain] : no eye pain [Earache] : no earache [Chest Pain] : no chest pain [Shortness Of Breath] : no shortness of breath [Abdominal Pain] : no abdominal pain [FreeTextEntry9] : phill [de-identified] : left lateral lower leg venous stasis ulceration down to skin and subcutaneous tissue with stasis dermatitis

## 2023-06-13 NOTE — PHYSICAL EXAM
[Normal Thyroid] : the thyroid was normal [Normal Breath Sounds] : Normal breath sounds [Normal Heart Sounds] : normal heart sounds [Normal Rate and Rhythm] : normal rate and rhythm [Ankle Swelling (On Exam)] : present [Ankle Swelling Bilaterally] : bilaterally  [Ankle Swelling On The Left] : moderate [Alert] : alert [Oriented to Person] : oriented to person [Oriented to Place] : oriented to place [Oriented to Time] : oriented to time [Calm] : calm [JVD] : no jugular venous distention  [] : not present [Abdomen Masses] : No abdominal massess [Abdomen Tenderness] : ~T ~M No abdominal tenderness [Tender] : nontender [Enlarged] : not enlarged [de-identified] : elderly WM, NAD, alert, Ox3. [de-identified] : Patient declined to have Circaid applied during the visit.  Patient's daughter states that she will put them on when he gets home.   [FreeTextEntry1] : Lateral Lower Leg - CLOSED  [de-identified] : dry/flaky [de-identified] : None [TWNoteComboBox1] : Left [TWNoteComboBox4] : None [de-identified] : None [de-identified] : Circaid wrap [de-identified] : Daily [de-identified] : Other

## 2023-06-13 NOTE — ASSESSMENT
[Verbal] : Verbal [Demo] : Demo [Patient] : Patient [Family member] : Family member [Fair - mild discomfort, physical impairment, low acceptance] : Fair - mild discomfort, physical impairment, low acceptance [Needs reinforcement] : needs reinforcement [Skin Care] : skin care [Signs and symptoms of infection] : sign and symptoms of infection [Venous Disease] : venous disease [Nutrition] : nutrition [How and When to Call] : how and when to call [Off-loading] : off-loading [Compression Therapy] : compression therapy [Patient responsibility to plan of care] : patient responsibility to plan of care [Stable] : stable [Home] : Home [Walker] : Walker [Not Applicable - Long Term Care/Home Health Agency] : Long Term Care/Home Health Agency: Not Applicable [] : No [FreeTextEntry2] : Maintain optimal skin integrity\par Infection prevention\par Patient will continue to express no pain or discomfort\par Edema control: Compression therapy, elevation, and low sodium diet\par Adherence to compression and elevation recommendations [FreeTextEntry4] : F/U for an assessment in 6 weeks\par Patient states that he doesn't wear his Circaids at home due to moisture build up between his digits.  Patient was advised to apply powder between digits prior to applying Circaid's.  Patient was re-educated on the importance of elevation throughout the day for edema control.  Patient and daughter verbalized understanding of these recommendations.

## 2023-06-13 NOTE — PLAN
[FreeTextEntry1] : leg elevation stressed\par bilateral circaids QD\par stressed elevation\par less edema and  left lower leg closed\par \par f/u 4 wks\par \par time spent-20 mins.

## 2023-06-14 DIAGNOSIS — Z79.899 OTHER LONG TERM (CURRENT) DRUG THERAPY: ICD-10-CM

## 2023-06-14 DIAGNOSIS — M17.0 BILATERAL PRIMARY OSTEOARTHRITIS OF KNEE: ICD-10-CM

## 2023-06-14 DIAGNOSIS — G20 PARKINSON'S DISEASE: ICD-10-CM

## 2023-06-14 DIAGNOSIS — I83.893 VARICOSE VEINS OF BILATERAL LOWER EXTREMITIES WITH OTHER COMPLICATIONS: ICD-10-CM

## 2023-06-14 DIAGNOSIS — I34.0 NONRHEUMATIC MITRAL (VALVE) INSUFFICIENCY: ICD-10-CM

## 2023-06-14 DIAGNOSIS — Z80.0 FAMILY HISTORY OF MALIGNANT NEOPLASM OF DIGESTIVE ORGANS: ICD-10-CM

## 2023-06-14 DIAGNOSIS — R01.1 CARDIAC MURMUR, UNSPECIFIED: ICD-10-CM

## 2023-06-14 DIAGNOSIS — Z87.891 PERSONAL HISTORY OF NICOTINE DEPENDENCE: ICD-10-CM

## 2023-06-14 DIAGNOSIS — F09 UNSPECIFIED MENTAL DISORDER DUE TO KNOWN PHYSIOLOGICAL CONDITION: ICD-10-CM

## 2023-06-14 DIAGNOSIS — I49.3 VENTRICULAR PREMATURE DEPOLARIZATION: ICD-10-CM

## 2023-06-14 DIAGNOSIS — I83.12 VARICOSE VEINS OF LEFT LOWER EXTREMITY WITH INFLAMMATION: ICD-10-CM

## 2023-08-02 ENCOUNTER — APPOINTMENT (OUTPATIENT)
Dept: WOUND CARE | Facility: HOSPITAL | Age: 88
End: 2023-08-02
Payer: MEDICARE

## 2023-08-02 ENCOUNTER — OUTPATIENT (OUTPATIENT)
Dept: OUTPATIENT SERVICES | Facility: HOSPITAL | Age: 88
LOS: 1 days | Discharge: HOME | End: 2023-08-02
Payer: MEDICARE

## 2023-08-02 VITALS
TEMPERATURE: 97.9 F | RESPIRATION RATE: 20 BRPM | BODY MASS INDEX: 21.31 KG/M2 | WEIGHT: 175 LBS | DIASTOLIC BLOOD PRESSURE: 72 MMHG | HEIGHT: 76 IN | OXYGEN SATURATION: 95 % | HEART RATE: 65 BPM | SYSTOLIC BLOOD PRESSURE: 119 MMHG

## 2023-08-02 DIAGNOSIS — I83.12 VARICOSE VEINS OF LEFT LOWER EXTREMITY WITH INFLAMMATION: ICD-10-CM

## 2023-08-02 PROCEDURE — 99213 OFFICE O/P EST LOW 20 MIN: CPT

## 2023-08-02 PROCEDURE — G0463: CPT

## 2023-08-02 NOTE — PHYSICAL EXAM
[Normal Thyroid] : the thyroid was normal [Normal Breath Sounds] : Normal breath sounds [Normal Heart Sounds] : normal heart sounds [Normal Rate and Rhythm] : normal rate and rhythm [Ankle Swelling Bilaterally] : bilaterally  [Ankle Swelling On The Left] : moderate [Alert] : alert [Oriented to Person] : oriented to person [Oriented to Place] : oriented to place [Oriented to Time] : oriented to time [Calm] : calm [JVD] : no jugular venous distention  [] : not present [Abdomen Masses] : No abdominal massess [Abdomen Tenderness] : ~T ~M No abdominal tenderness [Tender] : nontender [Enlarged] : not enlarged [de-identified] : elderly WM, NAD, alert, Ox3. [FreeTextEntry1] : Lower Leg - No Wound  [de-identified] : Patient expresses comfort post application.  No circ/vascular deficit noted. [de-identified] : NONE [FreeTextEntry7] : Lower Leg - No Wound  [de-identified] : Patient expresses comfort post application.  No circ/vascular deficit noted. [de-identified] : Circaid wrap [TWNoteComboBox1] : Left [de-identified] : Compression [TWNoteComboBox9] : Right [de-identified] : Circaid wrap [de-identified] : Compression

## 2023-08-02 NOTE — REVIEW OF SYSTEMS
[Skin Wound] : skin wound [Fever] : no fever [Eye Pain] : no eye pain [Earache] : no earache [Chest Pain] : no chest pain [Shortness Of Breath] : no shortness of breath [Abdominal Pain] : no abdominal pain [FreeTextEntry9] : phill [de-identified] : left lateral lower leg venous stasis ulceration down to skin and subcutaneous tissue with stasis dermatitis

## 2023-08-02 NOTE — HISTORY OF PRESENT ILLNESS
[FreeTextEntry1] : 86 yo WM, here for f/u of chronic BLE VSU. All ulcers have healed. Still with mod edema bilaterally. Using circaids and able to handle the circaids. Instructed pt on its proper use. Emphasized importance of leg elevation/compression to prevent recurrence.

## 2023-08-02 NOTE — ASSESSMENT
[Verbal] : Verbal [Demo] : Demo [Patient] : Patient [Fair - mild discomfort, physical impairment, low acceptance] : Fair - mild discomfort, physical impairment, low acceptance [Family member] : Family member [Needs reinforcement] : needs reinforcement [Skin Care] : skin care [Signs and symptoms of infection] : sign and symptoms of infection [Venous Disease] : venous disease [How and When to Call] : how and when to call [Compression Therapy] : compression therapy [Patient responsibility to plan of care] : patient responsibility to plan of care [] : Yes [FreeTextEntry2] : Infection prevention Maintain optimal skin integrity Edema control: Low sodium diet, elevation, and compression  Compression and elevation compliance  [FreeTextEntry4] : Follow up for an assessment in 6 weeks No photo obtained due to IT complications.  [Stable] : stable [Home] : Home [Walker] : Walker [Not Applicable - Long Term Care/Home Health Agency] : Long Term Care/Home Health Agency: Not Applicable

## 2023-08-03 DIAGNOSIS — Z80.0 FAMILY HISTORY OF MALIGNANT NEOPLASM OF DIGESTIVE ORGANS: ICD-10-CM

## 2023-08-03 DIAGNOSIS — Z87.891 PERSONAL HISTORY OF NICOTINE DEPENDENCE: ICD-10-CM

## 2023-08-03 DIAGNOSIS — F09 UNSPECIFIED MENTAL DISORDER DUE TO KNOWN PHYSIOLOGICAL CONDITION: ICD-10-CM

## 2023-08-03 DIAGNOSIS — I83.12 VARICOSE VEINS OF LEFT LOWER EXTREMITY WITH INFLAMMATION: ICD-10-CM

## 2023-08-03 DIAGNOSIS — M17.0 BILATERAL PRIMARY OSTEOARTHRITIS OF KNEE: ICD-10-CM

## 2023-08-03 DIAGNOSIS — I83.893 VARICOSE VEINS OF BILATERAL LOWER EXTREMITIES WITH OTHER COMPLICATIONS: ICD-10-CM

## 2023-08-03 DIAGNOSIS — I34.0 NONRHEUMATIC MITRAL (VALVE) INSUFFICIENCY: ICD-10-CM

## 2023-08-03 DIAGNOSIS — Z79.899 OTHER LONG TERM (CURRENT) DRUG THERAPY: ICD-10-CM

## 2023-08-03 DIAGNOSIS — I49.3 VENTRICULAR PREMATURE DEPOLARIZATION: ICD-10-CM

## 2023-08-03 DIAGNOSIS — G20 PARKINSON'S DISEASE: ICD-10-CM

## 2023-08-03 DIAGNOSIS — R01.1 CARDIAC MURMUR, UNSPECIFIED: ICD-10-CM

## 2023-10-04 ENCOUNTER — APPOINTMENT (OUTPATIENT)
Dept: WOUND CARE | Facility: HOSPITAL | Age: 88
End: 2023-10-04
Payer: MEDICARE

## 2023-10-04 ENCOUNTER — OUTPATIENT (OUTPATIENT)
Dept: OUTPATIENT SERVICES | Facility: HOSPITAL | Age: 88
LOS: 1 days | Discharge: HOME | End: 2023-10-04
Payer: MEDICARE

## 2023-10-04 VITALS
WEIGHT: 175 LBS | SYSTOLIC BLOOD PRESSURE: 127 MMHG | BODY MASS INDEX: 21.31 KG/M2 | OXYGEN SATURATION: 96 % | RESPIRATION RATE: 20 BRPM | HEIGHT: 76 IN | DIASTOLIC BLOOD PRESSURE: 77 MMHG | HEART RATE: 68 BPM | TEMPERATURE: 99.1 F

## 2023-10-04 DIAGNOSIS — I83.12 VARICOSE VEINS OF LEFT LOWER EXTREMITY WITH INFLAMMATION: ICD-10-CM

## 2023-10-04 DIAGNOSIS — I83.893 VARICOSE VEINS OF BILATERAL LOWER EXTREMITIES WITH OTHER COMPLICATIONS: ICD-10-CM

## 2023-10-04 PROCEDURE — 99213 OFFICE O/P EST LOW 20 MIN: CPT

## 2023-10-04 PROCEDURE — G0463: CPT

## 2023-10-07 DIAGNOSIS — Z80.0 FAMILY HISTORY OF MALIGNANT NEOPLASM OF DIGESTIVE ORGANS: ICD-10-CM

## 2023-10-07 DIAGNOSIS — G20.C PARKINSONISM, UNSPECIFIED: ICD-10-CM

## 2023-10-07 DIAGNOSIS — F09 UNSPECIFIED MENTAL DISORDER DUE TO KNOWN PHYSIOLOGICAL CONDITION: ICD-10-CM

## 2023-10-07 DIAGNOSIS — I83.893 VARICOSE VEINS OF BILATERAL LOWER EXTREMITIES WITH OTHER COMPLICATIONS: ICD-10-CM

## 2023-10-07 DIAGNOSIS — M17.0 BILATERAL PRIMARY OSTEOARTHRITIS OF KNEE: ICD-10-CM

## 2023-10-07 DIAGNOSIS — Z87.891 PERSONAL HISTORY OF NICOTINE DEPENDENCE: ICD-10-CM

## 2023-10-07 DIAGNOSIS — I34.0 NONRHEUMATIC MITRAL (VALVE) INSUFFICIENCY: ICD-10-CM

## 2023-10-07 DIAGNOSIS — I49.3 VENTRICULAR PREMATURE DEPOLARIZATION: ICD-10-CM

## 2023-10-07 DIAGNOSIS — Z79.899 OTHER LONG TERM (CURRENT) DRUG THERAPY: ICD-10-CM

## 2023-10-07 DIAGNOSIS — R01.1 CARDIAC MURMUR, UNSPECIFIED: ICD-10-CM

## 2023-10-07 DIAGNOSIS — I83.12 VARICOSE VEINS OF LEFT LOWER EXTREMITY WITH INFLAMMATION: ICD-10-CM

## 2023-10-18 ENCOUNTER — APPOINTMENT (OUTPATIENT)
Dept: WOUND CARE | Facility: HOSPITAL | Age: 88
End: 2023-10-18

## 2023-10-18 ENCOUNTER — RX RENEWAL (OUTPATIENT)
Age: 88
End: 2023-10-18

## 2023-10-18 RX ORDER — CARBIDOPA AND LEVODOPA 25; 100 MG/1; MG/1
25-100 TABLET ORAL
Qty: 360 | Refills: 0 | Status: ACTIVE | COMMUNITY
Start: 2021-01-04 | End: 1900-01-01

## 2023-11-30 ENCOUNTER — INPATIENT (INPATIENT)
Facility: HOSPITAL | Age: 88
LOS: 4 days | Discharge: NURSING, REHAB OR LONG-TERM CARE | DRG: 56 | End: 2023-12-05
Attending: FAMILY MEDICINE | Admitting: FAMILY MEDICINE
Payer: MEDICARE

## 2023-11-30 VITALS
OXYGEN SATURATION: 98 % | SYSTOLIC BLOOD PRESSURE: 176 MMHG | WEIGHT: 179.9 LBS | TEMPERATURE: 99 F | DIASTOLIC BLOOD PRESSURE: 81 MMHG | HEART RATE: 77 BPM | RESPIRATION RATE: 18 BRPM

## 2023-11-30 DIAGNOSIS — Z86.69 PERSONAL HISTORY OF OTHER DISEASES OF THE NERVOUS SYSTEM AND SENSE ORGANS: ICD-10-CM

## 2023-11-30 DIAGNOSIS — M79.89 OTHER SPECIFIED SOFT TISSUE DISORDERS: ICD-10-CM

## 2023-11-30 DIAGNOSIS — N39.0 URINARY TRACT INFECTION, SITE NOT SPECIFIED: ICD-10-CM

## 2023-11-30 DIAGNOSIS — Z29.9 ENCOUNTER FOR PROPHYLACTIC MEASURES, UNSPECIFIED: ICD-10-CM
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
## 2023-11-30 LAB
A1C WITH ESTIMATED AVERAGE GLUCOSE RESULT: 5.4 % — SIGNIFICANT CHANGE UP (ref 4–5.6)
A1C WITH ESTIMATED AVERAGE GLUCOSE RESULT: 5.4 % — SIGNIFICANT CHANGE UP (ref 4–5.6)
ALBUMIN SERPL ELPH-MCNC: 2.9 G/DL — LOW (ref 3.3–5)
ALBUMIN SERPL ELPH-MCNC: 2.9 G/DL — LOW (ref 3.3–5)
ALP SERPL-CCNC: 73 U/L — SIGNIFICANT CHANGE UP (ref 40–120)
ALP SERPL-CCNC: 73 U/L — SIGNIFICANT CHANGE UP (ref 40–120)
ALT FLD-CCNC: 12 U/L — SIGNIFICANT CHANGE UP (ref 12–78)
ALT FLD-CCNC: 12 U/L — SIGNIFICANT CHANGE UP (ref 12–78)
ANION GAP SERPL CALC-SCNC: 6 MMOL/L — SIGNIFICANT CHANGE UP (ref 5–17)
ANION GAP SERPL CALC-SCNC: 6 MMOL/L — SIGNIFICANT CHANGE UP (ref 5–17)
APPEARANCE UR: CLEAR — SIGNIFICANT CHANGE UP
APPEARANCE UR: CLEAR — SIGNIFICANT CHANGE UP
APTT BLD: 35 SEC — SIGNIFICANT CHANGE UP (ref 24.5–35.6)
APTT BLD: 35 SEC — SIGNIFICANT CHANGE UP (ref 24.5–35.6)
AST SERPL-CCNC: 16 U/L — SIGNIFICANT CHANGE UP (ref 15–37)
AST SERPL-CCNC: 16 U/L — SIGNIFICANT CHANGE UP (ref 15–37)
BACTERIA # UR AUTO: ABNORMAL /HPF
BACTERIA # UR AUTO: ABNORMAL /HPF
BASOPHILS # BLD AUTO: 0.05 K/UL — SIGNIFICANT CHANGE UP (ref 0–0.2)
BASOPHILS # BLD AUTO: 0.05 K/UL — SIGNIFICANT CHANGE UP (ref 0–0.2)
BASOPHILS NFR BLD AUTO: 0.4 % — SIGNIFICANT CHANGE UP (ref 0–2)
BASOPHILS NFR BLD AUTO: 0.4 % — SIGNIFICANT CHANGE UP (ref 0–2)
BILIRUB SERPL-MCNC: 0.7 MG/DL — SIGNIFICANT CHANGE UP (ref 0.2–1.2)
BILIRUB SERPL-MCNC: 0.7 MG/DL — SIGNIFICANT CHANGE UP (ref 0.2–1.2)
BILIRUB UR-MCNC: NEGATIVE — SIGNIFICANT CHANGE UP
BILIRUB UR-MCNC: NEGATIVE — SIGNIFICANT CHANGE UP
BUN SERPL-MCNC: 20 MG/DL — SIGNIFICANT CHANGE UP (ref 7–23)
BUN SERPL-MCNC: 20 MG/DL — SIGNIFICANT CHANGE UP (ref 7–23)
CALCIUM SERPL-MCNC: 8.6 MG/DL — SIGNIFICANT CHANGE UP (ref 8.5–10.1)
CALCIUM SERPL-MCNC: 8.6 MG/DL — SIGNIFICANT CHANGE UP (ref 8.5–10.1)
CHLORIDE SERPL-SCNC: 106 MMOL/L — SIGNIFICANT CHANGE UP (ref 96–108)
CHLORIDE SERPL-SCNC: 106 MMOL/L — SIGNIFICANT CHANGE UP (ref 96–108)
CK SERPL-CCNC: 117 U/L — SIGNIFICANT CHANGE UP (ref 26–308)
CK SERPL-CCNC: 117 U/L — SIGNIFICANT CHANGE UP (ref 26–308)
CO2 SERPL-SCNC: 31 MMOL/L — SIGNIFICANT CHANGE UP (ref 22–31)
CO2 SERPL-SCNC: 31 MMOL/L — SIGNIFICANT CHANGE UP (ref 22–31)
COLOR SPEC: YELLOW — SIGNIFICANT CHANGE UP
COLOR SPEC: YELLOW — SIGNIFICANT CHANGE UP
CREAT SERPL-MCNC: 0.76 MG/DL — SIGNIFICANT CHANGE UP (ref 0.5–1.3)
CREAT SERPL-MCNC: 0.76 MG/DL — SIGNIFICANT CHANGE UP (ref 0.5–1.3)
DIFF PNL FLD: ABNORMAL
DIFF PNL FLD: ABNORMAL
EGFR: 88 ML/MIN/1.73M2 — SIGNIFICANT CHANGE UP
EGFR: 88 ML/MIN/1.73M2 — SIGNIFICANT CHANGE UP
EOSINOPHIL # BLD AUTO: 0.12 K/UL — SIGNIFICANT CHANGE UP (ref 0–0.5)
EOSINOPHIL # BLD AUTO: 0.12 K/UL — SIGNIFICANT CHANGE UP (ref 0–0.5)
EOSINOPHIL NFR BLD AUTO: 1 % — SIGNIFICANT CHANGE UP (ref 0–6)
EOSINOPHIL NFR BLD AUTO: 1 % — SIGNIFICANT CHANGE UP (ref 0–6)
ESTIMATED AVERAGE GLUCOSE: 108 MG/DL — SIGNIFICANT CHANGE UP (ref 68–114)
ESTIMATED AVERAGE GLUCOSE: 108 MG/DL — SIGNIFICANT CHANGE UP (ref 68–114)
GLUCOSE SERPL-MCNC: 101 MG/DL — HIGH (ref 70–99)
GLUCOSE SERPL-MCNC: 101 MG/DL — HIGH (ref 70–99)
GLUCOSE UR QL: NEGATIVE MG/DL — SIGNIFICANT CHANGE UP
GLUCOSE UR QL: NEGATIVE MG/DL — SIGNIFICANT CHANGE UP
HCT VFR BLD CALC: 36 % — LOW (ref 39–50)
HCT VFR BLD CALC: 36 % — LOW (ref 39–50)
HGB BLD-MCNC: 11.8 G/DL — LOW (ref 13–17)
HGB BLD-MCNC: 11.8 G/DL — LOW (ref 13–17)
IMM GRANULOCYTES NFR BLD AUTO: 0.5 % — SIGNIFICANT CHANGE UP (ref 0–0.9)
IMM GRANULOCYTES NFR BLD AUTO: 0.5 % — SIGNIFICANT CHANGE UP (ref 0–0.9)
INR BLD: 0.96 RATIO — SIGNIFICANT CHANGE UP (ref 0.85–1.18)
INR BLD: 0.96 RATIO — SIGNIFICANT CHANGE UP (ref 0.85–1.18)
KETONES UR-MCNC: NEGATIVE MG/DL — SIGNIFICANT CHANGE UP
KETONES UR-MCNC: NEGATIVE MG/DL — SIGNIFICANT CHANGE UP
LEUKOCYTE ESTERASE UR-ACNC: ABNORMAL
LEUKOCYTE ESTERASE UR-ACNC: ABNORMAL
LYMPHOCYTES # BLD AUTO: 1.07 K/UL — SIGNIFICANT CHANGE UP (ref 1–3.3)
LYMPHOCYTES # BLD AUTO: 1.07 K/UL — SIGNIFICANT CHANGE UP (ref 1–3.3)
LYMPHOCYTES # BLD AUTO: 9.3 % — LOW (ref 13–44)
LYMPHOCYTES # BLD AUTO: 9.3 % — LOW (ref 13–44)
MCHC RBC-ENTMCNC: 31.1 PG — SIGNIFICANT CHANGE UP (ref 27–34)
MCHC RBC-ENTMCNC: 31.1 PG — SIGNIFICANT CHANGE UP (ref 27–34)
MCHC RBC-ENTMCNC: 32.8 GM/DL — SIGNIFICANT CHANGE UP (ref 32–36)
MCHC RBC-ENTMCNC: 32.8 GM/DL — SIGNIFICANT CHANGE UP (ref 32–36)
MCV RBC AUTO: 94.7 FL — SIGNIFICANT CHANGE UP (ref 80–100)
MCV RBC AUTO: 94.7 FL — SIGNIFICANT CHANGE UP (ref 80–100)
MONOCYTES # BLD AUTO: 1.18 K/UL — HIGH (ref 0–0.9)
MONOCYTES # BLD AUTO: 1.18 K/UL — HIGH (ref 0–0.9)
MONOCYTES NFR BLD AUTO: 10.2 % — SIGNIFICANT CHANGE UP (ref 2–14)
MONOCYTES NFR BLD AUTO: 10.2 % — SIGNIFICANT CHANGE UP (ref 2–14)
NEUTROPHILS # BLD AUTO: 9.08 K/UL — HIGH (ref 1.8–7.4)
NEUTROPHILS # BLD AUTO: 9.08 K/UL — HIGH (ref 1.8–7.4)
NEUTROPHILS NFR BLD AUTO: 78.6 % — HIGH (ref 43–77)
NEUTROPHILS NFR BLD AUTO: 78.6 % — HIGH (ref 43–77)
NITRITE UR-MCNC: NEGATIVE — SIGNIFICANT CHANGE UP
NITRITE UR-MCNC: NEGATIVE — SIGNIFICANT CHANGE UP
NRBC # BLD: 0 /100 WBCS — SIGNIFICANT CHANGE UP (ref 0–0)
NRBC # BLD: 0 /100 WBCS — SIGNIFICANT CHANGE UP (ref 0–0)
NT-PROBNP SERPL-SCNC: 632 PG/ML — HIGH (ref 0–450)
NT-PROBNP SERPL-SCNC: 632 PG/ML — HIGH (ref 0–450)
PH UR: 6.5 — SIGNIFICANT CHANGE UP (ref 5–8)
PH UR: 6.5 — SIGNIFICANT CHANGE UP (ref 5–8)
PLATELET # BLD AUTO: 217 K/UL — SIGNIFICANT CHANGE UP (ref 150–400)
PLATELET # BLD AUTO: 217 K/UL — SIGNIFICANT CHANGE UP (ref 150–400)
POTASSIUM SERPL-MCNC: 3.8 MMOL/L — SIGNIFICANT CHANGE UP (ref 3.5–5.3)
POTASSIUM SERPL-MCNC: 3.8 MMOL/L — SIGNIFICANT CHANGE UP (ref 3.5–5.3)
POTASSIUM SERPL-SCNC: 3.8 MMOL/L — SIGNIFICANT CHANGE UP (ref 3.5–5.3)
POTASSIUM SERPL-SCNC: 3.8 MMOL/L — SIGNIFICANT CHANGE UP (ref 3.5–5.3)
PROCALCITONIN SERPL-MCNC: 0.08 NG/ML — SIGNIFICANT CHANGE UP
PROCALCITONIN SERPL-MCNC: 0.08 NG/ML — SIGNIFICANT CHANGE UP
PROT SERPL-MCNC: 6.2 G/DL — SIGNIFICANT CHANGE UP (ref 6–8.3)
PROT SERPL-MCNC: 6.2 G/DL — SIGNIFICANT CHANGE UP (ref 6–8.3)
PROT UR-MCNC: NEGATIVE MG/DL — SIGNIFICANT CHANGE UP
PROT UR-MCNC: NEGATIVE MG/DL — SIGNIFICANT CHANGE UP
PROTHROM AB SERPL-ACNC: 11.2 SEC — SIGNIFICANT CHANGE UP (ref 9.5–13)
PROTHROM AB SERPL-ACNC: 11.2 SEC — SIGNIFICANT CHANGE UP (ref 9.5–13)
RBC # BLD: 3.8 M/UL — LOW (ref 4.2–5.8)
RBC # BLD: 3.8 M/UL — LOW (ref 4.2–5.8)
RBC # FLD: 13.2 % — SIGNIFICANT CHANGE UP (ref 10.3–14.5)
RBC # FLD: 13.2 % — SIGNIFICANT CHANGE UP (ref 10.3–14.5)
RBC CASTS # UR COMP ASSIST: 12 /HPF — HIGH (ref 0–4)
RBC CASTS # UR COMP ASSIST: 12 /HPF — HIGH (ref 0–4)
SODIUM SERPL-SCNC: 143 MMOL/L — SIGNIFICANT CHANGE UP (ref 135–145)
SODIUM SERPL-SCNC: 143 MMOL/L — SIGNIFICANT CHANGE UP (ref 135–145)
SP GR SPEC: 1.02 — SIGNIFICANT CHANGE UP (ref 1–1.03)
SP GR SPEC: 1.02 — SIGNIFICANT CHANGE UP (ref 1–1.03)
TROPONIN I, HIGH SENSITIVITY RESULT: 8.2 NG/L — SIGNIFICANT CHANGE UP
TROPONIN I, HIGH SENSITIVITY RESULT: 8.2 NG/L — SIGNIFICANT CHANGE UP
UROBILINOGEN FLD QL: 1 MG/DL — SIGNIFICANT CHANGE UP (ref 0.2–1)
UROBILINOGEN FLD QL: 1 MG/DL — SIGNIFICANT CHANGE UP (ref 0.2–1)
WBC # BLD: 11.56 K/UL — HIGH (ref 3.8–10.5)
WBC # BLD: 11.56 K/UL — HIGH (ref 3.8–10.5)
WBC # FLD AUTO: 11.56 K/UL — HIGH (ref 3.8–10.5)
WBC # FLD AUTO: 11.56 K/UL — HIGH (ref 3.8–10.5)
WBC UR QL: 11 /HPF — HIGH (ref 0–5)
WBC UR QL: 11 /HPF — HIGH (ref 0–5)

## 2023-11-30 PROCEDURE — 71045 X-RAY EXAM CHEST 1 VIEW: CPT | Mod: 26

## 2023-11-30 PROCEDURE — 70450 CT HEAD/BRAIN W/O DYE: CPT | Mod: 26,MA

## 2023-11-30 PROCEDURE — 99285 EMERGENCY DEPT VISIT HI MDM: CPT

## 2023-11-30 PROCEDURE — 93010 ELECTROCARDIOGRAM REPORT: CPT

## 2023-11-30 RX ORDER — HEPARIN SODIUM 5000 [USP'U]/ML
5000 INJECTION INTRAVENOUS; SUBCUTANEOUS EVERY 12 HOURS
Refills: 0 | Status: DISCONTINUED | OUTPATIENT
Start: 2023-11-30 | End: 2023-12-01

## 2023-11-30 RX ORDER — NYSTATIN CREAM 100000 [USP'U]/G
1 CREAM TOPICAL THREE TIMES A DAY
Refills: 0 | Status: DISCONTINUED | OUTPATIENT
Start: 2023-11-30 | End: 2023-12-05

## 2023-11-30 RX ORDER — ACETAMINOPHEN 500 MG
650 TABLET ORAL EVERY 6 HOURS
Refills: 0 | Status: DISCONTINUED | OUTPATIENT
Start: 2023-11-30 | End: 2023-12-05

## 2023-11-30 RX ORDER — CARBIDOPA AND LEVODOPA 25; 100 MG/1; MG/1
1 TABLET ORAL
Refills: 0 | Status: DISCONTINUED | OUTPATIENT
Start: 2023-11-30 | End: 2023-12-05

## 2023-11-30 RX ORDER — CEFTRIAXONE 500 MG/1
1000 INJECTION, POWDER, FOR SOLUTION INTRAMUSCULAR; INTRAVENOUS ONCE
Refills: 0 | Status: COMPLETED | OUTPATIENT
Start: 2023-11-30 | End: 2023-11-30

## 2023-11-30 RX ORDER — INFLUENZA VIRUS VACCINE 15; 15; 15; 15 UG/.5ML; UG/.5ML; UG/.5ML; UG/.5ML
0.7 SUSPENSION INTRAMUSCULAR ONCE
Refills: 0 | Status: DISCONTINUED | OUTPATIENT
Start: 2023-11-30 | End: 2023-12-05

## 2023-11-30 RX ORDER — CARBIDOPA AND LEVODOPA 25; 100 MG/1; MG/1
1 TABLET ORAL
Refills: 0 | DISCHARGE

## 2023-11-30 RX ORDER — ONDANSETRON 8 MG/1
4 TABLET, FILM COATED ORAL EVERY 8 HOURS
Refills: 0 | Status: DISCONTINUED | OUTPATIENT
Start: 2023-11-30 | End: 2023-12-05

## 2023-11-30 RX ORDER — CARBIDOPA AND LEVODOPA 25; 100 MG/1; MG/1
2 TABLET ORAL ONCE
Refills: 0 | Status: COMPLETED | OUTPATIENT
Start: 2023-11-30 | End: 2023-11-30

## 2023-11-30 RX ORDER — LANOLIN ALCOHOL/MO/W.PET/CERES
3 CREAM (GRAM) TOPICAL AT BEDTIME
Refills: 0 | Status: DISCONTINUED | OUTPATIENT
Start: 2023-11-30 | End: 2023-12-05

## 2023-11-30 RX ADMIN — NYSTATIN CREAM 1 APPLICATION(S): 100000 CREAM TOPICAL at 23:43

## 2023-11-30 RX ADMIN — HEPARIN SODIUM 5000 UNIT(S): 5000 INJECTION INTRAVENOUS; SUBCUTANEOUS at 18:09

## 2023-11-30 RX ADMIN — CARBIDOPA AND LEVODOPA 2 TABLET(S): 25; 100 TABLET ORAL at 09:30

## 2023-11-30 RX ADMIN — CARBIDOPA AND LEVODOPA 1 TABLET(S): 25; 100 TABLET ORAL at 18:08

## 2023-11-30 RX ADMIN — CARBIDOPA AND LEVODOPA 1 TABLET(S): 25; 100 TABLET ORAL at 23:03

## 2023-11-30 RX ADMIN — CEFTRIAXONE 100 MILLIGRAM(S): 500 INJECTION, POWDER, FOR SOLUTION INTRAMUSCULAR; INTRAVENOUS at 10:48

## 2023-11-30 NOTE — ED PROVIDER NOTE - OBJECTIVE STATEMENT
88 y/o M with hx of parkinsons, HTN, PVD, chronic wound LLE pw weakness/?fall pt states he was with his walker near the steps and felt like his legs were too weak and he slid down to the floor. he did not fall or hit his head but was sitting on the floor and was too weak to get up. states his legs feel heavy. pt has not taken his parkinsons meds yet today. no recent illness, fevers, chills, nausea vomitng. pt has chronic abd hernia that was told he cant repair. also has been experiencing rash for months and was evaluated for such and told not the worry about it. currently does not have any pain, did not have chest pain sob, lightheadednses prior to falling. 86 y/o M with hx of parkinsons, HTN, PVD, chronic wound LLE pw weakness/?fall pt states he was with his walker near the steps and felt like his legs were too weak and he slid down to the floor. he did not fall or hit his head but was sitting on the floor and was too weak to get up. states his legs feel heavy. pt has not taken his parkinsons meds yet today. no recent illness, fevers, chills, nausea vomitng. pt has chronic abd hernia that was told he cant repair. also has been experiencing rash for months and was evaluated for such and told not the worry about it. currently does not have any pain, did not have chest pain sob, lightheadednses prior to falling.    daughter arrived later - provided more history. states he has been having multiple falls lately. is noncompliant with his legs, legs become more swollen lately, typically wears compression stocking. states he fell at about 3am and has been on the floor until gustavougher arrived this am. mother lives at home with him but she is sick and on chemo and cant really help take care of him. she also sattes for last few weeks his walking has become difficult that he cant go to the bathroom on time. also has been having weeks of loose stools. does not feel safe for him to be at home

## 2023-11-30 NOTE — PATIENT PROFILE ADULT - CAREGIVER ADDRESS
[address] [facility] [address] [place] [address] [facility] [id number] [place] [address] [facility] [address] [place]

## 2023-11-30 NOTE — ED ADULT NURSE REASSESSMENT NOTE - COMFORT CARE
repositioned/side rails up/wait time explained/warm blanket provided
repositioned/side rails up/wait time explained/warm blanket provided

## 2023-11-30 NOTE — ED PROVIDER NOTE - CLINICAL SUMMARY MEDICAL DECISION MAKING FREE TEXT BOX
86 y/o M with hx of parkinsons, HTN, PVD, chronic wound LLE pw weakness/?fall pt states he was with his walker near the steps and felt like his legs were too weak and he slid down to the floor. he did not fall or hit his head but was sitting on the floor and was too weak to get up. states his legs feel heavy. pt has not taken his parkinsons meds yet today. no recent illness, fevers, chills, nausea vomitng. pt has chronic abd hernia that was told he cant repair. also has been experiencing rash for months and was evaluated for such and told not the worry about it. currently does not have any pain, did not have chest pain sob, lightheadednses prior to falling.   generalized weakness this morning, eval central vs infectious vs metabolic etiology vs progression of parkinsons/need for SHUKRI

## 2023-11-30 NOTE — ED ADULT NURSE NOTE - OBJECTIVE STATEMENT
as per daughter patient is been falling more often and more weak than usual and legs is more swollen

## 2023-11-30 NOTE — CARE COORDINATION ASSESSMENT. - NSCAREPROVIDERS_GEN_ALL_CORE_FT
CARE PROVIDERS:  Accepting Physician: Vin Whittington  Administration: Torie Askew  Admitting: Vin Whittington  Attending: Vin Whittington  Consultant: Toby Simpson  ED Attending: Anne Jamil  ED Nurse: Bobby Portillo  Emergency Medicine: Anne Jamil  Nurse: Tiera Christiansen  Ordered: ADM, User  Outpatient Provider: Kennedy Marx  Outpatient Provider: Alessio Melendez  Outpatient Provider: Toby Simpson  Primary Team: Vin Whittington  Respiratory Therapy: Ancelmo Ramos  : Chantelle Abad  : Joya Galdamez  Team: [facility], Team  UR// Supp. Assoc.: Jacqueline Dueñas  UR// Supp. Assoc.: Alice Ferrer  UR// Supp. Assoc.: Davin Gibson

## 2023-11-30 NOTE — CARE COORDINATION ASSESSMENT. - NSPASTMEDSURGHISTORY_GEN_ALL_CORE_FT
PAST MEDICAL & SURGICAL HISTORY:  HTN (hypertension)      Parkinsons      No significant past surgical history

## 2023-11-30 NOTE — H&P ADULT - HISTORY OF PRESENT ILLNESS
· HPI Objective Statement: 86 y/o M with hx of parkinsons, HTN, PVD, chronic wound LLE pw weakness/?fall pt states he was with his walker near the steps and felt like his legs were too weak and he slid down to the floor. he did not fall or hit his head but was sitting on the floor and was too weak to get up. states his legs feel heavy. pt has not taken his parkinsons meds yet today. no recent illness, fevers, chills, nausea vomitng. pt has chronic abd hernia that was told he cant repair. also has been experiencing rash for months and was evaluated for such and told not the worry about it. currently does not have any pain, did not have chest pain sob, lightheadednses prior to falling.    	daughter arrived later - provided more history. states he has been having multiple falls lately. is noncompliant with his legs, legs become more swollen lately, typically wears compression stocking. states he fell at about 3am and has been on the floor until giler arrived this am. mother lives at home with him but she is sick and on chemo and cant really help take care of him. she also sattes for last few weeks his walking has become difficult that he cant go to the bathroom on time. also has been having weeks of loose stools. does not feel safe for him to be at home  In ER patient was found to have possible CHF. patient is being admitted for further work up and treatment

## 2023-11-30 NOTE — H&P ADULT - NSHPPHYSICALEXAM_GEN_ALL_CORE
· Physical Examination: Gen: Well appearing in NAD, tremulous  	Head: NC/AT  	Neck: trachea midline  	cV: rrr  	Resp:  No distress, lungs clear  	abd: soft reducible hernia, nontender  	Ext: no deformities  	Neuro:  A&O appears non focal - no facial droop, speech intact, bl upper extremities 5/5 and sensation intact, bl lower extremity weakness noted, both legs drift to bed by 5, sensation itnact   	Skin:  Warm and dry as visualized, PVD venous stasis bl, healing wound on left outer ankle, bl le pitting edema  Psych:  Normal affect and mood

## 2023-11-30 NOTE — CARE COORDINATION ASSESSMENT. - OTHER PERTINENT DISCHARGE PLANNING INFORMATION:
pt is an 87 year old man admitted from home s/p fall with reports of recent falls at home, dx UTI. jake met with pt, dtr Zeina at bedside sw role discussed. pt alert and oriented, dtr stated periods of confusion at times. pt with h/o parkinsons. pt lives with his 82 year old wife who is currently receiving chemo for ovarian ca. pt and spouse live in a private home, [place] with no stairs to enter home. pt has ramp to enter home, walker, wheelchair, commode, shower grab bars. pt with no current home care services. dtr stated family applied thru his [employer] benefits for 6 hours of a home aide, however has not yet been approved. as per dtr pt will want to return home upon dc, but family willing to explore [facility] if recommended by PTE. sw to follow.

## 2023-11-30 NOTE — ED ADULT TRIAGE NOTE - CHIEF COMPLAINT QUOTE
pt fell at home today states he slipped to the floor daughter called ems unable to get pt up pt with hx parkinson's

## 2023-11-30 NOTE — PATIENT PROFILE ADULT - FALL HARM RISK - HARM RISK INTERVENTIONS
Assistance with ambulation/Assistance OOB with selected safe patient handling equipment/Communicate Risk of Fall with Harm to all staff/Discuss with provider need for PT consult/Monitor gait and stability/Provide patient with walking aids - walker, cane, crutches/Reinforce activity limits and safety measures with patient and family/Tailored Fall Risk Interventions/Visual Cue: Yellow wristband and red socks/Bed in lowest position, wheels locked, appropriate side rails in place/Call bell, personal items and telephone in reach/Instruct patient to call for assistance before getting out of bed or chair/Non-slip footwear when patient is out of bed/[place] to call system/Physically safe environment - no spills, clutter or unnecessary equipment/Purposeful Proactive Rounding/Room/bathroom lighting operational, light cord in reach Assistance with ambulation/Assistance OOB with selected safe patient handling equipment/Communicate Risk of Fall with Harm to all staff/Discuss with provider need for PT consult/Monitor gait and stability/Provide patient with walking aids - walker, cane, crutches/Reinforce activity limits and safety measures with patient and family/Tailored Fall Risk Interventions/Visual Cue: Yellow wristband and red socks/Bed in lowest position, wheels locked, appropriate side rails in place/Call bell, personal items and telephone in reach/Instruct patient to call for assistance before getting out of bed or chair/Non-slip footwear when patient is out of bed/[place] to call system/Physically safe environment - no spills, clutter or unnecessary equipment/Purposeful Proactive Rounding/Room/bathroom lighting operational, light cord in reach Assistance with ambulation/Assistance OOB with selected safe patient handling equipment/Communicate Risk of Fall with Harm to all staff/Discuss with provider need for PT consult/Monitor gait and stability/Provide patient with walking aids - walker, cane, crutches/Reinforce activity limits and safety measures with patient and family/Tailored Fall Risk Interventions/Visual Cue: Yellow wristband and red socks/Bed in lowest position, wheels locked, appropriate side rails in place/Call bell, personal items and telephone in reach/Instruct patient to call for assistance before getting out of bed or chair/Non-slip footwear when patient is out of bed/[place] to call system/Physically safe environment - no spills, clutter or unnecessary equipment/Purposeful Proactive Rounding/Room/bathroom lighting operational, light cord in reach

## 2023-11-30 NOTE — ED ADULT NURSE NOTE - NSFALLHARMRISKINTERV_ED_ALL_ED

## 2023-11-30 NOTE — ED PROVIDER NOTE - PHYSICAL EXAMINATION
Gen: Well appearing in NAD, tremulous  Head: NC/AT  Neck: trachea midline  cV: rrr  Resp:  No distress, lungs clear  abd: soft reducible hernia, nontender  Ext: no deformities  Neuro:  A&O appears non focal - no facial droop, speech intact, bl upper extremities 5/5 and sensation intact, bl lower extremity weakness noted, both legs drift to bed by 5, sensation itnact   Skin:  Warm and dry as visualized, PVD venous stasis bl, healing wound on left outer ankle  Psych:  Normal affect and mood Gen: Well appearing in NAD, tremulous  Head: NC/AT  Neck: trachea midline  cV: rrr  Resp:  No distress, lungs clear  abd: soft reducible hernia, nontender  Ext: no deformities  Neuro:  A&O appears non focal - no facial droop, speech intact, bl upper extremities 5/5 and sensation intact, bl lower extremity weakness noted, both legs drift to bed by 5, sensation itnact   Skin:  Warm and dry as visualized, PVD venous stasis bl, healing wound on left outer ankle, bl le pitting edema  Psych:  Normal affect and mood

## 2023-11-30 NOTE — GOALS OF CARE CONVERSATION - ADVANCED CARE PLANNING - CONVERSATION DETAILS
Palliative care SW met with patient and his daughter at bedside in ER. Patient presents with confusion. Reviewed patient's medical and social history as well as events leading to patient's hospitalization. Writer discussed patient's current diagnosis (UTI, multiple falls, HX of Parkinson's, HTN, PVD, chronic wound LLE, advanced age), medical condition and management. Daughter reports patient does not have any advanced directives in place. Inquired about patient's wishes regarding extent of medical care to be provided including escalation of medical care into the ICU and use of vasopressor support. In addition, the writer inquired about thoughts regarding cardiopulmonary resuscitation, artificial nutrition and hydration including use of feeding tubes and IVF, antibiotics, and further investigative studies such as blood draws and radiology. Daughter showed insight into medical condition. She states that she does not want resuscitation or intubation for patient. Writer recommended completion of MOLST. MOLST completed with DNR/DNI orders and placed on patient's chart. All questions answered.  Psychosocial support provided.

## 2023-11-30 NOTE — ED ADULT NURSE NOTE - NSSUHOSCREENINGYN_ED_ALL_ED
no back pain, no gout, no musculoskeletal pain, no neck pain, and no weakness.
No - the patient is unable to be screened due to medical condition

## 2023-11-30 NOTE — PATIENT PROFILE ADULT - FUNCTIONAL ASSESSMENT - BASIC MOBILITY 6.
2-calculated by average/Not able to assess (calculate score using [facility] averaging method)  2-calculated by average/Not able to assess (calculate score using [facility] averaging method)  2-calculated by average/Not able to assess (calculate score using [facility] averaging method)

## 2023-11-30 NOTE — H&P ADULT - NSHPLABSRESULTS_GEN_ALL_CORE
143  |  106  |  20  ----------------------------<  101<H>  3.8   |  31  |  0.76    Ca    8.6      2023 09:30    TPro  6.2  /  Alb  2.9<L>  /  TBili  0.7  /  DBili  x   /  AST  16  /  ALT  12  /  AlkPhos  73                              11.8   11.56 )-----------( 217      ( 2023 09:30 )             36.0       CARDIAC MARKERS ( 2023 09:30 )  x     / x     / 117 U/L / x     / x            LIVER FUNCTIONS - ( 2023 09:30 )  Alb: 2.9 g/dL / Pro: 6.2 g/dL / ALK PHOS: 73 U/L / ALT: 12 U/L / AST: 16 U/L / GGT: x             PT/INR - ( 2023 09:30 )   PT: 11.2 sec;   INR: 0.96 ratio         PTT - ( 2023 09:30 )  PTT:35.0 sec    Urinalysis Basic - ( 2023 09:30 )    Color: Yellow / Appearance: Clear / S.021 / pH: x  Gluc: 101 mg/dL / Ketone: Negative mg/dL  / Bili: Negative / Urobili: 1.0 mg/dL   Blood: x / Protein: Negative mg/dL / Nitrite: Negative   Leuk Esterase: Small / RBC: 12 /HPF / WBC 11 /HPF   Sq Epi: x / Non Sq Epi: x / Bacteria: Few /HPF

## 2023-12-01 DIAGNOSIS — L97.821 NON-PRESSURE CHRONIC ULCER OF OTHER PART OF LEFT LOWER LEG LIMITED TO BREAKDOWN OF SKIN: ICD-10-CM

## 2023-12-01 DIAGNOSIS — I83.228 VARICOSE VEINS OF LEFT LOWER EXTREMITY WITH BOTH ULCER OF OTHER PART OF LOWER EXTREMITY AND INFLAMMATION: ICD-10-CM

## 2023-12-01 LAB
CULTURE RESULTS: NO GROWTH — SIGNIFICANT CHANGE UP
CULTURE RESULTS: NO GROWTH — SIGNIFICANT CHANGE UP
HCT VFR BLD CALC: 34.2 % — LOW (ref 39–50)
HCT VFR BLD CALC: 34.2 % — LOW (ref 39–50)
HGB BLD-MCNC: 11 G/DL — LOW (ref 13–17)
HGB BLD-MCNC: 11 G/DL — LOW (ref 13–17)
MCHC RBC-ENTMCNC: 30.9 PG — SIGNIFICANT CHANGE UP (ref 27–34)
MCHC RBC-ENTMCNC: 30.9 PG — SIGNIFICANT CHANGE UP (ref 27–34)
MCHC RBC-ENTMCNC: 32.2 GM/DL — SIGNIFICANT CHANGE UP (ref 32–36)
MCHC RBC-ENTMCNC: 32.2 GM/DL — SIGNIFICANT CHANGE UP (ref 32–36)
MCV RBC AUTO: 96.1 FL — SIGNIFICANT CHANGE UP (ref 80–100)
MCV RBC AUTO: 96.1 FL — SIGNIFICANT CHANGE UP (ref 80–100)
NRBC # BLD: 0 /100 WBCS — SIGNIFICANT CHANGE UP (ref 0–0)
NRBC # BLD: 0 /100 WBCS — SIGNIFICANT CHANGE UP (ref 0–0)
PLATELET # BLD AUTO: 206 K/UL — SIGNIFICANT CHANGE UP (ref 150–400)
PLATELET # BLD AUTO: 206 K/UL — SIGNIFICANT CHANGE UP (ref 150–400)
RBC # BLD: 3.56 M/UL — LOW (ref 4.2–5.8)
RBC # BLD: 3.56 M/UL — LOW (ref 4.2–5.8)
RBC # FLD: 13.2 % — SIGNIFICANT CHANGE UP (ref 10.3–14.5)
RBC # FLD: 13.2 % — SIGNIFICANT CHANGE UP (ref 10.3–14.5)
SPECIMEN SOURCE: SIGNIFICANT CHANGE UP
SPECIMEN SOURCE: SIGNIFICANT CHANGE UP
WBC # BLD: 13.18 K/UL — HIGH (ref 3.8–10.5)
WBC # BLD: 13.18 K/UL — HIGH (ref 3.8–10.5)
WBC # FLD AUTO: 13.18 K/UL — HIGH (ref 3.8–10.5)
WBC # FLD AUTO: 13.18 K/UL — HIGH (ref 3.8–10.5)

## 2023-12-01 PROCEDURE — 99222 1ST HOSP IP/OBS MODERATE 55: CPT

## 2023-12-01 PROCEDURE — 93970 EXTREMITY STUDY: CPT | Mod: 26

## 2023-12-01 RX ORDER — COLLAGENASE CLOSTRIDIUM HIST. 250 UNIT/G
1 OINTMENT (GRAM) TOPICAL DAILY
Refills: 0 | Status: DISCONTINUED | OUTPATIENT
Start: 2023-12-01 | End: 2023-12-05

## 2023-12-01 RX ORDER — POTASSIUM CHLORIDE 20 MEQ
10 PACKET (EA) ORAL DAILY
Refills: 0 | Status: DISCONTINUED | OUTPATIENT
Start: 2023-12-01 | End: 2023-12-02

## 2023-12-01 RX ORDER — CEFAZOLIN SODIUM 1 G
1000 VIAL (EA) INJECTION EVERY 8 HOURS
Refills: 0 | Status: DISCONTINUED | OUTPATIENT
Start: 2023-12-01 | End: 2023-12-02

## 2023-12-01 RX ORDER — HEPARIN SODIUM 5000 [USP'U]/ML
5000 INJECTION INTRAVENOUS; SUBCUTANEOUS EVERY 8 HOURS
Refills: 0 | Status: DISCONTINUED | OUTPATIENT
Start: 2023-12-01 | End: 2023-12-05

## 2023-12-01 RX ORDER — FUROSEMIDE 40 MG
40 TABLET ORAL EVERY 24 HOURS
Refills: 0 | Status: DISCONTINUED | OUTPATIENT
Start: 2023-12-01 | End: 2023-12-02

## 2023-12-01 RX ORDER — LACTOBACILLUS ACIDOPHILUS 100MM CELL
1 CAPSULE ORAL DAILY
Refills: 0 | Status: DISCONTINUED | OUTPATIENT
Start: 2023-12-01 | End: 2023-12-05

## 2023-12-01 RX ADMIN — Medication 100 MILLIGRAM(S): at 22:23

## 2023-12-01 RX ADMIN — Medication 1 APPLICATION(S): at 12:31

## 2023-12-01 RX ADMIN — CARBIDOPA AND LEVODOPA 1 TABLET(S): 25; 100 TABLET ORAL at 23:35

## 2023-12-01 RX ADMIN — HEPARIN SODIUM 5000 UNIT(S): 5000 INJECTION INTRAVENOUS; SUBCUTANEOUS at 14:29

## 2023-12-01 RX ADMIN — Medication 40 MILLIGRAM(S): at 12:31

## 2023-12-01 RX ADMIN — Medication 100 MILLIGRAM(S): at 14:29

## 2023-12-01 RX ADMIN — NYSTATIN CREAM 1 APPLICATION(S): 100000 CREAM TOPICAL at 14:29

## 2023-12-01 RX ADMIN — NYSTATIN CREAM 1 APPLICATION(S): 100000 CREAM TOPICAL at 05:12

## 2023-12-01 RX ADMIN — Medication 1 TABLET(S): at 12:31

## 2023-12-01 RX ADMIN — CARBIDOPA AND LEVODOPA 1 TABLET(S): 25; 100 TABLET ORAL at 17:54

## 2023-12-01 RX ADMIN — HEPARIN SODIUM 5000 UNIT(S): 5000 INJECTION INTRAVENOUS; SUBCUTANEOUS at 05:12

## 2023-12-01 RX ADMIN — CARBIDOPA AND LEVODOPA 1 TABLET(S): 25; 100 TABLET ORAL at 12:31

## 2023-12-01 RX ADMIN — Medication 10 MILLIEQUIVALENT(S): at 12:31

## 2023-12-01 RX ADMIN — NYSTATIN CREAM 1 APPLICATION(S): 100000 CREAM TOPICAL at 22:24

## 2023-12-01 RX ADMIN — HEPARIN SODIUM 5000 UNIT(S): 5000 INJECTION INTRAVENOUS; SUBCUTANEOUS at 22:23

## 2023-12-01 RX ADMIN — Medication 3 MILLIGRAM(S): at 22:23

## 2023-12-01 RX ADMIN — CARBIDOPA AND LEVODOPA 1 TABLET(S): 25; 100 TABLET ORAL at 05:12

## 2023-12-01 NOTE — DIETITIAN INITIAL EVALUATION ADULT - PERTINENT MEDS FT
MEDICATIONS  (STANDING):  carbidopa/levodopa  25/100 1 Tablet(s) Oral four times a day  ceFAZolin   IVPB 1000 milliGRAM(s) IV Intermittent every 8 hours  collagenase Ointment 1 Application(s) Topical daily  furosemide   Injectable 40 milliGRAM(s) IV Push every 24 hours  heparin   Injectable 5000 Unit(s) SubCutaneous every 8 hours  influenza  Vaccine (HIGH DOSE) 0.7 milliLiter(s) IntraMuscular once  lactobacillus acidophilus 1 Tablet(s) Oral daily  nystatin Powder 1 Application(s) Topical three times a day  potassium chloride    Tablet ER 10 milliEquivalent(s) Oral daily    MEDICATIONS  (PRN):  acetaminophen     Tablet .. 650 milliGRAM(s) Oral every 6 hours PRN Temp greater or equal to 38C (100.4F), Mild Pain (1 - 3)  aluminum hydroxide/magnesium hydroxide/simethicone Suspension 30 milliLiter(s) Oral every 4 hours PRN Dyspepsia  melatonin 3 milliGRAM(s) Oral at bedtime PRN Insomnia  ondansetron Injectable 4 milliGRAM(s) IV Push every 8 hours PRN Nausea and/or Vomiting

## 2023-12-01 NOTE — PROGRESS NOTE ADULT - SUBJECTIVE AND OBJECTIVE BOX
Neurology follow up note    DENG [id number]      Interval History:    Patient feels ok no new complaints.    Allergies    No Known Allergies    Intolerances        MEDICATIONS    acetaminophen     Tablet .. 650 milliGRAM(s) Oral every 6 hours PRN  aluminum hydroxide/magnesium hydroxide/simethicone Suspension 30 milliLiter(s) Oral every 4 hours PRN  carbidopa/levodopa  25/100 1 Tablet(s) Oral four times a day  heparin   Injectable 5000 Unit(s) SubCutaneous every 12 hours  hydrochlorothiazide 25 milliGRAM(s) Oral daily  influenza  Vaccine (HIGH DOSE) 0.7 milliLiter(s) IntraMuscular once  melatonin 3 milliGRAM(s) Oral at bedtime PRN  nystatin Powder 1 Application(s) Topical three times a day  ondansetron Injectable 4 milliGRAM(s) IV Push every 8 hours PRN            Weight (kg): 81.6 ( @ 09:02)    Vital Signs Last 24 Hrs  T(C): 36.9 (01 Dec 2023 05:25), Max: 37.4 (2023 19:33)  T(F): 98.4 (01 Dec 2023 05:25), Max: 99.3 (2023 19:33)  HR: 73 (01 Dec 2023 05:25) (71 - 88)  BP: 150/72 (01 Dec 2023 05:25) (105/64 - 176/81)  BP(mean): --  RR: 18 (01 Dec 2023 05:25) (16 - 18)  SpO2: 93% (01 Dec 2023 05:25) (93% - 99%)    Parameters below as of 01 Dec 2023 05:25  Patient On (Oxygen Delivery Method): room air      REVIEW OF SYSTEMS:  Constitutional:  The patient denies fever, chills, or night sweats.  Head:  No headache.  Eyes:  No double vision or blurry vision.  Ears:  No ringing in the ears.  Neck:  No neck pain.  Respiratory:  No shortness of breath.  Cardiovascular:  No chest pain.  Abdomen:  No nausea, vomiting, or abdominal pain.  Extremities/Neurological:  No numbness or tingling.  Musculoskeletal:  Positive selling of lower extremities, legs.    PHYSICAL EXAMINATION:   HEENT:  Head:  Normocephalic and atraumatic.  Eyes:  No scleral icterus.  Ears:  Hearing bilaterally appeared to be intact.  NECK:  Supple.  RESPIRATORY:  Air entry bilaterally.  CARDIOVASCULAR:  S1 and S2 heard.  ABDOMEN:  Soft and nontender.  EXTREMITIES:  Positive poor circulation was noted.      NEUROLOGIC:  The patient is awake and alert.  Able to tell daughter at bedside.  Location was hospital.  Year was , month was January.  Recall was 1/3 with prompting in 3 minutes.  Was able to name simple objects.  Extraocular movements were intact.  Affect was flat.  Speech was monotone.  Motor:  Bilateral upper were 4/5.  Positive resting tremors were noted, right greater than left.  Positive cogwheel rigidity was noted.  Bilateral lower extremities were 3/5.  The patient has bradykinesia in both lower extremities with increased tone.      LABS:  CBC Full  -  ( 2023 09:30 )  WBC Count : 11.56 K/uL  RBC Count : 3.80 M/uL  Hemoglobin : 11.8 g/dL  Hematocrit : 36.0 %  Platelet Count - Automated : 217 K/uL  Mean Cell Volume : 94.7 fl  Mean Cell Hemoglobin : 31.1 pg  Mean Cell Hemoglobin Concentration : 32.8 gm/dL  Auto Neutrophil # : 9.08 K/uL  Auto Lymphocyte # : 1.07 K/uL  Auto Monocyte # : 1.18 K/uL  Auto Eosinophil # : 0.12 K/uL  Auto Basophil # : 0.05 K/uL  Auto Neutrophil % : 78.6 %  Auto Lymphocyte % : 9.3 %  Auto Monocyte % : 10.2 %  Auto Eosinophil % : 1.0 %  Auto Basophil % : 0.4 %    Urinalysis Basic - ( 2023 09:30 )    Color: Yellow / Appearance: Clear / S.021 / pH: x  Gluc: 101 mg/dL / Ketone: Negative mg/dL  / Bili: Negative / Urobili: 1.0 mg/dL   Blood: x / Protein: Negative mg/dL / Nitrite: Negative   Leuk Esterase: Small / RBC: 12 /HPF / WBC 11 /HPF   Sq Epi: x / Non Sq Epi: x / Bacteria: Few /HPF      11-30    143  |  106  |  20  ----------------------------<  101<H>  3.8   |  31  |  0.76    Ca    8.6      2023 09:30    TPro  6.2  /  Alb  2.9<L>  /  TBili  0.7  /  DBili  x   /  AST  16  /  ALT  12  /  AlkPhos  73  11-30    Hemoglobin A1C:     LIVER FUNCTIONS - ( 2023 09:30 )  Alb: 2.9 g/dL / Pro: 6.2 g/dL / ALK PHOS: 73 U/L / ALT: 12 U/L / AST: 16 U/L / GGT: x           Vitamin B12   PT/INR - ( 2023 09:30 )   PT: 11.2 sec;   INR: 0.96 ratio         PTT - ( 2023 09:30 )  PTT:35.0 sec      RADIOLOGY    ANALYSIS AND PLAN:  This is an 87-year-old with episode of fall and Parkinson's.  For episode of fall, suspect most likely mechanical in nature.  I do not see any clear sign to suggest a new primary central nervous system event such as cerebrovascular accident has ensued.  The patient has multiple factors that could contribute to this leg swelling, Parkinson's, bradykinesia, and deconditioning and also does appear to have impaired proprioception in bilateral feet, possibly underlying neuropathy.  Physical therapy evaluation.  For history of Parkinson's, continue him on Sinemet.  For history of dementia, did try medications in the past but had side effects, would recommend supportive therapy.    Spoke with daughter, Zeina, at bedside.  Her telephone number is [phone].  She does understand while in the hospital setting, he may become more disoriented.     59 minutes of time was spent with the patient, plan of care, reviewing data, with greater than 50% of the visit was spent counseling and/or coordinating care with multidisciplinary healthcare team   Neurology follow up note    DENG [id number]      Interval History:    Patient feels ok no new complaints.    Allergies    No Known Allergies    Intolerances        MEDICATIONS    acetaminophen     Tablet .. 650 milliGRAM(s) Oral every 6 hours PRN  aluminum hydroxide/magnesium hydroxide/simethicone Suspension 30 milliLiter(s) Oral every 4 hours PRN  carbidopa/levodopa  25/100 1 Tablet(s) Oral four times a day  heparin   Injectable 5000 Unit(s) SubCutaneous every 12 hours  hydrochlorothiazide 25 milliGRAM(s) Oral daily  influenza  Vaccine (HIGH DOSE) 0.7 milliLiter(s) IntraMuscular once  melatonin 3 milliGRAM(s) Oral at bedtime PRN  nystatin Powder 1 Application(s) Topical three times a day  ondansetron Injectable 4 milliGRAM(s) IV Push every 8 hours PRN            Weight (kg): 81.6 ( @ 09:02)    Vital Signs Last 24 Hrs  T(C): 36.9 (01 Dec 2023 05:25), Max: 37.4 (2023 19:33)  T(F): 98.4 (01 Dec 2023 05:25), Max: 99.3 (2023 19:33)  HR: 73 (01 Dec 2023 05:25) (71 - 88)  BP: 150/72 (01 Dec 2023 05:25) (105/64 - 176/81)  BP(mean): --  RR: 18 (01 Dec 2023 05:25) (16 - 18)  SpO2: 93% (01 Dec 2023 05:25) (93% - 99%)    Parameters below as of 01 Dec 2023 05:25  Patient On (Oxygen Delivery Method): room air      REVIEW OF SYSTEMS:  Constitutional:  The patient denies fever, chills, or night sweats.  Head:  No headache.  Eyes:  No double vision or blurry vision.  Ears:  No ringing in the ears.  Neck:  No neck pain.  Respiratory:  No shortness of breath.  Cardiovascular:  No chest pain.  Abdomen:  No nausea, vomiting, or abdominal pain.  Extremities/Neurological:  No numbness or tingling.  Musculoskeletal:  Positive selling of lower extremities, legs.    PHYSICAL EXAMINATION:   HEENT:  Head:  Normocephalic and atraumatic.  Eyes:  No scleral icterus.  Ears:  Hearing bilaterally appeared to be intact.  NECK:  Supple.  RESPIRATORY:  Air entry bilaterally.  CARDIOVASCULAR:  S1 and S2 heard.  ABDOMEN:  Soft and nontender.  EXTREMITIES:  Positive poor circulation was noted.      NEUROLOGIC:  The patient is awake and alert.  Able to tell daughter at bedside.  Location was hospital.  Year was , month was January.  Recall was 1/3 with prompting in 3 minutes.  Was able to name simple objects.  Extraocular movements were intact.  Affect was flat.  Speech was monotone.  Motor:  Bilateral upper were 4/5.  Positive resting tremors were noted, right greater than left.  Positive cogwheel rigidity was noted.  Bilateral lower extremities were 3/5.  The patient has bradykinesia in both lower extremities with increased tone.      LABS:  CBC Full  -  ( 2023 09:30 )  WBC Count : 11.56 K/uL  RBC Count : 3.80 M/uL  Hemoglobin : 11.8 g/dL  Hematocrit : 36.0 %  Platelet Count - Automated : 217 K/uL  Mean Cell Volume : 94.7 fl  Mean Cell Hemoglobin : 31.1 pg  Mean Cell Hemoglobin Concentration : 32.8 gm/dL  Auto Neutrophil # : 9.08 K/uL  Auto Lymphocyte # : 1.07 K/uL  Auto Monocyte # : 1.18 K/uL  Auto Eosinophil # : 0.12 K/uL  Auto Basophil # : 0.05 K/uL  Auto Neutrophil % : 78.6 %  Auto Lymphocyte % : 9.3 %  Auto Monocyte % : 10.2 %  Auto Eosinophil % : 1.0 %  Auto Basophil % : 0.4 %    Urinalysis Basic - ( 2023 09:30 )    Color: Yellow / Appearance: Clear / S.021 / pH: x  Gluc: 101 mg/dL / Ketone: Negative mg/dL  / Bili: Negative / Urobili: 1.0 mg/dL   Blood: x / Protein: Negative mg/dL / Nitrite: Negative   Leuk Esterase: Small / RBC: 12 /HPF / WBC 11 /HPF   Sq Epi: x / Non Sq Epi: x / Bacteria: Few /HPF      11-30    143  |  106  |  20  ----------------------------<  101<H>  3.8   |  31  |  0.76    Ca    8.6      2023 09:30    TPro  6.2  /  Alb  2.9<L>  /  TBili  0.7  /  DBili  x   /  AST  16  /  ALT  12  /  AlkPhos  73  11-30    Hemoglobin A1C:     LIVER FUNCTIONS - ( 2023 09:30 )  Alb: 2.9 g/dL / Pro: 6.2 g/dL / ALK PHOS: 73 U/L / ALT: 12 U/L / AST: 16 U/L / GGT: x           Vitamin B12   PT/INR - ( 2023 09:30 )   PT: 11.2 sec;   INR: 0.96 ratio         PTT - ( 2023 09:30 )  PTT:35.0 sec      RADIOLOGY    ANALYSIS AND PLAN:  This is an 87-year-old with episode of fall and Parkinson's.  For episode of fall, suspect most likely mechanical in nature.  I do not see any clear sign to suggest a new primary central nervous system event such as cerebrovascular accident has ensued.  The patient has multiple factors that could contribute to this leg swelling, Parkinson's, bradykinesia, and deconditioning and also does appear to have impaired proprioception in bilateral feet, possibly underlying neuropathy.  Physical therapy evaluation.  For history of Parkinson's, continue him on Sinemet.  For history of dementia, did try medications in the past but had side effects, would recommend supportive therapy.    Spoke with daughter, Zeina, at bedside.  Her telephone number is [phone].   She does understand while in the hospital setting, he may become more disoriented.     50 minutes of time was spent with the patient, plan of care, reviewing data, with greater than 50% of the visit was spent counseling and/or coordinating care with multidisciplinary healthcare team

## 2023-12-01 NOTE — SWALLOW BEDSIDE ASSESSMENT ADULT - ASR SWALLOW RECOMMEND DIAG
Clinical performance today does not warrant MBS. Pt with clear lungs on CT, recent MBS 2021 with no penetration/aspiration. Pt has had no prior choking or pneumonia hx.

## 2023-12-01 NOTE — OCCUPATIONAL THERAPY INITIAL EVALUATION ADULT - PERTINENT HX OF CURRENT PROBLEM, REHAB EVAL
88 y/o male with hx of Parkinsons, HTN, PVD, chronic wound LLE pw weakness/?fall pt states he was with his walker near the steps and felt like his legs were too weak and he slid down to the floor.Pt did not fall or hit his head but was sitting on the floor and was too weak to get up, states his legs feel heavy. Pt had not yet taken his parkinsons meds yet. Daughter arrived later & provided more history. Dtr states pt has been having multiple falls lately, is noncompliant with his meds, legs become more swollen lately. Pt typically wears compression stockings. Pt states he fell at about 3am and has been on the floor until daughter arrived this am. Spouse lives at home with him but she is sick and on chemo and can't really help take care of him. She also states for last few weeks his walking has become difficult & that he can't go to the bathroom on time. Pt also has been having weeks of loose stools. Pt admitted 11/30/23 with acute UTI, falls and possible CHF. 88 y/o male with hx of Parkinsons, HTN, PVD, chronic wound LLE pw weakness/?fall pt states he was with his walker near the steps and felt like his legs were too weak and he slid down to the floor.Pt did not fall or hit his head but was sitting on the floor and was too weak to get up, states his legs feel heavy. Pt had not yet taken his parkinsons meds yet. Daughter arrived later & provided more history. Dtr states pt has been having multiple falls lately, is noncompliant with his meds, legs become more swollen lately. Pt typically wears compression stockings. Pt states he fell at about 3am and has been on the floor until daughter arrived this am. Spouse lives at home with him but she is sick and on chemo and can't really help take care of him. She also states for last few weeks his walking has become difficult & that he can't go to the bathroom on time. Pt also has been having weeks of loose stools. Pt admitted 11/30/23 with acute UTI, falls and possible CHF. Pt exhibits resting tremors right UE>left UE.

## 2023-12-01 NOTE — OCCUPATIONAL THERAPY INITIAL EVALUATION ADULT - RANGE OF MOTION EXAMINATION, UPPER EXTREMITY
Muscle strength UE's: WFL's. Arthritic changes noted bilateral hands right>leflt with +atrophy bilateral thenar eminence/bilateral UE Active ROM was WFL  (within functional limits)

## 2023-12-01 NOTE — SWALLOW BEDSIDE ASSESSMENT ADULT - COMMENTS
Pt is known to this dept. and seen for a MBS 2021 rx regular solids and thin fluids, please see full report in chart for details.    Pt received upright OOB in chair on RA, awake, alert, in NAD, able to follow commands and communicated verbally with SLP. PT had clear vocal quality pre and post trials. Pt's daughter present who assisted in providing hx for pt. Pt consumes regular solids and thin fluids in maribell environment absent of difficulty. Pt's dtr stated pt does not like to glue in his dentures which can result in some difficulty chewing. Pt denies pneumonia hx. H&P: 86 y/o M with hx of parkinsons, HTN, PVD, chronic wound LLE pw weakness/?fall pt states he was with his walker near the steps and felt like his legs were too weak and he slid down to the floor. he did not fall or hit his head but was sitting on the floor and was too weak to get up. states his legs feel heavy. pt has not taken his parkinsons meds yet today. no recent illness, fevers, chills, nausea vomitng. pt has chronic abd hernia that was told he cant repair. also has been experiencing rash for months and was evaluated for such and told not the worry about it. currently does not have any pain, did not have chest pain sob, lightheadednses prior to falling.    Chest X-ray 11/30: Lungs are clear.Chest is similar to May 9, 2022. IMPRESSION: No acute finding or change.    Pt is known to this dept. and seen for a MBS 2021 rx regular solids and thin fluids, please see full report in chart for details.    Pt received upright OOB in chair on RA, awake, alert, in NAD, able to follow commands and communicated verbally with SLP. PT had clear vocal quality pre and post trials. Pt's daughter present who assisted in providing hx for pt. Pt consumes regular solids and thin fluids in maribell environment absent of difficulty. Pt's dtr stated pt does not like to glue in his dentures which can result in some difficulty chewing. Pt denies pneumonia hx.

## 2023-12-01 NOTE — PROGRESS NOTE ADULT - SUBJECTIVE AND OBJECTIVE BOX
Date of Service 23 @ 17:18    Patient is a 87y old  Male who presents with a chief complaint of recurrent falls (01 Dec 2023 15:13)      INTERVAL /OVERNIGHT EVENTS: feels ok    MEDICATIONS  (STANDING):  carbidopa/levodopa  25/100 1 Tablet(s) Oral four times a day  ceFAZolin   IVPB 1000 milliGRAM(s) IV Intermittent every 8 hours  collagenase Ointment 1 Application(s) Topical daily  furosemide   Injectable 40 milliGRAM(s) IV Push every 24 hours  heparin   Injectable 5000 Unit(s) SubCutaneous every 8 hours  influenza  Vaccine (HIGH DOSE) 0.7 milliLiter(s) IntraMuscular once  lactobacillus acidophilus 1 Tablet(s) Oral daily  nystatin Powder 1 Application(s) Topical three times a day  potassium chloride    Tablet ER 10 milliEquivalent(s) Oral daily    MEDICATIONS  (PRN):  acetaminophen     Tablet .. 650 milliGRAM(s) Oral every 6 hours PRN Temp greater or equal to 38C (100.4F), Mild Pain (1 - 3)  aluminum hydroxide/magnesium hydroxide/simethicone Suspension 30 milliLiter(s) Oral every 4 hours PRN Dyspepsia  melatonin 3 milliGRAM(s) Oral at bedtime PRN Insomnia  ondansetron Injectable 4 milliGRAM(s) IV Push every 8 hours PRN Nausea and/or Vomiting      Allergies    No Known Allergies    Intolerances        REVIEW OF SYSTEMS:  CONSTITUTIONAL: No fever, weight loss, or fatigue  EYES: No eye pain, visual disturbances, or discharge  ENMT:  No difficulty hearing, tinnitus, vertigo; No sinus or throat pain  NECK: No pain or stiffness  RESPIRATORY: No cough, wheezing, chills or hemoptysis; No shortness of breath  CARDIOVASCULAR: No chest pain, palpitations, dizziness, or leg swelling  GASTROINTESTINAL: No abdominal or epigastric pain. No nausea, vomiting, or hematemesis; No diarrhea or constipation. No melena or hematochezia.  GENITOURINARY: No dysuria, frequency, hematuria, or incontinence  NEUROLOGICAL: No headaches, memory loss, loss of strength, numbness, or tremors  SKIN: No itching, burning, rashes, or lesions   LYMPH NODES: No enlarged glands  ENDOCRINE: No heat or cold intolerance; No hair loss; No polydipsia or polyuria  MUSCULOSKELETAL: No joint pain or swelling; No muscle, back, or extremity pain  PSYCHIATRIC: No depression, anxiety, mood swings, or difficulty sleeping  HEME/LYMPH: No easy bruising, or bleeding gums  ALLERGY AND IMMUNOLOGIC: No hives or eczema    Vital Signs Last 24 Hrs  T(C): 36.9 (01 Dec 2023 12:21), Max: 37.4 (2023 19:33)  T(F): 98.4 (01 Dec 2023 12:21), Max: 99.3 (2023 19:33)  HR: 68 (01 Dec 2023 12:) (68 - 88)  BP: 116/68 (01 Dec 2023 12:) (108/65 - 150/72)  BP(mean): --  RR: 18 (01 Dec 2023 12:) (18 - 18)  SpO2: 95% (01 Dec 2023 12:) (93% - 95%)    Parameters below as of 01 Dec 2023 12:21  Patient On (Oxygen Delivery Method): room air        PHYSICAL EXAM:  GENERAL: NAD, well-groomed, well-developed  HEAD:  Atraumatic, Normocephalic  EYES: EOMI, PERRLA, conjunctiva and sclera clear  ENMT: No tonsillar erythema, exudates, or enlargement; Moist mucous membranes, Good dentition, No lesions  NECK: Supple, No JVD, Normal thyroid  NERVOUS SYSTEM:  Alert & Oriented X3, Good concentration; Motor Strength 5/5 B/L upper and lower extremities; DTRs 2+ intact and symmetric  CHEST/LUNG: Clear to auscultation bilaterally; No rales, rhonchi, wheezing, or rubs  HEART: Regular rate and rhythm; No murmurs, rubs, or gallops  ABDOMEN: Soft, Nontender, Nondistended; Bowel sounds present  EXTREMITIES:  2+ Peripheral Pulses, No clubbing, cyanosis, or edema  LYMPH: No lymphadenopathy noted  SKIN: No rashes or lesions    LABS:                        11.0   13.18 )-----------( 206      ( 01 Dec 2023 08:00 )             34.2       Ca    8.6        2023 09:30      PT/INR - ( 2023 09:30 )   PT: 11.2 sec;   INR: 0.96 ratio         PTT - ( 2023 09:30 )  PTT:35.0 sec  Urinalysis Basic - ( 2023 09:30 )    Color: Yellow / Appearance: Clear / S.021 / pH: x  Gluc: 101 mg/dL / Ketone: Negative mg/dL  / Bili: Negative / Urobili: 1.0 mg/dL   Blood: x / Protein: Negative mg/dL / Nitrite: Negative   Leuk Esterase: Small / RBC: 12 /HPF / WBC 11 /HPF   Sq Epi: x / Non Sq Epi: x / Bacteria: Few /HPF      CAPILLARY BLOOD GLUCOSE          RADIOLOGY & ADDITIONAL TESTS:    Notes Reviewed:  [ x] YES  [ ] NO    Care Discussed with Consultants/Other Providers [x ] YES  [ ] NO

## 2023-12-01 NOTE — DIETITIAN INITIAL EVALUATION ADULT - ORAL INTAKE PTA/DIET HISTORY
Pt lives at home with wife.  Most hx provided by dtr.  Pt eats all prepared foods from [employer] brought in by dtr, even bfst meals.  Potato and eggs, Palestinian toast for bfst, pasta, meatballs, mixed entrees all from [employer].  Dtr states pt is a good eater for foods he likes.  Refused to use dental adhesive for dentures resulting in difficulty chewing some foods patsy. meats.  Does well with fish, soft meats, ie. meatloaf, meatballs.  Pt likes pudding, ice creams as well.  Historically has not liked Ensure ONS but dtr states to try anyway. Pt ht was 6'6" but dtr thinks probably 6'3" now.

## 2023-12-01 NOTE — SWALLOW BEDSIDE ASSESSMENT ADULT - ADDITIONAL RECOMMENDATIONS
Can check with Quit Plan to see what options there are for the patch or the gum    Pick a quit date, tell everyone  The night you get rid of all smoking stuff  That night or next morning put on the patch  Use the gum if needed like at night        Thank you for choosing [facility].  You may be receiving a survey in the mail from Rachel Figueroa regarding your visit today.  Please take a few minutes to complete and return the survey to let us know how we are doing.      If you have questions or concerns, please contact us via DearLocal or you can contact your care team at [phone].    Our Clinic hours are:  Monday 6:40 am  to 7:00 pm  Tuesday -Friday 6:40 am to 5:00 pm    The [place] outpatient lab hours are:  Monday - Friday 6:10 am to 4:45 pm  Saturdays 7:00 am to 11:00 am  Appointments are required, call [phone]    If you have clinical questions after hours or would like to schedule an appointment,  call the clinic at [phone].  
1. SLP will continue to follow while patient is in house as schedule permits to monitor diet tolerance

## 2023-12-01 NOTE — OCCUPATIONAL THERAPY INITIAL EVALUATION ADULT - MD ORDER
[facility]          Patient Name: Radha Sargent  /Age:  2009 (13 year old)      Intervention: Left voicemail for patient's mother to schedule neuropsych evaluation from wait list. Also sent letter.      Status of Referral: Pending return call from patient's mother.      Plan: Can schedule if patient's mother calls back on/prior to 23.    Echo Pickard,     [facility]   OT evaluate and treat.

## 2023-12-01 NOTE — DIETITIAN INITIAL EVALUATION ADULT - PERTINENT LABORATORY DATA
11-30    143  |  106  |  20  ----------------------------<  101<H>  3.8   |  31  |  0.76    Ca    8.6      30 Nov 2023 09:30    TPro  6.2  /  Alb  2.9<L>  /  TBili  0.7  /  DBili  x   /  AST  16  /  ALT  12  /  AlkPhos  73  11-30  A1C with Estimated Average Glucose Result: 5.4 % (11-30-23 @ 09:30)

## 2023-12-01 NOTE — CONSULT NOTE ADULT - ASSESSMENT
88 y/o M with hx of parkinsons, HTN, PVD, chronic wound LLE pw weakness/?fall pt states he was with his walker near the steps and felt like his legs were too weak and he slid down to the floor.   In ER patient was found to have possible CHF. patient is being admitted for further work up and treatment (30 Nov 2023 15:20)  ID c/s uptrending WBC, possible LE cellulitis    b/l vs. RLE cellulitis superimposed on chronic venous stasis dermatitis    Recommendations:   Agree w/ cefazolin for now  Trend temps/WBC  Additional management per primary team    D/w pt daughter at bedside  D/w Dr. Nelsy Holden covering 12/2  Dr Liriano covering 12/3  I will resume care 12/4  Infectious Diseases will continue to follow. Please call with any questions.   Erin Lynn M.D.  [facility] Division of Infectious Diseases [phone]  For after 5 P.M. and weekends, please call [phone]     86 y/o M with hx of parkinsons, HTN, PVD, chronic wound LLE pw weakness/?fall pt states he was with his walker near the steps and felt like his legs were too weak and he slid down to the floor.   In ER patient was found to have possible CHF. patient is being admitted for further work up and treatment (30 Nov 2023 15:20)  ID c/s uptrending WBC, possible LE cellulitis    b/l vs. RLE cellulitis superimposed on chronic venous stasis dermatitis  UA not impressive for infection, UCx NGTD    Recommendations:   Agree w/ cefazolin for now  Trend temps/WBC  Additional management per primary team    D/w pt daughter at bedside  D/w Dr. Nelsy Holden covering 12/2  Dr Liriano covering 12/3  I will resume care 12/4  Infectious Diseases will continue to follow. Please call with any questions.   Erin Lynn M.D.  [facility] Division of Infectious Diseases [phone]  For after 5 P.M. and weekends, please call [phone]

## 2023-12-01 NOTE — DIETITIAN NUTRITION RISK NOTIFICATION - PHYSICAL ASSESSMENT THIGH
moderate Doxepin Counseling:  Patient advised that the medication is sedating and not to drive a car after taking this medication. Patient informed of potential adverse effects including but not limited to dry mouth, urinary retention, and blurry vision.  The patient verbalized understanding of the proper use and possible adverse effects of doxepin.  All of the patient's questions and concerns were addressed.

## 2023-12-01 NOTE — DIETITIAN INITIAL EVALUATION ADULT - PROBLEM SELECTOR PLAN 3
"Shanelle Powell LPN  " sinemet  neuro eval

## 2023-12-01 NOTE — SWALLOW BEDSIDE ASSESSMENT ADULT - ORAL PREPARATORY PHASE
Prolonged mastication however sufficient prior to AP transport Reduced oral grading Decreased mastication ability Within functional limits

## 2023-12-01 NOTE — OCCUPATIONAL THERAPY INITIAL EVALUATION ADULT - GENERAL OBSERVATIONS, REHAB EVAL
Patient found supine in bed with +IV lock wearing bifocal eyeglasses. Patient cooperative during evaluation.

## 2023-12-01 NOTE — SWALLOW BEDSIDE ASSESSMENT ADULT - SWALLOW EVAL: DIAGNOSIS
Pt presents with oropharyngeal dysphagia in the setting of Parkinson Disease marked by reduced oromotor strength and coordination resulting in delayed/reduced bolus collection, prolonged mastication for regular solids, reduced AP transport and increased oral transit time. Pharyngeal stage was marked by multiple swallows (solids) suggesting possible pharyngeal clearance deficits. No overt signs/symptoms of penetration or aspiration exhibited.

## 2023-12-01 NOTE — PHYSICAL THERAPY INITIAL EVALUATION ADULT - PERTINENT HX OF CURRENT PROBLEM, REHAB EVAL
86 y/o M with hx of parkinsons, HTN, PVD, chronic wound LLE pw weakness/?fall pt states he was with his walker near the steps and felt like his legs were too weak and he slid down to the floor. he did not fall or hit his head but was sitting on the floor and was too weak to get up. states his legs feel heavy. pt has not taken his parkinsons meds yet today. no recent illness, fevers, chills, nausea vomitng. pt has chronic abd hernia that was told he cant repair. also has been experiencing rash for months and was evaluated for such and told not the worry about it. states he has been having multiple falls lately. is noncompliant with his legs, legs become more swollen lately, typically wears compression stocking. states he fell at about 3am and has been on the floor until gustavougher arrived this am. mother lives at home with him but she is sick and on chemo and cant really help take care of him. she also sattes for last few weeks his walking has become difficult that he cant go to the bathroom on time. also has been having weeks of loose stools. does not feel safe for him to be at home 86 y/o M with hx of parkinsons, HTN, PVD, chronic wound LLE p/w weakness/?fall pt states he was with his walker near the steps and felt like his legs were too weak and he slid down to the floor. he did not fall or hit his head but was sitting on the floor and was too weak to get up. states his legs feel heavy. pt has not taken his parkinsons meds yet today. no recent illness, fevers, chills, nausea vomitng. pt has chronic abd hernia that was told he cant repair. also has been experiencing rash for months and was evaluated for such and told not the worry about it. states he has been having multiple falls lately. is noncompliant with his legs, legs become more swollen lately, typically wears compression stocking. states he fell at about 3am and has been on the floor until giler arrived this am. mother lives at home with him but she is sick and on chemo and cant really help take care of him. she also sattes for last few weeks his walking has become difficult that he cant go to the bathroom on time. also has been having weeks of loose stools. does not feel safe for him to be at home

## 2023-12-01 NOTE — OCCUPATIONAL THERAPY INITIAL EVALUATION ADULT - ADL RETRAINING, OT EVAL
Patient will feed self with supervision and set-up in 4-6 sessions. Patient will perform grooming tasks in standing with minimal assistance in 4-6 sessions.

## 2023-12-02 DIAGNOSIS — R21 RASH AND OTHER NONSPECIFIC SKIN ERUPTION: ICD-10-CM

## 2023-12-02 LAB
ANION GAP SERPL CALC-SCNC: 6 MMOL/L — SIGNIFICANT CHANGE UP (ref 5–17)
ANION GAP SERPL CALC-SCNC: 6 MMOL/L — SIGNIFICANT CHANGE UP (ref 5–17)
BUN SERPL-MCNC: 17 MG/DL — SIGNIFICANT CHANGE UP (ref 7–23)
BUN SERPL-MCNC: 17 MG/DL — SIGNIFICANT CHANGE UP (ref 7–23)
CALCIUM SERPL-MCNC: 8.3 MG/DL — LOW (ref 8.5–10.1)
CALCIUM SERPL-MCNC: 8.3 MG/DL — LOW (ref 8.5–10.1)
CHLORIDE SERPL-SCNC: 102 MMOL/L — SIGNIFICANT CHANGE UP (ref 96–108)
CHLORIDE SERPL-SCNC: 102 MMOL/L — SIGNIFICANT CHANGE UP (ref 96–108)
CO2 SERPL-SCNC: 34 MMOL/L — HIGH (ref 22–31)
CO2 SERPL-SCNC: 34 MMOL/L — HIGH (ref 22–31)
CREAT SERPL-MCNC: 0.84 MG/DL — SIGNIFICANT CHANGE UP (ref 0.5–1.3)
CREAT SERPL-MCNC: 0.84 MG/DL — SIGNIFICANT CHANGE UP (ref 0.5–1.3)
EGFR: 85 ML/MIN/1.73M2 — SIGNIFICANT CHANGE UP
EGFR: 85 ML/MIN/1.73M2 — SIGNIFICANT CHANGE UP
GLUCOSE SERPL-MCNC: 94 MG/DL — SIGNIFICANT CHANGE UP (ref 70–99)
GLUCOSE SERPL-MCNC: 94 MG/DL — SIGNIFICANT CHANGE UP (ref 70–99)
HCT VFR BLD CALC: 33.3 % — LOW (ref 39–50)
HCT VFR BLD CALC: 33.3 % — LOW (ref 39–50)
HGB BLD-MCNC: 10.7 G/DL — LOW (ref 13–17)
HGB BLD-MCNC: 10.7 G/DL — LOW (ref 13–17)
MCHC RBC-ENTMCNC: 30.4 PG — SIGNIFICANT CHANGE UP (ref 27–34)
MCHC RBC-ENTMCNC: 30.4 PG — SIGNIFICANT CHANGE UP (ref 27–34)
MCHC RBC-ENTMCNC: 32.1 GM/DL — SIGNIFICANT CHANGE UP (ref 32–36)
MCHC RBC-ENTMCNC: 32.1 GM/DL — SIGNIFICANT CHANGE UP (ref 32–36)
MCV RBC AUTO: 94.6 FL — SIGNIFICANT CHANGE UP (ref 80–100)
MCV RBC AUTO: 94.6 FL — SIGNIFICANT CHANGE UP (ref 80–100)
NRBC # BLD: 0 /100 WBCS — SIGNIFICANT CHANGE UP (ref 0–0)
NRBC # BLD: 0 /100 WBCS — SIGNIFICANT CHANGE UP (ref 0–0)
PLATELET # BLD AUTO: 189 K/UL — SIGNIFICANT CHANGE UP (ref 150–400)
PLATELET # BLD AUTO: 189 K/UL — SIGNIFICANT CHANGE UP (ref 150–400)
POTASSIUM SERPL-MCNC: 3.7 MMOL/L — SIGNIFICANT CHANGE UP (ref 3.5–5.3)
POTASSIUM SERPL-MCNC: 3.7 MMOL/L — SIGNIFICANT CHANGE UP (ref 3.5–5.3)
POTASSIUM SERPL-SCNC: 3.7 MMOL/L — SIGNIFICANT CHANGE UP (ref 3.5–5.3)
POTASSIUM SERPL-SCNC: 3.7 MMOL/L — SIGNIFICANT CHANGE UP (ref 3.5–5.3)
RBC # BLD: 3.52 M/UL — LOW (ref 4.2–5.8)
RBC # BLD: 3.52 M/UL — LOW (ref 4.2–5.8)
RBC # FLD: 13.2 % — SIGNIFICANT CHANGE UP (ref 10.3–14.5)
RBC # FLD: 13.2 % — SIGNIFICANT CHANGE UP (ref 10.3–14.5)
SODIUM SERPL-SCNC: 142 MMOL/L — SIGNIFICANT CHANGE UP (ref 135–145)
SODIUM SERPL-SCNC: 142 MMOL/L — SIGNIFICANT CHANGE UP (ref 135–145)
WBC # BLD: 7.82 K/UL — SIGNIFICANT CHANGE UP (ref 3.8–10.5)
WBC # BLD: 7.82 K/UL — SIGNIFICANT CHANGE UP (ref 3.8–10.5)
WBC # FLD AUTO: 7.82 K/UL — SIGNIFICANT CHANGE UP (ref 3.8–10.5)
WBC # FLD AUTO: 7.82 K/UL — SIGNIFICANT CHANGE UP (ref 3.8–10.5)

## 2023-12-02 RX ORDER — FAMOTIDINE 10 MG/ML
20 INJECTION INTRAVENOUS DAILY
Refills: 0 | Status: DISCONTINUED | OUTPATIENT
Start: 2023-12-02 | End: 2023-12-05

## 2023-12-02 RX ORDER — DIPHENHYDRAMINE HCL 50 MG
25 CAPSULE ORAL EVERY 4 HOURS
Refills: 0 | Status: DISCONTINUED | OUTPATIENT
Start: 2023-12-02 | End: 2023-12-05

## 2023-12-02 RX ORDER — FUROSEMIDE 40 MG
40 TABLET ORAL DAILY
Refills: 0 | Status: DISCONTINUED | OUTPATIENT
Start: 2023-12-03 | End: 2023-12-05

## 2023-12-02 RX ORDER — POTASSIUM CHLORIDE 20 MEQ
10 PACKET (EA) ORAL
Refills: 0 | Status: DISCONTINUED | OUTPATIENT
Start: 2023-12-02 | End: 2023-12-04

## 2023-12-02 RX ADMIN — Medication 1 APPLICATION(S): at 11:58

## 2023-12-02 RX ADMIN — NYSTATIN CREAM 1 APPLICATION(S): 100000 CREAM TOPICAL at 06:40

## 2023-12-02 RX ADMIN — NYSTATIN CREAM 1 APPLICATION(S): 100000 CREAM TOPICAL at 13:08

## 2023-12-02 RX ADMIN — FAMOTIDINE 20 MILLIGRAM(S): 10 INJECTION INTRAVENOUS at 11:57

## 2023-12-02 RX ADMIN — CARBIDOPA AND LEVODOPA 1 TABLET(S): 25; 100 TABLET ORAL at 11:57

## 2023-12-02 RX ADMIN — Medication 25 MILLIGRAM(S): at 20:26

## 2023-12-02 RX ADMIN — HEPARIN SODIUM 5000 UNIT(S): 5000 INJECTION INTRAVENOUS; SUBCUTANEOUS at 13:09

## 2023-12-02 RX ADMIN — Medication 25 MILLIGRAM(S): at 11:57

## 2023-12-02 RX ADMIN — HEPARIN SODIUM 5000 UNIT(S): 5000 INJECTION INTRAVENOUS; SUBCUTANEOUS at 21:26

## 2023-12-02 RX ADMIN — Medication 10 MILLIEQUIVALENT(S): at 18:49

## 2023-12-02 RX ADMIN — HEPARIN SODIUM 5000 UNIT(S): 5000 INJECTION INTRAVENOUS; SUBCUTANEOUS at 06:40

## 2023-12-02 RX ADMIN — NYSTATIN CREAM 1 APPLICATION(S): 100000 CREAM TOPICAL at 21:26

## 2023-12-02 RX ADMIN — Medication 3 MILLIGRAM(S): at 21:26

## 2023-12-02 RX ADMIN — Medication 10 MILLIEQUIVALENT(S): at 12:03

## 2023-12-02 RX ADMIN — Medication 40 MILLIGRAM(S): at 11:57

## 2023-12-02 RX ADMIN — CARBIDOPA AND LEVODOPA 1 TABLET(S): 25; 100 TABLET ORAL at 06:41

## 2023-12-02 RX ADMIN — CARBIDOPA AND LEVODOPA 1 TABLET(S): 25; 100 TABLET ORAL at 18:48

## 2023-12-02 RX ADMIN — Medication 1 TABLET(S): at 11:56

## 2023-12-02 RX ADMIN — Medication 100 MILLIGRAM(S): at 06:40

## 2023-12-02 NOTE — CONSULT NOTE ADULT - SUBJECTIVE AND OBJECTIVE BOX
[facility], Division of Infectious Diseases  RASHID Rosas S. Shah, Y. Patel, [facility]  [phone]    DENG SIERRA  87y, Male  [id number]    HPI--  HPI:86 y/o M with hx of parkinsons, HTN, PVD, chronic wound LLE pw weakness/?fall pt states he was with his walker near the steps and felt like his legs were too weak and he slid down to the floor. he did not fall or hit his head but was sitting on the floor and was too weak to get up. states his legs feel heavy. pt has not taken his parkinsons meds yet today. no recent illness, fevers, chills, nausea vomitng. pt has chronic abd hernia that was told he cant repair. also has been experiencing rash for months and was evaluated for such and told not the worry about it. currently does not have any pain, did not have chest pain sob, lightheadednses prior to falling.  daughter arrived later - provided more history. states he has been having multiple falls lately. is noncompliant with his legs, legs become more swollen lately, typically wears compression stocking. states he fell at about 3am and has been on the floor until daugher arrived this am. mother lives at home with him but she is sick and on chemo and cant really help take care of him. she also sattes for last few weeks his walking has become difficult that he cant go to the bathroom on time. also has been having weeks of loose stools. does not feel safe for him to be at home  In ER patient was found to have possible CHF. patient is being admitted for further work up and treatment (2023 15:20)    ID c/s uptrending WBC, possible LE cellulitis  Pt seen at bedside  Daughter present, hx as above      Active Medications--  acetaminophen     Tablet .. 650 milliGRAM(s) Oral every 6 hours PRN  aluminum hydroxide/magnesium hydroxide/simethicone Suspension 30 milliLiter(s) Oral every 4 hours PRN  carbidopa/levodopa  25/100 1 Tablet(s) Oral four times a day  ceFAZolin   IVPB 1000 milliGRAM(s) IV Intermittent every 8 hours  collagenase Ointment 1 Application(s) Topical daily  furosemide   Injectable 40 milliGRAM(s) IV Push every 24 hours  heparin   Injectable 5000 Unit(s) SubCutaneous every 8 hours  influenza  Vaccine (HIGH DOSE) 0.7 milliLiter(s) IntraMuscular once  lactobacillus acidophilus 1 Tablet(s) Oral daily  melatonin 3 milliGRAM(s) Oral at bedtime PRN  nystatin Powder 1 Application(s) Topical three times a day  ondansetron Injectable 4 milliGRAM(s) IV Push every 8 hours PRN  potassium chloride    Tablet ER 10 milliEquivalent(s) Oral daily    Antimicrobials:   ceFAZolin   IVPB 1000 milliGRAM(s) IV Intermittent every 8 hours    Immunologic: influenza  Vaccine (HIGH DOSE) 0.7 milliLiter(s) IntraMuscular once      ROS:  CONSTITUTIONAL: No fevers or chills. No weakness or headache. No weight changes.  EYES/ENT: No visual or hearing changes. No sore throat or throat pain .  NECK: No pain or stiffness  RESPIRATORY: No cough, wheezing, or hemoptysis. No shortness of breath  CARDIOVASCULAR: No chest pain or palpitations  GASTROINTESTINAL: No abdominal pain. No nausea or vomiting. No diarrhea or constipation.  GENITOURINARY: No dysuria, frequency or hematuria  NEUROLOGICAL: No numbness or weakness  SKIN: No itching or rashes  PSYCHIATRIC: Pleasant. Appropriate affect    Allergies: No Known Allergies    PMH -- Parkinsons    HTN (hypertension)      PSH -- No significant past surgical history    No significant past surgical history      FH -- No pertinent family history in first degree relatives    No pertinent family history in first degree relatives      Social History --  EtOH: denies   Tobacco: denies   Drug Use: denies     Travel/Environmental/Occupational History:    Physical Exam--  Vital Signs Last 24 Hrs  T(F): 98.4 (01 Dec 2023 12:21), Max: 99.3 (2023 19:33)  HR: 68 (01 Dec 2023 12:21) (68 - 88)  BP: 116/68 (01 Dec 2023 12:21) (105/64 - 150/72)  RR: 18 (01 Dec 2023 12:21) (16 - 18)  SpO2: 95% (01 Dec 2023 12:21) (93% - 98%)  General: nontoxic-appearing, no acute distress  HEENT: NC/AT, EOMI,   Lungs: Clear bilaterally without rales, wheezing or rhonchi  Heart: Regular rate and rhythm. No murmur, rub or gallop.  Abdomen: Soft. Nondistended. Nontender.   Extremities: b/l LE edema and erythema RLE >>> LLE  Skin: Warm. Dry.     Laboratory & Imaging Data:  CBC:                       11.0   13.18 )-----------( 206      ( 01 Dec 2023 08:00 )             34.2     CMP:     143  |  106  |  20  ----------------------------<  101<H>  3.8   |  31  |  0.76    Ca    8.6      2023 09:30    TPro  6.2  /  Alb  2.9<L>  /  TBili  0.7  /  DBili  x   /  AST  16  /  ALT  12  /  AlkPhos  73      LIVER FUNCTIONS - ( 2023 09:30 )  Alb: 2.9 g/dL / Pro: 6.2 g/dL / ALK PHOS: 73 U/L / ALT: 12 U/L / AST: 16 U/L / GGT: x           Urinalysis Basic - ( 2023 09:30 )    Color: Yellow / Appearance: Clear / S.021 / pH: x  Gluc: 101 mg/dL / Ketone: Negative mg/dL  / Bili: Negative / Urobili: 1.0 mg/dL   Blood: x / Protein: Negative mg/dL / Nitrite: Negative   Leuk Esterase: Small / RBC: 12 /HPF / WBC 11 /HPF   Sq Epi: x / Non Sq Epi: x / Bacteria: Few /HPF        Microbiology: reviewed    Culture - Urine (collected 23 @ 09:30)  Source: Clean Catch Clean Catch (Midstream)  Final Report (23 @ 11:48):    No growth          Radiology: reviewed    
Physical Medicine and Rehabilitation Initial Evaluation    DOS 12/1/2023    Patients acute care records reviewed and are summarized as follows:     Patient is a 87y Male who is admitted to acute care for progressively worsening weakness, recurrent falls in setting of parkinsons disease history.     Functional history reviewed with patients daughter at bedside. Patient previously modified independent with a RW for ambulation, transfers, ADL's. Lives in a house with no stairs or railings, is usually a community ambulator.    PT notes reviewed:     Previous Level of Function:     · Ambulation Skills	independent; needs device; rolling walker  · Transfer Skills	independent  · ADL Skills	independent  · Work/Leisure Activity	independent  · Additional Comments	Pt lives in house w/ no stairs/rail.  Pt is a community ambulator.    Cognitive Status Examination:   · Orientation	oriented to person, place, time and situation  · Level of Consciousness	alert  · Follows Commands and Answers Questions	100% of the time  · Personal Safety and Judgment	intact    Range of Motion Exam:   · Range of Motion Examination	bilateral upper extremity ROM was WFL (within functional limits); bilateral lower extremity ROM was WFL (within functional limits)    Manual Muscle Testing:   · Manual Muscle Testing Results	BUE/ BLE grossly 3+/5 throughout     Muscle Tone Assessment:   · Muscle Tone Assessment	normal    Bed Mobility: Supine to Sit:     · Level of [place]	minimum assist (75% patients effort)  · Physical Assist/Nonphysical Assist	1 person assist; verbal cues; supervision    Bed Mobility Analysis:     · Bed Mobility Limitations	decreased ability to use arms for pushing/pulling; decreased ability to use legs for bridging/pushing  · Impairments Contributing to Impaired Bed Mobility	impaired balance; decreased flexibility; decreased strength    Transfer: Sit to Stand:     · Level of [place]	minimum assist (75% patients effort)  · Physical Assist/Nonphysical Assist	1 person assist; verbal cues; supervision  · Weight-Bearing Restrictions	full weight-bearing    Transfer: Stand to Sit:     · Level of [place]	minimum assist (75% patients effort)  · Physical Assist/Nonphysical Assist	1 person assist; verbal cues; supervision  · Weight-Bearing Restrictions	full weight-bearing    Sit/Stand Transfer Safety Analysis:     · Transfer Safety Concerns Noted	decreased weight-shifting ability  · Impairments Contributing to Impaired Transfers	decreased flexibility; decreased strength; cognition; impaired balance    Gait Skills:     · Level of [place]	minimum assist (75% patients effort)  · Physical Assist/Nonphysical Assist	verbal cues; supervision; 1 person + 1 person to manage equipment  · Weight-Bearing Restrictions	full weight-bearing  · Assistive Device	rolling walker  · Gait Distance	2 feet      The patient was seen and examined at bedside. Denies any pain.    ROS:  Constitutional: Denies fevers or chills  MSK: Unsteady gait and poor balance, complains of weakness    PAST MEDICAL & SURGICAL HISTORY:  Parkinsons  HTN (hypertension)  PVD   Chronic LLE wound  Recurrrent falls    Medications:   acetaminophen     Tablet .. 650 milliGRAM(s) Oral every 6 hours PRN  aluminum hydroxide/magnesium hydroxide/simethicone Suspension 30 milliLiter(s) Oral every 4 hours PRN  carbidopa/levodopa  25/100 1 Tablet(s) Oral four times a day  ceFAZolin   IVPB 1000 milliGRAM(s) IV Intermittent every 8 hours  collagenase Ointment 1 Application(s) Topical daily  furosemide   Injectable 40 milliGRAM(s) IV Push every 24 hours  heparin   Injectable 5000 Unit(s) SubCutaneous every 8 hours  influenza  Vaccine (HIGH DOSE) 0.7 milliLiter(s) IntraMuscular once  lactobacillus acidophilus 1 Tablet(s) Oral daily  melatonin 3 milliGRAM(s) Oral at bedtime PRN  nystatin Powder 1 Application(s) Topical three times a day  ondansetron Injectable 4 milliGRAM(s) IV Push every 8 hours PRN  potassium chloride    Tablet ER 10 milliEquivalent(s) Oral daily      Physical Exam:   Vitals: T(C): 37 (12-01-23 @ 20:50), Max: 37 (12-01-23 @ 20:50)  HR: 72 (12-01-23 @ 20:50) (68 - 73)  BP: 151/89 (12-01-23 @ 20:58) (95/51 - 151/89)  RR: 18 (12-01-23 @ 20:50) (18 - 18)  SpO2: 93% (12-01-23 @ 20:50) (93% - 95%)    Constitutional: Gen: In no acute distress, cooperative with exam and questioning   Neuro: Sensation intact to light touch in peripheral upper and lower extremities  MSK: Strength 3/5 in bilateral upper and lower extremities, ROM full in all extremities passively  Psychiatric: Awake alert fully oriented    
Chief Complaint: LLE ulcer    HPI: 88 yo WM, admitted for recurrent falls/weakness and possible CHF. Asked to see pt regarding a chronic LLE ulcer. Pt well [facility] to the [facility] and according to the daughter who is present at bedside, does well his circaids compression stockings.    PAST MEDICAL & SURGICAL HISTORY:  Parkinsons      HTN (hypertension)      No significant past surgical history          Allergies    No Known Allergies    Intolerances        MEDICATIONS  (STANDING):  carbidopa/levodopa  25/100 1 Tablet(s) Oral four times a day  ceFAZolin   IVPB 1000 milliGRAM(s) IV Intermittent every 8 hours  collagenase Ointment 1 Application(s) Topical daily  furosemide   Injectable 40 milliGRAM(s) IV Push every 24 hours  heparin   Injectable 5000 Unit(s) SubCutaneous every 8 hours  influenza  Vaccine (HIGH DOSE) 0.7 milliLiter(s) IntraMuscular once  lactobacillus acidophilus 1 Tablet(s) Oral daily  nystatin Powder 1 Application(s) Topical three times a day  potassium chloride    Tablet ER 10 milliEquivalent(s) Oral daily    MEDICATIONS  (PRN):  acetaminophen     Tablet .. 650 milliGRAM(s) Oral every 6 hours PRN Temp greater or equal to 38C (100.4F), Mild Pain (1 - 3)  aluminum hydroxide/magnesium hydroxide/simethicone Suspension 30 milliLiter(s) Oral every 4 hours PRN Dyspepsia  melatonin 3 milliGRAM(s) Oral at bedtime PRN Insomnia  ondansetron Injectable 4 milliGRAM(s) IV Push every 8 hours PRN Nausea and/or Vomiting      FAMILY HISTORY:  No pertinent family history in first degree relatives            ROS:  CONSTITUTIONAL: No fever, weight loss, or fatigue  EYES: No eye pain, visual disturbances, or discharge  ENMT:  No difficulty hearing, tinnitus, vertigo; No sinus or throat pain  NECK: No pain or stiffness  RESPIRATORY: No cough, wheezing, chills or hemoptysis; No shortness of breath  CARDIOVASCULAR: No chest pain, palpitations, dizziness, or leg swelling  GASTROINTESTINAL: No abdominal or epigastric pain. No nausea, vomiting, or hematemesis; No diarrhea or constipation. No melena or hematochezia.  GENITOURINARY: No dysuria, frequency, hematuria, or incontinence  NEUROLOGICAL: No headaches, memory loss, loss of strength, numbness, or tremors  SKIN: No itching, burning, rashes, or lesions   LYMPH NODES: No enlarged glands  ENDOCRINE: No heat or cold intolerance; No hair loss  MUSCULOSKELETAL: No joint pain or swelling; No muscle, back, or extremity pain  PSYCHIATRIC: No depression, anxiety, mood swings, or difficulty sleeping  HEME/LYMPH: No easy bruising, or bleeding gums  ALLERGY AND IMMUNOLOGIC: No hives or eczema    PHYSICAL EXAM-      Vital Signs Last 24 Hrs  T(C): 36.9 (01 Dec 2023 05:25), Max: 37.4 (30 Nov 2023 19:33)  T(F): 98.4 (01 Dec 2023 05:25), Max: 99.3 (30 Nov 2023 19:33)  HR: 73 (01 Dec 2023 05:25) (71 - 88)  BP: 150/72 (01 Dec 2023 05:25) (105/64 - 150/72)  BP(mean): --  RR: 18 (01 Dec 2023 05:25) (16 - 18)  SpO2: 93% (01 Dec 2023 05:25) (93% - 99%)    Parameters below as of 01 Dec 2023 05:25  Patient On (Oxygen Delivery Method): room air        Constitutional: well developed, well nourished, elderly WM, no apparent distress, alert, oriented x 3.     BLE- mod edema; oval 2.5x2cm ulcer covered mostly by yellow adherent slough; no cellulitis/erythema.                          11.0   13.18 )-----------( 206      ( 01 Dec 2023 08:00 )             34.2     11-30    143  |  106  |  20  ----------------------------<  101<H>  3.8   |  31  |  0.76    Ca    8.6      30 Nov 2023 09:30    TPro  6.2  /  Alb  2.9<L>  /  TBili  0.7  /  DBili  x   /  AST  16  /  ALT  12  /  AlkPhos  73  11-30      Radiology

## 2023-12-02 NOTE — PROGRESS NOTE ADULT - SUBJECTIVE AND OBJECTIVE BOX
Neurology Follow up note    DENG [id number]    HPI:  · HPI Objective Statement: 88 y/o M with hx of parkinsons, HTN, PVD, chronic wound LLE pw weakness/?fall pt states he was with his walker near the steps and felt like his legs were too weak and he slid down to the floor. he did not fall or hit his head but was sitting on the floor and was too weak to get up. states his legs feel heavy. pt has not taken his parkinsons meds yet today. no recent illness, fevers, chills, nausea vomitng. pt has chronic abd hernia that was told he cant repair. also has been experiencing rash for months and was evaluated for such and told not the worry about it. currently does not have any pain, did not have chest pain sob, lightheadednses prior to falling.    	daughter arrived later - provided more history. states he has been having multiple falls lately. is noncompliant with his legs, legs become more swollen lately, typically wears compression stocking. states he fell at about 3am and has been on the floor until giler arrived this am. mother lives at home with him but she is sick and on chemo and cant really help take care of him. she also sattes for last few weeks his walking has become difficult that he cant go to the bathroom on time. also has been having weeks of loose stools. does not feel safe for him to be at home  In ER patient was found to have possible CHF. patient is being admitted for further work up and treatment (30 Nov 2023 15:20)      Interval History -no new events    Patient is seen, chart was reviewed and case was discussed with the treatment team.  Pt is not in any distress.   Lying on bed comfortably.       Vital Signs Last 24 Hrs  T(C): 36.9 (02 Dec 2023 12:45), Max: 37 (01 Dec 2023 20:50)  T(F): 98.5 (02 Dec 2023 12:45), Max: 98.6 (01 Dec 2023 20:50)  HR: 74 (02 Dec 2023 12:45) (72 - 74)  BP: 132/63 (02 Dec 2023 12:45) (95/51 - 151/89)  BP(mean): --  RR: 18 (02 Dec 2023 12:45) (18 - 18)  SpO2: 97% (02 Dec 2023 12:45) (93% - 97%)    Parameters below as of 02 Dec 2023 12:45  Patient On (Oxygen Delivery Method): room air            REVIEW OF SYSTEMS:    Constitutional: No fever,   Eyes: No eye pain, visual disturbances, or discharge  ENT:  No difficulty hearing, tinnitus, vertigo; No sinus or throat pain  Neck: No pain or stiffness  Respiratory: No cough, wheezing, chills or hemoptysis  Cardiovascular: No chest pain, palpitations, shortness of breath, dizziness or leg swelling  Gastrointestinal: No abdominal or epigastric pain. No nausea, vomiting   Genitourinary: No dysuria, frequency, hematuria   Neurological: No headaches,  loss of strength,   Psychiatric: No muscle, back or extremity pain  Skin: No itching, burning, rashes or lesions   Lymph Nodes: No enlarged glands  Endocrine: No heat or cold intolerance;  Allergy and Immunologic: No hives or eczema    On Neurological Examination:    Mental Status - Pt is alert, awake, . Follows commands well and able to answer questions appropriately.Mood and affect  normal    Speech -. Pt has dysarthria.    Cranial Nerves - Pupils 3 mm equal and reactive to light, extraocular eye movements intact. Pt has no visual field deficit.  Pt has no  facial asymmetry. Facial sensation is intact.Tongue - is in midline.    Muscle tone - is normal         Motor Exam - 5/5 of UE  LE 3/5   No drift. No shaking or tremors.    Sensory Exam - P. Pt withdraws all extremities equally on stimulation. No asymmetry seen. No complaints of tingling, numbness.        coordination:    Finger to nose: normal  .    Deep tendon Reflexes - 2 plus all over.          Neck Supple -  Yes.     MEDICATIONS    acetaminophen     Tablet .. 650 milliGRAM(s) Oral every 6 hours PRN  aluminum hydroxide/magnesium hydroxide/simethicone Suspension 30 milliLiter(s) Oral every 4 hours PRN  carbidopa/levodopa  25/100 1 Tablet(s) Oral four times a day  collagenase Ointment 1 Application(s) Topical daily  diphenhydrAMINE 25 milliGRAM(s) Oral every 4 hours PRN  famotidine    Tablet 20 milliGRAM(s) Oral daily  furosemide   Injectable 40 milliGRAM(s) IV Push every 24 hours  heparin   Injectable 5000 Unit(s) SubCutaneous every 8 hours  influenza  Vaccine (HIGH DOSE) 0.7 milliLiter(s) IntraMuscular once  lactobacillus acidophilus 1 Tablet(s) Oral daily  levoFLOXacin  Tablet 250 milliGRAM(s) Oral every 24 hours  melatonin 3 milliGRAM(s) Oral at bedtime PRN  nystatin Powder 1 Application(s) Topical three times a day  ondansetron Injectable 4 milliGRAM(s) IV Push every 8 hours PRN  potassium chloride    Tablet ER 10 milliEquivalent(s) Oral daily      Allergies    No Known Allergies    Intolerances        LABS:  CBC Full  -  ( 02 Dec 2023 07:15 )  WBC Count : 7.82 K/uL  RBC Count : 3.52 M/uL  Hemoglobin : 10.7 g/dL  Hematocrit : 33.3 %  Platelet Count - Automated : 189 K/uL  Mean Cell Volume : 94.6 fl  Mean Cell Hemoglobin : 30.4 pg  Mean Cell Hemoglobin Concentration : 32.1 gm/dL  Auto Neutrophil # : x  Auto Lymphocyte # : x  Auto Monocyte # : x  Auto Eosinophil # : x  Auto Basophil # : x   : x    Urinalysis Basic - ( 02 Dec 2023 12:22 )    Color: x / Appearance: x / SG: x / pH: x  Gluc: 94 mg/dL / Ketone: x  / Bili: x / Urobili: x   Blood: x / Protein: x / Nitrite: x   Leuk Esterase: x / RBC: x / WBC x   Sq Epi: x / Non Sq Epi: x / Bacteria: x      12-02    142  |  102  |  17  ----------------------------<  94  3.7   |  34<H>  |  0.84    Ca    8.3<L>      02 Dec 2023 12:22      Hemoglobin A1C:     Vitamin B12     RADIOLOGY    ASSESSMENT AND PLAN:      SEEN FOR FALL  LIKELY MECHANICAL  PD    Continue sinemet  Physical therapy evaluation.  OOB to chair/ambulation with assistance only.  Pain is accessed and addressed.  Plan of care was discussed with family. Questions answered.  Would continue to follow.

## 2023-12-02 NOTE — CONSULT NOTE ADULT - ASSESSMENT
A/P 87y year old Male with recurrent falls, progressively worsening parkinsons disease    Patient is a candidate for acute inpatient rehabilitation, he is a qualifier by diagnosis of parkinsons disease and would particularly benefit from parkinsons unit at [facility] acute [facility] -- but is a candidate for acute rehab elsewhere as well. He is participating with PT and OT, requires both disciplines for functional deficits, will require slp for dysphagia found on bedside swallwo while inpatient here.    He is both willing and able to perform three hours of daily therapy.    Primary team notes are reviewed  Therapy notes are reviewed    55 minutes spent during patient encounter:    ¦ preparing to see the patient   ¦ obtaining and/or reviewing separately obtained history from patients daughter at bedside  ¦ performing a medically appropriate examination and/or evaluation   ¦ counseling and educating the patient/family/caregiver as it related to his potential dispo options, acute vs brittany, inpatient vs outpatient pt/ot, risks and benefits of each.  ¦ referring and communicating with other health care professionals  ¦ documenting clinical information in the electronic or other health record

## 2023-12-02 NOTE — PROGRESS NOTE ADULT - SUBJECTIVE AND OBJECTIVE BOX
Date of Service 12-02-23 @ 16:25    Patient is a 87y old  Male who presents with a chief complaint of recurrent falls (02 Dec 2023 14:56)      INTERVAL /OVERNIGHT EVENTS: + rash    MEDICATIONS  (STANDING):  carbidopa/levodopa  25/100 1 Tablet(s) Oral four times a day  collagenase Ointment 1 Application(s) Topical daily  famotidine    Tablet 20 milliGRAM(s) Oral daily  furosemide   Injectable 40 milliGRAM(s) IV Push every 24 hours  heparin   Injectable 5000 Unit(s) SubCutaneous every 8 hours  influenza  Vaccine (HIGH DOSE) 0.7 milliLiter(s) IntraMuscular once  lactobacillus acidophilus 1 Tablet(s) Oral daily  levoFLOXacin  Tablet 250 milliGRAM(s) Oral every 24 hours  nystatin Powder 1 Application(s) Topical three times a day  potassium chloride    Tablet ER 10 milliEquivalent(s) Oral daily    MEDICATIONS  (PRN):  acetaminophen     Tablet .. 650 milliGRAM(s) Oral every 6 hours PRN Temp greater or equal to 38C (100.4F), Mild Pain (1 - 3)  aluminum hydroxide/magnesium hydroxide/simethicone Suspension 30 milliLiter(s) Oral every 4 hours PRN Dyspepsia  diphenhydrAMINE 25 milliGRAM(s) Oral every 4 hours PRN Rash and/or Itching  melatonin 3 milliGRAM(s) Oral at bedtime PRN Insomnia  ondansetron Injectable 4 milliGRAM(s) IV Push every 8 hours PRN Nausea and/or Vomiting      Allergies    No Known Allergies    Intolerances        REVIEW OF SYSTEMS:  CONSTITUTIONAL: No fever, weight loss, or fatigue  EYES: No eye pain, visual disturbances, or discharge  ENMT:  No difficulty hearing, tinnitus, vertigo; No sinus or throat pain  NECK: No pain or stiffness  RESPIRATORY: No cough, wheezing, chills or hemoptysis; No shortness of breath  CARDIOVASCULAR: No chest pain, palpitations, dizziness, or leg swelling  GASTROINTESTINAL: No abdominal or epigastric pain. No nausea, vomiting, or hematemesis; No diarrhea or constipation. No melena or hematochezia.  GENITOURINARY: No dysuria, frequency, hematuria, or incontinence  NEUROLOGICAL: No headaches, memory loss, loss of strength, numbness, or tremors  SKIN: + rashes   LYMPH NODES: No enlarged glands  ENDOCRINE: No heat or cold intolerance; No hair loss; No polydipsia or polyuria  MUSCULOSKELETAL: No joint pain or swelling; No muscle, back, or extremity pain  PSYCHIATRIC: No depression, anxiety, mood swings, or difficulty sleeping  HEME/LYMPH: No easy bruising, or bleeding gums  ALLERGY AND IMMUNOLOGIC: No hives or eczema    Vital Signs Last 24 Hrs  T(C): 36.9 (02 Dec 2023 12:45), Max: 37 (01 Dec 2023 20:50)  T(F): 98.5 (02 Dec 2023 12:45), Max: 98.6 (01 Dec 2023 20:50)  HR: 74 (02 Dec 2023 12:45) (72 - 74)  BP: 132/63 (02 Dec 2023 12:45) (95/51 - 151/89)  BP(mean): --  RR: 18 (02 Dec 2023 12:45) (18 - 18)  SpO2: 97% (02 Dec 2023 12:45) (93% - 97%)    Parameters below as of 02 Dec 2023 12:45  Patient On (Oxygen Delivery Method): room air        PHYSICAL EXAM:  GENERAL: NAD, well-groomed, well-developed  HEAD:  Atraumatic, Normocephalic  EYES: EOMI, PERRLA, conjunctiva and sclera clear  ENMT: No tonsillar erythema, exudates, or enlargement; Moist mucous membranes, Good dentition, No lesions  NECK: Supple, No JVD, Normal thyroid  NERVOUS SYSTEM:  Alert & Oriented X3, Good concentration; Motor Strength 5/5 B/L upper and lower extremities; DTRs 2+ intact and symmetric  CHEST/LUNG: Clear to auscultation bilaterally; No rales, rhonchi, wheezing, or rubs  HEART: Regular rate and rhythm; No murmurs, rubs, or gallops  ABDOMEN: Soft, Nontender, Nondistended; Bowel sounds present  EXTREMITIES:  2+ Peripheral Pulses, No clubbing, cyanosis, or edema  LYMPH: No lymphadenopathy noted  SKIN: +rashes    LABS:                        10.7   7.82  )-----------( 189      ( 02 Dec 2023 07:15 )             33.3     02 Dec 2023 12:22    142    |  102    |  17     ----------------------------<  94     3.7     |  34     |  0.84     Ca    8.3        02 Dec 2023 12:22        Urinalysis Basic - ( 02 Dec 2023 12:22 )    Color: x / Appearance: x / SG: x / pH: x  Gluc: 94 mg/dL / Ketone: x  / Bili: x / Urobili: x   Blood: x / Protein: x / Nitrite: x   Leuk Esterase: x / RBC: x / WBC x   Sq Epi: x / Non Sq Epi: x / Bacteria: x      CAPILLARY BLOOD GLUCOSE          RADIOLOGY & ADDITIONAL TESTS:    Notes Reviewed:  [x ] YES  [ ] NO    Care Discussed with Consultants/Other Providers [x ] YES  [ ] NO

## 2023-12-03 ENCOUNTER — TRANSCRIPTION ENCOUNTER (OUTPATIENT)
Age: 88
End: 2023-12-03

## 2023-12-03 LAB
ANION GAP SERPL CALC-SCNC: 6 MMOL/L — SIGNIFICANT CHANGE UP (ref 5–17)
ANION GAP SERPL CALC-SCNC: 6 MMOL/L — SIGNIFICANT CHANGE UP (ref 5–17)
BUN SERPL-MCNC: 18 MG/DL — SIGNIFICANT CHANGE UP (ref 7–23)
BUN SERPL-MCNC: 18 MG/DL — SIGNIFICANT CHANGE UP (ref 7–23)
CALCIUM SERPL-MCNC: 8.5 MG/DL — SIGNIFICANT CHANGE UP (ref 8.5–10.1)
CALCIUM SERPL-MCNC: 8.5 MG/DL — SIGNIFICANT CHANGE UP (ref 8.5–10.1)
CHLORIDE SERPL-SCNC: 101 MMOL/L — SIGNIFICANT CHANGE UP (ref 96–108)
CHLORIDE SERPL-SCNC: 101 MMOL/L — SIGNIFICANT CHANGE UP (ref 96–108)
CO2 SERPL-SCNC: 33 MMOL/L — HIGH (ref 22–31)
CO2 SERPL-SCNC: 33 MMOL/L — HIGH (ref 22–31)
CREAT SERPL-MCNC: 0.92 MG/DL — SIGNIFICANT CHANGE UP (ref 0.5–1.3)
CREAT SERPL-MCNC: 0.92 MG/DL — SIGNIFICANT CHANGE UP (ref 0.5–1.3)
EGFR: 82 ML/MIN/1.73M2 — SIGNIFICANT CHANGE UP
EGFR: 82 ML/MIN/1.73M2 — SIGNIFICANT CHANGE UP
GLUCOSE SERPL-MCNC: 121 MG/DL — HIGH (ref 70–99)
GLUCOSE SERPL-MCNC: 121 MG/DL — HIGH (ref 70–99)
POTASSIUM SERPL-MCNC: 3.9 MMOL/L — SIGNIFICANT CHANGE UP (ref 3.5–5.3)
POTASSIUM SERPL-MCNC: 3.9 MMOL/L — SIGNIFICANT CHANGE UP (ref 3.5–5.3)
POTASSIUM SERPL-SCNC: 3.9 MMOL/L — SIGNIFICANT CHANGE UP (ref 3.5–5.3)
POTASSIUM SERPL-SCNC: 3.9 MMOL/L — SIGNIFICANT CHANGE UP (ref 3.5–5.3)
SODIUM SERPL-SCNC: 140 MMOL/L — SIGNIFICANT CHANGE UP (ref 135–145)
SODIUM SERPL-SCNC: 140 MMOL/L — SIGNIFICANT CHANGE UP (ref 135–145)

## 2023-12-03 RX ORDER — DIPHENHYDRAMINE HCL 50 MG
1 CAPSULE ORAL
Qty: 0 | Refills: 0 | DISCHARGE
Start: 2023-12-03

## 2023-12-03 RX ORDER — HEPARIN SODIUM 5000 [USP'U]/ML
5000 INJECTION INTRAVENOUS; SUBCUTANEOUS
Qty: 0 | Refills: 0 | DISCHARGE
Start: 2023-12-03

## 2023-12-03 RX ORDER — POTASSIUM CHLORIDE 20 MEQ
1 PACKET (EA) ORAL
Qty: 0 | Refills: 0 | DISCHARGE
Start: 2023-12-03

## 2023-12-03 RX ORDER — HYDROCHLOROTHIAZIDE 25 MG
1 TABLET ORAL
Qty: 0 | Refills: 0 | DISCHARGE

## 2023-12-03 RX ORDER — LACTOBACILLUS ACIDOPHILUS 100MM CELL
1 CAPSULE ORAL
Qty: 0 | Refills: 0 | DISCHARGE
Start: 2023-12-03

## 2023-12-03 RX ORDER — COLLAGENASE CLOSTRIDIUM HIST. 250 UNIT/G
1 OINTMENT (GRAM) TOPICAL
Qty: 0 | Refills: 0 | DISCHARGE
Start: 2023-12-03

## 2023-12-03 RX ORDER — NYSTATIN CREAM 100000 [USP'U]/G
1 CREAM TOPICAL
Qty: 0 | Refills: 0 | DISCHARGE
Start: 2023-12-03

## 2023-12-03 RX ORDER — ACETAMINOPHEN 500 MG
2 TABLET ORAL
Qty: 0 | Refills: 0 | DISCHARGE
Start: 2023-12-03

## 2023-12-03 RX ORDER — FUROSEMIDE 40 MG
1 TABLET ORAL
Qty: 0 | Refills: 0 | DISCHARGE
Start: 2023-12-03

## 2023-12-03 RX ORDER — FAMOTIDINE 10 MG/ML
1 INJECTION INTRAVENOUS
Qty: 0 | Refills: 0 | DISCHARGE
Start: 2023-12-03

## 2023-12-03 RX ADMIN — CARBIDOPA AND LEVODOPA 1 TABLET(S): 25; 100 TABLET ORAL at 17:52

## 2023-12-03 RX ADMIN — NYSTATIN CREAM 1 APPLICATION(S): 100000 CREAM TOPICAL at 13:44

## 2023-12-03 RX ADMIN — NYSTATIN CREAM 1 APPLICATION(S): 100000 CREAM TOPICAL at 05:01

## 2023-12-03 RX ADMIN — CARBIDOPA AND LEVODOPA 1 TABLET(S): 25; 100 TABLET ORAL at 11:30

## 2023-12-03 RX ADMIN — Medication 10 MILLIEQUIVALENT(S): at 17:52

## 2023-12-03 RX ADMIN — HEPARIN SODIUM 5000 UNIT(S): 5000 INJECTION INTRAVENOUS; SUBCUTANEOUS at 05:01

## 2023-12-03 RX ADMIN — HEPARIN SODIUM 5000 UNIT(S): 5000 INJECTION INTRAVENOUS; SUBCUTANEOUS at 13:43

## 2023-12-03 RX ADMIN — NYSTATIN CREAM 1 APPLICATION(S): 100000 CREAM TOPICAL at 21:54

## 2023-12-03 RX ADMIN — CARBIDOPA AND LEVODOPA 1 TABLET(S): 25; 100 TABLET ORAL at 23:21

## 2023-12-03 RX ADMIN — Medication 1 TABLET(S): at 11:29

## 2023-12-03 RX ADMIN — Medication 40 MILLIGRAM(S): at 05:01

## 2023-12-03 RX ADMIN — CARBIDOPA AND LEVODOPA 1 TABLET(S): 25; 100 TABLET ORAL at 05:01

## 2023-12-03 RX ADMIN — Medication 1 APPLICATION(S): at 13:44

## 2023-12-03 RX ADMIN — FAMOTIDINE 20 MILLIGRAM(S): 10 INJECTION INTRAVENOUS at 11:30

## 2023-12-03 RX ADMIN — HEPARIN SODIUM 5000 UNIT(S): 5000 INJECTION INTRAVENOUS; SUBCUTANEOUS at 21:53

## 2023-12-03 RX ADMIN — Medication 10 MILLIEQUIVALENT(S): at 05:01

## 2023-12-03 NOTE — DISCHARGE NOTE PROVIDER - NSDCCPCAREPLAN_GEN_ALL_CORE_FT
PRINCIPAL DISCHARGE DIAGNOSIS  Diagnosis: Cellulitis  Assessment and Plan of Treatment: follow up with rehab MD      SECONDARY DISCHARGE DIAGNOSES  Diagnosis: Falls  Assessment and Plan of Treatment:

## 2023-12-03 NOTE — DISCHARGE NOTE PROVIDER - CARE PROVIDER_API CALL
Alessio Melendez  Internal Medicine  [address] [place] [address]  Phone: [phone]  Fax: [phone]  Follow Up Time:     Toby Simpson)  Neurology  [address]  [place] [address]  Phone: [phone]  Fax: ()-  Follow Up Time:    Alessio Melendez  Internal Medicine  [address] [place] [address]  Phone: [phone]  Fax: [phone]  Follow Up Time:     Toby Simpson)  Neurology  [address]  [place] [address]  Phone: [phone]  Fax: ()-  Follow Up Time:    Alessio Melendez  Internal Medicine  [address] [place] [address]  Phone: [phone]  Fax: [phone]  Follow Up Time:     Toby Simpson)  Neurology  [address]  [place] [address]  Phone: [phone]  Fax: ()-  Follow Up Time:

## 2023-12-03 NOTE — PROGRESS NOTE ADULT - SUBJECTIVE AND OBJECTIVE BOX
Physical Medicine and Rehabilitation Subsequent Evaluation    DOS 12/2/2023    The patient was seen and examined at bedside. Denies any pain. Was sitting upright, alert, eating, and making jokes.  Patients family at bedside too -- we had discussed yesterday that he wasnt likely to tolerate three hours of therapy daily based on their reported history of patients function and decline, however they as well as I also did not know that patient had performed therapy and done considerably well. I had left the conversation intent on recommending subacute rehab, but following revision of notes, patients candidacy for acute rehab became evident.   We discussed his candidacy for acute inpatient rehab.  Social work notes reviewed, referral is made to [facility].    ROS:  Constitutional: Denies fevers or chills  MSK: Unsteady gait and poor balance, complains of weakness    PAST MEDICAL & SURGICAL HISTORY:  Parkinsons  HTN (hypertension)  PVD   Chronic LLE wound  Recurrrent falls    Medications: reviewed    Physical Exam:   Vitals reviewed    Constitutional: Gen: In no acute distress, cooperative with exam and questioning   Neuro: Sensation intact to light touch in peripheral upper and lower extremities  MSK: Strength 3 to 4-/5 in bilateral upper and lower extremities, ROM full in all extremities passively  Psychiatric: Awake alert fully oriented

## 2023-12-03 NOTE — DISCHARGE NOTE PROVIDER - HOSPITAL COURSE
admitted for fall with PD  UTI ruled out  LE cellulitis  ABX per ID  developed rash  possible cephalosporin allergy  DC after ID and neuro clearance

## 2023-12-03 NOTE — PROGRESS NOTE ADULT - SUBJECTIVE AND OBJECTIVE BOX
Physical Medicine and Rehabilitation Subsequent Evaluation    Seen in f/u for functional deficits    No new MSK pain noted. No therapy eval today.  Doing well, no overnight events.    ROS:  Constitutional: Denies fevers or chills  MSK: Unsteady gait and poor balance, complains of weakness    PAST MEDICAL & SURGICAL HISTORY:  Parkinsons  HTN (hypertension)  PVD   Chronic LLE wound  Recurrrent falls    Medications: reviewed    Physical Exam:   Vitals reviewed    Constitutional: Gen: In no acute distress, cooperative with exam and questioning   Neuro: Sensation intact to light touch in peripheral upper and lower extremities  MSK: Strength 3 to 4-/5 in bilateral upper and lower extremities, ROM full in all extremities passively  Psychiatric: Awake alert fully oriented

## 2023-12-03 NOTE — PROGRESS NOTE ADULT - ASSESSMENT
A/P 87y year old Male with recurrent falls, progressively worsening parkinsons disease    Patient is a candidate for acute inpatient rehabilitation, he is a qualifier by diagnosis of parkinsons disease and would particularly benefit from parkinsons unit at [facility] -- but is a candidate for acute rehab elsewhere as well. He is participating with PT and OT, requires both disciplines for functional deficits, will require slp for dysphagia found on bedside swallow while inpatient here, as reviewed in notes from SLP. He is both willing and able to perform three hours of daily therapy.    SW made referral, will monitor.    Primary team notes are reviewed    35 minutes spent during patient encounter:    ¦ preparing to see the patient   ¦ performing a medically appropriate examination and/or evaluation   ¦ counseling and educating the patient/family/caregiver re requirements of acute inpatient rehab, patients candidacy.  ¦ referring and communicating with other health care professionals  ¦ documenting clinical information in the electronic or other health record    A/P 87y year old Male with recurrent falls, progressively worsening parkinsons disease    Patient is a candidate for acute inpatient rehabilitation, he is a qualifier by diagnosis of parkinsons disease and would particularly benefit from parkinsons unit at [facility] -- but is a candidate for acute rehab elsewhere as well. He is participating with PT and OT, requires both disciplines for functional deficits, will require slp for dysphagia found on bedside swallow while inpatient here, as reviewed in notes from SLP. He is both willing and able to perform three hours of daily therapy.    SW made referral, will monitor.    Primary team notes are reviewed    35 minutes spent during patient encounter:    ¦ preparing to see the patient   ¦ performing a medically appropriate examination and/or evaluation   ¦ counseling and educating the patient/family/caregiver re requirements of acute inpatient rehab, patients candidacy.  ¦ referring and communicating with other health care professionals  ¦ documenting clinical information in the electronic or other health record    A/P 87y year old Male with recurrent falls, progressively worsening parkinsons disease    Patient is a candidate for acute inpatient rehabilitation, he is a qualifier by diagnosis of parkinsons disease and would particularly benefit from parkinsons unit at [facility] acute [facility] -- but is a candidate for acute rehab elsewhere as well. He is participating with PT and OT, requires both disciplines for functional deficits, will require slp for dysphagia found on bedside swallow while inpatient here, as reviewed in notes from SLP. He is both willing and able to perform three hours of daily therapy.    SW made referral, will monitor.    Primary team notes are reviewed    35 minutes spent during patient encounter:    ¦ preparing to see the patient   ¦ performing a medically appropriate examination and/or evaluation   ¦ counseling and educating the patient/family/caregiver re requirements of acute inpatient rehab, patients candidacy.  ¦ referring and communicating with other health care professionals  ¦ documenting clinical information in the electronic or other health record

## 2023-12-03 NOTE — DISCHARGE NOTE PROVIDER - CARE PROVIDERS DIRECT ADDRESSES
[email or web address] [email or web address],DirectAddress_Unknown [email or web address],DirectAddress_Unknown

## 2023-12-03 NOTE — PROGRESS NOTE ADULT - ASSESSMENT
A/P 87y year old Male with recurrent falls, progressively worsening parkinsons disease    Awaiting inpatient rehab acceptance.    Patient is a candidate for acute inpatient rehabilitation, he is a qualifier by diagnosis of parkinsons disease and would particularly benefit from parkinsons unit at [place] [facility] -- but is a candidate for acute rehab elsewhere as well. He is participating with PT and OT, requires both disciplines for functional deficits, will require slp for dysphagia found on bedside swallow while inpatient here, as reviewed in notes from SLP. He is both willing and able to perform three hours of daily therapy.    Primary team notes are reviewed    25 minutes spent during patient encounter:    ¦ preparing to see the patient   ¦ performing a medically appropriate examination and/or evaluation   ¦ referring and communicating with other health care professionals  ¦ documenting clinical information in the electronic or other health record  A/P 87y year old Male with recurrent falls, progressively worsening parkinsons disease    Awaiting inpatient rehab acceptance.    Patient is a candidate for acute inpatient rehabilitation, he is a qualifier by diagnosis of parkinsons disease and would particularly benefit from parkinsons unit at [place] [facility] -- but is a candidate for acute rehab elsewhere as well. He is participating with PT and OT, requires both disciplines for functional deficits, will require slp for dysphagia found on bedside swallow while inpatient here, as reviewed in notes from SLP. He is both willing and able to perform three hours of daily therapy.    Primary team notes are reviewed    25 minutes spent during patient encounter:    ¦ preparing to see the patient   ¦ performing a medically appropriate examination and/or evaluation   ¦ referring and communicating with other health care professionals  ¦ documenting clinical information in the electronic or other health record

## 2023-12-03 NOTE — DISCHARGE NOTE PROVIDER - DETAILS OF MALNUTRITION DIAGNOSIS/DIAGNOSES
This patient has been assessed with a concern for Malnutrition and was treated during this hospitalization for the following Nutrition diagnosis/diagnoses:     -  12/01/2023: Severe protein-calorie malnutrition

## 2023-12-03 NOTE — DISCHARGE NOTE PROVIDER - NSDCMRMEDTOKEN_GEN_ALL_CORE_FT
acetaminophen 325 mg oral tablet: 2 tab(s) orally every 6 hours As needed Temp greater or equal to 38C (100.4F), Mild Pain (1 - 3)  carbidopa-levodopa 25 mg-100 mg oral tablet: 1 tab(s) orally 4 times a day Takes as follows:  2 tablet(s) orally at 8am  1 tablet(s) orally at 12p, (2 hours after meal)  1 tablet(s) orally at 6p, (2 hours after meal)  collagenase 250 units/g topical ointment: 1 Apply topically to affected area once a day  diphenhydrAMINE 25 mg oral capsule: 1 cap(s) orally every 4 hours As needed Rash and/or Itching  famotidine 20 mg oral tablet: 1 tab(s) orally once a day  furosemide 40 mg oral tablet: 1 tab(s) orally once a day  heparin 5000 units/mL injectable solution: 5,000 unit(s) injectable 2 times a day  lactobacillus acidophilus oral capsule: 1 cap(s) orally once a day  nystatin 100,000 units/g topical powder: 1 Apply topically to affected area 3 times a day  potassium chloride 10 mEq oral tablet, extended release: 1 tab(s) orally 2 times a day   acetaminophen 325 mg oral tablet: 2 tab(s) orally every 6 hours As needed Temp greater or equal to 38C (100.4F), Mild Pain (1 - 3)  carbidopa-levodopa 25 mg-100 mg oral tablet: 1 tab(s) orally 4 times a day Takes as follows:  2 tablet(s) orally at 8am  1 tablet(s) orally at 12p, (2 hours after meal)  1 tablet(s) orally at 6p, (2 hours after meal)  collagenase 250 units/g topical ointment: 1 Apply topically to affected area once a day  diphenhydrAMINE 25 mg oral capsule: 1 cap(s) orally every 4 hours As needed Rash and/or Itching  famotidine 20 mg oral tablet: 1 tab(s) orally once a day  furosemide 40 mg oral tablet: 1 tab(s) orally once a day  heparin 5000 units/mL injectable solution: 5,000 unit(s) injectable 2 times a day  lactobacillus acidophilus oral capsule: 1 cap(s) orally once a day  nystatin 100,000 units/g topical powder: 1 Apply topically to affected area 3 times a day  potassium chloride 10 mEq oral tablet, extended release: 1 tab(s) orally 3 times a day

## 2023-12-03 NOTE — PROGRESS NOTE ADULT - ASSESSMENT
86 y/o M with hx of parkinsons, HTN, PVD, chronic wound LLE pw weakness/?fall pt states he was with his walker near the steps and felt like his legs were too weak and he slid down to the floor.   In ER patient was found to have possible CHF. patient is being admitted for further work up and treatment (30 Nov 2023 15:20)  ID c/s uptrending WBC, possible LE cellulitis    rle cellulitis   UA not impressive for infection, UCx NGTD  rash - ? etiology antibx > switched     Recommendations:   rash resolved  legs improving    on levofloxacin day 4 of 5  wbc down  Trend temps/WBC  local care  keep elevated   dc planning      88 y/o M with hx of parkinsons, HTN, PVD, chronic wound LLE pw weakness/?fall pt states he was with his walker near the steps and felt like his legs were too weak and he slid down to the floor.   In ER patient was found to have possible CHF. patient is being admitted for further work up and treatment (30 Nov 2023 15:20)  ID c/s uptrending WBC, possible LE cellulitis    rle cellulitis   UA not impressive for infection, UCx NGTD  rash - ? etiology antibx > switched     Recommendations:   rash resolved  legs improving    on levofloxacin day 4 of 5  wbc down  Trend temps/WBC  local care  keep elevated   dc planning

## 2023-12-03 NOTE — PROGRESS NOTE ADULT - SUBJECTIVE AND OBJECTIVE BOX
Patient is a 87y old  Male who presents with a chief complaint of recurrent falls (02 Dec 2023 14:56)      INTERVAL /OVERNIGHT EVENTS: + rash      T(C): 36.8 (12-03-23 @ 12:16), Max: 36.8 (12-03-23 @ 12:16)  HR: 81 (12-03-23 @ 12:16) (81 - 81)  BP: 101/61 (12-03-23 @ 12:16) (101/61 - 101/61)  RR: 17 (12-03-23 @ 12:16) (17 - 17)  SpO2: 93% (12-03-23 @ 12:16) (93% - 93%)      MEDICATIONS  (STANDING):  carbidopa/levodopa  25/100 1 Tablet(s) Oral four times a day  collagenase Ointment 1 Application(s) Topical daily  famotidine    Tablet 20 milliGRAM(s) Oral daily  furosemide    Tablet 40 milliGRAM(s) Oral daily  heparin   Injectable 5000 Unit(s) SubCutaneous every 8 hours  influenza  Vaccine (HIGH DOSE) 0.7 milliLiter(s) IntraMuscular once  lactobacillus acidophilus 1 Tablet(s) Oral daily  levoFLOXacin  Tablet 250 milliGRAM(s) Oral every 24 hours  nystatin Powder 1 Application(s) Topical three times a day  potassium chloride    Tablet ER 10 milliEquivalent(s) Oral two times a day    MEDICATIONS  (PRN):  acetaminophen     Tablet .. 650 milliGRAM(s) Oral every 6 hours PRN Temp greater or equal to 38C (100.4F), Mild Pain (1 - 3)  aluminum hydroxide/magnesium hydroxide/simethicone Suspension 30 milliLiter(s) Oral every 4 hours PRN Dyspepsia  diphenhydrAMINE 25 milliGRAM(s) Oral every 4 hours PRN Rash and/or Itching  melatonin 3 milliGRAM(s) Oral at bedtime PRN Insomnia  ondansetron Injectable 4 milliGRAM(s) IV Push every 8 hours PRN Nausea and/or Vomiting  No Known Allergies    Intolerances        REVIEW OF SYSTEMS:  CONSTITUTIONAL: No fever, weight loss, or fatigue  EYES: No eye pain, visual disturbances, or discharge  ENMT:  No difficulty hearing, tinnitus, vertigo; No sinus or throat pain  NECK: No pain or stiffness  RESPIRATORY: No cough, wheezing, chills or hemoptysis; No shortness of breath  CARDIOVASCULAR: No chest pain, palpitations, dizziness, or leg swelling  GASTROINTESTINAL: No abdominal or epigastric pain. No nausea, vomiting, or hematemesis; No diarrhea or constipation. No melena or hematochezia.  GENITOURINARY: No dysuria, frequency, hematuria, or incontinence  NEUROLOGICAL: No headaches, memory loss, loss of strength, numbness, or tremors  SKIN: + rashes   LYMPH NODES: No enlarged glands  ENDOCRINE: No heat or cold intolerance; No hair loss; No polydipsia or polyuria  MUSCULOSKELETAL: No joint pain or swelling; No muscle, back, or extremity pain  PSYCHIATRIC: No depression, anxiety, mood swings, or difficulty sleeping  HEME/LYMPH: No easy bruising, or bleeding gums  ALLERGY AND IMMUNOLOGIC: No hives or eczema    Vital Signs Last 24 Hrs  T(C): 36.9 (02 Dec 2023 12:45), Max: 37 (01 Dec 2023 20:50)  T(F): 98.5 (02 Dec 2023 12:45), Max: 98.6 (01 Dec 2023 20:50)  HR: 74 (02 Dec 2023 12:45) (72 - 74)  BP: 132/63 (02 Dec 2023 12:45) (95/51 - 151/89)  BP(mean): --  RR: 18 (02 Dec 2023 12:45) (18 - 18)  SpO2: 97% (02 Dec 2023 12:45) (93% - 97%)    Parameters below as of 02 Dec 2023 12:45  Patient On (Oxygen Delivery Method): room air        PHYSICAL EXAM:  GENERAL: NAD, well-groomed, well-developed  HEAD:  Atraumatic, Normocephalic  EYES: EOMI, PERRLA, conjunctiva and sclera clear  ENMT: No tonsillar erythema, exudates, or enlargement; Moist mucous membranes, Good dentition, No lesions  NECK: Supple, No JVD, Normal thyroid  NERVOUS SYSTEM:  Alert & Oriented X3, Good concentration; Motor Strength 5/5 B/L upper and lower extremities; DTRs 2+ intact and symmetric  CHEST/LUNG: Clear to auscultation bilaterally; No rales, rhonchi, wheezing, or rubs  HEART: Regular rate and rhythm; No murmurs, rubs, or gallops  ABDOMEN: Soft, Nontender, Nondistended; Bowel sounds present  EXTREMITIES:  2+ Peripheral Pulses, No clubbing, cyanosis, or edema  LYMPH: No lymphadenopathy noted  SKIN: +rashes    LABS:                        10.7   7.82  )-----------( 189      ( 02 Dec 2023 07:15 )             33.3     02 Dec 2023 12:22    142    |  102    |  17     ----------------------------<  94     3.7     |  34     |  0.84     Ca    8.3        02 Dec 2023 12:22        Urinalysis Basic - ( 02 Dec 2023 12:22 )    Color: x / Appearance: x / SG: x / pH: x  Gluc: 94 mg/dL / Ketone: x  / Bili: x / Urobili: x   Blood: x / Protein: x / Nitrite: x   Leuk Esterase: x / RBC: x / WBC x   Sq Epi: x / Non Sq Epi: x / Bacteria: x      CAPILLARY BLOOD GLUCOSE          RADIOLOGY & ADDITIONAL TESTS:    Notes Reviewed:  [x ] YES  [ ] NO    Care Discussed with Consultants/Other Providers [x ] YES  [ ] NO

## 2023-12-03 NOTE — PROGRESS NOTE ADULT - SUBJECTIVE AND OBJECTIVE BOX
Neurology Follow up note    DENG [id number]    HPI:  · HPI Objective Statement: 88 y/o M with hx of parkinsons, HTN, PVD, chronic wound LLE pw weakness/?fall pt states he was with his walker near the steps and felt like his legs were too weak and he slid down to the floor. he did not fall or hit his head but was sitting on the floor and was too weak to get up. states his legs feel heavy. pt has not taken his parkinsons meds yet today. no recent illness, fevers, chills, nausea vomitng. pt has chronic abd hernia that was told he cant repair. also has been experiencing rash for months and was evaluated for such and told not the worry about it. currently does not have any pain, did not have chest pain sob, lightheadednses prior to falling.    	daughter arrived later - provided more history. states he has been having multiple falls lately. is noncompliant with his legs, legs become more swollen lately, typically wears compression stocking. states he fell at about 3am and has been on the floor until giler arrived this am. mother lives at home with him but she is sick and on chemo and cant really help take care of him. she also sattes for last few weeks his walking has become difficult that he cant go to the bathroom on time. also has been having weeks of loose stools. does not feel safe for him to be at home  In ER patient was found to have possible CHF. patient is being admitted for further work up and treatment (30 Nov 2023 15:20)      Interval History -no complaints    Patient is seen, chart was reviewed and case was discussed with the treatment team.  Pt is not in any distress.   Lying on bed comfortably.       Vital Signs Last 24 Hrs  T(C): 36.8 (03 Dec 2023 12:16), Max: 37.4 (02 Dec 2023 20:12)  T(F): 98.3 (03 Dec 2023 12:16), Max: 99.4 (02 Dec 2023 20:12)  HR: 81 (03 Dec 2023 12:16) (71 - 86)  BP: 101/61 (03 Dec 2023 12:16) (101/61 - 152/79)  BP(mean): --  RR: 17 (03 Dec 2023 12:16) (17 - 18)  SpO2: 93% (03 Dec 2023 12:16) (93% - 97%)    Parameters below as of 03 Dec 2023 12:16  Patient On (Oxygen Delivery Method): room air                REVIEW OF SYSTEMS:    Constitutional: No fever,   Eyes: No eye pain, visual disturbances, or discharge  ENT:  No difficulty hearing, tinnitus, vertigo; No sinus or throat pain  Neck: No pain or stiffness  Respiratory: No cough, wheezing, chills or hemoptysis  Cardiovascular: No chest pain, palpitations, shortness of breath, dizziness or leg swelling  Gastrointestinal: No abdominal or epigastric pain. No nausea, vomiting   Genitourinary: No dysuria, frequency, hematuria   Neurological: No headaches,  loss of strength,   Psychiatric: No muscle, back or extremity pain  Skin: No itching, burning, rashes or lesions   Lymph Nodes: No enlarged glands  Endocrine: No heat or cold intolerance;  Allergy and Immunologic: No hives or eczema    On Neurological Examination:    Mental Status - Pt is alert, awake, . Follows commands well and able to answer questions appropriately.Mood and affect  normal    Speech -. Pt has dysarthria.    Cranial Nerves - Pupils 3 mm equal and reactive to light, extraocular eye movements intact. Pt has no visual field deficit.  Pt has no  facial asymmetry. Facial sensation is intact.Tongue - is in midline.    Muscle tone - is normal         Motor Exam - 5/5 of UE  LE 3/5   No drift. No shaking or tremors.    Sensory Exam - P. Pt withdraws all extremities equally on stimulation. No asymmetry seen. No complaints of tingling, numbness.        coordination:    Finger to nose: normal  .    Deep tendon Reflexes - 2 plus all over.          Neck Supple -  Yes.       MEDICATIONS  (STANDING):  carbidopa/levodopa  25/100 1 Tablet(s) Oral four times a day  collagenase Ointment 1 Application(s) Topical daily  famotidine    Tablet 20 milliGRAM(s) Oral daily  furosemide    Tablet 40 milliGRAM(s) Oral daily  heparin   Injectable 5000 Unit(s) SubCutaneous every 8 hours  influenza  Vaccine (HIGH DOSE) 0.7 milliLiter(s) IntraMuscular once  lactobacillus acidophilus 1 Tablet(s) Oral daily  levoFLOXacin  Tablet 250 milliGRAM(s) Oral every 24 hours  nystatin Powder 1 Application(s) Topical three times a day  potassium chloride    Tablet ER 10 milliEquivalent(s) Oral two times a day    MEDICATIONS  (PRN):  acetaminophen     Tablet .. 650 milliGRAM(s) Oral every 6 hours PRN Temp greater or equal to 38C (100.4F), Mild Pain (1 - 3)  aluminum hydroxide/magnesium hydroxide/simethicone Suspension 30 milliLiter(s) Oral every 4 hours PRN Dyspepsia  diphenhydrAMINE 25 milliGRAM(s) Oral every 4 hours PRN Rash and/or Itching  melatonin 3 milliGRAM(s) Oral at bedtime PRN Insomnia  ondansetron Injectable 4 milliGRAM(s) IV Push every 8 hours PRN Nausea and/or Vomiting    Allergies    No Known Allergies    Intolerances    12-03    140  |  101  |  18  ----------------------------<  121<H>  3.9   |  33<H>  |  0.92    Ca    8.5      03 Dec 2023 10:18        Hemoglobin A1C:     Vitamin B12     RADIOLOGY    ASSESSMENT AND PLAN:      SEEN FOR FALL  LIKELY MECHANICAL  PD    Continue sinemet  Physical therapy evaluation.  OOB to chair/ambulation with assistance only.  Pain is accessed and addressed.  Would continue to follow.       Neurology Follow up note    DENG [id number]    HPI:  · HPI Objective Statement: 86 y/o M with hx of parkinsons, HTN, PVD, chronic wound LLE pw weakness/?fall pt states he was with his walker near the steps and felt like his legs were too weak and he slid down to the floor. he did not fall or hit his head but was sitting on the floor and was too weak to get up. states his legs feel heavy. pt has not taken his parkinsons meds yet today. no recent illness, fevers, chills, nausea vomitng. pt has chronic abd hernia that was told he cant repair. also has been experiencing rash for months and was evaluated for such and told not the worry about it. currently does not have any pain, did not have chest pain sob, lightheadednses prior to falling.    	daughter arrived later - provided more history. states he has been having multiple falls lately. is noncompliant with his legs, legs become more swollen lately, typically wears compression stocking. states he fell at about 3am and has been on the floor until giler arrived this am. mother lives at home with him but she is sick and on chemo and cant really help take care of him. she also sattes for last few weeks his walking has become difficult that he cant go to the bathroom on time. also has been having weeks of loose stools. does not feel safe for him to be at home  In ER patient was found to have possible CHF. patient is being admitted for further work up and treatment (30 Nov 2023 15:20)      Interval History -no complaints    Patient is seen, chart was reviewed and case was discussed with the treatment team.  Pt is not in any distress.   Lying on bed comfortably.       Vital Signs Last 24 Hrs  T(C): 36.8 (03 Dec 2023 12:16), Max: 37.4 (02 Dec 2023 20:12)  T(F): 98.3 (03 Dec 2023 12:16), Max: 99.4 (02 Dec 2023 20:12)  HR: 81 (03 Dec 2023 12:16) (71 - 86)  BP: 101/61 (03 Dec 2023 12:16) (101/61 - 152/79)  BP(mean): --  RR: 17 (03 Dec 2023 12:16) (17 - 18)  SpO2: 93% (03 Dec 2023 12:16) (93% - 97%)    Parameters below as of 03 Dec 2023 12:16  Patient On (Oxygen Delivery Method): room air                REVIEW OF SYSTEMS:    Constitutional: No fever,   Eyes: No eye pain, visual disturbances, or discharge  ENT:  No difficulty hearing, tinnitus, vertigo; No sinus or throat pain  Neck: No pain or stiffness  Respiratory: No cough, wheezing, chills or hemoptysis  Cardiovascular: No chest pain, palpitations, shortness of breath, dizziness or leg swelling  Gastrointestinal: No abdominal or epigastric pain. No nausea, vomiting   Genitourinary: No dysuria, frequency, hematuria   Neurological: No headaches,  loss of strength,   Psychiatric: No muscle, back or extremity pain  Skin: No itching, burning, rashes or lesions   Lymph Nodes: No enlarged glands  Endocrine: No heat or cold intolerance;  Allergy and Immunologic: No hives or eczema    On Neurological Examination:    Mental Status - Pt is alert, awake, . Follows commands well and able to answer questions appropriately.Mood and affect  normal    Speech -. Pt has dysarthria.    Cranial Nerves - Pupils 3 mm equal and reactive to light, extraocular eye movements intact. Pt has no visual field deficit.  Pt has no  facial asymmetry. Facial sensation is intact.Tongue - is in midline.    Muscle tone - is normal         Motor Exam - 5/5 of UE  LE 3/5   No drift. No shaking or tremors.    Sensory Exam - P. Pt withdraws all extremities equally on stimulation. No asymmetry seen. No complaints of tingling, numbness.        coordination:    Finger to nose: normal  .    Deep tendon Reflexes - 2 plus all over.          Neck Supple -  Yes.       MEDICATIONS  (STANDING):  carbidopa/levodopa  25/100 1 Tablet(s) Oral four times a day  collagenase Ointment 1 Application(s) Topical daily  famotidine    Tablet 20 milliGRAM(s) Oral daily  furosemide    Tablet 40 milliGRAM(s) Oral daily  heparin   Injectable 5000 Unit(s) SubCutaneous every 8 hours  influenza  Vaccine (HIGH DOSE) 0.7 milliLiter(s) IntraMuscular once  lactobacillus acidophilus 1 Tablet(s) Oral daily  levoFLOXacin  Tablet 250 milliGRAM(s) Oral every 24 hours  nystatin Powder 1 Application(s) Topical three times a day  potassium chloride    Tablet ER 10 milliEquivalent(s) Oral two times a day    MEDICATIONS  (PRN):  acetaminophen     Tablet .. 650 milliGRAM(s) Oral every 6 hours PRN Temp greater or equal to 38C (100.4F), Mild Pain (1 - 3)  aluminum hydroxide/magnesium hydroxide/simethicone Suspension 30 milliLiter(s) Oral every 4 hours PRN Dyspepsia  diphenhydrAMINE 25 milliGRAM(s) Oral every 4 hours PRN Rash and/or Itching  melatonin 3 milliGRAM(s) Oral at bedtime PRN Insomnia  ondansetron Injectable 4 milliGRAM(s) IV Push every 8 hours PRN Nausea and/or Vomiting    Allergies    No Known Allergies    Intolerances    12-03    140  |  101  |  18  ----------------------------<  121<H>  3.9   |  33<H>  |  0.92    Ca    8.5      03 Dec 2023 10:18        Hemoglobin A1C:     Vitamin B12     RADIOLOGY    ASSESSMENT AND PLAN:      SEEN FOR FALL  LIKELY MECHANICAL  PD    Continue sinemet  Physical therapy evaluation.  OOB to chair/ambulation with assistance only.  Pain is accessed and addressed.  Would continue to follow.

## 2023-12-04 LAB
HCT VFR BLD CALC: 38.3 % — LOW (ref 39–50)
HCT VFR BLD CALC: 38.3 % — LOW (ref 39–50)
HGB BLD-MCNC: 12.6 G/DL — LOW (ref 13–17)
HGB BLD-MCNC: 12.6 G/DL — LOW (ref 13–17)
MCHC RBC-ENTMCNC: 30.7 PG — SIGNIFICANT CHANGE UP (ref 27–34)
MCHC RBC-ENTMCNC: 30.7 PG — SIGNIFICANT CHANGE UP (ref 27–34)
MCHC RBC-ENTMCNC: 32.9 GM/DL — SIGNIFICANT CHANGE UP (ref 32–36)
MCHC RBC-ENTMCNC: 32.9 GM/DL — SIGNIFICANT CHANGE UP (ref 32–36)
MCV RBC AUTO: 93.4 FL — SIGNIFICANT CHANGE UP (ref 80–100)
MCV RBC AUTO: 93.4 FL — SIGNIFICANT CHANGE UP (ref 80–100)
NRBC # BLD: 0 /100 WBCS — SIGNIFICANT CHANGE UP (ref 0–0)
NRBC # BLD: 0 /100 WBCS — SIGNIFICANT CHANGE UP (ref 0–0)
PLATELET # BLD AUTO: 258 K/UL — SIGNIFICANT CHANGE UP (ref 150–400)
PLATELET # BLD AUTO: 258 K/UL — SIGNIFICANT CHANGE UP (ref 150–400)
RBC # BLD: 4.1 M/UL — LOW (ref 4.2–5.8)
RBC # BLD: 4.1 M/UL — LOW (ref 4.2–5.8)
RBC # FLD: 13.2 % — SIGNIFICANT CHANGE UP (ref 10.3–14.5)
RBC # FLD: 13.2 % — SIGNIFICANT CHANGE UP (ref 10.3–14.5)
WBC # BLD: 8.9 K/UL — SIGNIFICANT CHANGE UP (ref 3.8–10.5)
WBC # BLD: 8.9 K/UL — SIGNIFICANT CHANGE UP (ref 3.8–10.5)
WBC # FLD AUTO: 8.9 K/UL — SIGNIFICANT CHANGE UP (ref 3.8–10.5)
WBC # FLD AUTO: 8.9 K/UL — SIGNIFICANT CHANGE UP (ref 3.8–10.5)

## 2023-12-04 RX ORDER — POTASSIUM CHLORIDE 20 MEQ
10 PACKET (EA) ORAL THREE TIMES A DAY
Refills: 0 | Status: DISCONTINUED | OUTPATIENT
Start: 2023-12-04 | End: 2023-12-05

## 2023-12-04 RX ADMIN — NYSTATIN CREAM 1 APPLICATION(S): 100000 CREAM TOPICAL at 05:10

## 2023-12-04 RX ADMIN — CARBIDOPA AND LEVODOPA 1 TABLET(S): 25; 100 TABLET ORAL at 17:33

## 2023-12-04 RX ADMIN — HEPARIN SODIUM 5000 UNIT(S): 5000 INJECTION INTRAVENOUS; SUBCUTANEOUS at 13:31

## 2023-12-04 RX ADMIN — Medication 10 MILLIEQUIVALENT(S): at 21:16

## 2023-12-04 RX ADMIN — Medication 10 MILLIEQUIVALENT(S): at 13:34

## 2023-12-04 RX ADMIN — Medication 10 MILLIEQUIVALENT(S): at 05:10

## 2023-12-04 RX ADMIN — Medication 40 MILLIGRAM(S): at 05:10

## 2023-12-04 RX ADMIN — CARBIDOPA AND LEVODOPA 1 TABLET(S): 25; 100 TABLET ORAL at 23:14

## 2023-12-04 RX ADMIN — HEPARIN SODIUM 5000 UNIT(S): 5000 INJECTION INTRAVENOUS; SUBCUTANEOUS at 05:09

## 2023-12-04 RX ADMIN — Medication 1 APPLICATION(S): at 11:33

## 2023-12-04 RX ADMIN — HEPARIN SODIUM 5000 UNIT(S): 5000 INJECTION INTRAVENOUS; SUBCUTANEOUS at 21:16

## 2023-12-04 RX ADMIN — NYSTATIN CREAM 1 APPLICATION(S): 100000 CREAM TOPICAL at 21:16

## 2023-12-04 RX ADMIN — FAMOTIDINE 20 MILLIGRAM(S): 10 INJECTION INTRAVENOUS at 11:33

## 2023-12-04 RX ADMIN — Medication 1 TABLET(S): at 11:33

## 2023-12-04 RX ADMIN — CARBIDOPA AND LEVODOPA 1 TABLET(S): 25; 100 TABLET ORAL at 11:33

## 2023-12-04 RX ADMIN — CARBIDOPA AND LEVODOPA 1 TABLET(S): 25; 100 TABLET ORAL at 05:10

## 2023-12-04 RX ADMIN — NYSTATIN CREAM 1 APPLICATION(S): 100000 CREAM TOPICAL at 13:34

## 2023-12-04 RX ADMIN — Medication 3 MILLIGRAM(S): at 21:16

## 2023-12-04 NOTE — PROGRESS NOTE ADULT - SUBJECTIVE AND OBJECTIVE BOX
Neurology follow up note    DENG [id number]      Interval History:    Patient feels ok no new complaints seen with dtr     Allergies    No Known Allergies    Intolerances        MEDICATIONS    acetaminophen     Tablet .. 650 milliGRAM(s) Oral every 6 hours PRN  aluminum hydroxide/magnesium hydroxide/simethicone Suspension 30 milliLiter(s) Oral every 4 hours PRN  carbidopa/levodopa  25/100 1 Tablet(s) Oral four times a day  collagenase Ointment 1 Application(s) Topical daily  diphenhydrAMINE 25 milliGRAM(s) Oral every 4 hours PRN  famotidine    Tablet 20 milliGRAM(s) Oral daily  furosemide    Tablet 40 milliGRAM(s) Oral daily  heparin   Injectable 5000 Unit(s) SubCutaneous every 8 hours  influenza  Vaccine (HIGH DOSE) 0.7 milliLiter(s) IntraMuscular once  lactobacillus acidophilus 1 Tablet(s) Oral daily  levoFLOXacin  Tablet 250 milliGRAM(s) Oral every 24 hours  melatonin 3 milliGRAM(s) Oral at bedtime PRN  nystatin Powder 1 Application(s) Topical three times a day  ondansetron Injectable 4 milliGRAM(s) IV Push every 8 hours PRN  potassium chloride    Tablet ER 10 milliEquivalent(s) Oral two times a day              Vital Signs Last 24 Hrs  T(C): 36.9 (04 Dec 2023 04:50), Max: 36.9 (04 Dec 2023 04:50)  T(F): 98.4 (04 Dec 2023 04:50), Max: 98.4 (04 Dec 2023 04:50)  HR: 92 (04 Dec 2023 04:50) (66 - 92)  BP: 146/82 (04 Dec 2023 04:50) (101/61 - 147/80)  BP(mean): --  RR: 17 (04 Dec 2023 04:50) (16 - 17)  SpO2: 93% (04 Dec 2023 04:50) (93% - 93%)    Parameters below as of 04 Dec 2023 04:50  Patient On (Oxygen Delivery Method): room air    REVIEW OF SYSTEMS:  Constitutional:  The patient denies fever, chills, or night sweats.  Head:  No headache.  Eyes:  No double vision or blurry vision.  Ears:  No ringing in the ears.  Neck:  No neck pain.  Respiratory:  No shortness of breath.  Cardiovascular:  No chest pain.  Abdomen:  No nausea, vomiting, or abdominal pain.  Extremities/Neurological:  No numbness or tingling.  Musculoskeletal:  Positive selling of lower extremities, legs.    PHYSICAL EXAMINATION:   HEENT:  Head:  Normocephalic and atraumatic.  Eyes:  No scleral icterus.  Ears:  Hearing bilaterally appeared to be intact.  NECK:  Supple.  RESPIRATORY:  Air entry bilaterally.  CARDIOVASCULAR:  S1 and S2 heard.  ABDOMEN:  Soft and nontender.  EXTREMITIES:  Positive poor circulation was noted.      NEUROLOGIC:  The patient is awake and alert.  Able to tell daughter at bedside.  Location was hospital.  Year was 2023, month was January.  Recall was 1/3 with prompting in 3 minutes.  Was able to name simple objects.  Extraocular movements were intact.  Affect was flat.  Speech was monotone.  Motor:  Bilateral upper were 4/5.  Positive resting tremors were noted, right greater than left.  Positive cogwheel rigidity was noted.  Bilateral lower extremities were 3/5.  The patient has bradykinesia in both lower extremities with increased tone.    LABS:    Urinalysis Basic - ( 03 Dec 2023 10:18 )    Color: x / Appearance: x / SG: x / pH: x  Gluc: 121 mg/dL / Ketone: x  / Bili: x / Urobili: x   Blood: x / Protein: x / Nitrite: x   Leuk Esterase: x / RBC: x / WBC x   Sq Epi: x / Non Sq Epi: x / Bacteria: x      12-03    140  |  101  |  18  ----------------------------<  121<H>  3.9   |  33<H>  |  0.92    Ca    8.5      03 Dec 2023 10:18      Hemoglobin A1C:       Vitamin B12         RADIOLOGY    ANALYSIS AND PLAN:  This is an 87-year-old with episode of fall and Parkinson's.  For episode of fall, suspect most likely mechanical in nature.  I do not see any clear sign to suggest a new primary central nervous system event such as cerebrovascular accident has ensued.  The patient has multiple factors that could contribute to this leg swelling, Parkinson's, bradykinesia, and deconditioning and also does appear to have impaired proprioception in bilateral feet, possibly underlying neuropathy.  Physical therapy evaluation.  For history of Parkinson's, continue him on Sinemet.  For history of dementia, did try medications in the past but had side effects, would recommend supportive therapy.  AMS suspect form hospital setting.    Neurologic wise stable dc planning     Spoke with daughter, Zeina, at bedside.  Her telephone number is [phone]. 12/4  She does understand while in the hospital setting, he may become more disoriented.     45 minutes of time was spent with the patient, plan of care, reviewing data, with greater than 50% of the visit was spent counseling and/or coordinating care with multidisciplinary healthcare team   Neurology follow up note    DENG [id number]      Interval History:    Patient feels ok no new complaints seen with dtr     Allergies    No Known Allergies    Intolerances        MEDICATIONS    acetaminophen     Tablet .. 650 milliGRAM(s) Oral every 6 hours PRN  aluminum hydroxide/magnesium hydroxide/simethicone Suspension 30 milliLiter(s) Oral every 4 hours PRN  carbidopa/levodopa  25/100 1 Tablet(s) Oral four times a day  collagenase Ointment 1 Application(s) Topical daily  diphenhydrAMINE 25 milliGRAM(s) Oral every 4 hours PRN  famotidine    Tablet 20 milliGRAM(s) Oral daily  furosemide    Tablet 40 milliGRAM(s) Oral daily  heparin   Injectable 5000 Unit(s) SubCutaneous every 8 hours  influenza  Vaccine (HIGH DOSE) 0.7 milliLiter(s) IntraMuscular once  lactobacillus acidophilus 1 Tablet(s) Oral daily  levoFLOXacin  Tablet 250 milliGRAM(s) Oral every 24 hours  melatonin 3 milliGRAM(s) Oral at bedtime PRN  nystatin Powder 1 Application(s) Topical three times a day  ondansetron Injectable 4 milliGRAM(s) IV Push every 8 hours PRN  potassium chloride    Tablet ER 10 milliEquivalent(s) Oral two times a day              Vital Signs Last 24 Hrs  T(C): 36.9 (04 Dec 2023 04:50), Max: 36.9 (04 Dec 2023 04:50)  T(F): 98.4 (04 Dec 2023 04:50), Max: 98.4 (04 Dec 2023 04:50)  HR: 92 (04 Dec 2023 04:50) (66 - 92)  BP: 146/82 (04 Dec 2023 04:50) (101/61 - 147/80)  BP(mean): --  RR: 17 (04 Dec 2023 04:50) (16 - 17)  SpO2: 93% (04 Dec 2023 04:50) (93% - 93%)    Parameters below as of 04 Dec 2023 04:50  Patient On (Oxygen Delivery Method): room air    REVIEW OF SYSTEMS:  Constitutional:  The patient denies fever, chills, or night sweats.  Head:  No headache.  Eyes:  No double vision or blurry vision.  Ears:  No ringing in the ears.  Neck:  No neck pain.  Respiratory:  No shortness of breath.  Cardiovascular:  No chest pain.  Abdomen:  No nausea, vomiting, or abdominal pain.  Extremities/Neurological:  No numbness or tingling.  Musculoskeletal:  Positive selling of lower extremities, legs.    PHYSICAL EXAMINATION:   HEENT:  Head:  Normocephalic and atraumatic.  Eyes:  No scleral icterus.  Ears:  Hearing bilaterally appeared to be intact.  NECK:  Supple.  RESPIRATORY:  Air entry bilaterally.  CARDIOVASCULAR:  S1 and S2 heard.  ABDOMEN:  Soft and nontender.  EXTREMITIES:  Positive poor circulation was noted.      NEUROLOGIC:  The patient is awake and alert.  Able to tell daughter at bedside.  Location was hospital.  Year was 2023, month was January.  Recall was 1/3 with prompting in 3 minutes.  Was able to name simple objects.  Extraocular movements were intact.  Affect was flat.  Speech was monotone.  Motor:  Bilateral upper were 4/5.  Positive resting tremors were noted, right greater than left.  Positive cogwheel rigidity was noted.  Bilateral lower extremities were 3/5.  The patient has bradykinesia in both lower extremities with increased tone.    LABS:    Urinalysis Basic - ( 03 Dec 2023 10:18 )    Color: x / Appearance: x / SG: x / pH: x  Gluc: 121 mg/dL / Ketone: x  / Bili: x / Urobili: x   Blood: x / Protein: x / Nitrite: x   Leuk Esterase: x / RBC: x / WBC x   Sq Epi: x / Non Sq Epi: x / Bacteria: x      12-03    140  |  101  |  18  ----------------------------<  121<H>  3.9   |  33<H>  |  0.92    Ca    8.5      03 Dec 2023 10:18      Hemoglobin A1C:       Vitamin B12         RADIOLOGY    ANALYSIS AND PLAN:  This is an 87-year-old with episode of fall and Parkinson's.  For episode of fall, suspect most likely mechanical in nature.  I do not see any clear sign to suggest a new primary central nervous system event such as cerebrovascular accident has ensued.  The patient has multiple factors that could contribute to this leg swelling, Parkinson's, bradykinesia, and deconditioning and also does appear to have impaired proprioception in bilateral feet, possibly underlying neuropathy.  Physical therapy evaluation.  For history of Parkinson's, continue him on Sinemet.  For history of dementia, did try medications in the past but had side effects, would recommend supportive therapy.  AMS suspect form hospital setting.    Neurologic wise stable dc planning     Spoke with daughter, Zeina, at bedside.  Her telephone number is [phone]. 12/4  She does understand while in the hospital setting, he may become more disoriented.     45 minutes of time was spent with the patient, plan of care, reviewing data, with greater than 50% of the visit was spent counseling and/or coordinating care with multidisciplinary healthcare team

## 2023-12-04 NOTE — PROGRESS NOTE ADULT - ASSESSMENT
88 y/o M with hx of parkinsons, HTN, PVD, chronic wound LLE pw weakness/?fall pt states he was with his walker near the steps and felt like his legs were too weak and he slid down to the floor.   In ER patient was found to have possible CHF. patient is being admitted for further work up and treatment (30 Nov 2023 15:20)  ID c/s uptrending WBC, possible LE cellulitis    rle cellulitis   UA not impressive for infection, UCx NGTD  rash - ? etiology antibx > switched     Recommendations:   rash resolved  legs improving    on levofloxacin day 5 of 5  wbc down  Trend temps/WBC  local care  keep elevated   dc planning   d/w daughter at bedside

## 2023-12-04 NOTE — PROGRESS NOTE ADULT - PROBLEM SELECTOR PROBLEM 5
Inflammation of left lower leg concurrent with and due to varicose ulcer

## 2023-12-04 NOTE — PROGRESS NOTE ADULT - PROBLEM SELECTOR PLAN 2
? etiology   TTE with normal EF  r/o chf
? etiology   TTE with normal EF  r/o chf
? etiology   TTE with normal EF  doubt chf  continue diuretic
? etiology  check TTE  r/o chf

## 2023-12-04 NOTE — PROGRESS NOTE ADULT - PROBLEM SELECTOR PROBLEM 6
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

## 2023-12-04 NOTE — PROGRESS NOTE ADULT - PROBLEM SELECTOR PLAN 3
sinemet  neuro eval with Dr. kumari  pt  ot
sinemet  neuro eval with Dr. kumari  pt  ot
sinemet  neuro eval  pt  ot
sinemet  neuro eval with Dr. kumari appreciated  pt  ot  AR

## 2023-12-04 NOTE — PROGRESS NOTE ADULT - SUBJECTIVE AND OBJECTIVE BOX
Date of Service 12-04-23 @ 19:53    Patient is a 87y old  Male who presents with a chief complaint of recurrent falls (04 Dec 2023 15:16)      INTERVAL /OVERNIGHT EVENTS: h/o recurrent rash, doubt related to abx    MEDICATIONS  (STANDING):  carbidopa/levodopa  25/100 1 Tablet(s) Oral four times a day  collagenase Ointment 1 Application(s) Topical daily  famotidine    Tablet 20 milliGRAM(s) Oral daily  furosemide    Tablet 40 milliGRAM(s) Oral daily  heparin   Injectable 5000 Unit(s) SubCutaneous every 8 hours  influenza  Vaccine (HIGH DOSE) 0.7 milliLiter(s) IntraMuscular once  lactobacillus acidophilus 1 Tablet(s) Oral daily  levoFLOXacin  Tablet 250 milliGRAM(s) Oral every 24 hours  nystatin Powder 1 Application(s) Topical three times a day  potassium chloride    Tablet ER 10 milliEquivalent(s) Oral three times a day    MEDICATIONS  (PRN):  acetaminophen     Tablet .. 650 milliGRAM(s) Oral every 6 hours PRN Temp greater or equal to 38C (100.4F), Mild Pain (1 - 3)  aluminum hydroxide/magnesium hydroxide/simethicone Suspension 30 milliLiter(s) Oral every 4 hours PRN Dyspepsia  diphenhydrAMINE 25 milliGRAM(s) Oral every 4 hours PRN Rash and/or Itching  melatonin 3 milliGRAM(s) Oral at bedtime PRN Insomnia  ondansetron Injectable 4 milliGRAM(s) IV Push every 8 hours PRN Nausea and/or Vomiting      Allergies    No Known Allergies    Intolerances        REVIEW OF SYSTEMS:  denies    Vital Signs Last 24 Hrs  T(C): 37.1 (04 Dec 2023 16:22), Max: 37.1 (04 Dec 2023 16:22)  T(F): 98.8 (04 Dec 2023 16:22), Max: 98.8 (04 Dec 2023 16:22)  HR: 86 (04 Dec 2023 16:22) (66 - 92)  BP: 144/85 (04 Dec 2023 16:22) (125/59 - 147/80)  BP(mean): --  RR: 17 (04 Dec 2023 16:22) (16 - 17)  SpO2: 94% (04 Dec 2023 16:22) (93% - 95%)    Parameters below as of 04 Dec 2023 16:22  Patient On (Oxygen Delivery Method): room air        PHYSICAL EXAM:  GENERAL: NAD, well-groomed, well-developed  HEAD:  Atraumatic, Normocephalic  EYES: EOMI, PERRLA, conjunctiva and sclera clear  ENMT: No tonsillar erythema, exudates, or enlargement; Moist mucous membranes, Good dentition, No lesions  NECK: Supple, No JVD, Normal thyroid  NERVOUS SYSTEM:  Alert & Oriented X3, Good concentration; Motor Strength 5/5 B/L upper and lower extremities; DTRs 2+ intact and symmetric  CHEST/LUNG: Clear to auscultation bilaterally; No rales, rhonchi, wheezing, or rubs  HEART: Regular rate and rhythm; No murmurs, rubs, or gallops  ABDOMEN: Soft, Nontender, Nondistended; Bowel sounds present  EXTREMITIES:  2+ Peripheral Pulses, No clubbing, cyanosis, or edema  LYMPH: No lymphadenopathy noted  SKIN: No rashes or lesions    LABS:                        12.6   8.90  )-----------( 258      ( 04 Dec 2023 10:20 )             38.3       Ca    8.5        03 Dec 2023 10:18        Urinalysis Basic - ( 03 Dec 2023 10:18 )    Color: x / Appearance: x / SG: x / pH: x  Gluc: 121 mg/dL / Ketone: x  / Bili: x / Urobili: x   Blood: x / Protein: x / Nitrite: x   Leuk Esterase: x / RBC: x / WBC x   Sq Epi: x / Non Sq Epi: x / Bacteria: x      CAPILLARY BLOOD GLUCOSE          RADIOLOGY & ADDITIONAL TESTS:    Notes Reviewed:  [x ] YES  [ ] NO    Care Discussed with Consultants/Other Providers [x ] YES  [ ] NO

## 2023-12-04 NOTE — CAREGIVER ENGAGEMENT NOTE - CAREGIVER OUTREACH NOTES - FREE TEXT
Spoke with dtr Zeina for d/c planning. Discussed accepting facilities. Zeina reviewing and SW to follow up tomorrow and discuss. SW to follow and remain available for any needs. Spoke with dtdustin Pastrana for d/c planning. SW provided choices and 1) [facility] accepting. SW submitted for auth. Still pending response from [facility]. Will send orthostatic vitals once completed. SW to follow and remain available for any needs.   Spoke with dtdustin Pastrana for d/c planning. SW provided choices and 1) [facility] accepting. SW submitted for auth. Still pending response from [facility]. Will send orthostatic vitals once completed. SW to follow and remain available for any needs.

## 2023-12-04 NOTE — PROGRESS NOTE ADULT - PROBLEM SELECTOR PLAN 1
fall precautions  PT  OT  [facility] vs [facility]

## 2023-12-04 NOTE — PROGRESS NOTE ADULT - NUTRITIONAL ASSESSMENT
This patient has been assessed with a concern for Malnutrition and has been determined to have a diagnosis/diagnoses of Severe protein-calorie malnutrition.    This patient is being managed with:   Diet Easy to Chew-  Entered: Dec  1 2023  5:16PM  

## 2023-12-04 NOTE — PROGRESS NOTE ADULT - SUBJECTIVE AND OBJECTIVE BOX
[facility], Division of Infectious Diseases  RASHID Rosas Y. Patel, S. Shah, G. Valentin  [phone]  after hours and weekends [phone]    Name: DENG SIERRA  Age: 87y  Gender: Male  MRN: [id number]    Interval History--  Notes reviewed  legs much better       Allergies    No Known Allergies    Intolerances        Medications--  Antibiotics:  levoFLOXacin  Tablet 250 milliGRAM(s) Oral every 24 hours    Immunologic:  influenza  Vaccine (HIGH DOSE) 0.7 milliLiter(s) IntraMuscular once    Other:  acetaminophen     Tablet .. PRN  aluminum hydroxide/magnesium hydroxide/simethicone Suspension PRN  carbidopa/levodopa  25/100  collagenase Ointment  diphenhydrAMINE PRN  famotidine    Tablet  furosemide    Tablet  heparin   Injectable  lactobacillus acidophilus  melatonin PRN  nystatin Powder  ondansetron Injectable PRN  potassium chloride    Tablet ER      Review of Systems--  A 10-point review of systems was obtained.     Pertinent positives and negatives--  Constitutional: No fevers. No Chills. No Rigors.   Cardiovascular: No chest pain. No palpitations.  Respiratory: No shortness of breath. No cough.  Gastrointestinal: No nausea or vomiting. No diarrhea or constipation.   Psychiatric: Pleasant. Appropriate affect.    Review of systems otherwise negative except as previously noted.    Physical Examination--  Vital Signs: T(F): 97.8 (12-04-23 @ 12:01), Max: 98.4 (12-04-23 @ 04:50)  HR: 71 (12-04-23 @ 12:01)  BP: 125/59 (12-04-23 @ 12:01)  RR: 17 (12-04-23 @ 12:01)  SpO2: 95% (12-04-23 @ 12:01)  Wt(kg): --  General: Nontoxic-appearing Male in no acute distress.  HEENT: AT/NC.  Neck: Not rigid. No sense of mass.  Nodes: None palpable.  Lungs: Clear bilaterally without rales, wheezing or rhonchi  Heart: Regular rate and rhythm.   Abdomen: Bowel sounds present and normoactive. Soft. Nondistended. Nontender.   Back: No spinal tenderness. No costovertebral angle tenderness.   Extremities: No cyanosis or clubbing. decreased edema. dermatitis   Skin: Warm. rash resolved .  Psychiatric: Appropriate affect and mood for situation.         Laboratory Studies--  CBC      Chemistries  12-03    140  |  101  |  18  ----------------------------<  121<H>  3.9   |  33<H>  |  0.92    Ca    8.5      03 Dec 2023 10:18        Culture Data    Culture - Urine (collected 30 Nov 2023 09:30)  Source: Clean Catch Clean Catch (Midstream)  Final Report (01 Dec 2023 11:48):    No growth             [facility], Division of Infectious Diseases  RASHID Rosas Y. Patel, S. Shah, G. Valentin  [phone]  after hours and weekends [phone]    Name: DENG SIERRA  Age: 87y  Gender: Male  MRN: [id number]    Interval History--  Notes reviewed  legs much better       Allergies    No Known Allergies    Intolerances        Medications--  Antibiotics:  levoFLOXacin  Tablet 250 milliGRAM(s) Oral every 24 hours    Immunologic:  influenza  Vaccine (HIGH DOSE) 0.7 milliLiter(s) IntraMuscular once    Other:  acetaminophen     Tablet .. PRN  aluminum hydroxide/magnesium hydroxide/simethicone Suspension PRN  carbidopa/levodopa  25/100  collagenase Ointment  diphenhydrAMINE PRN  famotidine    Tablet  furosemide    Tablet  heparin   Injectable  lactobacillus acidophilus  melatonin PRN  nystatin Powder  ondansetron Injectable PRN  potassium chloride    Tablet ER      Review of Systems--  A 10-point review of systems was obtained.     Pertinent positives and negatives--  Constitutional: No fevers. No Chills. No Rigors.   Cardiovascular: No chest pain. No palpitations.  Respiratory: No shortness of breath. No cough.  Gastrointestinal: No nausea or vomiting. No diarrhea or constipation.   Psychiatric: Pleasant. Appropriate affect.    Review of systems otherwise negative except as previously noted.    Physical Examination--  Vital Signs: T(F): 97.8 (12-04-23 @ 12:01), Max: 98.4 (12-04-23 @ 04:50)  HR: 71 (12-04-23 @ 12:01)  BP: 125/59 (12-04-23 @ 12:01)  RR: 17 (12-04-23 @ 12:01)  SpO2: 95% (12-04-23 @ 12:01)  Wt(kg): --  General: Nontoxic-appearing Male in no acute distress.  HEENT: AT/NC.  Neck: Not rigid. No sense of mass.  Nodes: None palpable.  Lungs: Clear bilaterally without rales, wheezing or rhonchi  Heart: Regular rate and rhythm.   Abdomen: Bowel sounds present and normoactive. Soft. Nondistended. Nontender.   Back: No spinal tenderness. No costovertebral angle tenderness.   Extremities: No cyanosis or clubbing. decreased edema. dermatitis   Skin: Warm. rash resolved .  Psychiatric: Appropriate affect and mood for situation.         Laboratory Studies--  CBC      Chemistries  12-03    140  |  101  |  18  ----------------------------<  121<H>  3.9   |  33<H>  |  0.92    Ca    8.5      03 Dec 2023 10:18        Culture Data    Culture - Urine (collected 30 Nov 2023 09:30)  Source: Clean Catch Clean Catch (Midstream)  Final Report (01 Dec 2023 11:48):    No growth

## 2023-12-05 ENCOUNTER — TRANSCRIPTION ENCOUNTER (OUTPATIENT)
Age: 88
End: 2023-12-05

## 2023-12-05 VITALS
RESPIRATION RATE: 17 BRPM | HEART RATE: 89 BPM | OXYGEN SATURATION: 95 % | DIASTOLIC BLOOD PRESSURE: 65 MMHG | SYSTOLIC BLOOD PRESSURE: 107 MMHG | TEMPERATURE: 98 F

## 2023-12-05 LAB
ANION GAP SERPL CALC-SCNC: 7 MMOL/L — SIGNIFICANT CHANGE UP (ref 5–17)
ANION GAP SERPL CALC-SCNC: 7 MMOL/L — SIGNIFICANT CHANGE UP (ref 5–17)
BUN SERPL-MCNC: 29 MG/DL — HIGH (ref 7–23)
BUN SERPL-MCNC: 29 MG/DL — HIGH (ref 7–23)
CALCIUM SERPL-MCNC: 8.7 MG/DL — SIGNIFICANT CHANGE UP (ref 8.5–10.1)
CALCIUM SERPL-MCNC: 8.7 MG/DL — SIGNIFICANT CHANGE UP (ref 8.5–10.1)
CHLORIDE SERPL-SCNC: 104 MMOL/L — SIGNIFICANT CHANGE UP (ref 96–108)
CHLORIDE SERPL-SCNC: 104 MMOL/L — SIGNIFICANT CHANGE UP (ref 96–108)
CO2 SERPL-SCNC: 32 MMOL/L — HIGH (ref 22–31)
CO2 SERPL-SCNC: 32 MMOL/L — HIGH (ref 22–31)
CREAT SERPL-MCNC: 0.97 MG/DL — SIGNIFICANT CHANGE UP (ref 0.5–1.3)
CREAT SERPL-MCNC: 0.97 MG/DL — SIGNIFICANT CHANGE UP (ref 0.5–1.3)
EGFR: 76 ML/MIN/1.73M2 — SIGNIFICANT CHANGE UP
EGFR: 76 ML/MIN/1.73M2 — SIGNIFICANT CHANGE UP
GLUCOSE SERPL-MCNC: 114 MG/DL — HIGH (ref 70–99)
GLUCOSE SERPL-MCNC: 114 MG/DL — HIGH (ref 70–99)
POTASSIUM SERPL-MCNC: 3.9 MMOL/L — SIGNIFICANT CHANGE UP (ref 3.5–5.3)
POTASSIUM SERPL-MCNC: 3.9 MMOL/L — SIGNIFICANT CHANGE UP (ref 3.5–5.3)
POTASSIUM SERPL-SCNC: 3.9 MMOL/L — SIGNIFICANT CHANGE UP (ref 3.5–5.3)
POTASSIUM SERPL-SCNC: 3.9 MMOL/L — SIGNIFICANT CHANGE UP (ref 3.5–5.3)
SARS-COV-2 RNA SPEC QL NAA+PROBE: SIGNIFICANT CHANGE UP
SARS-COV-2 RNA SPEC QL NAA+PROBE: SIGNIFICANT CHANGE UP
SODIUM SERPL-SCNC: 143 MMOL/L — SIGNIFICANT CHANGE UP (ref 135–145)
SODIUM SERPL-SCNC: 143 MMOL/L — SIGNIFICANT CHANGE UP (ref 135–145)

## 2023-12-05 PROCEDURE — 83036 HEMOGLOBIN GLYCOSYLATED A1C: CPT

## 2023-12-05 PROCEDURE — 93306 TTE W/DOPPLER COMPLETE: CPT

## 2023-12-05 PROCEDURE — 85730 THROMBOPLASTIN TIME PARTIAL: CPT

## 2023-12-05 PROCEDURE — 99285 EMERGENCY DEPT VISIT HI MDM: CPT

## 2023-12-05 PROCEDURE — 97162 PT EVAL MOD COMPLEX 30 MIN: CPT

## 2023-12-05 PROCEDURE — 85027 COMPLETE CBC AUTOMATED: CPT

## 2023-12-05 PROCEDURE — 97535 SELF CARE MNGMENT TRAINING: CPT

## 2023-12-05 PROCEDURE — 87635 SARS-COV-2 COVID-19 AMP PRB: CPT

## 2023-12-05 PROCEDURE — 83880 ASSAY OF NATRIURETIC PEPTIDE: CPT

## 2023-12-05 PROCEDURE — 93970 EXTREMITY STUDY: CPT

## 2023-12-05 PROCEDURE — 85025 COMPLETE CBC W/AUTO DIFF WBC: CPT

## 2023-12-05 PROCEDURE — 93005 ELECTROCARDIOGRAM TRACING: CPT

## 2023-12-05 PROCEDURE — 81001 URINALYSIS AUTO W/SCOPE: CPT

## 2023-12-05 PROCEDURE — 71045 X-RAY EXAM CHEST 1 VIEW: CPT

## 2023-12-05 PROCEDURE — 80053 COMPREHEN METABOLIC PANEL: CPT

## 2023-12-05 PROCEDURE — 84484 ASSAY OF TROPONIN QUANT: CPT

## 2023-12-05 PROCEDURE — 70450 CT HEAD/BRAIN W/O DYE: CPT | Mod: MA

## 2023-12-05 PROCEDURE — 97166 OT EVAL MOD COMPLEX 45 MIN: CPT

## 2023-12-05 PROCEDURE — 82550 ASSAY OF CK (CPK): CPT

## 2023-12-05 PROCEDURE — 85610 PROTHROMBIN TIME: CPT

## 2023-12-05 PROCEDURE — 97110 THERAPEUTIC EXERCISES: CPT

## 2023-12-05 PROCEDURE — 97530 THERAPEUTIC ACTIVITIES: CPT

## 2023-12-05 PROCEDURE — 36415 COLL VENOUS BLD VENIPUNCTURE: CPT

## 2023-12-05 PROCEDURE — 80048 BASIC METABOLIC PNL TOTAL CA: CPT

## 2023-12-05 PROCEDURE — 84145 PROCALCITONIN (PCT): CPT

## 2023-12-05 PROCEDURE — 87086 URINE CULTURE/COLONY COUNT: CPT

## 2023-12-05 RX ADMIN — Medication 10 MILLIEQUIVALENT(S): at 05:22

## 2023-12-05 RX ADMIN — Medication 40 MILLIGRAM(S): at 05:18

## 2023-12-05 RX ADMIN — HEPARIN SODIUM 5000 UNIT(S): 5000 INJECTION INTRAVENOUS; SUBCUTANEOUS at 14:05

## 2023-12-05 RX ADMIN — Medication 1 TABLET(S): at 11:27

## 2023-12-05 RX ADMIN — NYSTATIN CREAM 1 APPLICATION(S): 100000 CREAM TOPICAL at 05:22

## 2023-12-05 RX ADMIN — HEPARIN SODIUM 5000 UNIT(S): 5000 INJECTION INTRAVENOUS; SUBCUTANEOUS at 05:18

## 2023-12-05 RX ADMIN — Medication 1 APPLICATION(S): at 11:29

## 2023-12-05 RX ADMIN — FAMOTIDINE 20 MILLIGRAM(S): 10 INJECTION INTRAVENOUS at 11:27

## 2023-12-05 RX ADMIN — Medication 10 MILLIEQUIVALENT(S): at 14:06

## 2023-12-05 RX ADMIN — NYSTATIN CREAM 1 APPLICATION(S): 100000 CREAM TOPICAL at 14:06

## 2023-12-05 RX ADMIN — CARBIDOPA AND LEVODOPA 1 TABLET(S): 25; 100 TABLET ORAL at 17:16

## 2023-12-05 RX ADMIN — CARBIDOPA AND LEVODOPA 1 TABLET(S): 25; 100 TABLET ORAL at 05:22

## 2023-12-05 RX ADMIN — CARBIDOPA AND LEVODOPA 1 TABLET(S): 25; 100 TABLET ORAL at 11:27

## 2023-12-05 NOTE — CAREGIVER ENGAGEMENT NOTE - CAREGIVER OUTREACH NOTES - FREE TEXT
SOPHIE spoke with pt sam Pastrana on unit. Zeina is requesting [facility] opposed to  acute. SW confirmed that bed is available at [facility] for [facility]. SW to sc schedule d/c for tonight at 7pm. SW to follow and remain available for any needs. SOPHIE spoke with pt sam Pastrana on unit. Zeina is requesting [facility] to  [facility]. SW confirmed that bed is available at [facility] for [facility]. SW to sc schedule d/c for tonight at 7pm. SW to follow and remain available for any needs.

## 2023-12-05 NOTE — PROGRESS NOTE ADULT - SUBJECTIVE AND OBJECTIVE BOX
[facility], Division of Infectious Diseases  RASHID Rosas Y. Patel, S. Shah, G. [facility]  [phone]    Name: DENG SIERRA  Age: 87y  Gender: Male  MRN: [id number]    Interval History:  Patient seen and examined at bedside  No acute overnight events. Afebrile  No complaints  Notes reviewed    Antibiotics:      Medications:  acetaminophen     Tablet .. 650 milliGRAM(s) Oral every 6 hours PRN  aluminum hydroxide/magnesium hydroxide/simethicone Suspension 30 milliLiter(s) Oral every 4 hours PRN  carbidopa/levodopa  25/100 1 Tablet(s) Oral four times a day  collagenase Ointment 1 Application(s) Topical daily  diphenhydrAMINE 25 milliGRAM(s) Oral every 4 hours PRN  famotidine    Tablet 20 milliGRAM(s) Oral daily  furosemide    Tablet 40 milliGRAM(s) Oral daily  heparin   Injectable 5000 Unit(s) SubCutaneous every 8 hours  influenza  Vaccine (HIGH DOSE) 0.7 milliLiter(s) IntraMuscular once  lactobacillus acidophilus 1 Tablet(s) Oral daily  melatonin 3 milliGRAM(s) Oral at bedtime PRN  nystatin Powder 1 Application(s) Topical three times a day  ondansetron Injectable 4 milliGRAM(s) IV Push every 8 hours PRN  potassium chloride    Tablet ER 10 milliEquivalent(s) Oral three times a day      Review of Systems: unable to obtain    Allergies: No Known Allergies    For details regarding the patient's past medical history, social history, family history, and other miscellaneous elements, please refer the initial infectious diseases consultation and/or the admitting history and physical examination for this admission.    Objective:  Vitals:   T(C): 36.7 (12-05-23 @ 05:05), Max: 37.1 (12-04-23 @ 16:22)  HR: 73 (12-05-23 @ 05:05) (73 - 86)  BP: 109/67 (12-05-23 @ 09:15) (109/67 - 144/85)  RR: 17 (12-05-23 @ 05:05) (17 - 17)  SpO2: 98% (12-05-23 @ 05:05) (94% - 98%)    Physical Examination:  General: no acute distress  HEENT: NC/AT, EOMI,   Cardio: S1, S2 heard, RRR, no murmurs  Resp: decreased b/l breath sounds  Abd: soft, NT, ND  Ext: no edema or cyanosis  Skin: warm, dry, no visible rash      Laboratory Studies:  CBC:                       12.6   8.90  )-----------( 258      ( 04 Dec 2023 10:20 )             38.3     CMP:             Microbiology: reviewed    Culture - Urine (collected 11-30-23 @ 09:30)  Source: Clean Catch Clean Catch (Midstream)  Final Report (12-01-23 @ 11:48):    No growth          Radiology: reviewed       [facility], Division of Infectious Diseases  RASHID Rosas Y. Patel, S. Shah, G. [facility]  [phone]    Name: DENG SIERRA  Age: 87y  Gender: Male  MRN: [id number]    Interval History:  Patient seen and examined at bedside  No acute overnight events. Afebrile  No complaints  Notes reviewed    Antibiotics:      Medications:  acetaminophen     Tablet .. 650 milliGRAM(s) Oral every 6 hours PRN  aluminum hydroxide/magnesium hydroxide/simethicone Suspension 30 milliLiter(s) Oral every 4 hours PRN  carbidopa/levodopa  25/100 1 Tablet(s) Oral four times a day  collagenase Ointment 1 Application(s) Topical daily  diphenhydrAMINE 25 milliGRAM(s) Oral every 4 hours PRN  famotidine    Tablet 20 milliGRAM(s) Oral daily  furosemide    Tablet 40 milliGRAM(s) Oral daily  heparin   Injectable 5000 Unit(s) SubCutaneous every 8 hours  influenza  Vaccine (HIGH DOSE) 0.7 milliLiter(s) IntraMuscular once  lactobacillus acidophilus 1 Tablet(s) Oral daily  melatonin 3 milliGRAM(s) Oral at bedtime PRN  nystatin Powder 1 Application(s) Topical three times a day  ondansetron Injectable 4 milliGRAM(s) IV Push every 8 hours PRN  potassium chloride    Tablet ER 10 milliEquivalent(s) Oral three times a day      Review of Systems: unable to obtain    Allergies: No Known Allergies    For details regarding the patient's past medical history, social history, family history, and other miscellaneous elements, please refer the initial infectious diseases consultation and/or the admitting history and physical examination for this admission.    Objective:  Vitals:   T(C): 36.7 (12-05-23 @ 05:05), Max: 37.1 (12-04-23 @ 16:22)  HR: 73 (12-05-23 @ 05:05) (73 - 86)  BP: 109/67 (12-05-23 @ 09:15) (109/67 - 144/85)  RR: 17 (12-05-23 @ 05:05) (17 - 17)  SpO2: 98% (12-05-23 @ 05:05) (94% - 98%)    Physical Examination:  General: no acute distress  HEENT: NC/AT, EOMI,   Cardio: S1, S2 heard, RRR, no murmurs  Resp: decreased b/l breath sounds  Abd: soft, NT, ND  Ext: no edema or cyanosis  Skin: warm, dry, no visible rash      Laboratory Studies:  CBC:                       12.6   8.90  )-----------( 258      ( 04 Dec 2023 10:20 )             38.3     CMP:             Microbiology: reviewed    Culture - Urine (collected 11-30-23 @ 09:30)  Source: Clean Catch Clean Catch (Midstream)  Final Report (12-01-23 @ 11:48):    No growth          Radiology: reviewed

## 2023-12-05 NOTE — CHART NOTE - NSCHARTNOTEFT_GEN_A_CORE
Assessment: 86 y/o male adm with UTI, cellulitis. PMH parkinson's, HTN.   Pt visited at bedside this morning. Pt's sitting up in chair. Pt's daughter present. As per daughter, pt is eating very well. Trialed Ensure and pt taking. Will rx adding to diet order. SLP emily completed on 12/1 which rx Easy to chew with thin liquids. Fecal incontinence.     Factors impacting intake: [ ] none [ ] nausea  [ ] vomiting [ ] diarrhea [ ] constipation  [ ]chewing problems [ ] swallowing issues  [ ] other:     Diet Presciption: Easy to Chew   Intake:     Current Weight: Weight (kg): 81.6 (11-30 @ 09:02)  2+ edema to right/left  foot and right/left legs    % Weight Change    Pertinent Medications: MEDICATIONS  (STANDING):  carbidopa/levodopa  25/100 1 Tablet(s) Oral four times a day  collagenase Ointment 1 Application(s) Topical daily  famotidine    Tablet 20 milliGRAM(s) Oral daily  furosemide    Tablet 40 milliGRAM(s) Oral daily  heparin   Injectable 5000 Unit(s) SubCutaneous every 8 hours  influenza  Vaccine (HIGH DOSE) 0.7 milliLiter(s) IntraMuscular once  lactobacillus acidophilus 1 Tablet(s) Oral daily  nystatin Powder 1 Application(s) Topical three times a day  potassium chloride    Tablet ER 10 milliEquivalent(s) Oral three times a day    MEDICATIONS  (PRN):  acetaminophen     Tablet .. 650 milliGRAM(s) Oral every 6 hours PRN Temp greater or equal to 38C (100.4F), Mild Pain (1 - 3)  aluminum hydroxide/magnesium hydroxide/simethicone Suspension 30 milliLiter(s) Oral every 4 hours PRN Dyspepsia  diphenhydrAMINE 25 milliGRAM(s) Oral every 4 hours PRN Rash and/or Itching  melatonin 3 milliGRAM(s) Oral at bedtime PRN Insomnia  ondansetron Injectable 4 milliGRAM(s) IV Push every 8 hours PRN Nausea and/or Vomiting    Pertinent Labs: 12-03 Na140 mmol/L Glu 121 mg/dL<H> K+ 3.9 mmol/L Cr  0.92 mg/dL BUN 18 mg/dL 11-30 Alb 2.9 g/dL<L>     CAPILLARY BLOOD GLUCOSE        Skin: LLE chronic ulcer     Estimated Needs:   [x ] no change since previous assessment  [ ] recalculated:     Previous Nutrition Diagnosis:   [ ] Inadequate Energy Intake [ ]Inadequate Oral Intake [ ] Excessive Energy Intake   [ ] Underweight [ ] Increased Nutrient Needs [ ] Overweight/Obesity   [ ] Altered GI Function [ ] Unintended Weight Loss [ ] Food & Nutrition Related Knowledge Deficit [x ] Malnutrition     Nutrition Diagnosis is [x ] ongoing  [ ] resolved [ ] not applicable     New Nutrition Diagnosis: [ x] not applicable       Interventions:   Recommend  [ ] Change Diet To:  [x ] Nutrition Supplement: Rx Ensure daily   [ ] Nutrition Support  [ x] Other: Rx Ensure daily (ingrid)  continue to honor pt's food preferences/tolerances.   Rx MVI daily   message left with Nery POTTER.     Monitoring and Evaluation:   [x ] PO intake [ x ] Tolerance to diet prescription [ x ] weights [ x ] labs[ x ] follow up per protocol  [ ] other: Assessment: 86 y/o male adm with UTI, cellulitis. PMH parkinson's, HTN.   Pt visited at bedside this morning. Pt's sitting up in chair. Pt's daughter present. As per daughter, pt is eating very well. Trialed Ensure and pt taking. Will rx adding to diet order. SLP emily completed on 12/1 which rx Easy to chew with thin liquids. Fecal incontinence.     Factors impacting intake: [ ] none [ ] nausea  [ ] vomiting [ ] diarrhea [ ] constipation  [ ]chewing problems [ ] swallowing issues  [ ] other:     Diet Presciption: Easy to Chew   Intake:     Current Weight: Weight (kg): 81.6 (11-30 @ 09:02)  2+ edema to right/left  foot and right/left legs    % Weight Change    Pertinent Medications: MEDICATIONS  (STANDING):  carbidopa/levodopa  25/100 1 Tablet(s) Oral four times a day  collagenase Ointment 1 Application(s) Topical daily  famotidine    Tablet 20 milliGRAM(s) Oral daily  furosemide    Tablet 40 milliGRAM(s) Oral daily  heparin   Injectable 5000 Unit(s) SubCutaneous every 8 hours  influenza  Vaccine (HIGH DOSE) 0.7 milliLiter(s) IntraMuscular once  lactobacillus acidophilus 1 Tablet(s) Oral daily  nystatin Powder 1 Application(s) Topical three times a day  potassium chloride    Tablet ER 10 milliEquivalent(s) Oral three times a day    MEDICATIONS  (PRN):  acetaminophen     Tablet .. 650 milliGRAM(s) Oral every 6 hours PRN Temp greater or equal to 38C (100.4F), Mild Pain (1 - 3)  aluminum hydroxide/magnesium hydroxide/simethicone Suspension 30 milliLiter(s) Oral every 4 hours PRN Dyspepsia  diphenhydrAMINE 25 milliGRAM(s) Oral every 4 hours PRN Rash and/or Itching  melatonin 3 milliGRAM(s) Oral at bedtime PRN Insomnia  ondansetron Injectable 4 milliGRAM(s) IV Push every 8 hours PRN Nausea and/or Vomiting    Pertinent Labs: 12-03 Na140 mmol/L Glu 121 mg/dL<H> K+ 3.9 mmol/L Cr  0.92 mg/dL BUN 18 mg/dL 11-30 Alb 2.9 g/dL<L>     CAPILLARY BLOOD GLUCOSE        Skin: LLE chronic ulcer     Estimated Needs:   [x ] no change since previous assessment  [ ] recalculated:     Previous Nutrition Diagnosis:   [ ] Inadequate Energy Intake [ ]Inadequate Oral Intake [ ] Excessive Energy Intake   [ ] Underweight [ ] Increased Nutrient Needs [ ] Overweight/Obesity   [ ] Altered GI Function [ ] Unintended Weight Loss [ ] Food & Nutrition Related Knowledge Deficit [x ] Malnutrition     Nutrition Diagnosis is [x ] ongoing  [ ] resolved [ ] not applicable     New Nutrition Diagnosis: [ x] not applicable       Interventions:   Recommend  [ ] Change Diet To:  [x ] Nutrition Supplement: Rx Ensure daily   [ ] Nutrition Support  [ x] Other: Rx Ensure daily (ingrid)  continue to honor pt's food preferences/tolerances.   Rx MVI daily      Monitoring and Evaluation:   [x ] PO intake [ x ] Tolerance to diet prescription [ x ] weights [ x ] labs[ x ] follow up per protocol  [ ] other: Assessment: 88 y/o male adm with UTI, cellulitis. PMH parkinson's, HTN.   Pt visited at bedside this morning. Pt's sitting up in chair. Pt's daughter present. As per daughter, pt is eating very well. Trialed Ensure and pt taking. Will rx adding to diet order. SLP emily completed on 12/1 which rx Easy to chew with thin liquids. Fecal incontinence.     Factors impacting intake: [ ] none [ ] nausea  [ ] vomiting [ ] diarrhea [ ] constipation  [ ]chewing problems [ ] swallowing issues  [ ] other:     Diet Presciption: Easy to Chew   Intake:     Current Weight: Weight (kg): 81.6 (11-30 @ 09:02)  2+ edema to right/left  foot and right/left legs    % Weight Change    Pertinent Medications: MEDICATIONS  (STANDING):  carbidopa/levodopa  25/100 1 Tablet(s) Oral four times a day  collagenase Ointment 1 Application(s) Topical daily  famotidine    Tablet 20 milliGRAM(s) Oral daily  furosemide    Tablet 40 milliGRAM(s) Oral daily  heparin   Injectable 5000 Unit(s) SubCutaneous every 8 hours  influenza  Vaccine (HIGH DOSE) 0.7 milliLiter(s) IntraMuscular once  lactobacillus acidophilus 1 Tablet(s) Oral daily  nystatin Powder 1 Application(s) Topical three times a day  potassium chloride    Tablet ER 10 milliEquivalent(s) Oral three times a day    MEDICATIONS  (PRN):  acetaminophen     Tablet .. 650 milliGRAM(s) Oral every 6 hours PRN Temp greater or equal to 38C (100.4F), Mild Pain (1 - 3)  aluminum hydroxide/magnesium hydroxide/simethicone Suspension 30 milliLiter(s) Oral every 4 hours PRN Dyspepsia  diphenhydrAMINE 25 milliGRAM(s) Oral every 4 hours PRN Rash and/or Itching  melatonin 3 milliGRAM(s) Oral at bedtime PRN Insomnia  ondansetron Injectable 4 milliGRAM(s) IV Push every 8 hours PRN Nausea and/or Vomiting    Pertinent Labs: 12-03 Na140 mmol/L Glu 121 mg/dL<H> K+ 3.9 mmol/L Cr  0.92 mg/dL BUN 18 mg/dL 11-30 Alb 2.9 g/dL<L>     CAPILLARY BLOOD GLUCOSE        Skin: LLE chronic ulcer     Estimated Needs:   [x ] no change since previous assessment  [ ] recalculated:     Previous Nutrition Diagnosis:   [ ] Inadequate Energy Intake [ ]Inadequate Oral Intake [ ] Excessive Energy Intake   [ ] Underweight [ ] Increased Nutrient Needs [ ] Overweight/Obesity   [ ] Altered GI Function [ ] Unintended Weight Loss [ ] Food & Nutrition Related Knowledge Deficit [x ] Malnutrition     Nutrition Diagnosis is [x ] ongoing  [ ] resolved [ ] not applicable     New Nutrition Diagnosis: [ x] not applicable       Interventions:   Recommend  [ ] Change Diet To:  [x ] Nutrition Supplement: Rx Ensure daily   [ ] Nutrition Support  [ x] Other: Rx Ensure daily (ingrid)  continue to honor pt's food preferences/tolerances.   Rx MVI daily      Monitoring and Evaluation:   [x ] PO intake [ x ] Tolerance to diet prescription [ x ] weights [ x ] labs[ x ] follow up per protocol  [ ] other:

## 2023-12-05 NOTE — DISCHARGE NOTE NURSING/CASE MANAGEMENT/SOCIAL WORK - SOCIAL WORKER'S NAME
Maria Elena Simental, [facility] [phone] Maria Elena Simental, [facility] [phone]

## 2023-12-05 NOTE — DISCHARGE NOTE NURSING/CASE MANAGEMENT/SOCIAL WORK - NSDCPEFALRISK_GEN_ALL_CORE
For information on Fall & Injury Prevention, visit: [email or web address]-[email or web address]-mobility OR  [email or web address]-[email or web address]-tips-[email or web address]-injury OR  https://www.cdc.gov/steadi/patient.html For information on Fall & Injury Prevention, visit: [email or web address].[email or web address]-prevention-protects-[email or web address]-[email or web address]-health-[email or web address]-mobility OR  [email or web address]://[email or web address].[email or web address]/fall-prevention-tips-to-avoid-injury OR  https://www.cdc.gov/steadi/patient.html

## 2023-12-05 NOTE — PROGRESS NOTE ADULT - REASON FOR ADMISSION
recurrent falls

## 2023-12-05 NOTE — CHART NOTE - NSCHARTNOTEFT_GEN_A_CORE
Do you have Advance Directives (HCP / LV / Organ donation / Documentation of oral advance Directive):   (  x  )  yes    (      )    NO                                                                            Do you have LV - Living will :                                                                                                                                             (   x )  yes    (      )   No    Do you have HCP - Health Care Proxy:                                                                                                                            (   x  )  yes   (       ) N0    Do you have DNR- Do Not Resuscitate :                                                                                                                           (    x  )  yes  (        )  No    Do you have DNI- Do Not intubate  :                                                                                                                               (   x   )  yes   (       ) No    Do you have MOLST - Medical orders for Life sustaining treatment  :                                                                    (   x   ) yes    (       ) No    Decision Maker :  x) Patient     (      )  [facility]   (     ) Public Health Law Surrogate     (      ) Surrogate  (       ) Guardian    Goals of Care :  (      )   Complete Care     (       ) No Limitations                              (       )   Comfort Care       (       )  Hospice                               (   x   )   Limited medical Intervention / s    Medical Interventions :   (        )   CPR       (     x   )  DNR                                               (        )  Intubation with MV - Mechanical Ventilation  (      ) BIPAP/CPAP    (     x    )   DNI                                               (         )  Artificial Nutrition -  IVF, TPN / PPN, Tube Feeds             (     x    )   No Feeding Tube                                                (   x     ) Use Antibiotics                         (          ) No Antibiotics                                                (     x    ) Blood and Blood Products     (         )   No Blood or Blood products                                                (       x   )  Dialysis                                    (         )  No Dialysis                                                (          )  Medical Management only  (         )  No Invasive Interventions or Surgery  Time spent :                        (    x   ) upto 30 minutes                       (           )   more than 30 minutes  ACP reviewed and discussed

## 2023-12-05 NOTE — PATIENT CHOICE NOTE. - NSPTCHOICESTATE_GEN_ALL_CORE
I have met with the patient and/or caregiver to discuss discharge goals and treatment plan. Patient and/or caregiver also provided with instructions on accessing the CMS Compare websites for additional information related to Post Acute Provider quality and resource use measures to assist them in evaluation of the providers and in selecting their post-acute provider of choice. Patient and caregiver were informed of the facilities that are owned and/or operated by [facility]. I have discussed with the patient the availability of in-network facilities and providers. Patient and caregiver provided with a list of post-acute providers whose services are appropriate to the discharge plans and patient needs.     For patient requiring durable medical equipment, patient and/or caregiver were informed that they have the right to request who provides the required equipment. I have met with the patient and/or caregiver to discuss discharge goals and treatment plan. Patient and/or caregiver also provided with instructions on accessing the CMS Compare websites for additional information related to Post Acute Provider quality and resource use measures to assist them in evaluation of the providers and in selecting their post-acute provider of choice. Patient and caregiver were informed of the facilities that are owned and/or operated by [facility]. I have discussed with the patient the availability of in-network facilities and providers. Patient and caregiver provided with a list of post-acute providers whose services are appropriate to the discharge plans and patient needs.     For patient requiring durable medical equipment, patient and/or caregiver were informed that they have the right to request who provides the required equipment.

## 2023-12-05 NOTE — PROGRESS NOTE ADULT - PROVIDER SPECIALTY LIST ADULT
Infectious Disease
Neurology
Physiatry
Family Medicine
Neurology
Infectious Disease
Infectious Disease
Physiatry
Family Medicine
Internal Medicine
Family Medicine

## 2023-12-05 NOTE — PROGRESS NOTE ADULT - ASSESSMENT
88 y/o M with hx of parkinsons, HTN, PVD, chronic wound LLE pw weakness/?fall pt states he was with his walker near the steps and felt like his legs were too weak and he slid down to the floor.   In ER patient was found to have possible CHF. patient is being admitted for further work up and treatment (30 Nov 2023 15:20)  ID c/s uptrending WBC, possible LE cellulitis    rle cellulitis   UA not impressive for infection, UCx NGTD  rash - ? etiology antibx > switched   Rash resolved  Leg improving    Recommendations:   S/p levofloxacin x5 day course  Monitor off Abx  Trend temps/WBC--leukocytosis   local care  keep elevated     Stable from ID standpoint  D/c planning per primary team    Infectious Diseases will sign off.  Please call with any questions.   Erin Lynn M.D.  [facility] Division of Infectious Diseases [phone]  For after 5 P.M. and weekends, please call [phone]     86 y/o M with hx of parkinsons, HTN, PVD, chronic wound LLE pw weakness/?fall pt states he was with his walker near the steps and felt like his legs were too weak and he slid down to the floor.   In ER patient was found to have possible CHF. patient is being admitted for further work up and treatment (30 Nov 2023 15:20)  ID c/s uptrending WBC, possible LE cellulitis    rle cellulitis   UA not impressive for infection, UCx NGTD  rash - ? etiology antibx > switched   Rash resolved  Leg improving    Recommendations:   S/p levofloxacin x5 day course  Monitor off Abx  Trend temps/WBC--leukocytosis   local care  keep elevated     Stable from ID standpoint  D/c planning per primary team    Infectious Diseases will sign off.  Please call with any questions.   Erin Lynn M.D.  [facility] Division of Infectious Diseases [phone]  For after 5 P.M. and weekends, please call [phone]

## 2023-12-05 NOTE — PROGRESS NOTE ADULT - SUBJECTIVE AND OBJECTIVE BOX
Neurology follow up note    DENG [id number]      Interval History:    Patient feels ok no new complaints.    Allergies    No Known Allergies    Intolerances        MEDICATIONS    acetaminophen     Tablet .. 650 milliGRAM(s) Oral every 6 hours PRN  aluminum hydroxide/magnesium hydroxide/simethicone Suspension 30 milliLiter(s) Oral every 4 hours PRN  carbidopa/levodopa  25/100 1 Tablet(s) Oral four times a day  collagenase Ointment 1 Application(s) Topical daily  diphenhydrAMINE 25 milliGRAM(s) Oral every 4 hours PRN  famotidine    Tablet 20 milliGRAM(s) Oral daily  furosemide    Tablet 40 milliGRAM(s) Oral daily  heparin   Injectable 5000 Unit(s) SubCutaneous every 8 hours  influenza  Vaccine (HIGH DOSE) 0.7 milliLiter(s) IntraMuscular once  lactobacillus acidophilus 1 Tablet(s) Oral daily  levoFLOXacin  Tablet 250 milliGRAM(s) Oral every 24 hours  melatonin 3 milliGRAM(s) Oral at bedtime PRN  nystatin Powder 1 Application(s) Topical three times a day  ondansetron Injectable 4 milliGRAM(s) IV Push every 8 hours PRN  potassium chloride    Tablet ER 10 milliEquivalent(s) Oral three times a day              Vital Signs Last 24 Hrs  T(C): 36.7 (05 Dec 2023 05:05), Max: 37.1 (04 Dec 2023 16:22)  T(F): 98.1 (05 Dec 2023 05:05), Max: 98.8 (04 Dec 2023 16:22)  HR: 73 (05 Dec 2023 05:05) (71 - 86)  BP: 127/72 (05 Dec 2023 05:05) (114/61 - 144/85)  BP(mean): --  RR: 17 (05 Dec 2023 05:05) (17 - 17)  SpO2: 98% (05 Dec 2023 05:05) (94% - 98%)    Parameters below as of 05 Dec 2023 05:05  Patient On (Oxygen Delivery Method): room air      REVIEW OF SYSTEMS:  Constitutional:  The patient denies fever, chills, or night sweats.  Head:  No headache.  Eyes:  No double vision or blurry vision.  Ears:  No ringing in the ears.  Neck:  No neck pain.  Respiratory:  No shortness of breath.  Cardiovascular:  No chest pain.  Abdomen:  No nausea, vomiting, or abdominal pain.  Extremities/Neurological:  No numbness or tingling.  Musculoskeletal:  Positive selling of lower extremities, legs.    PHYSICAL EXAMINATION:   HEENT:  Head:  Normocephalic and atraumatic.  Eyes:  No scleral icterus.  Ears:  Hearing bilaterally appeared to be intact.  NECK:  Supple.  RESPIRATORY:  Air entry bilaterally.  CARDIOVASCULAR:  S1 and S2 heard.  ABDOMEN:  Soft and nontender.  EXTREMITIES:  Positive poor circulation was noted.      NEUROLOGIC:  The patient is awake and alert.  Able to tell daughter at bedside.  Location was hospital.  Year was 2023, month was January.  Recall was 1/3 with prompting in 3 minutes.  Was able to name simple objects.  Extraocular movements were intact.  Affect was flat.  Speech was monotone.  Motor:  Bilateral upper were 4/5.  Positive resting tremors were noted, right greater than left.  Positive cogwheel rigidity was noted.  Bilateral lower extremities were 3/5.  The patient has bradykinesia in both lower extremities with increased tone.                LABS:  CBC Full  -  ( 04 Dec 2023 10:20 )  WBC Count : 8.90 K/uL  RBC Count : 4.10 M/uL  Hemoglobin : 12.6 g/dL  Hematocrit : 38.3 %  Platelet Count - Automated : 258 K/uL  Mean Cell Volume : 93.4 fl  Mean Cell Hemoglobin : 30.7 pg  Mean Cell Hemoglobin Concentration : 32.9 gm/dL  Auto Neutrophil # : x  Auto Lymphocyte # : x  Auto Monocyte # : x  Auto Eosinophil # : x  Auto Basophil # : x  Auto Neutrophil % : x  Auto Lymphocyte % : x  Auto Monocyte % : x  Auto Eosinophil % : x  Auto Basophil % : x    Urinalysis Basic - ( 03 Dec 2023 10:18 )    Color: x / Appearance: x / SG: x / pH: x  Gluc: 121 mg/dL / Ketone: x  / Bili: x / Urobili: x   Blood: x / Protein: x / Nitrite: x   Leuk Esterase: x / RBC: x / WBC x   Sq Epi: x / Non Sq Epi: x / Bacteria: x      12-03    140  |  101  |  18  ----------------------------<  121<H>  3.9   |  33<H>  |  0.92    Ca    8.5      03 Dec 2023 10:18      Hemoglobin A1C:       Vitamin B12         RADIOLOGY      ANALYSIS AND PLAN:  This is an 87-year-old with episode of fall and Parkinson's.  For episode of fall, suspect most likely mechanical in nature.  I do not see any clear sign to suggest a new primary central nervous system event such as cerebrovascular accident has ensued.  The patient has multiple factors that could contribute to this leg swelling, Parkinson's, bradykinesia, and deconditioning and also does appear to have impaired proprioception in bilateral feet, possibly underlying neuropathy.  Physical therapy evaluation.  For history of Parkinson's, continue him on Sinemet.  For history of dementia, did try medications in the past but had side effects, would recommend supportive therapy.  AMS suspect form hospital setting.    Neurologic wise stable dc planning     Spoke with daughter, Zeian, at bedside.  Her telephone number is [phone]. 12/4  She does understand while in the hospital setting, he may become more disoriented.     45 minutes of time was spent with the patient, plan of care, reviewing data, with greater than 50% of the visit was spent counseling and/or coordinating care with multidisciplinary healthcare team   Neurology follow up note    DENG [id number]      Interval History:    Patient feels ok no new complaints.    Allergies    No Known Allergies    Intolerances        MEDICATIONS    acetaminophen     Tablet .. 650 milliGRAM(s) Oral every 6 hours PRN  aluminum hydroxide/magnesium hydroxide/simethicone Suspension 30 milliLiter(s) Oral every 4 hours PRN  carbidopa/levodopa  25/100 1 Tablet(s) Oral four times a day  collagenase Ointment 1 Application(s) Topical daily  diphenhydrAMINE 25 milliGRAM(s) Oral every 4 hours PRN  famotidine    Tablet 20 milliGRAM(s) Oral daily  furosemide    Tablet 40 milliGRAM(s) Oral daily  heparin   Injectable 5000 Unit(s) SubCutaneous every 8 hours  influenza  Vaccine (HIGH DOSE) 0.7 milliLiter(s) IntraMuscular once  lactobacillus acidophilus 1 Tablet(s) Oral daily  levoFLOXacin  Tablet 250 milliGRAM(s) Oral every 24 hours  melatonin 3 milliGRAM(s) Oral at bedtime PRN  nystatin Powder 1 Application(s) Topical three times a day  ondansetron Injectable 4 milliGRAM(s) IV Push every 8 hours PRN  potassium chloride    Tablet ER 10 milliEquivalent(s) Oral three times a day              Vital Signs Last 24 Hrs  T(C): 36.7 (05 Dec 2023 05:05), Max: 37.1 (04 Dec 2023 16:22)  T(F): 98.1 (05 Dec 2023 05:05), Max: 98.8 (04 Dec 2023 16:22)  HR: 73 (05 Dec 2023 05:05) (71 - 86)  BP: 127/72 (05 Dec 2023 05:05) (114/61 - 144/85)  BP(mean): --  RR: 17 (05 Dec 2023 05:05) (17 - 17)  SpO2: 98% (05 Dec 2023 05:05) (94% - 98%)    Parameters below as of 05 Dec 2023 05:05  Patient On (Oxygen Delivery Method): room air      REVIEW OF SYSTEMS:  Constitutional:  The patient denies fever, chills, or night sweats.  Head:  No headache.  Eyes:  No double vision or blurry vision.  Ears:  No ringing in the ears.  Neck:  No neck pain.  Respiratory:  No shortness of breath.  Cardiovascular:  No chest pain.  Abdomen:  No nausea, vomiting, or abdominal pain.  Extremities/Neurological:  No numbness or tingling.  Musculoskeletal:  Positive selling of lower extremities, legs.    PHYSICAL EXAMINATION:   HEENT:  Head:  Normocephalic and atraumatic.  Eyes:  No scleral icterus.  Ears:  Hearing bilaterally appeared to be intact.  NECK:  Supple.  RESPIRATORY:  Air entry bilaterally.  CARDIOVASCULAR:  S1 and S2 heard.  ABDOMEN:  Soft and nontender.  EXTREMITIES:  Positive poor circulation was noted.      NEUROLOGIC:  The patient is awake and alert.  Able to tell daughter at bedside.  Location was hospital.  Year was 2023, month was January.  Recall was 1/3 with prompting in 3 minutes.  Was able to name simple objects.  Extraocular movements were intact.  Affect was flat.  Speech was monotone.  Motor:  Bilateral upper were 4/5.  Positive resting tremors were noted, right greater than left.  Positive cogwheel rigidity was noted.  Bilateral lower extremities were 3/5.  The patient has bradykinesia in both lower extremities with increased tone.                LABS:  CBC Full  -  ( 04 Dec 2023 10:20 )  WBC Count : 8.90 K/uL  RBC Count : 4.10 M/uL  Hemoglobin : 12.6 g/dL  Hematocrit : 38.3 %  Platelet Count - Automated : 258 K/uL  Mean Cell Volume : 93.4 fl  Mean Cell Hemoglobin : 30.7 pg  Mean Cell Hemoglobin Concentration : 32.9 gm/dL  Auto Neutrophil # : x  Auto Lymphocyte # : x  Auto Monocyte # : x  Auto Eosinophil # : x  Auto Basophil # : x  Auto Neutrophil % : x  Auto Lymphocyte % : x  Auto Monocyte % : x  Auto Eosinophil % : x  Auto Basophil % : x    Urinalysis Basic - ( 03 Dec 2023 10:18 )    Color: x / Appearance: x / SG: x / pH: x  Gluc: 121 mg/dL / Ketone: x  / Bili: x / Urobili: x   Blood: x / Protein: x / Nitrite: x   Leuk Esterase: x / RBC: x / WBC x   Sq Epi: x / Non Sq Epi: x / Bacteria: x      12-03    140  |  101  |  18  ----------------------------<  121<H>  3.9   |  33<H>  |  0.92    Ca    8.5      03 Dec 2023 10:18      Hemoglobin A1C:       Vitamin B12         RADIOLOGY      ANALYSIS AND PLAN:  This is an 87-year-old with episode of fall and Parkinson's.  For episode of fall, suspect most likely mechanical in nature.  I do not see any clear sign to suggest a new primary central nervous system event such as cerebrovascular accident has ensued.  The patient has multiple factors that could contribute to this leg swelling, Parkinson's, bradykinesia, and deconditioning and also does appear to have impaired proprioception in bilateral feet, possibly underlying neuropathy.  Physical therapy evaluation.  For history of Parkinson's, continue him on Sinemet.  For history of dementia, did try medications in the past but had side effects, would recommend supportive therapy.  AMS suspect form hospital setting.    Neurologic wise stable dc planning     Spoke with daughter, Zeina, at bedside.  Her telephone number is [phone]. 12/4  She does understand while in the hospital setting, he may become more disoriented.     45 minutes of time was spent with the patient, plan of care, reviewing data, with greater than 50% of the visit was spent counseling and/or coordinating care with multidisciplinary healthcare team   Neurology follow up note    DENG [id number]      Interval History:    Patient feels ok no new complaints.    Allergies    No Known Allergies    Intolerances        MEDICATIONS    acetaminophen     Tablet .. 650 milliGRAM(s) Oral every 6 hours PRN  aluminum hydroxide/magnesium hydroxide/simethicone Suspension 30 milliLiter(s) Oral every 4 hours PRN  carbidopa/levodopa  25/100 1 Tablet(s) Oral four times a day  collagenase Ointment 1 Application(s) Topical daily  diphenhydrAMINE 25 milliGRAM(s) Oral every 4 hours PRN  famotidine    Tablet 20 milliGRAM(s) Oral daily  furosemide    Tablet 40 milliGRAM(s) Oral daily  heparin   Injectable 5000 Unit(s) SubCutaneous every 8 hours  influenza  Vaccine (HIGH DOSE) 0.7 milliLiter(s) IntraMuscular once  lactobacillus acidophilus 1 Tablet(s) Oral daily  levoFLOXacin  Tablet 250 milliGRAM(s) Oral every 24 hours  melatonin 3 milliGRAM(s) Oral at bedtime PRN  nystatin Powder 1 Application(s) Topical three times a day  ondansetron Injectable 4 milliGRAM(s) IV Push every 8 hours PRN  potassium chloride    Tablet ER 10 milliEquivalent(s) Oral three times a day              Vital Signs Last 24 Hrs  T(C): 36.7 (05 Dec 2023 05:05), Max: 37.1 (04 Dec 2023 16:22)  T(F): 98.1 (05 Dec 2023 05:05), Max: 98.8 (04 Dec 2023 16:22)  HR: 73 (05 Dec 2023 05:05) (71 - 86)  BP: 127/72 (05 Dec 2023 05:05) (114/61 - 144/85)  BP(mean): --  RR: 17 (05 Dec 2023 05:05) (17 - 17)  SpO2: 98% (05 Dec 2023 05:05) (94% - 98%)    Parameters below as of 05 Dec 2023 05:05  Patient On (Oxygen Delivery Method): room air      REVIEW OF SYSTEMS:  Constitutional:  The patient denies fever, chills, or night sweats.  Head:  No headache.  Eyes:  No double vision or blurry vision.  Ears:  No ringing in the ears.  Neck:  No neck pain.  Respiratory:  No shortness of breath.  Cardiovascular:  No chest pain.  Abdomen:  No nausea, vomiting, or abdominal pain.  Extremities/Neurological:  No numbness or tingling.  Musculoskeletal:  Positive selling of lower extremities, legs.    PHYSICAL EXAMINATION:   HEENT:  Head:  Normocephalic and atraumatic.  Eyes:  No scleral icterus.  Ears:  Hearing bilaterally appeared to be intact.  NECK:  Supple.  RESPIRATORY:  Air entry bilaterally.  CARDIOVASCULAR:  S1 and S2 heard.  ABDOMEN:  Soft and nontender.  EXTREMITIES:  Positive poor circulation was noted.      NEUROLOGIC:  The patient is awake and alert.  Able to tell daughter at bedside.  Location was hospital.  Year was 2023, month was January.  Recall was 1/3 with prompting in 3 minutes.  Was able to name simple objects.  Extraocular movements were intact.  Affect was flat.  Speech was monotone.  Motor:  Bilateral upper were 4/5.  Positive resting tremors were noted, right greater than left.  Positive cogwheel rigidity was noted.  Bilateral lower extremities were 3/5.  The patient has bradykinesia in both lower extremities with increased tone.      LABS:  CBC Full  -  ( 04 Dec 2023 10:20 )  WBC Count : 8.90 K/uL  RBC Count : 4.10 M/uL  Hemoglobin : 12.6 g/dL  Hematocrit : 38.3 %  Platelet Count - Automated : 258 K/uL  Mean Cell Volume : 93.4 fl  Mean Cell Hemoglobin : 30.7 pg  Mean Cell Hemoglobin Concentration : 32.9 gm/dL  Auto Neutrophil # : x  Auto Lymphocyte # : x  Auto Monocyte # : x  Auto Eosinophil # : x  Auto Basophil # : x  Auto Neutrophil % : x  Auto Lymphocyte % : x  Auto Monocyte % : x  Auto Eosinophil % : x  Auto Basophil % : x    Urinalysis Basic - ( 03 Dec 2023 10:18 )    Color: x / Appearance: x / SG: x / pH: x  Gluc: 121 mg/dL / Ketone: x  / Bili: x / Urobili: x   Blood: x / Protein: x / Nitrite: x   Leuk Esterase: x / RBC: x / WBC x   Sq Epi: x / Non Sq Epi: x / Bacteria: x      12-03    140  |  101  |  18  ----------------------------<  121<H>  3.9   |  33<H>  |  0.92    Ca    8.5      03 Dec 2023 10:18      Hemoglobin A1C:       Vitamin B12         RADIOLOGY      ANALYSIS AND PLAN:  This is an 87-year-old with episode of fall and Parkinson's.  For episode of fall, suspect most likely mechanical in nature.  I do not see any clear sign to suggest a new primary central nervous system event such as cerebrovascular accident has ensued.  The patient has multiple factors that could contribute to this leg swelling, Parkinson's, bradykinesia, and deconditioning and also does appear to have impaired proprioception in bilateral feet, possibly underlying neuropathy.  Physical therapy evaluation.  For history of Parkinson's, continue him on Sinemet.  For history of dementia, did try medications in the past but had side effects, would recommend supportive therapy.  AMS suspect form hospital setting.    Neurologic wise stable dc planning     Spoke with daughter, Zeina  Her telephone number is [phone]. 12/5  She does understand while in the hospital setting, he may become more disoriented.     45 minutes of time was spent with the patient, plan of care, reviewing data, with greater than 50% of the visit was spent counseling and/or coordinating care with multidisciplinary healthcare team   Neurology follow up note    DENG [id number]      Interval History:    Patient feels ok no new complaints.    Allergies    No Known Allergies    Intolerances        MEDICATIONS    acetaminophen     Tablet .. 650 milliGRAM(s) Oral every 6 hours PRN  aluminum hydroxide/magnesium hydroxide/simethicone Suspension 30 milliLiter(s) Oral every 4 hours PRN  carbidopa/levodopa  25/100 1 Tablet(s) Oral four times a day  collagenase Ointment 1 Application(s) Topical daily  diphenhydrAMINE 25 milliGRAM(s) Oral every 4 hours PRN  famotidine    Tablet 20 milliGRAM(s) Oral daily  furosemide    Tablet 40 milliGRAM(s) Oral daily  heparin   Injectable 5000 Unit(s) SubCutaneous every 8 hours  influenza  Vaccine (HIGH DOSE) 0.7 milliLiter(s) IntraMuscular once  lactobacillus acidophilus 1 Tablet(s) Oral daily  levoFLOXacin  Tablet 250 milliGRAM(s) Oral every 24 hours  melatonin 3 milliGRAM(s) Oral at bedtime PRN  nystatin Powder 1 Application(s) Topical three times a day  ondansetron Injectable 4 milliGRAM(s) IV Push every 8 hours PRN  potassium chloride    Tablet ER 10 milliEquivalent(s) Oral three times a day              Vital Signs Last 24 Hrs  T(C): 36.7 (05 Dec 2023 05:05), Max: 37.1 (04 Dec 2023 16:22)  T(F): 98.1 (05 Dec 2023 05:05), Max: 98.8 (04 Dec 2023 16:22)  HR: 73 (05 Dec 2023 05:05) (71 - 86)  BP: 127/72 (05 Dec 2023 05:05) (114/61 - 144/85)  BP(mean): --  RR: 17 (05 Dec 2023 05:05) (17 - 17)  SpO2: 98% (05 Dec 2023 05:05) (94% - 98%)    Parameters below as of 05 Dec 2023 05:05  Patient On (Oxygen Delivery Method): room air      REVIEW OF SYSTEMS:  Constitutional:  The patient denies fever, chills, or night sweats.  Head:  No headache.  Eyes:  No double vision or blurry vision.  Ears:  No ringing in the ears.  Neck:  No neck pain.  Respiratory:  No shortness of breath.  Cardiovascular:  No chest pain.  Abdomen:  No nausea, vomiting, or abdominal pain.  Extremities/Neurological:  No numbness or tingling.  Musculoskeletal:  Positive selling of lower extremities, legs.    PHYSICAL EXAMINATION:   HEENT:  Head:  Normocephalic and atraumatic.  Eyes:  No scleral icterus.  Ears:  Hearing bilaterally appeared to be intact.  NECK:  Supple.  RESPIRATORY:  Air entry bilaterally.  CARDIOVASCULAR:  S1 and S2 heard.  ABDOMEN:  Soft and nontender.  EXTREMITIES:  Positive poor circulation was noted.      NEUROLOGIC:  The patient is awake and alert.  Able to tell daughter at bedside.  Location was hospital.  Year was 2023, month was January.  Recall was 1/3 with prompting in 3 minutes.  Was able to name simple objects.  Extraocular movements were intact.  Affect was flat.  Speech was monotone.  Motor:  Bilateral upper were 4/5.  Positive resting tremors were noted, right greater than left.  Positive cogwheel rigidity was noted.  Bilateral lower extremities were 3/5.  The patient has bradykinesia in both lower extremities with increased tone.      LABS:  CBC Full  -  ( 04 Dec 2023 10:20 )  WBC Count : 8.90 K/uL  RBC Count : 4.10 M/uL  Hemoglobin : 12.6 g/dL  Hematocrit : 38.3 %  Platelet Count - Automated : 258 K/uL  Mean Cell Volume : 93.4 fl  Mean Cell Hemoglobin : 30.7 pg  Mean Cell Hemoglobin Concentration : 32.9 gm/dL  Auto Neutrophil # : x  Auto Lymphocyte # : x  Auto Monocyte # : x  Auto Eosinophil # : x  Auto Basophil # : x  Auto Neutrophil % : x  Auto Lymphocyte % : x  Auto Monocyte % : x  Auto Eosinophil % : x  Auto Basophil % : x    Urinalysis Basic - ( 03 Dec 2023 10:18 )    Color: x / Appearance: x / SG: x / pH: x  Gluc: 121 mg/dL / Ketone: x  / Bili: x / Urobili: x   Blood: x / Protein: x / Nitrite: x   Leuk Esterase: x / RBC: x / WBC x   Sq Epi: x / Non Sq Epi: x / Bacteria: x      12-03    140  |  101  |  18  ----------------------------<  121<H>  3.9   |  33<H>  |  0.92    Ca    8.5      03 Dec 2023 10:18      Hemoglobin A1C:       Vitamin B12         RADIOLOGY      ANALYSIS AND PLAN:  This is an 87-year-old with episode of fall and Parkinson's.  For episode of fall, suspect most likely mechanical in nature.  I do not see any clear sign to suggest a new primary central nervous system event such as cerebrovascular accident has ensued.  The patient has multiple factors that could contribute to this leg swelling, Parkinson's, bradykinesia, and deconditioning and also does appear to have impaired proprioception in bilateral feet, possibly underlying neuropathy.  Physical therapy evaluation.  For history of Parkinson's, continue him on Sinemet.  For history of dementia, did try medications in the past but had side effects, would recommend supportive therapy.  AMS suspect form hospital setting.    Neurologic wise stable dc planning     Spoke with daughter, Zeina  Her telephone number is [phone]. 12/5  She does understand while in the hospital setting, he may become more disoriented.     45 minutes of time was spent with the patient, plan of care, reviewing data, with greater than 50% of the visit was spent counseling and/or coordinating care with multidisciplinary healthcare team

## 2023-12-05 NOTE — DISCHARGE NOTE NURSING/CASE MANAGEMENT/SOCIAL WORK - PATIENT PORTAL LINK FT
You can access the FollowMyHealth Patient Portal offered by [facility] by registering at the following website: [email or web address]. By joining [employer]’s FollowMyHealth portal, you will also be able to view your health information using other applications (apps) compatible with our system. You can access the FollowMyHealth Patient Portal offered by [facility] by registering at the following website: [email or web address]. By joining [employer]’s FollowMyHealth portal, you will also be able to view your health information using other applications (apps) compatible with our system.

## 2024-07-18 NOTE — PHYSICAL THERAPY INITIAL EVALUATION ADULT - GAIT TRAINING, PT EVAL
How Severe Is It?: mild
Is This A New Presentation, Or A Follow-Up?: Rash
(2 weeks) independent ambulation ~ 200 feet

## 2024-08-09 NOTE — PATIENT PROFILE ADULT - MONEY FOR FOOD
Add High Risk Medication Management Associated Diagnosis?: Yes
Comments: Started Dupixent July 26, 2024
Length Of Therapy Override: 2 weeks
Detail Level: Generalized
no

## 2024-08-16 NOTE — PHYSICAL EXAM
cardiologist.        RADIOLOGY:   Non-plain film images such as CT, Ultrasound and MRI are read by the radiologist. Plain radiographic images are visualized and preliminarily interpreted by the emergency physician with the below findings:        Interpretation per the Radiologist below, if available at the time of this note:    No orders to display        LABS:  Labs Reviewed   COVID-19 & INFLUENZA COMBO - Abnormal; Notable for the following components:       Result Value    SARS-CoV-2, PCR Detected (*)     All other components within normal limits   CBC WITH AUTO DIFFERENTIAL - Abnormal; Notable for the following components:    RBC 3.59 (*)     Hemoglobin 10.8 (*)     Hematocrit 32.1 (*)     Immature Granulocytes % 2 (*)     Monocytes Absolute 1.3 (*)     Immature Granulocytes Absolute 0.2 (*)     All other components within normal limits   COMPREHENSIVE METABOLIC PANEL - Abnormal; Notable for the following components:    Potassium 3.3 (*)     BUN 2 (*)     Creatinine 0.42 (*)     BUN/Creatinine Ratio 5 (*)     Albumin 3.0 (*)     Globulin 4.1 (*)     Albumin/Globulin Ratio 0.7 (*)     All other components within normal limits   URINALYSIS WITH MICROSCOPIC - Abnormal; Notable for the following components:    Leukocyte Esterase, Urine TRACE (*)     Epithelial Cells, UA MANY (*)     BACTERIA, URINE 3+ (*)     All other components within normal limits   URINALYSIS WITH MICROSCOPIC - Abnormal; Notable for the following components:    BACTERIA, URINE 1+ (*)     All other components within normal limits   URINE CULTURE HOLD SAMPLE   URINE CULTURE HOLD SAMPLE   CULTURE, URINE   EXTRA TUBES HOLD       All other labs were within normal range or not returned as of this dictation.    EMERGENCY DEPARTMENT COURSE and DIFFERENTIAL DIAGNOSIS/MDM:   Vitals:    Vitals:    08/16/24 1714   BP: 107/68   Pulse: (!) 110   Resp: 18   Temp: 97.5 °F (36.4 °C)   TempSrc: Oral   SpO2: 98%   Weight: 46.4 kg (102 lb 4.7 oz)           Medical  Decision Making  22-year-old female at about 23 weeks gestation presenting to the ER for 24-hour history of subjective fever with URI symptoms.  Considered septic workup and empiric antibiotics, however patient is afebrile, not hypotensive, overall well-appearing.  Abdomen is soft, relatively nontender, no focal tenderness concerning for cholecystitis, pancreatitis, TOA, appendicitis and patient is already status post appendectomy.  No CVA tenderness or flank pain concerning for pyelonephritis, however given risk of hydronephrosis in pregnancy will still check UA.  Given the patient is pregnant and would be considered immune compromised, will check COVID and flu as well as basic blood work, treat symptomatically and reassess.    COVID-positive.  Otherwise reassuring workup.  Patient with 1+ bacteria, given pregnancy will send culture.    Amount and/or Complexity of Data Reviewed  External Data Reviewed:      Details: Chart review indicates the patient has been seen by [facility], previously has had an appendectomy and an admission for pyelonephritis  Labs: ordered.    Risk  OTC drugs.  Prescription drug management.            REASSESSMENT            CONSULTS:  None    PROCEDURES:  Unless otherwise noted below, none     Procedures      FINAL IMPRESSION      1. Upper respiratory tract infection, unspecified type    2. COVID-19          DISPOSITION/PLAN   DISPOSITION Decision To Discharge 08/16/2024 09:40:42 PM      PATIENT REFERRED TO:  [facility] EMERGENCY DEP  [address] [address] [phone]    If symptoms worsen    [facility] [place] [address] OFFICE  [address] [place] [id number] [phone]          DISCHARGE MEDICATIONS:  Discharge Medication List as of 8/16/2024  9:34 PM        START taking these medications    Details   nirmatrelvir/ritonavir 300/100 (PAXLOVID) 20 x 150 MG & 10 x 100MG TBPK Take 3 tablets (two 150 mg nirmatrelvir and one 100  [4 x 4] : 4 x 4  [Normal Breath Sounds] : Normal breath sounds [1+] : left 1+ [0] : left 0 [Ankle Swelling (On Exam)] : present [Ankle Swelling On The Left] : moderate [Varicose Veins Of Lower Extremities] : bilaterally [Ankle Swelling Bilaterally] : severe [] : of the left leg [Alert] : alert [Oriented to Person] : oriented to person [Calm] : calm [JVD] : no jugular venous distention  [de-identified] : WD/WN in no acute distress. [de-identified] : JANISL [de-identified] : WNL [de-identified] : Left leg ulcer is clean, base is red and viable, no infection, periwound skin is intact with no cellulitis. [de-identified] : No neurovascular deficits noted. Patient expressed comfort post compression application. [FreeTextEntry1] :  Lower Leg  [FreeTextEntry2] : 2.1 [FreeTextEntry3] : 1.0 [FreeTextEntry4] : 0.1 [de-identified] : serosanguineous [de-identified] : moderately  [de-identified] : none [de-identified] : >80% [de-identified] : 20% [de-identified] : none [de-identified] : Coban.  [de-identified] : Silver Alginate [de-identified] : Cleansed with Normal saline\par  [de-identified] : No neurovascular deficits noted. Patient expressed comfort post compression application. [FreeTextEntry7] : Medial Leg - steri strip in place after venous procedure [FreeTextEntry8] : 1.1 [FreeTextEntry9] : 4.9 [de-identified] : 0.1 [de-identified] : Coban [de-identified] : No treatment required [de-identified] : Cleansed with Normal saline\par  [TWNoteComboBox1] : Left [TWNoteComboBox4] : Small [de-identified] : Macerated [TWNoteComboBox7] : Jorge [de-identified] : Yes [de-identified] : Multilayer other compression wrap [de-identified] : Weekly [TWNoteComboBox9] : Left [de-identified] : Primary Dressing [de-identified] : None [de-identified] : Multilayer other compression wrap [de-identified] : Compression

## 2024-08-26 NOTE — CONSULT NOTE ADULT - CONSULT REQUESTED BY NAME
Dr. KANNAN Whittington
Procedure Date  8/26/24     Impression  Overall Impression:   Four polyps measuring from 3 mm up to 15 mm in the ascending colon; performed cold forceps biopsy; performed cold snare removal; performed hot snare removal  Three polyps measuring from 5 mm up to 15 mm in the descending colon; performed cold snare removal; performed hot snare removal  2 subcentimeter polyps in the sigmoid colon were removed with cold snare and hot snare  Scattered diverticulosis of mild severity in the sigmoid colon  The ileocecal valve, cecum and transverse colon appeared normal.  (grade 2) hemorrhoids        Recommendation  Await pathology results   Repeat colonoscopy in 3 years    Please schedule EGD for reported dysphagia - OK to overbook 9/6 or 9/9-11 - Sept. 11, 2024 @ 1200 noon      Outcome of procedure: successful Colonoscopy  Disposition: patient to recovery following procedure; discharge to home when appropriate parameters met  Provisions for follow up: please call my office for any unexpected symptoms like chest or abdominal pain or bleeding following your procedure.  Final Diagnosis: colon polyps      No driving today, no operating heavy machinery, no signing any legal documents until tomorrow.    Drink lots of fluids, resume regular diet.  Take your normal medications.     Please call our office for any nausea, vomiting or abdominal pain.  
Dr. Whittington
Dr. Vin Whittington

## 2024-11-15 NOTE — PROGRESS NOTE ADULT - SUBJECTIVE AND OBJECTIVE BOX
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  Clinical exam and imaging reviewed with parent and patient at length. Natural history discussed.  Child is 18 years of age, Risser 4+.  Patient is skeletally mature. Scoliosis currently measures about 45 degrees.  Relatively unchanged from previous imaging.  Scoliosis can progress despite skeletal maturity due to curve magnitude.  Treatment algorithm for scoliosis has been discussed.  Bracing is warranted for curves measuring greater than 25 degrees with skeletal growth remaining.  The parent understands that the braces do not correct curves permanently and that there is 30% risk brace failure. Parent understands the risk of curve progression needing surgery. Surgery is recommended for scoliosis measuring greater than 45 degrees.   It is already a large curve.  The options of surgery were again discussed.  Mother does understand curve larger than 50, based on natural history, tend to progress 1-2 /1 in adult life. Curves 90 or more can cause significant cardiac and pulmonary compromise. Large curves are likely at risk for back pain, arthritis in adult life.  She has previously undergone full spine MRI with images uploaded to our system.  Preoperatively I would recommend cardiac evaluation with 2D echocardiogram and pulmonary consultation for pulmonary function testing if she is able to do so.  Return in 9 months, repeat XR scoliosis AP/Lat views   Surgical procedure discussed at length. Surgery including spine fusion with instrumentation, osteotomies, Cell Saver, multimodal spinal cord monitoring, bone grafting (autograft/allograft), thoracoplasty, and navigated guidance versus fluoroscopic guidance and complex wound closure is planned. Perioperative plan discussed. Wakeup test discussed. Transfusion risk discussed. Neural monitoring explained. Possible need for rib resection has been discussed. Use of fluoroscopy and AIRO navigation explained. Infection prevention steps discussed. Postoperative pain management protocol discussed. Intraspinal Duramorph discussed. Preoperative and postoperative instructions reviewed. Hospital day is usually about 3-4 days with one night in the PICU. We have implemented a rapid recovery pathway that incorporates a micro-dose of intraspinal Duramorph at the time of surgery which eliminate the need for opioid PCA and decreases opioid needs postoperatively for pain control. This also decreases constipation rate and allows patients to resume diet on the same day of surgery. Pain management is done in collaboration with a pediatric pain specialist. We have a dedicated intraoperative and postoperative pediatric spine team that includes pediatric spine anesthesiologists, neurologist for intraoperative or real-time spinal cord monitoring to increase the safety of surgery, dedicated surgical team, pediatric hospitalist,  and  along with child life therapists. This approach has allowed for optimal management of patient's, increased surgical safety, decrease risk of blood transfusion, decreased need for opioid and allows for patient to be discharged to home earlier. At discharge the patient is able to walk and climb stairs independently. We will arrange for visiting nurse services for home care as needed. Sutures are dissolvable and wound closure was done by plastic surgery which decreases the risk of infection. We also utilized intraoperative low-dose CT scan to ensure accuracy of spinal implants. In addition, we perform multimodal spinal cord monitoring and real-time which is supervised by neurophysiologist and neurologists to decrease the risk of neurological injury and improve safety of the surgical procedure. This approach has improved surgical safety, accuracy and efficiency thereby ensuring superior patient now comes. In addition, [facility] is the only [place] [facility], ensuring the highest level of nursing care.   All the risks and complications of surgery have been briefly discussed.  Family is not interested in surgery at this time but will continue to consider these options.  They will return for follow-up in 9 months for repeat evaluation with x-rays at that time.  Activities as tolerated.  All questions answered, understanding verbalized.  Patient and parent in agreement with plan of care.  This note was generated using Dragon medical dictation software. A reasonable effort has been made for proofreading its contents, but typos may still remain. If there are any questions or points of clarification needed, please do not hesitate to contact my office.  We spent 30 minutes on HPI, Clinical exam, ordering/ reviewing all imaging, reviewing any existing record, reviewing findings and counseling patient to treatment, differentials, etiology, prognosis, natural history, implications on ADLs, activities limitations/modifications, genetics, answering questions and addressing concerns, treatment goals and documenting in the EHR.   [facility], Division of Infectious Diseases  RASHID Rosas Y. Patel, S. Shah, G. Valentin  [phone]  after hours and weekends [phone]    Name: DENG SIERRA  Age: 87y  Gender: Male  MRN: [id number]    Interval History--  Notes reviewed  in bed  somnolent   states better  daughter at bedside states rash improved     Allergies    No Known Allergies    Intolerances        Medications--  Antibiotics:  levoFLOXacin  Tablet 250 milliGRAM(s) Oral every 24 hours    Immunologic:  influenza  Vaccine (HIGH DOSE) 0.7 milliLiter(s) IntraMuscular once    Other:  acetaminophen     Tablet .. PRN  aluminum hydroxide/magnesium hydroxide/simethicone Suspension PRN  carbidopa/levodopa  25/100  collagenase Ointment  diphenhydrAMINE PRN  famotidine    Tablet  furosemide    Tablet  heparin   Injectable  lactobacillus acidophilus  melatonin PRN  nystatin Powder  ondansetron Injectable PRN  potassium chloride    Tablet ER      Review of Systems--  A 10-point review of systems was obtained.     limited   Review of systems otherwise negative except as previously noted.    Physical Examination--  Vital Signs: T(F): 98.3 (12-03-23 @ 12:16), Max: 99.4 (12-02-23 @ 20:12)  HR: 81 (12-03-23 @ 12:16)  BP: 101/61 (12-03-23 @ 12:16)  RR: 17 (12-03-23 @ 12:16)  SpO2: 93% (12-03-23 @ 12:16)  Wt(kg): --  General: Nontoxic-appearing Male in no acute distress.  HEENT: AT/NC.   Neck: Not rigid. No sense of mass.  Nodes: None palpable.  Lungs: Clear bilaterally without rales, wheezing or rhonchi  Heart: Regular rate and rhythm. No Murmur. No rub. No gallop. No palpable thrill.  Abdomen: Bowel sounds present and normoactive. Soft. mildly distended. nt  Back: No spinal tenderness. No costovertebral angle tenderness.   Extremities: No cyanosis or clubbing. b/l stasis dermatitis edema r> l but decreased not hot   Skin: Warm. Dry. Good turgor. . No vasculitic stigmata. rash resolved   Psychiatric: Appropriate affect and mood for situation.         Laboratory Studies--  CBC                        10.7   7.82  )-----------( 189      ( 02 Dec 2023 07:15 )             33.3       Chemistries  12-03    140  |  101  |  18  ----------------------------<  121<H>  3.9   |  33<H>  |  0.92    Ca    8.5      03 Dec 2023 10:18        Culture Data    Culture - Urine (collected 30 Nov 2023 09:30)  Source: Clean Catch Clean Catch (Midstream)  Final Report (01 Dec 2023 11:48):    No growth          < from: US Duplex Venous Lower Ext Complete, Bilateral (12.01.23 @ 09:21) >  eal fossa cyst measuring 5.8 x 1.8 x 3.5 cm.    Subcutaneous edema in the right calf.    LEFT:  Normal compressibility of the LEFT common femoral, femoral and popliteal   veins. 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  Doppler examination shows normal spontaneous and phasic flow.  No LEFT calf vein thrombosis is detected.    Subcutaneous edema is noted in the thigh and calf.    Superficial thrombus in the left small saphenous vein of uncertain   chronicity.    IMPRESSION:  No evidence of deep venous thrombosis in either lower extremity.    Superficial thrombus in the left small saphenous vein of uncertain   chronicity.    < end of copied text >  < from: CT Head No Cont (11.30.23 @ 09:46) >  COMPARISON: MR brain dated 2/4/2021.    FINDINGS:    No acute intracranial hemorrhage. Areas ofdecreased attenuation in the   deep and periventricular white matter, compatible with chronic small   vessel disease. Central parenchymal volume loss. The extra-axial spaces   and basal cisterns are within normal limits. No midline shift or mass   effect present.    The cranial cervical junction is within normal limits. The sella is not   expanded. No depressed calvarial fracture. Mild mucosal thickening in the   ethmoid air cells. The visualized mastoid air cells are well aerated. The   visualized orbits are within normal limits.    IMPRESSION:    1.  No evidence of acute intracranial hemorrhage or midline shift.  2.  Chronic small vessel disease. If there is continued concern for acute   neurologic compromise, recommend MRI of the brain further evaluation.    < end of copied text >    . [facility], Division of Infectious Diseases  RASHID Rosas Y. Patel, S. Shah, G. Valentin  [phone]  after hours and weekends [phone]    Name: DENG SIERRA  Age: 87y  Gender: Male  MRN: [id number]    Interval History--  Notes reviewed  in bed  somnolent   states better  daughter at bedside states rash improved     Allergies    No Known Allergies    Intolerances        Medications--  Antibiotics:  levoFLOXacin  Tablet 250 milliGRAM(s) Oral every 24 hours    Immunologic:  influenza  Vaccine (HIGH DOSE) 0.7 milliLiter(s) IntraMuscular once    Other:  acetaminophen     Tablet .. PRN  aluminum hydroxide/magnesium hydroxide/simethicone Suspension PRN  carbidopa/levodopa  25/100  collagenase Ointment  diphenhydrAMINE PRN  famotidine    Tablet  furosemide    Tablet  heparin   Injectable  lactobacillus acidophilus  melatonin PRN  nystatin Powder  ondansetron Injectable PRN  potassium chloride    Tablet ER      Review of Systems--  A 10-point review of systems was obtained.     limited   Review of systems otherwise negative except as previously noted.    Physical Examination--  Vital Signs: T(F): 98.3 (12-03-23 @ 12:16), Max: 99.4 (12-02-23 @ 20:12)  HR: 81 (12-03-23 @ 12:16)  BP: 101/61 (12-03-23 @ 12:16)  RR: 17 (12-03-23 @ 12:16)  SpO2: 93% (12-03-23 @ 12:16)  Wt(kg): --  General: Nontoxic-appearing Male in no acute distress.  HEENT: AT/NC.   Neck: Not rigid. No sense of mass.  Nodes: None palpable.  Lungs: Clear bilaterally without rales, wheezing or rhonchi  Heart: Regular rate and rhythm. No Murmur. No rub. No gallop. No palpable thrill.  Abdomen: Bowel sounds present and normoactive. Soft. mildly distended. nt  Back: No spinal tenderness. No costovertebral angle tenderness.   Extremities: No cyanosis or clubbing. b/l stasis dermatitis edema r> l but decreased not hot   Skin: Warm. Dry. Good turgor. . No vasculitic stigmata. rash resolved   Psychiatric: Appropriate affect and mood for situation.         Laboratory Studies--  CBC                        10.7   7.82  )-----------( 189      ( 02 Dec 2023 07:15 )             33.3       Chemistries  12-03    140  |  101  |  18  ----------------------------<  121<H>  3.9   |  33<H>  |  0.92    Ca    8.5      03 Dec 2023 10:18        Culture Data    Culture - Urine (collected 30 Nov 2023 09:30)  Source: Clean Catch Clean Catch (Midstream)  Final Report (01 Dec 2023 11:48):    No growth          < from: US Duplex Venous Lower Ext Complete, Bilateral (12.01.23 @ 09:21) >  eal fossa cyst measuring 5.8 x 1.8 x 3.5 cm.    Subcutaneous edema in the right calf.    LEFT:  Normal compressibility of the LEFT common femoral, femoral and popliteal   veins. 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  Doppler examination shows normal spontaneous and phasic flow.  No LEFT calf vein thrombosis is detected.    Subcutaneous edema is noted in the thigh and calf.    Superficial thrombus in the left small saphenous vein of uncertain   chronicity.    IMPRESSION:  No evidence of deep venous thrombosis in either lower extremity.    Superficial thrombus in the left small saphenous vein of uncertain   chronicity.    < end of copied text >  < from: CT Head No Cont (11.30.23 @ 09:46) >  COMPARISON: MR brain dated 2/4/2021.    FINDINGS:    No acute intracranial hemorrhage. Areas ofdecreased attenuation in the   deep and periventricular white matter, compatible with chronic small   vessel disease. Central parenchymal volume loss. The extra-axial spaces   and basal cisterns are within normal limits. No midline shift or mass   effect present.    The cranial cervical junction is within normal limits. The sella is not   expanded. No depressed calvarial fracture. Mild mucosal thickening in the   ethmoid air cells. The visualized mastoid air cells are well aerated. The   visualized orbits are within normal limits.    IMPRESSION:    1.  No evidence of acute intracranial hemorrhage or midline shift.  2.  Chronic small vessel disease. If there is continued concern for acute   neurologic compromise, recommend MRI of the brain further evaluation.    < end of copied text >    . [facility], Division of Infectious Diseases  RASHID Rosas Y. Patel, S. Shah, G. Valentin  [phone]  after hours and weekends [phone]    Name: DENG SIERRA  Age: 87y  Gender: Male  MRN: [id number]    Interval History--  Notes reviewed  in bed  somnolent   states better  daughter at bedside states rash improved     Allergies    No Known Allergies    Intolerances        Medications--  Antibiotics:  levoFLOXacin  Tablet 250 milliGRAM(s) Oral every 24 hours    Immunologic:  influenza  Vaccine (HIGH DOSE) 0.7 milliLiter(s) IntraMuscular once    Other:  acetaminophen     Tablet .. PRN  aluminum hydroxide/magnesium hydroxide/simethicone Suspension PRN  carbidopa/levodopa  25/100  collagenase Ointment  diphenhydrAMINE PRN  famotidine    Tablet  furosemide    Tablet  heparin   Injectable  lactobacillus acidophilus  melatonin PRN  nystatin Powder  ondansetron Injectable PRN  potassium chloride    Tablet ER      Review of Systems--  A 10-point review of systems was obtained.     limited   Review of systems otherwise negative except as previously noted.    Physical Examination--  Vital Signs: T(F): 98.3 (12-03-23 @ 12:16), Max: 99.4 (12-02-23 @ 20:12)  HR: 81 (12-03-23 @ 12:16)  BP: 101/61 (12-03-23 @ 12:16)  RR: 17 (12-03-23 @ 12:16)  SpO2: 93% (12-03-23 @ 12:16)  Wt(kg): --  General: Nontoxic-appearing Male in no acute distress.  HEENT: AT/NC.   Neck: Not rigid. No sense of mass.  Nodes: None palpable.  Lungs: Clear bilaterally without rales, wheezing or rhonchi  Heart: Regular rate and rhythm. No Murmur. No rub. No gallop. No palpable thrill.  Abdomen: Bowel sounds present and normoactive. Soft. mildly distended. nt  Back: No spinal tenderness. No costovertebral angle tenderness.   Extremities: No cyanosis or clubbing. b/l stasis dermatitis edema r> l but decreased not hot   Skin: Warm. Dry. Good turgor. . No vasculitic stigmata. rash resolved   Psychiatric: Appropriate affect and mood for situation.         Laboratory Studies--  CBC                        10.7   7.82  )-----------( 189      ( 02 Dec 2023 07:15 )             33.3       Chemistries  12-03    140  |  101  |  18  ----------------------------<  121<H>  3.9   |  33<H>  |  0.92    Ca    8.5      03 Dec 2023 10:18        Culture Data    Culture - Urine (collected 30 Nov 2023 09:30)  Source: Clean Catch Clean Catch (Midstream)  Final Report (01 Dec 2023 11:48):    No growth          < from: US Duplex Venous Lower Ext Complete, Bilateral (12.01.23 @ 09:21) >  eal fossa cyst measuring 5.8 x 1.8 x 3.5 cm.    Subcutaneous edema in the right calf.    LEFT:  Normal compressibility of the LEFT common femoral, femoral and popliteal   veins. 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  Doppler examination shows normal spontaneous and phasic flow.  No LEFT calf vein thrombosis is detected.    Subcutaneous edema is noted in the thigh and calf.    Superficial thrombus in the left small saphenous vein of uncertain   chronicity.    IMPRESSION:  No evidence of deep venous thrombosis in either lower extremity.    Superficial thrombus in the left small saphenous vein of uncertain   chronicity.    < end of copied text >  < from: CT Head No Cont (11.30.23 @ 09:46) >  COMPARISON: MR brain dated 2/4/2021.    FINDINGS:    No acute intracranial hemorrhage. Areas ofdecreased attenuation in the   deep and periventricular white matter, compatible with chronic small   vessel disease. Central parenchymal volume loss. The extra-axial spaces   and basal cisterns are within normal limits. No midline shift or mass   effect present.    The cranial cervical junction is within normal limits. The sella is not   expanded. No depressed calvarial fracture. Mild mucosal thickening in the   ethmoid air cells. The visualized mastoid air cells are well aerated. The   visualized orbits are within normal limits.    IMPRESSION:    1.  No evidence of acute intracranial hemorrhage or midline shift.  2.  Chronic small vessel disease. If there is continued concern for acute   neurologic compromise, recommend MRI of the brain further evaluation.    < end of copied text >    . [facility], Division of Infectious Diseases  RASHID Rosas Y. Patel, S. Shah, G. Valentin  [phone]  after hours and weekends [phone]    Name: DENG SIERRA  Age: 87y  Gender: Male  MRN: [id number]    Interval History--  Notes reviewed  in bed  somnolent   states better  daughter at bedside states rash improved     Allergies    No Known Allergies    Intolerances        Medications--  Antibiotics:  levoFLOXacin  Tablet 250 milliGRAM(s) Oral every 24 hours    Immunologic:  influenza  Vaccine (HIGH DOSE) 0.7 milliLiter(s) IntraMuscular once    Other:  acetaminophen     Tablet .. PRN  aluminum hydroxide/magnesium hydroxide/simethicone Suspension PRN  carbidopa/levodopa  25/100  collagenase Ointment  diphenhydrAMINE PRN  famotidine    Tablet  furosemide    Tablet  heparin   Injectable  lactobacillus acidophilus  melatonin PRN  nystatin Powder  ondansetron Injectable PRN  potassium chloride    Tablet ER      Review of Systems--  A 10-point review of systems was obtained.     limited   Review of systems otherwise negative except as previously noted.    Physical Examination--  Vital Signs: T(F): 98.3 (12-03-23 @ 12:16), Max: 99.4 (12-02-23 @ 20:12)  HR: 81 (12-03-23 @ 12:16)  BP: 101/61 (12-03-23 @ 12:16)  RR: 17 (12-03-23 @ 12:16)  SpO2: 93% (12-03-23 @ 12:16)  Wt(kg): --  General: Nontoxic-appearing Male in no acute distress.  HEENT: AT/NC.   Neck: Not rigid. No sense of mass.  Nodes: None palpable.  Lungs: Clear bilaterally without rales, wheezing or rhonchi  Heart: Regular rate and rhythm. No Murmur. No rub. No gallop. No palpable thrill.  Abdomen: Bowel sounds present and normoactive. Soft. mildly distended. nt  Back: No spinal tenderness. No costovertebral angle tenderness.   Extremities: No cyanosis or clubbing. b/l stasis dermatitis edema left > right  but decreased not hot   Skin: Warm. Dry. Good turgor. . No vasculitic stigmata. rash resolved   Psychiatric: Appropriate affect and mood for situation.         Laboratory Studies--  CBC                        10.7   7.82  )-----------( 189      ( 02 Dec 2023 07:15 )             33.3       Chemistries  12-03    140  |  101  |  18  ----------------------------<  121<H>  3.9   |  33<H>  |  0.92    Ca    8.5      03 Dec 2023 10:18        Culture Data    Culture - Urine (collected 30 Nov 2023 09:30)  Source: Clean Catch Clean Catch (Midstream)  Final Report (01 Dec 2023 11:48):    No growth          < from: US Duplex Venous Lower Ext Complete, Bilateral (12.01.23 @ 09:21) >  eal fossa cyst measuring 5.8 x 1.8 x 3.5 cm.    Subcutaneous edema in the right calf.    LEFT:  Normal compressibility of the LEFT common femoral, femoral and popliteal   veins. 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  Doppler examination shows normal spontaneous and phasic flow.  No LEFT calf vein thrombosis is detected.    Subcutaneous edema is noted in the thigh and calf.    Superficial thrombus in the left small saphenous vein of uncertain   chronicity.    IMPRESSION:  No evidence of deep venous thrombosis in either lower extremity.    Superficial thrombus in the left small saphenous vein of uncertain   chronicity.    < end of copied text >  < from: CT Head No Cont (11.30.23 @ 09:46) >  COMPARISON: MR brain dated 2/4/2021.    FINDINGS:    No acute intracranial hemorrhage. Areas ofdecreased attenuation in the   deep and periventricular white matter, compatible with chronic small   vessel disease. Central parenchymal volume loss. The extra-axial spaces   and basal cisterns are within normal limits. No midline shift or mass   effect present.    The cranial cervical junction is within normal limits. The sella is not   expanded. No depressed calvarial fracture. Mild mucosal thickening in the   ethmoid air cells. The visualized mastoid air cells are well aerated. The   visualized orbits are within normal limits.    IMPRESSION:    1.  No evidence of acute intracranial hemorrhage or midline shift.  2.  Chronic small vessel disease. If there is continued concern for acute   neurologic compromise, recommend MRI of the brain further evaluation.    < end of copied text >    . [facility], Division of Infectious Diseases  RASHID Rosas Y. Patel, S. Shah, G. Valentin  [phone]  after hours and weekends [phone]    Name: DENG SIERRA  Age: 87y  Gender: Male  MRN: [id number]    Interval History--  Notes reviewed  in bed  somnolent   states better  daughter at bedside states rash improved     Allergies    No Known Allergies    Intolerances        Medications--  Antibiotics:  levoFLOXacin  Tablet 250 milliGRAM(s) Oral every 24 hours    Immunologic:  influenza  Vaccine (HIGH DOSE) 0.7 milliLiter(s) IntraMuscular once    Other:  acetaminophen     Tablet .. PRN  aluminum hydroxide/magnesium hydroxide/simethicone Suspension PRN  carbidopa/levodopa  25/100  collagenase Ointment  diphenhydrAMINE PRN  famotidine    Tablet  furosemide    Tablet  heparin   Injectable  lactobacillus acidophilus  melatonin PRN  nystatin Powder  ondansetron Injectable PRN  potassium chloride    Tablet ER      Review of Systems--  A 10-point review of systems was obtained.     limited   Review of systems otherwise negative except as previously noted.    Physical Examination--  Vital Signs: T(F): 98.3 (12-03-23 @ 12:16), Max: 99.4 (12-02-23 @ 20:12)  HR: 81 (12-03-23 @ 12:16)  BP: 101/61 (12-03-23 @ 12:16)  RR: 17 (12-03-23 @ 12:16)  SpO2: 93% (12-03-23 @ 12:16)  Wt(kg): --  General: Nontoxic-appearing Male in no acute distress.  HEENT: AT/NC.   Neck: Not rigid. No sense of mass.  Nodes: None palpable.  Lungs: Clear bilaterally without rales, wheezing or rhonchi  Heart: Regular rate and rhythm. No Murmur. No rub. No gallop. No palpable thrill.  Abdomen: Bowel sounds present and normoactive. Soft. mildly distended. nt  Back: No spinal tenderness. No costovertebral angle tenderness.   Extremities: No cyanosis or clubbing. b/l stasis dermatitis edema left > right  but decreased not hot   Skin: Warm. Dry. Good turgor. . No vasculitic stigmata. rash resolved   Psychiatric: Appropriate affect and mood for situation.         Laboratory Studies--  CBC                        10.7   7.82  )-----------( 189      ( 02 Dec 2023 07:15 )             33.3       Chemistries  12-03    140  |  101  |  18  ----------------------------<  121<H>  3.9   |  33<H>  |  0.92    Ca    8.5      03 Dec 2023 10:18        Culture Data    Culture - Urine (collected 30 Nov 2023 09:30)  Source: Clean Catch Clean Catch (Midstream)  Final Report (01 Dec 2023 11:48):    No growth          < from: US Duplex Venous Lower Ext Complete, Bilateral (12.01.23 @ 09:21) >  eal fossa cyst measuring 5.8 x 1.8 x 3.5 cm.    Subcutaneous edema in the right calf.    LEFT:  Normal compressibility of the LEFT common femoral, femoral and popliteal   veins. 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  Doppler examination shows normal spontaneous and phasic flow.  No LEFT calf vein thrombosis is detected.    Subcutaneous edema is noted in the thigh and calf.    Superficial thrombus in the left small saphenous vein of uncertain   chronicity.    IMPRESSION:  No evidence of deep venous thrombosis in either lower extremity.    Superficial thrombus in the left small saphenous vein of uncertain   chronicity.    < end of copied text >  < from: CT Head No Cont (11.30.23 @ 09:46) >  COMPARISON: MR brain dated 2/4/2021.    FINDINGS:    No acute intracranial hemorrhage. Areas ofdecreased attenuation in the   deep and periventricular white matter, compatible with chronic small   vessel disease. Central parenchymal volume loss. The extra-axial spaces   and basal cisterns are within normal limits. No midline shift or mass   effect present.    The cranial cervical junction is within normal limits. The sella is not   expanded. No depressed calvarial fracture. Mild mucosal thickening in the   ethmoid air cells. The visualized mastoid air cells are well aerated. The   visualized orbits are within normal limits.    IMPRESSION:    1.  No evidence of acute intracranial hemorrhage or midline shift.  2.  Chronic small vessel disease. If there is continued concern for acute   neurologic compromise, recommend MRI of the brain further evaluation.    < end of copied text >    . [facility], Division of Infectious Diseases  RASHID Rosas Y. Patel, S. Shah, G. Valentin  [phone]  after hours and weekends [phone]    Name: DENG SIERRA  Age: 87y  Gender: Male  MRN: [id number]    Interval History--  Notes reviewed  in bed  somnolent   states better  daughter at bedside states rash improved     Allergies    No Known Allergies    Intolerances        Medications--  Antibiotics:  levoFLOXacin  Tablet 250 milliGRAM(s) Oral every 24 hours    Immunologic:  influenza  Vaccine (HIGH DOSE) 0.7 milliLiter(s) IntraMuscular once    Other:  acetaminophen     Tablet .. PRN  aluminum hydroxide/magnesium hydroxide/simethicone Suspension PRN  carbidopa/levodopa  25/100  collagenase Ointment  diphenhydrAMINE PRN  famotidine    Tablet  furosemide    Tablet  heparin   Injectable  lactobacillus acidophilus  melatonin PRN  nystatin Powder  ondansetron Injectable PRN  potassium chloride    Tablet ER      Review of Systems--  A 10-point review of systems was obtained.     limited   Review of systems otherwise negative except as previously noted.    Physical Examination--  Vital Signs: T(F): 98.3 (12-03-23 @ 12:16), Max: 99.4 (12-02-23 @ 20:12)  HR: 81 (12-03-23 @ 12:16)  BP: 101/61 (12-03-23 @ 12:16)  RR: 17 (12-03-23 @ 12:16)  SpO2: 93% (12-03-23 @ 12:16)  Wt(kg): --  General: Nontoxic-appearing Male in no acute distress.  HEENT: AT/NC.   Neck: Not rigid. No sense of mass.  Nodes: None palpable.  Lungs: Clear bilaterally without rales, wheezing or rhonchi  Heart: Regular rate and rhythm. No Murmur. No rub. No gallop. No palpable thrill.  Abdomen: Bowel sounds present and normoactive. Soft. mildly distended. nt  Back: No spinal tenderness. No costovertebral angle tenderness.   Extremities: No cyanosis or clubbing. b/l stasis dermatitis edema left > right  but decreased not hot   Skin: Warm. Dry. Good turgor. . No vasculitic stigmata. rash resolved   Psychiatric: Appropriate affect and mood for situation.         Laboratory Studies--  CBC                        10.7   7.82  )-----------( 189      ( 02 Dec 2023 07:15 )             33.3       Chemistries  12-03    140  |  101  |  18  ----------------------------<  121<H>  3.9   |  33<H>  |  0.92    Ca    8.5      03 Dec 2023 10:18        Culture Data    Culture - Urine (collected 30 Nov 2023 09:30)  Source: Clean Catch Clean Catch (Midstream)  Final Report (01 Dec 2023 11:48):    No growth          < from: US Duplex Venous Lower Ext Complete, Bilateral (12.01.23 @ 09:21) >  eal fossa cyst measuring 5.8 x 1.8 x 3.5 cm.    Subcutaneous edema in the right calf.    LEFT:  Normal compressibility of the LEFT common femoral, femoral and popliteal   veins. 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  Doppler examination shows normal spontaneous and phasic flow.  No LEFT calf vein thrombosis is detected.    Subcutaneous edema is noted in the thigh and calf.    Superficial thrombus in the left small saphenous vein of uncertain   chronicity.    IMPRESSION:  No evidence of deep venous thrombosis in either lower extremity.    Superficial thrombus in the left small saphenous vein of uncertain   chronicity.    < end of copied text >  < from: CT Head No Cont (11.30.23 @ 09:46) >  COMPARISON: MR brain dated 2/4/2021.    FINDINGS:    No acute intracranial hemorrhage. Areas ofdecreased attenuation in the   deep and periventricular white matter, compatible with chronic small   vessel disease. Central parenchymal volume loss. The extra-axial spaces   and basal cisterns are within normal limits. No midline shift or mass   effect present.    The cranial cervical junction is within normal limits. The sella is not   expanded. No depressed calvarial fracture. Mild mucosal thickening in the   ethmoid air cells. The visualized mastoid air cells are well aerated. The   visualized orbits are within normal limits.    IMPRESSION:    1.  No evidence of acute intracranial hemorrhage or midline shift.  2.  Chronic small vessel disease. If there is continued concern for acute   neurologic compromise, recommend MRI of the brain further evaluation.    < end of copied text >    .

## 2024-12-17 NOTE — PLAN
Normal [FreeTextEntry1] : leg elevation\par Ani,adaptic touch, DD, ace wrap 2X/week\par \par f/u 1 wk\par \par time spent 25 mins.

## 2025-02-24 NOTE — PATIENT PROFILE ADULT - NSPROPTRIGHTSUPPORTPERSON_GEN_A_NUR
VSS, all questions answered. Denies recent fever or illness. Pt states ready for procedure.    same name as above

## 2025-07-30 NOTE — OCCUPATIONAL THERAPY INITIAL EVALUATION ADULT - ADDITIONAL COMMENTS
[Normal] : mucosa is normal Pt lives in a house with +ramp to enter, resides on main level. Pt has a stall shower with grab bars. Pt is right hand dominant. Pt owns a rolling walker, urinal, commode, raised toilet seat with arms, w/c and grab bars in stall shower. Dtr Zeina reports that patient was able to dress self with extended time and patient utilized a urinal nightime. Pt performed sit to stand with min assist and ambulated 2' using RW with min assist x 1 + 1 with bradykinesia noted. Pt requires assistance with ADL's and transfers due to decreased strength, impaired cognition, impaired motor control, decreased endurance and impaired sitting/standing balance. [Midline] : trachea located in midline position